# Patient Record
Sex: FEMALE | Race: WHITE | Employment: OTHER | URBAN - METROPOLITAN AREA
[De-identification: names, ages, dates, MRNs, and addresses within clinical notes are randomized per-mention and may not be internally consistent; named-entity substitution may affect disease eponyms.]

---

## 2017-01-06 DIAGNOSIS — E11.42 TYPE 2 DIABETES MELLITUS WITH DIABETIC POLYNEUROPATHY, UNSPECIFIED LONG TERM INSULIN USE STATUS: ICD-10-CM

## 2017-01-06 DIAGNOSIS — E03.9 HYPOTHYROIDISM, UNSPECIFIED TYPE: ICD-10-CM

## 2017-01-06 DIAGNOSIS — I10 ESSENTIAL HYPERTENSION: ICD-10-CM

## 2017-01-06 DIAGNOSIS — E78.2 MIXED HYPERLIPIDEMIA: ICD-10-CM

## 2017-01-06 LAB
ALBUMIN SERPL-MCNC: 4.2 G/DL (ref 3.9–4.9)
ALP BLD-CCNC: 87 U/L (ref 40–130)
ALT SERPL-CCNC: 16 U/L (ref 0–33)
ANION GAP SERPL CALCULATED.3IONS-SCNC: 11 MEQ/L (ref 7–13)
AST SERPL-CCNC: 19 U/L (ref 0–35)
BILIRUB SERPL-MCNC: 0.6 MG/DL (ref 0–1.2)
BUN BLDV-MCNC: 15 MG/DL (ref 8–23)
CALCIUM SERPL-MCNC: 9.7 MG/DL (ref 8.6–10.2)
CHLORIDE BLD-SCNC: 102 MEQ/L (ref 98–107)
CO2: 28 MEQ/L (ref 22–29)
CREAT SERPL-MCNC: 0.77 MG/DL (ref 0.5–0.9)
GFR AFRICAN AMERICAN: >60
GFR NON-AFRICAN AMERICAN: >60
GLOBULIN: 2.7 G/DL (ref 2.3–3.5)
GLUCOSE BLD-MCNC: 103 MG/DL (ref 74–109)
HBA1C MFR BLD: 5.8 % (ref 4.8–5.9)
POTASSIUM SERPL-SCNC: 4.8 MEQ/L (ref 3.5–5.1)
SODIUM BLD-SCNC: 141 MEQ/L (ref 132–144)
T4 FREE: 1.16 NG/DL (ref 0.93–1.7)
TOTAL PROTEIN: 6.9 G/DL (ref 6.4–8.1)
TSH SERPL DL<=0.05 MIU/L-ACNC: 5.83 UIU/ML (ref 0.27–4.2)

## 2017-01-13 ENCOUNTER — OFFICE VISIT (OUTPATIENT)
Dept: FAMILY MEDICINE CLINIC | Age: 67
End: 2017-01-13

## 2017-01-13 VITALS
HEIGHT: 63 IN | RESPIRATION RATE: 16 BRPM | BODY MASS INDEX: 44.3 KG/M2 | HEART RATE: 74 BPM | TEMPERATURE: 99.2 F | DIASTOLIC BLOOD PRESSURE: 80 MMHG | WEIGHT: 250 LBS | SYSTOLIC BLOOD PRESSURE: 136 MMHG

## 2017-01-13 DIAGNOSIS — E11.42 TYPE 2 DIABETES MELLITUS WITH DIABETIC POLYNEUROPATHY, WITHOUT LONG-TERM CURRENT USE OF INSULIN (HCC): Primary | ICD-10-CM

## 2017-01-13 DIAGNOSIS — E78.2 MIXED HYPERLIPIDEMIA: ICD-10-CM

## 2017-01-13 DIAGNOSIS — M17.9 OSTEOARTHRITIS OF KNEE, UNSPECIFIED LATERALITY, UNSPECIFIED OSTEOARTHRITIS TYPE: ICD-10-CM

## 2017-01-13 DIAGNOSIS — I10 ESSENTIAL HYPERTENSION: ICD-10-CM

## 2017-01-13 DIAGNOSIS — E03.9 HYPOTHYROIDISM, UNSPECIFIED TYPE: ICD-10-CM

## 2017-01-13 PROCEDURE — 99214 OFFICE O/P EST MOD 30 MIN: CPT | Performed by: FAMILY MEDICINE

## 2017-01-13 RX ORDER — LEVOTHYROXINE SODIUM 0.12 MG/1
125 TABLET ORAL DAILY
Qty: 90 TABLET | Refills: 3
Start: 2017-01-13 | End: 2017-06-05 | Stop reason: SDUPTHER

## 2017-01-13 ASSESSMENT — PATIENT HEALTH QUESTIONNAIRE - PHQ9
2. FEELING DOWN, DEPRESSED OR HOPELESS: 0
SUM OF ALL RESPONSES TO PHQ9 QUESTIONS 1 & 2: 1
1. LITTLE INTEREST OR PLEASURE IN DOING THINGS: 1
SUM OF ALL RESPONSES TO PHQ QUESTIONS 1-9: 1

## 2017-02-28 RX ORDER — LEVOTHYROXINE SODIUM 0.03 MG/1
TABLET ORAL
Qty: 30 TABLET | Refills: 3 | Status: SHIPPED | OUTPATIENT
Start: 2017-02-28 | End: 2017-06-30 | Stop reason: SDUPTHER

## 2017-03-08 RX ORDER — METOPROLOL SUCCINATE 100 MG/1
TABLET, EXTENDED RELEASE ORAL
Qty: 30 TABLET | Refills: 5 | Status: SHIPPED | OUTPATIENT
Start: 2017-03-08 | End: 2017-10-01 | Stop reason: SDUPTHER

## 2017-03-14 ENCOUNTER — TELEPHONE (OUTPATIENT)
Dept: FAMILY MEDICINE CLINIC | Age: 67
End: 2017-03-14

## 2017-04-05 ENCOUNTER — OFFICE VISIT (OUTPATIENT)
Dept: FAMILY MEDICINE CLINIC | Age: 67
End: 2017-04-05

## 2017-04-05 VITALS
DIASTOLIC BLOOD PRESSURE: 76 MMHG | RESPIRATION RATE: 18 BRPM | HEART RATE: 70 BPM | TEMPERATURE: 97.8 F | BODY MASS INDEX: 46.07 KG/M2 | SYSTOLIC BLOOD PRESSURE: 132 MMHG | HEIGHT: 63 IN | WEIGHT: 260 LBS

## 2017-04-05 DIAGNOSIS — I10 ESSENTIAL HYPERTENSION: ICD-10-CM

## 2017-04-05 DIAGNOSIS — E78.2 MIXED HYPERLIPIDEMIA: ICD-10-CM

## 2017-04-05 DIAGNOSIS — E03.9 HYPOTHYROIDISM, UNSPECIFIED TYPE: ICD-10-CM

## 2017-04-05 DIAGNOSIS — E11.42 TYPE 2 DIABETES MELLITUS WITH DIABETIC POLYNEUROPATHY, WITHOUT LONG-TERM CURRENT USE OF INSULIN (HCC): Primary | ICD-10-CM

## 2017-04-05 PROCEDURE — 99213 OFFICE O/P EST LOW 20 MIN: CPT | Performed by: FAMILY MEDICINE

## 2017-05-09 ENCOUNTER — OFFICE VISIT (OUTPATIENT)
Dept: FAMILY MEDICINE CLINIC | Age: 67
End: 2017-05-09

## 2017-05-09 VITALS
HEIGHT: 64 IN | WEIGHT: 257 LBS | DIASTOLIC BLOOD PRESSURE: 84 MMHG | BODY MASS INDEX: 43.87 KG/M2 | RESPIRATION RATE: 14 BRPM | SYSTOLIC BLOOD PRESSURE: 126 MMHG | HEART RATE: 72 BPM | TEMPERATURE: 97.7 F

## 2017-05-09 DIAGNOSIS — I10 ESSENTIAL HYPERTENSION: ICD-10-CM

## 2017-05-09 DIAGNOSIS — M17.9 OSTEOARTHRITIS OF KNEE, UNSPECIFIED LATERALITY, UNSPECIFIED OSTEOARTHRITIS TYPE: ICD-10-CM

## 2017-05-09 DIAGNOSIS — E03.9 HYPOTHYROIDISM, UNSPECIFIED TYPE: ICD-10-CM

## 2017-05-09 DIAGNOSIS — E78.2 MIXED HYPERLIPIDEMIA: ICD-10-CM

## 2017-05-09 DIAGNOSIS — E11.42 TYPE 2 DIABETES MELLITUS WITH DIABETIC POLYNEUROPATHY, WITHOUT LONG-TERM CURRENT USE OF INSULIN (HCC): Primary | ICD-10-CM

## 2017-05-09 PROCEDURE — 99213 OFFICE O/P EST LOW 20 MIN: CPT | Performed by: FAMILY MEDICINE

## 2017-05-26 ENCOUNTER — TELEPHONE (OUTPATIENT)
Dept: FAMILY MEDICINE CLINIC | Age: 67
End: 2017-05-26

## 2017-06-05 RX ORDER — LEVOTHYROXINE SODIUM 0.12 MG/1
125 TABLET ORAL DAILY
Qty: 90 TABLET | Refills: 1 | Status: SHIPPED | OUTPATIENT
Start: 2017-06-05 | End: 2017-07-15 | Stop reason: DRUGHIGH

## 2017-06-20 ENCOUNTER — OFFICE VISIT (OUTPATIENT)
Dept: FAMILY MEDICINE CLINIC | Age: 67
End: 2017-06-20

## 2017-06-20 VITALS
SYSTOLIC BLOOD PRESSURE: 118 MMHG | RESPIRATION RATE: 16 BRPM | TEMPERATURE: 97.6 F | HEART RATE: 72 BPM | HEIGHT: 63 IN | BODY MASS INDEX: 45 KG/M2 | DIASTOLIC BLOOD PRESSURE: 72 MMHG | WEIGHT: 254 LBS

## 2017-06-20 DIAGNOSIS — E03.9 HYPOTHYROIDISM, UNSPECIFIED TYPE: ICD-10-CM

## 2017-06-20 DIAGNOSIS — I10 ESSENTIAL HYPERTENSION: ICD-10-CM

## 2017-06-20 DIAGNOSIS — E78.2 MIXED HYPERLIPIDEMIA: ICD-10-CM

## 2017-06-20 DIAGNOSIS — E11.42 TYPE 2 DIABETES MELLITUS WITH DIABETIC POLYNEUROPATHY, WITHOUT LONG-TERM CURRENT USE OF INSULIN (HCC): Primary | ICD-10-CM

## 2017-06-20 DIAGNOSIS — M17.9 OSTEOARTHRITIS OF KNEE, UNSPECIFIED LATERALITY, UNSPECIFIED OSTEOARTHRITIS TYPE: ICD-10-CM

## 2017-06-20 PROCEDURE — 99213 OFFICE O/P EST LOW 20 MIN: CPT | Performed by: FAMILY MEDICINE

## 2017-06-23 RX ORDER — LOSARTAN POTASSIUM AND HYDROCHLOROTHIAZIDE 25; 100 MG/1; MG/1
TABLET ORAL
Qty: 90 TABLET | Refills: 1 | Status: SHIPPED | OUTPATIENT
Start: 2017-06-23 | End: 2017-12-19 | Stop reason: SDUPTHER

## 2017-06-30 RX ORDER — LEVOTHYROXINE SODIUM 0.03 MG/1
TABLET ORAL
Qty: 30 TABLET | Refills: 3 | Status: SHIPPED | OUTPATIENT
Start: 2017-06-30 | End: 2017-07-15 | Stop reason: DRUGHIGH

## 2017-07-12 DIAGNOSIS — E78.2 MIXED HYPERLIPIDEMIA: ICD-10-CM

## 2017-07-12 DIAGNOSIS — I10 ESSENTIAL HYPERTENSION: Primary | ICD-10-CM

## 2017-07-12 DIAGNOSIS — E11.42 TYPE 2 DIABETES MELLITUS WITH DIABETIC POLYNEUROPATHY, WITHOUT LONG-TERM CURRENT USE OF INSULIN (HCC): ICD-10-CM

## 2017-07-12 DIAGNOSIS — E03.9 HYPOTHYROIDISM, UNSPECIFIED TYPE: ICD-10-CM

## 2017-07-13 DIAGNOSIS — E03.9 HYPOTHYROIDISM, UNSPECIFIED TYPE: ICD-10-CM

## 2017-07-13 DIAGNOSIS — I10 ESSENTIAL HYPERTENSION: ICD-10-CM

## 2017-07-13 DIAGNOSIS — E11.42 TYPE 2 DIABETES MELLITUS WITH DIABETIC POLYNEUROPATHY, WITHOUT LONG-TERM CURRENT USE OF INSULIN (HCC): ICD-10-CM

## 2017-07-13 DIAGNOSIS — E78.2 MIXED HYPERLIPIDEMIA: ICD-10-CM

## 2017-07-13 LAB
ALBUMIN SERPL-MCNC: 3.9 G/DL (ref 3.9–4.9)
ALP BLD-CCNC: 69 U/L (ref 40–130)
ALT SERPL-CCNC: 12 U/L (ref 0–33)
ANION GAP SERPL CALCULATED.3IONS-SCNC: 13 MEQ/L (ref 7–13)
AST SERPL-CCNC: 18 U/L (ref 0–35)
BILIRUB SERPL-MCNC: 0.5 MG/DL (ref 0–1.2)
BUN BLDV-MCNC: 17 MG/DL (ref 8–23)
CALCIUM SERPL-MCNC: 9.2 MG/DL (ref 8.6–10.2)
CHLORIDE BLD-SCNC: 102 MEQ/L (ref 98–107)
CHOLESTEROL, TOTAL: 149 MG/DL (ref 0–199)
CO2: 25 MEQ/L (ref 22–29)
CREAT SERPL-MCNC: 0.95 MG/DL (ref 0.5–0.9)
GFR AFRICAN AMERICAN: >60
GFR NON-AFRICAN AMERICAN: 58.7
GLOBULIN: 2.8 G/DL (ref 2.3–3.5)
GLUCOSE BLD-MCNC: 98 MG/DL (ref 74–109)
HBA1C MFR BLD: 6.1 % (ref 4.8–5.9)
HDLC SERPL-MCNC: 46 MG/DL (ref 40–59)
LDL CHOLESTEROL CALCULATED: 72 MG/DL (ref 0–129)
POTASSIUM SERPL-SCNC: 4.5 MEQ/L (ref 3.5–5.1)
SODIUM BLD-SCNC: 140 MEQ/L (ref 132–144)
T4 FREE: 1.24 NG/DL (ref 0.93–1.7)
TOTAL PROTEIN: 6.7 G/DL (ref 6.4–8.1)
TRIGL SERPL-MCNC: 154 MG/DL (ref 0–200)
TSH SERPL DL<=0.05 MIU/L-ACNC: 9.31 UIU/ML (ref 0.27–4.2)

## 2017-07-14 ENCOUNTER — PATIENT MESSAGE (OUTPATIENT)
Dept: FAMILY MEDICINE CLINIC | Age: 67
End: 2017-07-14

## 2017-07-14 DIAGNOSIS — E03.9 HYPOTHYROIDISM, UNSPECIFIED TYPE: Primary | ICD-10-CM

## 2017-07-15 ENCOUNTER — PATIENT MESSAGE (OUTPATIENT)
Dept: FAMILY MEDICINE CLINIC | Age: 67
End: 2017-07-15

## 2017-07-15 DIAGNOSIS — E03.9 HYPOTHYROIDISM, UNSPECIFIED TYPE: Primary | ICD-10-CM

## 2017-07-15 RX ORDER — LEVOTHYROXINE SODIUM 0.15 MG/1
150 TABLET ORAL DAILY
Qty: 30 TABLET | Refills: 3 | Status: SHIPPED | OUTPATIENT
Start: 2017-07-15 | End: 2017-10-09 | Stop reason: SDUPTHER

## 2017-07-15 RX ORDER — LEVOTHYROXINE SODIUM 175 UG/1
175 TABLET ORAL DAILY
Qty: 30 TABLET | Refills: 3 | Status: SHIPPED | OUTPATIENT
Start: 2017-07-15 | End: 2017-07-15 | Stop reason: DRUGHIGH

## 2017-07-19 ENCOUNTER — OFFICE VISIT (OUTPATIENT)
Dept: FAMILY MEDICINE CLINIC | Age: 67
End: 2017-07-19

## 2017-07-19 VITALS
HEART RATE: 72 BPM | DIASTOLIC BLOOD PRESSURE: 78 MMHG | HEIGHT: 63 IN | SYSTOLIC BLOOD PRESSURE: 116 MMHG | WEIGHT: 248 LBS | TEMPERATURE: 97.7 F | BODY MASS INDEX: 43.94 KG/M2 | RESPIRATION RATE: 14 BRPM

## 2017-07-19 DIAGNOSIS — E78.2 MIXED HYPERLIPIDEMIA: ICD-10-CM

## 2017-07-19 DIAGNOSIS — I10 ESSENTIAL HYPERTENSION: ICD-10-CM

## 2017-07-19 DIAGNOSIS — E03.9 HYPOTHYROIDISM, UNSPECIFIED TYPE: ICD-10-CM

## 2017-07-19 DIAGNOSIS — E11.42 TYPE 2 DIABETES MELLITUS WITH DIABETIC POLYNEUROPATHY, WITHOUT LONG-TERM CURRENT USE OF INSULIN (HCC): Primary | ICD-10-CM

## 2017-07-19 PROCEDURE — 99213 OFFICE O/P EST LOW 20 MIN: CPT | Performed by: FAMILY MEDICINE

## 2017-10-02 RX ORDER — METOPROLOL SUCCINATE 100 MG/1
TABLET, EXTENDED RELEASE ORAL
Qty: 30 TABLET | Refills: 5 | Status: SHIPPED | OUTPATIENT
Start: 2017-10-02 | End: 2018-04-14 | Stop reason: SDUPTHER

## 2017-10-07 DIAGNOSIS — E11.42 TYPE 2 DIABETES MELLITUS WITH DIABETIC POLYNEUROPATHY, WITHOUT LONG-TERM CURRENT USE OF INSULIN (HCC): ICD-10-CM

## 2017-10-07 DIAGNOSIS — E03.9 HYPOTHYROIDISM, UNSPECIFIED TYPE: ICD-10-CM

## 2017-10-07 DIAGNOSIS — E11.42 TYPE 2 DIABETES MELLITUS WITH DIABETIC POLYNEUROPATHY, WITHOUT LONG-TERM CURRENT USE OF INSULIN (HCC): Primary | ICD-10-CM

## 2017-10-07 LAB
HBA1C MFR BLD: 5.8 % (ref 4.8–5.9)
T4 FREE: 1.89 NG/DL (ref 0.93–1.7)
TSH SERPL DL<=0.05 MIU/L-ACNC: 0.53 UIU/ML (ref 0.27–4.2)

## 2017-10-09 DIAGNOSIS — E03.9 HYPOTHYROIDISM, UNSPECIFIED TYPE: ICD-10-CM

## 2017-10-09 RX ORDER — LEVOTHYROXINE SODIUM 0.15 MG/1
150 TABLET ORAL DAILY
Qty: 90 TABLET | Refills: 0 | Status: SHIPPED | OUTPATIENT
Start: 2017-10-09 | End: 2018-06-13 | Stop reason: DRUGHIGH

## 2017-10-13 ENCOUNTER — OFFICE VISIT (OUTPATIENT)
Dept: FAMILY MEDICINE CLINIC | Age: 67
End: 2017-10-13

## 2017-10-13 VITALS
WEIGHT: 248 LBS | HEART RATE: 76 BPM | TEMPERATURE: 98.6 F | SYSTOLIC BLOOD PRESSURE: 124 MMHG | BODY MASS INDEX: 43.94 KG/M2 | RESPIRATION RATE: 18 BRPM | HEIGHT: 63 IN | DIASTOLIC BLOOD PRESSURE: 78 MMHG

## 2017-10-13 DIAGNOSIS — I10 ESSENTIAL HYPERTENSION: ICD-10-CM

## 2017-10-13 DIAGNOSIS — Z23 NEED FOR INFLUENZA VACCINATION: ICD-10-CM

## 2017-10-13 DIAGNOSIS — E78.2 MIXED HYPERLIPIDEMIA: ICD-10-CM

## 2017-10-13 DIAGNOSIS — E11.42 TYPE 2 DIABETES MELLITUS WITH DIABETIC POLYNEUROPATHY, WITHOUT LONG-TERM CURRENT USE OF INSULIN (HCC): Primary | ICD-10-CM

## 2017-10-13 DIAGNOSIS — Z12.31 ENCOUNTER FOR SCREENING MAMMOGRAM FOR BREAST CANCER: ICD-10-CM

## 2017-10-13 DIAGNOSIS — E03.9 HYPOTHYROIDISM, UNSPECIFIED TYPE: ICD-10-CM

## 2017-10-13 PROCEDURE — 90662 IIV NO PRSV INCREASED AG IM: CPT | Performed by: FAMILY MEDICINE

## 2017-10-13 PROCEDURE — 90471 IMMUNIZATION ADMIN: CPT | Performed by: FAMILY MEDICINE

## 2017-10-13 PROCEDURE — 99214 OFFICE O/P EST MOD 30 MIN: CPT | Performed by: FAMILY MEDICINE

## 2017-10-13 NOTE — PROGRESS NOTES
Chief Complaint   Patient presents with    Diabetes Mellitus    Hypertension    Hyperlipidemia     Rowdy Polo is here for a diabetic follow up    HBA1C= 5.8    LDL= 72    Sugars most recently have been running-   HIGH & LOW <150  Activity level-     advised to increase  The patient denies history of       seizures,             heart attack or KNOWN CAD       or stroke. No chest pain, shortness of breath, paroxysmal nocturnal dyspnea. No nausea, vomiting, diarrhea, hematochezia or melena. No paresthesias or headaches      No dysuria, frequency or hematuria.     Past Medical History:   Diagnosis Date    DM2 (diabetes mellitus, type 2) ([facility] [address]     Hyperlipidemia     Hypertension     Hypothyroidism      Past Surgical History:   Procedure Laterality Date     SECTION      COLONOSCOPY      needs      Social History     Social History    Marital status:      Spouse name: N/A    Number of children: N/A    Years of education: N/A     Social History Main Topics    Smoking status: Never Smoker    Smokeless tobacco: Never Used    Alcohol use Yes      Comment: rare     Drug use: No    Sexual activity: Yes     Partners: Male     Other Topics Concern    None     Social History Narrative    None     Orders Only on 10/07/2017   Component Date Value Ref Range Status    Hemoglobin A1C 10/07/2017 5.8  4.8 - 5.9 % Final    TSH 10/07/2017 0.527  0.270 - 4.200 uIU/mL Final    T4 Free 10/07/2017 1.89* 0.93 - 1.70 ng/dL Final     Health Maintenance   Topic Date Due    Hepatitis C screen  1950    Flu vaccine (1) 2017    Diabetic foot exam  10/12/2017    DEXA (modify frequency per FRAX score)  2018 (Originally 2015)    DTaP/Tdap/Td vaccine (1 - Tdap) 2018 (Originally 1969)    Diabetic microalbuminuria test  2017    Diabetic retinal exam  2018    Colon cancer screen colonoscopy  2018    Lipid screen  2018    Breast trying w diet and exercise  Has lost weight  May want to return to contrave  Will skip one d/wk thyroid med  Labs in 3-4mo    And again had a lengthy discussion w pt  about risks of poorly controlled diabetes including micro and macrovascular complications of DM2 including blindness,MI,CVA and death among other possibilities. Pt aware and agrees to better compliance and adherance to instructions such as regular eye exams q 1-2 y, foot exams,and f/u regularly for hba1c with a goal of 6.5.     NO evidence of neuropathy or nephropathy at this point    Follow up as planned for hba1c and BP checks    Sooner if condition deteriorates or problems arise    Adis Mccartney MD

## 2017-10-25 ENCOUNTER — HOSPITAL ENCOUNTER (OUTPATIENT)
Dept: WOMENS IMAGING | Age: 67
Discharge: HOME | End: 2017-10-25
Payer: COMMERCIAL

## 2017-10-25 DIAGNOSIS — Z12.31 ENCOUNTER FOR SCREENING MAMMOGRAM FOR BREAST CANCER: ICD-10-CM

## 2017-10-25 PROCEDURE — G0202 SCR MAMMO BI INCL CAD: HCPCS

## 2017-12-19 RX ORDER — LOSARTAN POTASSIUM AND HYDROCHLOROTHIAZIDE 25; 100 MG/1; MG/1
TABLET ORAL
Qty: 90 TABLET | Refills: 1 | Status: SHIPPED | OUTPATIENT
Start: 2017-12-19 | End: 2018-06-25 | Stop reason: SDUPTHER

## 2018-01-10 RX ORDER — ROSUVASTATIN CALCIUM 10 MG/1
TABLET, COATED ORAL
Qty: 90 TABLET | Refills: 1 | Status: SHIPPED | OUTPATIENT
Start: 2018-01-10 | End: 2018-07-12 | Stop reason: SDUPTHER

## 2018-03-05 DIAGNOSIS — E11.42 TYPE 2 DIABETES MELLITUS WITH DIABETIC POLYNEUROPATHY, WITHOUT LONG-TERM CURRENT USE OF INSULIN (HCC): ICD-10-CM

## 2018-03-05 DIAGNOSIS — I10 ESSENTIAL HYPERTENSION: ICD-10-CM

## 2018-03-05 DIAGNOSIS — E78.2 MIXED HYPERLIPIDEMIA: ICD-10-CM

## 2018-03-05 DIAGNOSIS — E03.9 HYPOTHYROIDISM, UNSPECIFIED TYPE: Primary | ICD-10-CM

## 2018-03-06 DIAGNOSIS — I10 ESSENTIAL HYPERTENSION: ICD-10-CM

## 2018-03-06 DIAGNOSIS — E78.2 MIXED HYPERLIPIDEMIA: ICD-10-CM

## 2018-03-06 DIAGNOSIS — E03.9 HYPOTHYROIDISM, UNSPECIFIED TYPE: ICD-10-CM

## 2018-03-06 DIAGNOSIS — E11.42 TYPE 2 DIABETES MELLITUS WITH DIABETIC POLYNEUROPATHY, WITHOUT LONG-TERM CURRENT USE OF INSULIN (HCC): ICD-10-CM

## 2018-03-06 LAB
ALBUMIN SERPL-MCNC: 4.1 G/DL (ref 3.9–4.9)
ALP BLD-CCNC: 81 U/L (ref 40–130)
ALT SERPL-CCNC: 12 U/L (ref 0–33)
ANION GAP SERPL CALCULATED.3IONS-SCNC: 14 MEQ/L (ref 7–13)
AST SERPL-CCNC: 16 U/L (ref 0–35)
BILIRUB SERPL-MCNC: 0.5 MG/DL (ref 0–1.2)
BUN BLDV-MCNC: 15 MG/DL (ref 8–23)
CALCIUM SERPL-MCNC: 9.4 MG/DL (ref 8.6–10.2)
CHLORIDE BLD-SCNC: 103 MEQ/L (ref 98–107)
CO2: 26 MEQ/L (ref 22–29)
CREAT SERPL-MCNC: 0.91 MG/DL (ref 0.5–0.9)
CREATININE URINE: 37.6 MG/DL
GFR AFRICAN AMERICAN: >60
GFR NON-AFRICAN AMERICAN: >60
GLOBULIN: 2.7 G/DL (ref 2.3–3.5)
GLUCOSE BLD-MCNC: 90 MG/DL (ref 74–109)
HBA1C MFR BLD: 6 % (ref 4.8–5.9)
MICROALBUMIN UR-MCNC: <1.2 MG/DL
MICROALBUMIN/CREAT UR-RTO: NORMAL MG/G (ref 0–30)
POTASSIUM SERPL-SCNC: 4.6 MEQ/L (ref 3.5–5.1)
SODIUM BLD-SCNC: 143 MEQ/L (ref 132–144)
T4 FREE: 1.23 NG/DL (ref 0.93–1.7)
TOTAL PROTEIN: 6.8 G/DL (ref 6.4–8.1)
TSH SERPL DL<=0.05 MIU/L-ACNC: 1.97 UIU/ML (ref 0.27–4.2)

## 2018-03-13 ENCOUNTER — OFFICE VISIT (OUTPATIENT)
Dept: FAMILY MEDICINE CLINIC | Age: 68
End: 2018-03-13
Payer: COMMERCIAL

## 2018-03-13 VITALS
RESPIRATION RATE: 16 BRPM | SYSTOLIC BLOOD PRESSURE: 118 MMHG | HEIGHT: 63 IN | BODY MASS INDEX: 42.88 KG/M2 | DIASTOLIC BLOOD PRESSURE: 62 MMHG | HEART RATE: 76 BPM | WEIGHT: 242 LBS | TEMPERATURE: 98.1 F

## 2018-03-13 DIAGNOSIS — E03.9 HYPOTHYROIDISM, UNSPECIFIED TYPE: ICD-10-CM

## 2018-03-13 DIAGNOSIS — E78.2 MIXED HYPERLIPIDEMIA: ICD-10-CM

## 2018-03-13 DIAGNOSIS — R19.4 CHANGE IN BOWEL HABITS: ICD-10-CM

## 2018-03-13 DIAGNOSIS — I10 ESSENTIAL HYPERTENSION: ICD-10-CM

## 2018-03-13 DIAGNOSIS — Z12.11 COLON CANCER SCREENING: ICD-10-CM

## 2018-03-13 DIAGNOSIS — E11.42 TYPE 2 DIABETES MELLITUS WITH DIABETIC POLYNEUROPATHY, WITHOUT LONG-TERM CURRENT USE OF INSULIN (HCC): Primary | ICD-10-CM

## 2018-03-13 PROCEDURE — 99214 OFFICE O/P EST MOD 30 MIN: CPT | Performed by: FAMILY MEDICINE

## 2018-03-13 RX ORDER — NALTREXONE HYDROCHLORIDE AND BUPROPION HYDROCHLORIDE 8; 90 MG/1; MG/1
TABLET, EXTENDED RELEASE ORAL
Refills: 3 | COMMUNITY
Start: 2018-02-13 | End: 2018-06-13 | Stop reason: SDUPTHER

## 2018-03-13 ASSESSMENT — PATIENT HEALTH QUESTIONNAIRE - PHQ9
SUM OF ALL RESPONSES TO PHQ QUESTIONS 1-9: 0
2. FEELING DOWN, DEPRESSED OR HOPELESS: 0
1. LITTLE INTEREST OR PLEASURE IN DOING THINGS: 0
SUM OF ALL RESPONSES TO PHQ9 QUESTIONS 1 & 2: 0

## 2018-03-13 NOTE — PROGRESS NOTES
controlled diabetes including micro and macrovascular complications of DM2 including blindness,MI,CVA and death among other possibilities. Pt aware and agrees to better compliance and adherance to instructions such as regular eye exams q 1-2 y, foot exams,and f/u regularly for hba1c with a goal of 6.5.     NO evidence of neuropathy or nephropathy at this point    Follow up as planned for hba1c and BP checks    Sooner if condition deteriorates or problems arise    iMcki Alanis MD

## 2018-03-15 ENCOUNTER — TELEPHONE (OUTPATIENT)
Dept: FAMILY MEDICINE CLINIC | Age: 68
End: 2018-03-15

## 2018-03-27 ENCOUNTER — OFFICE VISIT (OUTPATIENT)
Dept: GASTROENTEROLOGY | Age: 68
End: 2018-03-27
Payer: COMMERCIAL

## 2018-03-27 VITALS
DIASTOLIC BLOOD PRESSURE: 82 MMHG | HEIGHT: 64 IN | BODY MASS INDEX: 40.63 KG/M2 | RESPIRATION RATE: 16 BRPM | SYSTOLIC BLOOD PRESSURE: 152 MMHG | HEART RATE: 53 BPM | WEIGHT: 238 LBS

## 2018-03-27 DIAGNOSIS — Z12.11 SPECIAL SCREENING FOR MALIGNANT NEOPLASMS, COLON: Primary | ICD-10-CM

## 2018-03-27 DIAGNOSIS — Z79.82 ENCOUNTER FOR LONG-TERM (CURRENT) USE OF ASPIRIN: ICD-10-CM

## 2018-03-27 DIAGNOSIS — Z79.899 ENCOUNTER FOR LONG-TERM (CURRENT) USE OF MEDICATIONS: ICD-10-CM

## 2018-03-27 PROCEDURE — 99203 OFFICE O/P NEW LOW 30 MIN: CPT | Performed by: INTERNAL MEDICINE

## 2018-03-27 NOTE — PROGRESS NOTES
Jai Sees  79 y.o. female  Referred by: Lawrence Martínez MD    Medicines, social history, past medical history, surgical history, and allergies have been reviewed and updated as needed. Chief Complaint   Patient presents with    Other     change in bowel          HPI:   Patient is a 79year old WF referred for a screening colonoscopy. Family history is negative for colon carcinoma or colonic polyps. There is no bleeding. The rest of patient's GI history is negative. ROS:    CONSTITUTIONAL:  Negative  EYES:  Negative  ENMT:  Negative  MUSCULOSKELETAL:  Hip pain  NEUROLOGICAL:  Negative  INTEGUMENTARY:  Negative  GASTROINTESTINAL:  See HPI  GENITOURINARY:  Negative  CARDIOVASCULAR:  Hypertension  RESPIRATORY:  Shortness of breath with activity  HEM/LYMPH:  Negative  ENDOCRINE:  Diabetes  PSYCHIATRIC:  Negative  ALLERGIC/IMMUNO:  Negative  EXTREMITIES:  Negative  BREAST:  Negative        Physical Exam:  BP (!) 152/82 (Site: Right Arm, Position: Sitting)   Pulse 53   Resp 16   Ht 5' 4\" (1.626 m)   Wt 238 lb (108 kg)   LMP  (LMP Unknown)   BMI 40.85 kg/m²   Constitutional:  Patient appears overweight, well nourished, well developed, and hydrated. Alert and oriented x3 and appropriate. Non icteric and not pale. Eyes:  Pupils equal and reactive. Oropharynx:  Unremarkable. Neck/Thyroid: Neck is supple. No palpable nodules. No carotid bruits. Thyroid is symmetrical, without thyromegaly, masses, or palpable nodules. Respiratory:  Normal to inspection. Lungs clear to auscultation and percussion. No wheezes rhonchi or rales. Cardiovascular:  Regular rate and rhythm. No murmurs, gallops, or rubs. Abdomen:  Soft, obese, no tenderness and non-distended. No organomegaly. No masses. No rebound or guarding. Normal bowel sounds. Integumentary:  The skin is unremarkable. No rashes. No suspicious lesions. Warm and dry. Neuro: Grossly intact.  Cranial nerves, sensation and reflexes grossly

## 2018-04-13 ENCOUNTER — HOSPITAL ENCOUNTER (OUTPATIENT)
Age: 68
Setting detail: OUTPATIENT SURGERY
Discharge: HOME | End: 2018-04-13
Attending: INTERNAL MEDICINE | Admitting: INTERNAL MEDICINE
Payer: COMMERCIAL

## 2018-04-13 ENCOUNTER — ANESTHESIA (OUTPATIENT)
Dept: ENDOSCOPY | Age: 68
End: 2018-04-13
Payer: COMMERCIAL

## 2018-04-13 ENCOUNTER — ANESTHESIA EVENT (OUTPATIENT)
Dept: ENDOSCOPY | Age: 68
End: 2018-04-13
Payer: COMMERCIAL

## 2018-04-13 VITALS
SYSTOLIC BLOOD PRESSURE: 132 MMHG | WEIGHT: 238 LBS | TEMPERATURE: 98.5 F | RESPIRATION RATE: 16 BRPM | HEIGHT: 64 IN | BODY MASS INDEX: 40.63 KG/M2 | OXYGEN SATURATION: 96 % | HEART RATE: 51 BPM | DIASTOLIC BLOOD PRESSURE: 66 MMHG

## 2018-04-13 VITALS
OXYGEN SATURATION: 96 % | SYSTOLIC BLOOD PRESSURE: 219 MMHG | DIASTOLIC BLOOD PRESSURE: 94 MMHG | RESPIRATION RATE: 21 BRPM

## 2018-04-13 PROCEDURE — 7100000010 HC PHASE II RECOVERY - FIRST 15 MIN: Performed by: INTERNAL MEDICINE

## 2018-04-13 PROCEDURE — 3700000000 HC ANESTHESIA ATTENDED CARE: Performed by: INTERNAL MEDICINE

## 2018-04-13 PROCEDURE — 2580000003 HC RX 258: Performed by: NURSE ANESTHETIST, CERTIFIED REGISTERED

## 2018-04-13 PROCEDURE — 7100000011 HC PHASE II RECOVERY - ADDTL 15 MIN: Performed by: INTERNAL MEDICINE

## 2018-04-13 PROCEDURE — 3700000001 HC ADD 15 MINUTES (ANESTHESIA): Performed by: INTERNAL MEDICINE

## 2018-04-13 PROCEDURE — 6360000002 HC RX W HCPCS: Performed by: NURSE ANESTHETIST, CERTIFIED REGISTERED

## 2018-04-13 PROCEDURE — 3609027000 HC COLONOSCOPY: Performed by: INTERNAL MEDICINE

## 2018-04-13 PROCEDURE — 2500000003 HC RX 250 WO HCPCS: Performed by: NURSE ANESTHETIST, CERTIFIED REGISTERED

## 2018-04-13 PROCEDURE — 45385 COLONOSCOPY W/LESION REMOVAL: CPT | Performed by: INTERNAL MEDICINE

## 2018-04-13 RX ORDER — SODIUM CHLORIDE 9 MG/ML
INJECTION, SOLUTION INTRAVENOUS CONTINUOUS
Status: DISCONTINUED | OUTPATIENT
Start: 2018-04-13 | End: 2018-04-13 | Stop reason: HOSPADM

## 2018-04-13 RX ORDER — ONDANSETRON 2 MG/ML
4 INJECTION INTRAMUSCULAR; INTRAVENOUS
Status: DISCONTINUED | OUTPATIENT
Start: 2018-04-13 | End: 2018-04-13 | Stop reason: HOSPADM

## 2018-04-13 RX ORDER — SODIUM CHLORIDE 0.9 % (FLUSH) 0.9 %
SYRINGE (ML) INJECTION PRN
Status: DISCONTINUED | OUTPATIENT
Start: 2018-04-13 | End: 2018-04-13 | Stop reason: SDUPTHER

## 2018-04-13 RX ORDER — PROPOFOL 10 MG/ML
INJECTION, EMULSION INTRAVENOUS PRN
Status: DISCONTINUED | OUTPATIENT
Start: 2018-04-13 | End: 2018-04-13 | Stop reason: SDUPTHER

## 2018-04-13 RX ORDER — LABETALOL HYDROCHLORIDE 5 MG/ML
INJECTION, SOLUTION INTRAVENOUS PRN
Status: DISCONTINUED | OUTPATIENT
Start: 2018-04-13 | End: 2018-04-13 | Stop reason: SDUPTHER

## 2018-04-13 RX ORDER — LIDOCAINE HYDROCHLORIDE 20 MG/ML
INJECTION, SOLUTION INFILTRATION; PERINEURAL PRN
Status: DISCONTINUED | OUTPATIENT
Start: 2018-04-13 | End: 2018-04-13 | Stop reason: SDUPTHER

## 2018-04-13 RX ADMIN — PROPOFOL 40 MG: 10 INJECTION, EMULSION INTRAVENOUS at 09:34

## 2018-04-13 RX ADMIN — SODIUM CHLORIDE, PRESERVATIVE FREE 10 ML: 5 INJECTION INTRAVENOUS at 09:47

## 2018-04-13 RX ADMIN — PROPOFOL 40 MG: 10 INJECTION, EMULSION INTRAVENOUS at 09:36

## 2018-04-13 RX ADMIN — LABETALOL HYDROCHLORIDE 10 MG: 5 INJECTION, SOLUTION INTRAVENOUS at 09:48

## 2018-04-13 RX ADMIN — LIDOCAINE HYDROCHLORIDE 60 MG: 20 INJECTION, SOLUTION INFILTRATION; PERINEURAL at 09:33

## 2018-04-13 RX ADMIN — PROPOFOL 100 MG: 10 INJECTION, EMULSION INTRAVENOUS at 09:33

## 2018-04-13 ASSESSMENT — PAIN - FUNCTIONAL ASSESSMENT: PAIN_FUNCTIONAL_ASSESSMENT: 0-10

## 2018-04-16 RX ORDER — METOPROLOL SUCCINATE 100 MG/1
TABLET, EXTENDED RELEASE ORAL
Qty: 30 TABLET | Refills: 5 | Status: SHIPPED | OUTPATIENT
Start: 2018-04-16 | End: 2018-10-16 | Stop reason: SDUPTHER

## 2018-06-13 DIAGNOSIS — E03.9 HYPOTHYROIDISM, UNSPECIFIED TYPE: ICD-10-CM

## 2018-06-13 DIAGNOSIS — E11.42 TYPE 2 DIABETES MELLITUS WITH DIABETIC POLYNEUROPATHY, WITHOUT LONG-TERM CURRENT USE OF INSULIN (HCC): Primary | ICD-10-CM

## 2018-06-13 RX ORDER — LEVOTHYROXINE SODIUM 0.12 MG/1
125 TABLET ORAL DAILY
Qty: 90 TABLET | Refills: 0 | Status: SHIPPED | OUTPATIENT
Start: 2018-06-13 | End: 2018-09-14 | Stop reason: SDUPTHER

## 2018-06-13 RX ORDER — NALTREXONE HYDROCHLORIDE AND BUPROPION HYDROCHLORIDE 8; 90 MG/1; MG/1
2 TABLET, EXTENDED RELEASE ORAL 2 TIMES DAILY
Qty: 120 TABLET | Refills: 0 | Status: SHIPPED | OUTPATIENT
Start: 2018-06-13 | End: 2018-07-21 | Stop reason: SDUPTHER

## 2018-06-25 RX ORDER — LOSARTAN POTASSIUM AND HYDROCHLOROTHIAZIDE 25; 100 MG/1; MG/1
TABLET ORAL
Qty: 90 TABLET | Refills: 1 | Status: SHIPPED | OUTPATIENT
Start: 2018-06-25 | End: 2018-12-13 | Stop reason: SDUPTHER

## 2018-06-29 DIAGNOSIS — E03.9 HYPOTHYROIDISM, UNSPECIFIED TYPE: ICD-10-CM

## 2018-06-29 DIAGNOSIS — I10 ESSENTIAL HYPERTENSION: ICD-10-CM

## 2018-06-29 DIAGNOSIS — E78.2 MIXED HYPERLIPIDEMIA: ICD-10-CM

## 2018-06-29 DIAGNOSIS — E11.42 TYPE 2 DIABETES MELLITUS WITH DIABETIC POLYNEUROPATHY, WITHOUT LONG-TERM CURRENT USE OF INSULIN (HCC): Primary | ICD-10-CM

## 2018-07-02 DIAGNOSIS — I10 ESSENTIAL HYPERTENSION: ICD-10-CM

## 2018-07-02 DIAGNOSIS — E78.2 MIXED HYPERLIPIDEMIA: ICD-10-CM

## 2018-07-02 DIAGNOSIS — E11.42 TYPE 2 DIABETES MELLITUS WITH DIABETIC POLYNEUROPATHY, WITHOUT LONG-TERM CURRENT USE OF INSULIN (HCC): ICD-10-CM

## 2018-07-02 DIAGNOSIS — E03.9 HYPOTHYROIDISM, UNSPECIFIED TYPE: ICD-10-CM

## 2018-07-02 LAB
ALBUMIN SERPL-MCNC: 4 G/DL (ref 3.9–4.9)
ALP BLD-CCNC: 74 U/L (ref 40–130)
ALT SERPL-CCNC: 15 U/L (ref 0–33)
ANION GAP SERPL CALCULATED.3IONS-SCNC: 14 MEQ/L (ref 7–13)
AST SERPL-CCNC: 21 U/L (ref 0–35)
BILIRUB SERPL-MCNC: 0.4 MG/DL (ref 0–1.2)
BUN BLDV-MCNC: 18 MG/DL (ref 8–23)
CALCIUM SERPL-MCNC: 9.6 MG/DL (ref 8.6–10.2)
CHLORIDE BLD-SCNC: 102 MEQ/L (ref 98–107)
CHOLESTEROL, TOTAL: 154 MG/DL (ref 0–199)
CO2: 26 MEQ/L (ref 22–29)
CREAT SERPL-MCNC: 1.15 MG/DL (ref 0.5–0.9)
GFR AFRICAN AMERICAN: 56.8
GFR NON-AFRICAN AMERICAN: 46.9
GLOBULIN: 3 G/DL (ref 2.3–3.5)
GLUCOSE BLD-MCNC: 90 MG/DL (ref 74–109)
HBA1C MFR BLD: 6.2 % (ref 4.8–5.9)
HDLC SERPL-MCNC: 53 MG/DL (ref 40–59)
LDL CHOLESTEROL CALCULATED: 78 MG/DL (ref 0–129)
POTASSIUM SERPL-SCNC: 4.6 MEQ/L (ref 3.5–5.1)
SODIUM BLD-SCNC: 142 MEQ/L (ref 132–144)
T4 FREE: 1.55 NG/DL (ref 0.93–1.7)
TOTAL PROTEIN: 7 G/DL (ref 6.4–8.1)
TRIGL SERPL-MCNC: 117 MG/DL (ref 0–200)
TSH SERPL DL<=0.05 MIU/L-ACNC: 4.6 UIU/ML (ref 0.27–4.2)

## 2018-07-10 ENCOUNTER — OFFICE VISIT (OUTPATIENT)
Dept: FAMILY MEDICINE CLINIC | Age: 68
End: 2018-07-10
Payer: COMMERCIAL

## 2018-07-10 VITALS
HEART RATE: 80 BPM | HEIGHT: 63 IN | DIASTOLIC BLOOD PRESSURE: 60 MMHG | TEMPERATURE: 98.1 F | RESPIRATION RATE: 16 BRPM | SYSTOLIC BLOOD PRESSURE: 114 MMHG

## 2018-07-10 DIAGNOSIS — I10 ESSENTIAL HYPERTENSION: ICD-10-CM

## 2018-07-10 DIAGNOSIS — E11.42 TYPE 2 DIABETES MELLITUS WITH DIABETIC POLYNEUROPATHY, WITHOUT LONG-TERM CURRENT USE OF INSULIN (HCC): Primary | ICD-10-CM

## 2018-07-10 DIAGNOSIS — L23.7 ALLERGIC CONTACT DERMATITIS DUE TO PLANTS, EXCEPT FOOD: ICD-10-CM

## 2018-07-10 DIAGNOSIS — E03.9 HYPOTHYROIDISM, UNSPECIFIED TYPE: ICD-10-CM

## 2018-07-10 DIAGNOSIS — E78.2 MIXED HYPERLIPIDEMIA: ICD-10-CM

## 2018-07-10 PROCEDURE — 99214 OFFICE O/P EST MOD 30 MIN: CPT | Performed by: FAMILY MEDICINE

## 2018-07-10 RX ORDER — PREDNISONE 20 MG/1
20 TABLET ORAL DAILY
Qty: 5 TABLET | Refills: 0 | Status: SHIPPED | OUTPATIENT
Start: 2018-07-10 | End: 2018-07-15

## 2018-07-10 RX ORDER — PREDNISONE 20 MG/1
20 TABLET ORAL DAILY
Qty: 5 TABLET | Refills: 0 | Status: SHIPPED | OUTPATIENT
Start: 2018-07-10 | End: 2018-07-10 | Stop reason: SDUPTHER

## 2018-07-10 NOTE — PATIENT INSTRUCTIONS
Patient Education        Poison EMIGDIO[place]ILL[place], [place], and [place]: Care Instructions  Your Care Instructions    Poison ivy, poison oak, and poison sumac are plants that can cause a skin rash upon contact. The red, itchy rash often shows up in lines or streaks and may cause fluid-filled blisters or large, raised hives. The rash is caused by an allergic reaction to an oil in poison ivy, oak, and sumac. The rash may occur when you touch the plant or when you touch clothing, pet fur, sporting gear, gardening tools, or other objects that have come in contact with one of these plants. You cannot catch or spread the rash, even if you touch it or the blister fluid, because the plant oil will already have been absorbed or washed off the skin. The rash may seem to be spreading, but either it is still developing from earlier contact or you have touched something that still has the plant oil on it. Follow-up care is a key part of your treatment and safety. Be sure to make and go to all appointments, and call your doctor if you are having problems. It's also a good idea to know your test results and keep a list of the medicines you take. How can you care for yourself at home? · If your doctor prescribed a cream, use it as directed. If your doctor prescribed medicine, take it exactly as prescribed. Call your doctor if you think you are having a problem with your medicine. · Use cold, wet cloths to reduce itching. · Keep cool, and stay out of the sun. · Leave the rash open to the air. · Wash all clothing or other things that may have come in contact with the plant oil. · Avoid most lotions and ointments until the rash heals. Calamine lotion may help relieve symptoms of a plant rash. Use it 3 or 4 times a day. To prevent poison ivy exposure  If you know that you will be near poison ivy, oak, or sumac, you can try these options:  · Use a product designed to help prevent plant oil from getting on the skin.  These products, such as EMIGDIO-CHÂTILLON X Pre-Contact Skin Solution, come in lotions, sprays, or towelettes. You put the product on your skin right before you go outdoors. · If you did not use a preventive product and you have had contact with plant oil, clean it off your skin as soon as possible. Use a product such as [employer] Original Outdoor Skin Cleanser. These products can also be used to clean plant oil from clothing or tools. When should you call for help? Call your doctor now or seek immediate medical care if:    · Your rash gets worse, and you start to feel bad and have a fever, a stiff neck, nausea, and vomiting.     · You have signs of infection, such as:  ¨ Increased pain, swelling, warmth, or redness. ¨ Red streaks leading from the rash. ¨ Pus draining from the rash. ¨ A fever.    Watch closely for changes in your health, and be sure to contact your doctor if:    · You have new blisters or bruises, or the rash spreads and looks like a sunburn.     · The rash gets worse, or it comes back after nearly disappearing.     · You think a medicine you are using is making your rash worse.     · Your rash does not clear up after 1 to 2 weeks of home treatment.     · You have joint aches or body aches with your rash. Where can you learn more? Go to https://[employer] [facility]. org and sign in to your [employer] account. Enter I649 in the [facility] box to learn more about \"Poison EMIGDIO[place], [place], and [place]: Care Instructions. \"     If you do not have an account, please click on the \"Sign Up Now\" link. Current as of: October 5, 2017  Content Version: 11.6  © 1905-1451 [employer]. Care instructions adapted under license by [facility] ([facility]). If you have questions about a medical condition or this instruction, always ask your healthcare professional. [address] any warranty or liability for your use of this information.

## 2018-07-13 RX ORDER — ROSUVASTATIN CALCIUM 10 MG/1
TABLET, COATED ORAL
Qty: 90 TABLET | Refills: 1 | Status: SHIPPED | OUTPATIENT
Start: 2018-07-13 | End: 2019-01-04 | Stop reason: SDUPTHER

## 2018-07-23 RX ORDER — NALTREXONE HYDROCHLORIDE AND BUPROPION HYDROCHLORIDE 8; 90 MG/1; MG/1
2 TABLET, EXTENDED RELEASE ORAL 2 TIMES DAILY
Qty: 120 TABLET | Refills: 1 | Status: SHIPPED | OUTPATIENT
Start: 2018-07-23 | End: 2018-09-21 | Stop reason: SDUPTHER

## 2018-09-14 DIAGNOSIS — E11.42 TYPE 2 DIABETES MELLITUS WITH DIABETIC POLYNEUROPATHY, WITHOUT LONG-TERM CURRENT USE OF INSULIN (HCC): ICD-10-CM

## 2018-09-14 DIAGNOSIS — E03.9 HYPOTHYROIDISM, UNSPECIFIED TYPE: ICD-10-CM

## 2018-09-14 RX ORDER — LEVOTHYROXINE SODIUM 0.12 MG/1
125 TABLET ORAL DAILY
Qty: 90 TABLET | Refills: 1 | Status: SHIPPED | OUTPATIENT
Start: 2018-09-14 | End: 2018-12-13 | Stop reason: SDUPTHER

## 2018-09-24 RX ORDER — NALTREXONE HYDROCHLORIDE AND BUPROPION HYDROCHLORIDE 8; 90 MG/1; MG/1
2 TABLET, EXTENDED RELEASE ORAL 2 TIMES DAILY
Qty: 120 TABLET | Refills: 1 | Status: SHIPPED | OUTPATIENT
Start: 2018-09-24 | End: 2018-11-17 | Stop reason: SDUPTHER

## 2018-10-16 RX ORDER — METOPROLOL SUCCINATE 100 MG/1
TABLET, EXTENDED RELEASE ORAL
Qty: 90 TABLET | Refills: 0 | Status: ON HOLD | OUTPATIENT
Start: 2018-10-16 | End: 2021-07-21 | Stop reason: HOSPADM

## 2018-11-19 RX ORDER — NALTREXONE HYDROCHLORIDE AND BUPROPION HYDROCHLORIDE 8; 90 MG/1; MG/1
2 TABLET, EXTENDED RELEASE ORAL 2 TIMES DAILY
Qty: 120 TABLET | Refills: 1 | Status: SHIPPED | OUTPATIENT
Start: 2018-11-19 | End: 2021-05-21

## 2018-12-13 DIAGNOSIS — E03.9 HYPOTHYROIDISM, UNSPECIFIED TYPE: ICD-10-CM

## 2018-12-13 DIAGNOSIS — E11.42 TYPE 2 DIABETES MELLITUS WITH DIABETIC POLYNEUROPATHY, WITHOUT LONG-TERM CURRENT USE OF INSULIN (HCC): ICD-10-CM

## 2018-12-14 RX ORDER — LOSARTAN POTASSIUM AND HYDROCHLOROTHIAZIDE 25; 100 MG/1; MG/1
TABLET ORAL
Qty: 90 TABLET | Refills: 0 | Status: ON HOLD | OUTPATIENT
Start: 2018-12-14 | End: 2021-06-07 | Stop reason: HOSPADM

## 2018-12-14 RX ORDER — LEVOTHYROXINE SODIUM 0.12 MG/1
125 TABLET ORAL DAILY
Qty: 90 TABLET | Refills: 0 | Status: SHIPPED | OUTPATIENT
Start: 2018-12-14

## 2018-12-17 RX ORDER — LOSARTAN POTASSIUM AND HYDROCHLOROTHIAZIDE 25; 100 MG/1; MG/1
TABLET ORAL
Qty: 90 TABLET | Refills: 1 | OUTPATIENT
Start: 2018-12-17

## 2019-01-02 DIAGNOSIS — E78.2 MIXED HYPERLIPIDEMIA: ICD-10-CM

## 2019-01-02 DIAGNOSIS — E03.9 HYPOTHYROIDISM, UNSPECIFIED TYPE: ICD-10-CM

## 2019-01-02 DIAGNOSIS — I10 ESSENTIAL HYPERTENSION: ICD-10-CM

## 2019-01-02 DIAGNOSIS — E11.42 TYPE 2 DIABETES MELLITUS WITH DIABETIC POLYNEUROPATHY, WITHOUT LONG-TERM CURRENT USE OF INSULIN (HCC): ICD-10-CM

## 2019-01-02 DIAGNOSIS — E11.42 TYPE 2 DIABETES MELLITUS WITH DIABETIC POLYNEUROPATHY, WITHOUT LONG-TERM CURRENT USE OF INSULIN (HCC): Primary | ICD-10-CM

## 2019-01-02 LAB
ALBUMIN SERPL-MCNC: 3.8 G/DL (ref 3.9–4.9)
ALP BLD-CCNC: 74 U/L (ref 40–130)
ALT SERPL-CCNC: 11 U/L (ref 0–33)
ANION GAP SERPL CALCULATED.3IONS-SCNC: 13 MEQ/L (ref 7–13)
AST SERPL-CCNC: 20 U/L (ref 0–35)
BILIRUB SERPL-MCNC: 0.5 MG/DL (ref 0–1.2)
BUN BLDV-MCNC: 17 MG/DL (ref 8–23)
CALCIUM SERPL-MCNC: 9.4 MG/DL (ref 8.6–10.2)
CHLORIDE BLD-SCNC: 104 MEQ/L (ref 98–107)
CHOLESTEROL, TOTAL: 147 MG/DL (ref 0–199)
CO2: 24 MEQ/L (ref 22–29)
CREAT SERPL-MCNC: 1.03 MG/DL (ref 0.5–0.9)
GFR AFRICAN AMERICAN: >60
GFR NON-AFRICAN AMERICAN: 53.2
GLOBULIN: 3.1 G/DL (ref 2.3–3.5)
GLUCOSE BLD-MCNC: 95 MG/DL (ref 74–109)
HBA1C MFR BLD: 6 % (ref 4.8–5.9)
HDLC SERPL-MCNC: 51 MG/DL (ref 40–59)
LDL CHOLESTEROL CALCULATED: 62 MG/DL (ref 0–129)
POTASSIUM SERPL-SCNC: 4.5 MEQ/L (ref 3.5–5.1)
SODIUM BLD-SCNC: 141 MEQ/L (ref 132–144)
T4 FREE: 1.33 NG/DL (ref 0.93–1.7)
TOTAL PROTEIN: 6.9 G/DL (ref 6.4–8.1)
TRIGL SERPL-MCNC: 168 MG/DL (ref 0–200)
TSH SERPL DL<=0.05 MIU/L-ACNC: 5.4 UIU/ML (ref 0.27–4.2)

## 2019-01-04 RX ORDER — ROSUVASTATIN CALCIUM 10 MG/1
TABLET, COATED ORAL
Qty: 90 TABLET | Refills: 1 | Status: SHIPPED | OUTPATIENT
Start: 2019-01-04

## 2019-01-07 ENCOUNTER — OFFICE VISIT (OUTPATIENT)
Dept: FAMILY MEDICINE CLINIC | Age: 69
End: 2019-01-07
Payer: COMMERCIAL

## 2019-01-07 VITALS
TEMPERATURE: 98.4 F | RESPIRATION RATE: 16 BRPM | HEART RATE: 76 BPM | SYSTOLIC BLOOD PRESSURE: 118 MMHG | WEIGHT: 240 LBS | DIASTOLIC BLOOD PRESSURE: 70 MMHG | BODY MASS INDEX: 42.52 KG/M2 | HEIGHT: 63 IN

## 2019-01-07 DIAGNOSIS — E03.9 HYPOTHYROIDISM, UNSPECIFIED TYPE: ICD-10-CM

## 2019-01-07 DIAGNOSIS — Z12.39 BREAST CANCER SCREENING: ICD-10-CM

## 2019-01-07 DIAGNOSIS — E78.2 MIXED HYPERLIPIDEMIA: ICD-10-CM

## 2019-01-07 DIAGNOSIS — I10 ESSENTIAL HYPERTENSION: ICD-10-CM

## 2019-01-07 DIAGNOSIS — Z23 NEED FOR INFLUENZA VACCINATION: ICD-10-CM

## 2019-01-07 DIAGNOSIS — E11.42 TYPE 2 DIABETES MELLITUS WITH DIABETIC POLYNEUROPATHY, WITHOUT LONG-TERM CURRENT USE OF INSULIN (HCC): Primary | ICD-10-CM

## 2019-01-07 PROCEDURE — 99214 OFFICE O/P EST MOD 30 MIN: CPT | Performed by: FAMILY MEDICINE

## 2019-01-07 PROCEDURE — 90471 IMMUNIZATION ADMIN: CPT | Performed by: FAMILY MEDICINE

## 2019-01-07 PROCEDURE — 90662 IIV NO PRSV INCREASED AG IM: CPT | Performed by: FAMILY MEDICINE

## 2019-01-17 ENCOUNTER — HOSPITAL ENCOUNTER (OUTPATIENT)
Dept: WOMENS IMAGING | Age: 69
Discharge: HOME | End: 2019-01-19
Payer: COMMERCIAL

## 2019-01-17 DIAGNOSIS — Z12.39 BREAST CANCER SCREENING: ICD-10-CM

## 2019-01-17 PROCEDURE — 77067 SCR MAMMO BI INCL CAD: CPT

## 2019-03-07 LAB — DIABETIC RETINOPATHY: NEGATIVE

## 2019-05-08 ENCOUNTER — TELEPHONE (OUTPATIENT)
Dept: FAMILY MEDICINE CLINIC | Age: 69
End: 2019-05-08

## 2020-01-20 ENCOUNTER — HOSPITAL ENCOUNTER (OUTPATIENT)
Dept: WOMENS IMAGING | Age: 70
Discharge: HOME | End: 2020-01-22
Payer: COMMERCIAL

## 2020-01-20 PROCEDURE — 77067 SCR MAMMO BI INCL CAD: CPT

## 2020-12-22 ENCOUNTER — OFFICE VISIT (OUTPATIENT)
Dept: GASTROENTEROLOGY | Age: 70
End: 2020-12-22
Payer: COMMERCIAL

## 2020-12-22 VITALS
SYSTOLIC BLOOD PRESSURE: 128 MMHG | OXYGEN SATURATION: 97 % | RESPIRATION RATE: 16 BRPM | HEART RATE: 51 BPM | DIASTOLIC BLOOD PRESSURE: 86 MMHG | BODY MASS INDEX: 41.99 KG/M2 | WEIGHT: 237 LBS | HEIGHT: 63 IN

## 2020-12-22 PROCEDURE — 99203 OFFICE O/P NEW LOW 30 MIN: CPT | Performed by: SPECIALIST

## 2020-12-22 RX ORDER — SODIUM, POTASSIUM,MAG SULFATES 17.5-3.13G
SOLUTION, RECONSTITUTED, ORAL ORAL
Qty: 1 BOTTLE | Refills: 0 | Status: SHIPPED | OUTPATIENT
Start: 2020-12-22 | End: 2021-02-12 | Stop reason: ALTCHOICE

## 2020-12-22 ASSESSMENT — ENCOUNTER SYMPTOMS
EYES NEGATIVE: 1
GASTROINTESTINAL NEGATIVE: 1
RECTAL PAIN: 0
RESPIRATORY NEGATIVE: 1
VOMITING: 0
CONSTIPATION: 0
BLOOD IN STOOL: 0
ABDOMINAL DISTENTION: 0
NAUSEA: 0
ABDOMINAL PAIN: 0
ANAL BLEEDING: 0
DIARRHEA: 0

## 2020-12-22 NOTE — PROGRESS NOTES
Gastroenterology Clinic Visit    Tita Alejandra  [id number]  Chief Complaint   Patient presents with    Consultation     Anemia. No nausea, dizziness, lightheaded. Is fatigued. HPI: 79 y.o. female presents to the clinic with history of anemia.,  Patient reports no history of hematemesis melena or rectal bleeding, history of any hematuria. Occasional change in stool consistency depending on what she eats otherwise no significant change in bowel habits, no sharp abdominal pain nausea or vomiting, no history of any significant weight loss, takes Aleve occasionally for back pain, no history of any GERD symptoms, no history of any dysphagia, history of any oral ulcerations. Social history does not smoke does not drink alcohol, surgical history includes     Review of Systems   Constitutional: Negative. HENT: Negative. Eyes: Negative. Respiratory: Negative. No pulmonary issues   Cardiovascular: Negative. No cardiac issues   Gastrointestinal: Negative. Negative for abdominal distention, abdominal pain, anal bleeding, blood in stool, constipation, diarrhea, nausea, rectal pain and vomiting. Soft nontender no palpable mass   Endocrine: Negative. Genitourinary: Negative. Musculoskeletal: Negative. Skin: Negative. Allergic/Immunologic: Negative for food allergies. Neurological: Negative. Hematological: Negative. Psychiatric/Behavioral: Negative.          Past Medical History:   Diagnosis Date    DM2 (diabetes mellitus, type 2) ([facility] [address]     Hyperlipidemia     Hypertension     Hypothyroidism       Past Surgical History:   Procedure Laterality Date     SECTION      COLONOSCOPY      needs     CT COLON CA SCRN NOT  [address] IND N/A 2018    COLONOSCOPY adenoma     Current Outpatient Medications on File Prior to Visit   Medication Sig Dispense Refill    rosuvastatin (CRESTOR) 10 MG tablet TAKE 1 TABLET BY MOUTH ONCE DAILY 90 tablet 1    No    Sexual activity: Yes     Partners: Male   Lifestyle    Physical activity     Days per week: None     Minutes per session: None    Stress: None   Relationships    Social connections     Talks on phone: None     Gets together: None     Attends Zoroastrianism service: None     Active member of club or organization: None     Attends meetings of clubs or organizations: None     Relationship status: None    Intimate partner violence     Fear of current or ex partner: None     Emotionally abused: None     Physically abused: None     Forced sexual activity: None   Other Topics Concern    None   Social History Narrative    None       Blood pressure 128/86, pulse 51, resp. rate 16, height 5' 3\" (1.6 m), weight 237 lb (107.5 kg), SpO2 97 %, not currently breastfeeding. Physical Exam  Constitutional:       Appearance: She is well-developed. HENT:      Head: Normocephalic and atraumatic. Eyes:      Conjunctiva/sclera: Conjunctivae normal.      Pupils: Pupils are equal, round, and reactive to light. Neck:      Musculoskeletal: Normal range of motion. Cardiovascular:      Rate and Rhythm: Normal rate. Pulmonary:      Effort: Pulmonary effort is normal.   Abdominal:      General: Bowel sounds are normal.      Palpations: Abdomen is soft. Comments: Soft nontender no palpable mass, no nail changes of chronic anemia   Musculoskeletal: Normal range of motion. Skin:     General: Skin is warm. Neurological:      Mental Status: She is alert. Laboratory, Pathology, Radiology reviewed indetail with relevant important investigations summarized below:  Lab Results   Component Value Date    WBC 7.3 04/06/2016    HGB 12.8 04/06/2016    HCT 38.5 04/06/2016    MCV 80.5 (L) 04/06/2016     04/06/2016     Lab Results   Component Value Date    ALT 11 01/02/2019    AST 20 01/02/2019    ALKPHOS 74 01/02/2019    BILITOT 0.5 01/02/2019       No results found.     Endoscopic investigations:     Assessmentand Plan:  79 y.o. female with history of anemia etiology unclear. Rule out GI blood loss. Will schedule EGD and colonoscopy. Diagnosis Orders   1. Iron deficiency anemia, unspecified iron deficiency anemia type  EGD    Endoscopy, colon, diagnostic     Return in about 5 weeks (around 1/26/2021). Rafaela Dent MD   Staff Gastroenterologist  [facility]    Please note this report has been partially produced using speech recognition software and may cause contain errors related to thatsystem including grammar, punctuation and spelling as well as words and phrases that may seem inappropriate. If there are questions or concerns please feel free to contact me to clarify.

## 2021-01-05 ENCOUNTER — NURSE ONLY (OUTPATIENT)
Dept: PRIMARY CARE CLINIC | Age: 71
End: 2021-01-05

## 2021-01-05 DIAGNOSIS — Z01.818 ENCOUNTER FOR PREADMISSION TESTING: Primary | ICD-10-CM

## 2021-01-05 DIAGNOSIS — Z01.818 ENCOUNTER FOR PREADMISSION TESTING: ICD-10-CM

## 2021-01-07 LAB
SARS-COV-2: NOT DETECTED
SOURCE: NORMAL

## 2021-01-11 ENCOUNTER — ANCILLARY PROCEDURE (OUTPATIENT)
Dept: ENDOSCOPY | Age: 71
End: 2021-01-11
Attending: SPECIALIST
Payer: COMMERCIAL

## 2021-01-11 ENCOUNTER — ANESTHESIA EVENT (OUTPATIENT)
Dept: ENDOSCOPY | Age: 71
End: 2021-01-11
Payer: COMMERCIAL

## 2021-01-11 ENCOUNTER — ANESTHESIA (OUTPATIENT)
Dept: ENDOSCOPY | Age: 71
End: 2021-01-11
Payer: COMMERCIAL

## 2021-01-11 ENCOUNTER — HOSPITAL ENCOUNTER (OUTPATIENT)
Age: 71
Setting detail: OUTPATIENT SURGERY
Discharge: HOME | End: 2021-01-11
Attending: SPECIALIST | Admitting: SPECIALIST
Payer: COMMERCIAL

## 2021-01-11 VITALS
RESPIRATION RATE: 16 BRPM | SYSTOLIC BLOOD PRESSURE: 148 MMHG | HEART RATE: 43 BPM | WEIGHT: 231 LBS | DIASTOLIC BLOOD PRESSURE: 63 MMHG | BODY MASS INDEX: 39.44 KG/M2 | HEIGHT: 64 IN | OXYGEN SATURATION: 100 % | TEMPERATURE: 98.9 F

## 2021-01-11 VITALS
DIASTOLIC BLOOD PRESSURE: 63 MMHG | SYSTOLIC BLOOD PRESSURE: 131 MMHG | RESPIRATION RATE: 17 BRPM | OXYGEN SATURATION: 100 %

## 2021-01-11 LAB
GLUCOSE BLD-MCNC: 104 MG/DL (ref 60–115)
PERFORMED ON: NORMAL

## 2021-01-11 PROCEDURE — 6360000002 HC RX W HCPCS: Performed by: NURSE ANESTHETIST, CERTIFIED REGISTERED

## 2021-01-11 PROCEDURE — 7100000010 HC PHASE II RECOVERY - FIRST 15 MIN: Performed by: SPECIALIST

## 2021-01-11 PROCEDURE — 2580000003 HC RX 258: Performed by: SPECIALIST

## 2021-01-11 PROCEDURE — 43235 EGD DIAGNOSTIC BRUSH WASH: CPT | Performed by: SPECIALIST

## 2021-01-11 PROCEDURE — 3700000001 HC ADD 15 MINUTES (ANESTHESIA): Performed by: SPECIALIST

## 2021-01-11 PROCEDURE — 3700000000 HC ANESTHESIA ATTENDED CARE: Performed by: SPECIALIST

## 2021-01-11 PROCEDURE — 2500000003 HC RX 250 WO HCPCS: Performed by: NURSE ANESTHETIST, CERTIFIED REGISTERED

## 2021-01-11 PROCEDURE — 45380 COLONOSCOPY AND BIOPSY: CPT | Performed by: SPECIALIST

## 2021-01-11 PROCEDURE — 3609017100 HC EGD: Performed by: SPECIALIST

## 2021-01-11 PROCEDURE — 88305 TISSUE EXAM BY PATHOLOGIST: CPT

## 2021-01-11 PROCEDURE — 3609027000 HC COLONOSCOPY: Performed by: SPECIALIST

## 2021-01-11 PROCEDURE — 2709999900 HC NON-CHARGEABLE SUPPLY: Performed by: SPECIALIST

## 2021-01-11 PROCEDURE — 2580000003 HC RX 258: Performed by: NURSE ANESTHETIST, CERTIFIED REGISTERED

## 2021-01-11 RX ORDER — SODIUM CHLORIDE 9 MG/ML
INJECTION, SOLUTION INTRAVENOUS ONCE
Status: COMPLETED | OUTPATIENT
Start: 2021-01-11 | End: 2021-01-11

## 2021-01-11 RX ORDER — LIDOCAINE HYDROCHLORIDE 20 MG/ML
INJECTION, SOLUTION INFILTRATION; PERINEURAL PRN
Status: DISCONTINUED | OUTPATIENT
Start: 2021-01-11 | End: 2021-01-11 | Stop reason: SDUPTHER

## 2021-01-11 RX ORDER — PROPOFOL 10 MG/ML
INJECTION, EMULSION INTRAVENOUS PRN
Status: DISCONTINUED | OUTPATIENT
Start: 2021-01-11 | End: 2021-01-11 | Stop reason: SDUPTHER

## 2021-01-11 RX ORDER — PROPOFOL 10 MG/ML
INJECTION, EMULSION INTRAVENOUS CONTINUOUS PRN
Status: DISCONTINUED | OUTPATIENT
Start: 2021-01-11 | End: 2021-01-11 | Stop reason: SDUPTHER

## 2021-01-11 RX ORDER — SODIUM CHLORIDE 9 MG/ML
INJECTION, SOLUTION INTRAVENOUS CONTINUOUS PRN
Status: DISCONTINUED | OUTPATIENT
Start: 2021-01-11 | End: 2021-01-11 | Stop reason: SDUPTHER

## 2021-01-11 RX ORDER — PANTOPRAZOLE SODIUM 40 MG/1
40 TABLET, DELAYED RELEASE ORAL DAILY
Qty: 30 TABLET | Refills: 3 | Status: SHIPPED | OUTPATIENT
Start: 2021-01-11 | End: 2021-04-23 | Stop reason: SDUPTHER

## 2021-01-11 RX ADMIN — SODIUM CHLORIDE: 9 INJECTION, SOLUTION INTRAVENOUS at 09:27

## 2021-01-11 RX ADMIN — LIDOCAINE HYDROCHLORIDE 60 MG: 20 INJECTION, SOLUTION INFILTRATION; PERINEURAL at 10:51

## 2021-01-11 RX ADMIN — PROPOFOL 20 MG: 10 INJECTION, EMULSION INTRAVENOUS at 10:54

## 2021-01-11 RX ADMIN — PROPOFOL 30 MG: 10 INJECTION, EMULSION INTRAVENOUS at 10:53

## 2021-01-11 RX ADMIN — SODIUM CHLORIDE: 9 INJECTION, SOLUTION INTRAVENOUS at 09:45

## 2021-01-11 RX ADMIN — PROPOFOL 50 MG: 10 INJECTION, EMULSION INTRAVENOUS at 10:52

## 2021-01-11 RX ADMIN — PROPOFOL 100 MG: 10 INJECTION, EMULSION INTRAVENOUS at 10:51

## 2021-01-11 RX ADMIN — PROPOFOL 140 MCG/KG/MIN: 10 INJECTION, EMULSION INTRAVENOUS at 10:51

## 2021-01-11 ASSESSMENT — PAIN - FUNCTIONAL ASSESSMENT: PAIN_FUNCTIONAL_ASSESSMENT: 0-10

## 2021-01-11 NOTE — ANESTHESIA POSTPROCEDURE EVALUATION
Department of Anesthesiology  Postprocedure Note    Patient: Mike Up  MRN: [id number]  YOB: 1950  Date of evaluation: 1/11/2021  Time:  11:20 AM     Procedure Summary     Date: 01/11/21 Room / Location: [address] [place] [address] 01 / Bassem Harkins    Anesthesia Start: 6813 Anesthesia Stop:     Procedures:       EGD DIAGNOSTIC ONLY (N/A )      COLONOSCOPY DIAGNOSTIC (N/A ) Diagnosis: (86146/27475 - Iron deficiency anemia)    Surgeons: Mona Ireland MD Responsible Provider: DELIO Estrada CRNA    Anesthesia Type: MAC ASA Status: 3          Anesthesia Type: MAC    Heather Phase I: Heather Score: 10    Heather Phase II:      Last vitals: Reviewed and per EMR flowsheets.        Anesthesia Post Evaluation    Patient location during evaluation: PACU  Patient participation: complete - patient participated  Level of consciousness: awake and alert  Pain score: 0  Airway patency: patent  Nausea & Vomiting: no vomiting and no nausea  Complications: no  Cardiovascular status: hemodynamically stable  Respiratory status: acceptable, nonlabored ventilation and spontaneous ventilation  Hydration status: stable

## 2021-01-11 NOTE — H&P
Patient Name: Radha Patterson  : 1950  MRN: [id number]  DATE: 21      ENDOSCOPY  History and Physical    Procedure:    [x] Diagnostic Colonoscopy       [] Screening Colonoscopy  [x] EGD      [] ERCP      [] EUS       [] Other    [x] Previous office notes/History and Physical reviewed from the patients chart. Please see EMR for further details of HPI. I have examined the patient's status immediately prior to the procedure and:      Indications/HPI:    []Abdominal Pain  []Cancer- GI/Lung  []Fhx of colon CA/polyps  []History of Polyps  []Murphys   []Melena  []Abnormal Imaging  []Dysphagia    []Persistent Pneumonia  []Anemia  []Food Impaction  []History of Polyps  []GI Bleed  []Pulmonary nodule/Mass  []Change in bowel habits []Heartburn/Reflux  []Rectal Bleed (BRBPR)  []Chest Pain - Non Cardiac []Heme (+) Stoo  l[]Ulcers  []Constipation  []Hemoptysis   []Varices  []Diarrhea  []Hypoxemia  []Nausea/Vomiting  []Screening   []Crohns/Colitis  []Other: anemia  Anesthesia:   [x] MAC [] Moderate Sedation   [] General   [] None     ROS: 12 pt Review of Symptoms was negative unless mentioned above    Medications:   Prior to Admission medications    Medication Sig Start Date End Date Taking? Authorizing Provider   Na Sulfate-K Sulfate-Mg Sulf 17.5-3.13-1.6 GM/177ML SOLN As directed 20  Yes Sarah Cutler MD   VITAMIN E by Does not apply route. Yes Historical Provider, MD   vitamin B-12 (CYANOCOBALAMIN) 1000 MCG tablet Take 1,000 mcg by mouth daily.      Yes Historical Provider, MD   rosuvastatin (CRESTOR) 10 MG tablet TAKE 1 TABLET BY MOUTH ONCE DAILY 19   Mati Dooley MD   levothyroxine (SYNTHROID) 125 MCG tablet Take 1 tablet by mouth daily -except Sundays 12/14/18   Mati Dooley MD   metFORMIN (GLUCOPHAGE) 500 MG tablet TAKE 1 TABLET BY MOUTH TWICE A DAY WITH FOOD 18   Mati Dooley MD   losartan-hydrochlorothiazide (HYZAAR) 100-25 MG per tablet TAKE 1 TABLET BY MOUTH DAILY 18   Antoinette Salcedo MD   CONTRAVE 8-90 MG per extended release tablet TAKE 2 TABLETS BY MOUTH 2 TIMES DAILY  Patient not taking: Reported on 2020   Antoinette Salcedo MD   metoprolol succinate (TOPROL XL) 100 MG extended release tablet TAKE 1 TABLET BY MOUTH EVERY DAY 10/16/18   Antoinette Salcedo MD   omeprazole (PRILOSEC) 40 MG capsule Take 1 capsule by mouth daily  Patient not taking: Reported on 2020   Antoinette Salcedo MD   aspirin 81 MG tablet Take 81 mg by mouth daily. Historical Provider, MD   Omega-3 Fatty Acids (FISH OIL) 1200 MG CAPS Take  by mouth. Historical Provider, MD       Allergies: No Known Allergies     History of allergic reaction to anesthesia:  No    Past Medical History:  Past Medical History:   Diagnosis Date    DM2 (diabetes mellitus, type 2) ([facility] [address]     Hyperlipidemia     Hypertension     Hypothyroidism        Past Surgical History:  Past Surgical History:   Procedure Laterality Date     SECTION      COLONOSCOPY  2008    needs 2018    [place] COLON CA SCRN NOT  [address] [place] [address] N/A 2018    COLONOSCOPY adenoma       Social History:  Social History     Tobacco Use    Smoking status: Never Smoker    Smokeless tobacco: Never Used   Substance Use Topics    Alcohol use: Yes     Comment: rare     Drug use: No       Vital Signs:   Vitals:    21 0917   BP: (!) 176/77   Pulse: 67   Resp: 18   Temp: 98.9 °F (37.2 °C)   SpO2: 100%        Physical Exam:  Cardiac:  [x]WNL  []Comments:  Pulmonary:  [x]WNL   []Comments:   Neuro/Mental Status:  [x]WNL  []Comments:  Abdominal:  [x]WNL    []Comments:  Other:   []WNL  []Comments:    Informed Consent:  The risks and benefits of the procedure have been discussed with either the patient or if they cannot consent, their representative. Assessment:  Patient examined and appropriate for planned sedation and procedure. Plan:  Proceed with planned sedation and procedure as above.     Albin Bates Bridget Vee MD  10:39 AM

## 2021-01-11 NOTE — ANESTHESIA PRE PROCEDURE
Department of Anesthesiology  Preprocedure Note       Name:  Korina Vickers   Age:  79 y.o.  :  1950                                          MRN:  [id number]         Date:  2021      Surgeon: Sara Padilla):  Lakeisha Licona MD    Procedure: Procedure(s):  EGD DIAGNOSTIC ONLY  COLONOSCOPY DIAGNOSTIC    Medications prior to admission:   Prior to Admission medications    Medication Sig Start Date End Date Taking? Authorizing Provider   Na Sulfate-K Sulfate-Mg Sulf 17.5-3.13-1.6 GM/177ML SOLN As directed 20   Lakeisha Licona MD   rosuvastatin (CRESTOR) 10 MG tablet TAKE 1 TABLET BY MOUTH ONCE DAILY 19   Nay Lang MD   levothyroxine (SYNTHROID) 125 MCG tablet Take 1 tablet by mouth daily -except Sundays 12/14/18   Nay Lang MD   metFORMIN (GLUCOPHAGE) 500 MG tablet TAKE 1 TABLET BY MOUTH TWICE A DAY WITH FOOD 18   Nay Lang MD   losartan-hydrochlorothiazide [facility]) 100-25 MG per tablet TAKE 1 TABLET BY MOUTH DAILY 18   Nay Lang MD   CONTRAVE 8-90 MG per extended release tablet TAKE 2 TABLETS BY MOUTH 2 TIMES DAILY  Patient not taking: Reported on 2020   Nay Lang MD   metoprolol succinate (TOPROL XL) 100 MG extended release tablet TAKE 1 TABLET BY MOUTH EVERY DAY 10/16/18   Nay Lang MD   omeprazole (PRILOSEC) 40 MG capsule Take 1 capsule by mouth daily  Patient not taking: Reported on 2020   Nay Lang MD   aspirin 81 MG tablet Take 81 mg by mouth daily. Historical Provider, MD   VITAMIN E by Does not apply route. Historical Provider, MD   vitamin B-12 (CYANOCOBALAMIN) 1000 MCG tablet Take 1,000 mcg by mouth daily. Historical Provider, MD   Omega-3 Fatty Acids (FISH OIL) 1200 MG CAPS Take  by mouth. Historical Provider, MD       Current medications:    No current facility-administered medications for this encounter.         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    Diverticulosis of large intestine without diverticulitis K57.30       Past Medical History:        Diagnosis Date    DM[facility] mellitus, type 2) ([facility] [address]     Hyperlipidemia     Hypertension     Hypothyroidism        Past Surgical History:        Procedure Laterality Date     SECTION      COLONOSCOPY      needs     [place] COLON CA SCRN NOT  [address] IND N/A 2018    COLONOSCOPY adenoma       Social History:    Social History     Tobacco Use    Smoking status: Never Smoker    Smokeless tobacco: Never Used   Substance Use Topics    Alcohol use: Yes     Comment: rare                                 Counseling given: Not Answered      Vital Signs (Current): There were no vitals filed for this visit.                                            BP Readings from Last 3 Encounters:   20 128/86   19 118/70   07/10/18 114/60       NPO Status:                                                                                 BMI:   Wt Readings from Last 3 Encounters:   20 237 lb (107.5 kg)   19 240 lb (108.9 kg)   18 238 lb (108 kg)     There is no height or weight on file to calculate BMI.    CBC:   Lab Results   Component Value Date    WBC 7.3 2016    RBC 4.79 2016    HGB 12.8 2016    HCT 38.5 2016    MCV 80.5 2016    RDW 15.1 2016     2016       CMP:   Lab Results   Component Value Date     2019    K 4.5 2019     2019    CO2 24 2019    BUN 17 2019    CREATININE 1.03 2019    GFRAA >60.0 2019    LABGLOM 53.2 2019    GLUCOSE 95 2019    PROT 6.9 01/02/2019    CALCIUM 9.4 01/02/2019    BILITOT 0.5 01/02/2019    ALKPHOS 74 01/02/2019    AST 20 01/02/2019    ALT 11 01/02/2019       POC Tests: No results for input(s): POCGLU, POCNA, POCK, POCCL, POCBUN, POCHEMO, POCHCT in the last 72 hours. Coags: No results found for: PROTIME, INR, APTT    HCG (If Applicable): No results found for: PREGTESTUR, PREGSERUM, HCG, HCGQUANT     ABGs: No results found for: PHART, PO2ART, LKH6BON, JKD5NEX, BEART, R8GBGWIF     Type & Screen (If Applicable):  No results found for: LABABO, LABRH    Drug/Infectious Status (If Applicable):  No results found for: HIV, HEPCAB    COVID-19 Screening (If Applicable):   Lab Results   Component Value Date    COVID19 Not Detected 01/05/2021         Anesthesia Evaluation  Patient summary reviewed and Nursing notes reviewed no history of anesthetic complications:   Airway: Mallampati: III        Dental:          Pulmonary:Negative Pulmonary ROS                              Cardiovascular:  Exercise tolerance: no interval change,   (+) hypertension: no interval change, hyperlipidemia        Rhythm: regular  Rate: normal           Beta Blocker:  Not on Beta Blocker         Neuro/Psych:   (+) neuromuscular disease:,             GI/Hepatic/Renal:   (+) GERD:,           Endo/Other:    (+) DiabetesType II DM, , hypothyroidism: arthritis: OA., .                 Abdominal:           Vascular: negative vascular ROS. Anesthesia Plan      MAC     ASA 3       Induction: intravenous. Anesthetic plan and risks discussed with patient. Plan discussed with CRNA and attending.                   DELIO Puckett - MAISHA   1/11/2021

## 2021-01-21 ENCOUNTER — HOSPITAL ENCOUNTER (OUTPATIENT)
Dept: WOMENS IMAGING | Age: 71
Discharge: HOME | End: 2021-01-23
Payer: COMMERCIAL

## 2021-01-21 DIAGNOSIS — Z12.31 ENCOUNTER FOR SCREENING MAMMOGRAM FOR MALIGNANT NEOPLASM OF BREAST: ICD-10-CM

## 2021-01-21 PROCEDURE — 77067 SCR MAMMO BI INCL CAD: CPT

## 2021-02-12 ENCOUNTER — VIRTUAL VISIT (OUTPATIENT)
Dept: GASTROENTEROLOGY | Age: 71
End: 2021-02-12
Payer: COMMERCIAL

## 2021-02-12 DIAGNOSIS — K25.3 ACUTE CAMERON LESION: ICD-10-CM

## 2021-02-12 DIAGNOSIS — R19.7 DIARRHEA, UNSPECIFIED TYPE: ICD-10-CM

## 2021-02-12 DIAGNOSIS — D50.9 IRON DEFICIENCY ANEMIA, UNSPECIFIED IRON DEFICIENCY ANEMIA TYPE: Primary | ICD-10-CM

## 2021-02-12 PROCEDURE — 99442 PR PHYS/QHP TELEPHONE EVALUATION 11-20 MIN: CPT | Performed by: SPECIALIST

## 2021-02-12 ASSESSMENT — ENCOUNTER SYMPTOMS
DIARRHEA: 0
CONSTIPATION: 0
RECTAL PAIN: 0
EYES NEGATIVE: 1
ABDOMINAL DISTENTION: 0
ABDOMINAL PAIN: 0
NAUSEA: 0
RESPIRATORY NEGATIVE: 1
ANAL BLEEDING: 0
GASTROINTESTINAL NEGATIVE: 1
BLOOD IN STOOL: 0
VOMITING: 0

## 2021-02-12 NOTE — PROGRESS NOTES
Gastroenterology Clinic Follow up Visit    Paty Tolentino  [id number]  Chief Complaint   Patient presents with    Follow Up After Procedure       HPI and A/P at last visit summarized below: This was a telephone encounter and patient was at home and I was in our office. She was told it is a billable service and had agreed for that, this was a patient initiated telephone encounter. Patient had EGD and colonoscopy for evaluation of anemia. He showed a hiatal hernia with Georges's lesions and colonoscopy showed diverticulosis and hemorrhoid. Patient was placed on Protonix and she was also advised to hold aspirin. It seems patient was taking low-dose aspirin on her own, she also reports occasional greasy looking stool especially after she eats certain food, history of type 2 diabetes. No nausea no vomiting no dysphagia. Duration of phone call was 15 minutes  Review of Systems   Constitutional: Negative. HENT: Negative. Eyes: Negative. Respiratory: Negative. Cardiovascular: Negative. Gastrointestinal: Negative. Negative for abdominal distention, abdominal pain, anal bleeding, blood in stool, constipation, diarrhea, nausea, rectal pain and vomiting. No GERD symptoms occasional diarrhea   Endocrine: Negative. Genitourinary: Negative. Musculoskeletal: Negative. Skin: Negative. Allergic/Immunologic: Negative for food allergies. Neurological: Negative. Hematological: Negative. Psychiatric/Behavioral: Negative. Past medical history, past surgical history, medication list, social and familyhistory reviewed    not currently breastfeeding. Physical Exam    Laboratory, Pathology, Radiology reviewed in detail with relevantimportant investigations summarized below:    No results for input(s): WBC, HGB, HCT, MCV, PLT in the last 720 hours.   Lab Results   Component Value Date    ALT 11 01/02/2019    AST 20 01/02/2019    ALKPHOS 74 01/02/2019    BILITOT 0.5 01/02/2019 [facility] Digital Screen W Or Wo Cad Bilateral    Result Date: 1/21/2021  EXAMINATION: [facility] DIGITAL SCREEN W OR WO CAD BILATERAL CLINICAL HISTORY:Z12.31 Encounter for screening mammogram for malignant neoplasm of breast ICD10 COMPARISON: 1/20/2020 FINDINGS:3-D tomosynthesis imaging of the bilateral breasts was performed. There are scattered fibroglandular densities within each breast.   There are no suspicious masses, areas of architectural distortion or suspicious areas of microcalcifications. CAD analysis was performed and used in the interpretation. BI-RADS 1: NEGATIVE MAMMOGRAM. ROUTINE FOLLOW-UP MAMMOGRAPHY IS SUGGESTED IN ONE YEAR. Board Certified Radiologists. Accredited by the ACR and FDA. MAMMOGRAPHY IS VERY IMPORTANT TO YOUR HEALTH. THE [employer]ICAN [employer] SOCIET[employer] GUIDELINES RECOMMEND THAT WOMEN 36YEARS OF AGE AND OLDER SHOULD HAVE A MAMMOGRAM EVERY YEAR. A REMINDER LETTER WILL BE SENT AT THE APPROPRIATE TIME.  THIS FACILITY UTILIZES A REMINDER SYSTEM TO ENSURE ALL PATIENTS RECEIVE REMINDER NOTIFICATIONS AT THE APPROPRIATE TIME BASED ON THE RECOMMENDATIONS OF THIS EXAM. THIS INCLUDES REMINDERS FOR ROUTINE  SCREENING MAMMOGRAMS, DIAGNOSTIC MAMMOGRAMS IN WHICH THE PATIENT IS ASKED TO RETURN FOR ADDITIONAL VIEWS, OR OTHER BREAST IMAGING INTERVENTIONS WHEN APPROPRIATE. THE PATIENT WILL BE PLACED IN THE APPROPRIATE REMINDER SYSTEM INCLUDING A REMINDER AT THE APPROPRIATE TIME FOR ANY PENDING ADDITIONAL VIEWS. Endoscopic investigations:     Assessment and Plan:  Belinda Ramos 79 y.o. female for follow up. Anemia most likely  secondary to oral hernia and Georges's lesions. Patient advised to continue Protonix 40 mg once a day and avoid aspirin and NSAIDs. Will order stool for fat and pancreatic elastase. Return after 6 months   Diagnosis Orders   1. Iron deficiency anemia, unspecified iron deficiency anemia type     2. Acute Georges lesion     3.  Diarrhea, unspecified type  Pancreatic elastase, fecal Fecal Fat, Qualitative       Return in about 6 months (around 8/12/2021). Marla Doyle MD   StaffGastroenterologist  [facility]    Please note this report has been partially produced using speech recognitionsoftware  and may cause contain errors related to that system including grammar, punctuation and spelling as well as words andphrases that may seem inappropriate. If there are questions or concerns please feel free to contact me to clarify.

## 2021-03-01 DIAGNOSIS — R19.7 DIARRHEA, UNSPECIFIED TYPE: ICD-10-CM

## 2021-03-03 LAB
CALPROTECTIN, FECAL: 122 UG/G
FECAL NEUTRAL FAT: NORMAL
FECAL SPLIT FATS: NORMAL

## 2021-03-22 ENCOUNTER — TELEPHONE (OUTPATIENT)
Dept: GASTROENTEROLOGY | Age: 71
End: 2021-03-22

## 2021-04-23 RX ORDER — PANTOPRAZOLE SODIUM 40 MG/1
40 TABLET, DELAYED RELEASE ORAL DAILY
Qty: 30 TABLET | Refills: 3 | Status: SHIPPED | OUTPATIENT
Start: 2021-04-23 | End: 2021-08-14

## 2021-05-21 ENCOUNTER — OFFICE VISIT (OUTPATIENT)
Dept: CARDIOLOGY CLINIC | Age: 71
End: 2021-05-21
Payer: COMMERCIAL

## 2021-05-21 VITALS
HEIGHT: 64 IN | BODY MASS INDEX: 39.27 KG/M2 | WEIGHT: 230 LBS | SYSTOLIC BLOOD PRESSURE: 128 MMHG | OXYGEN SATURATION: 98 % | HEART RATE: 66 BPM | DIASTOLIC BLOOD PRESSURE: 80 MMHG

## 2021-05-21 DIAGNOSIS — I20.8 ANGINA AT REST (HCC): ICD-10-CM

## 2021-05-21 DIAGNOSIS — E78.2 MIXED HYPERLIPIDEMIA: ICD-10-CM

## 2021-05-21 DIAGNOSIS — I10 ESSENTIAL HYPERTENSION: Primary | ICD-10-CM

## 2021-05-21 PROBLEM — I20.89 ANGINA AT REST: Status: ACTIVE | Noted: 2021-05-21

## 2021-05-21 PROCEDURE — 99204 OFFICE O/P NEW MOD 45 MIN: CPT | Performed by: INTERNAL MEDICINE

## 2021-05-21 RX ORDER — PNV NO.95/FERROUS FUM/FOLIC AC 28MG-0.8MG
1 TABLET ORAL DAILY
COMMUNITY
Start: 2021-04-28

## 2021-05-21 NOTE — PROGRESS NOTES
Chief Complaint   Patient presents with    New Patient      referred    Chest Pain       Patient presents for initial medical evaluation. Patient is followed on a regular basis by Dr. Monik Salcido MD. S/p hospitalization for CP/heaviness. Had negative wokup in ER. Symptoms occurred at rest and was worse with carrying groceries. Had radiation to right shoulder. + associated SOB. No hx of MI, CHF or arrhythmia.  was worried about her during the episode. Never seen her like this. No hx of MI, CHF or arrhythmia. No hx of cath. Pt denies   nausea, vomiting, diarrhea, constipation, motor weakness, insomnia, weight loss, syncope, dizziness, lightheadedness, palpitations, PND, orthopnea, or claudication. Does have history of iron deficiency anemia ranging from 8-10. Does have hx of endoscopy.              Patient Active Problem List   Diagnosis    Essential hypertension    Mixed hyperlipidemia    Hypothyroidism    Osteoarthritis of knee    Type 2 diabetes mellitus with diabetic polyneuropathy, without long-term current use of insulin (HCC)    Benign neoplasm of ascending colon    Diverticulosis of large intestine without diverticulitis    Anemia    HH (hiatus hernia)    Chronic Georges lesion    Adenomatous polyp of transverse colon    Angina at rest [facility])       Past Surgical History:   Procedure Laterality Date     SECTION      COLONOSCOPY      needs     COLONOSCOPY N/A 2021    COLONOSCOPY DIAGNOSTIC performed by Oscar Mathur MD at [address] NOT  [address] [place] [address] N/A 2018    COLONOSCOPY adenoma    UPPER GASTROINTESTINAL ENDOSCOPY N/A 2021    EGD DIAGNOSTIC ONLY performed by Oscar Mathur MD at [address] [facility] Marital status:      Spouse name: Not on file    Number of children: Not on file    Years of education: Not on file    Highest education level: Not on file Occupational History    Not on file   Tobacco Use    Smoking status: Never Smoker    Smokeless tobacco: Never Used   Vaping Use    Vaping Use: Never used   Substance and Sexual Activity    Alcohol use: Yes     Comment: rare     Drug use: No    Sexual activity: Yes     Partners: Male   Other Topics Concern    Not on file   Social History Narrative    Not on file     Social Determinants of Health     Financial Resource Strain:     Difficulty of Paying Living Expenses:    Food Insecurity:     Worried About Running Out of Food in the Last Year:     [address] in the Last Year:    Transportation Needs:     Lack of Transportation (Medical):      Lack of Transportation (Non-Medical):    Physical Activity:     Days of Exercise per Week:     Minutes of Exercise per Session:    Stress:     Feeling of Stress :    Social Connections:     Frequency of Communication with Friends and Family:     Frequency of Social Gatherings with Friends and Family:     Attends [employer] Services:     Active Member of Clubs or Organizations:     Attends Club or Organization Meetings:     Marital Status:    Intimate Partner Violence:     Fear of Current or Ex-Partner:     Emotionally Abused:     Physically Abused:     Sexually Abused:        Family History   Problem Relation Age of Onset    Colon Cancer Maternal Grandmother        Current Outpatient Medications   Medication Sig Dispense Refill    Ferrous Sulfate (IRON) 325 (65 Fe) MG TABS       pantoprazole (PROTONIX) 40 MG tablet Take 1 tablet by mouth daily 30 tablet 3    rosuvastatin (CRESTOR) 10 MG tablet TAKE 1 TABLET BY MOUTH ONCE DAILY 90 tablet 1    levothyroxine (SYNTHROID) 125 MCG tablet Take 1 tablet by mouth daily -except Sundays 90 tablet 0    metFORMIN (GLUCOPHAGE) 500 MG tablet TAKE 1 TABLET BY MOUTH TWICE A DAY WITH FOOD 180 tablet 0    losartan-hydrochlorothiazide (HYZAAR) 100-25 MG per tablet TAKE 1 TABLET BY MOUTH DAILY 90 tablet 0    metoprolol succinate (TOPROL XL) 100 MG extended release tablet TAKE 1 TABLET BY MOUTH EVERY DAY 90 tablet 0    VITAMIN E by Does not apply route.  vitamin B-12 (CYANOCOBALAMIN) 1000 MCG tablet Take 1,000 mcg by mouth daily.  Omega-3 Fatty Acids (FISH OIL) 1200 MG CAPS Take  by mouth. Current Facility-Administered Medications   Medication Dose Route Frequency Provider Last Rate Last Admin    triamcinolone acetonide (KENALOG-40) injection 80 mg  80 mg Intramuscular Once DEILO Ann - CNP        methylPREDNISolone acetate (DEPO-MEDROL) injection 40 mg  40 mg Intramuscular Once Jyotsna Gold MD           Patient has no known allergies. Review of Systems:  General ROS: negative  Psychological ROS: negative  Hematological and Lymphatic ROS: No history of blood clots or bleeding disorder. Respiratory ROS: positive for - shortness of breath  Cardiovascular ROS: positive for - chest pain, dyspnea on exertion and shortness of breath  Gastrointestinal ROS: no abdominal pain, change in bowel habits, or black or bloody stools  Genito-Urinary ROS: no dysuria, trouble voiding, or hematuria  Musculoskeletal ROS: negative  Neurological ROS: no TIA or stroke symptoms  Dermatological ROS: negative    VITALS:  Blood pressure 128/80, pulse 66, height 5' 4\" (1.626 m), weight 230 lb (104.3 kg), SpO2 98 %, not currently breastfeeding. Body mass index is 39.48 kg/m². Physical Examination:  General appearance - alert, well appearing, and in no distress  Mental status - alert, oriented to person, place, and time  Neck - Neck is supple, no JVD or carotid bruits. No thyromegaly or adenopathy.    Chest - clear to auscultation, no wheezes, rales or rhonchi, symmetric air entry  Heart - normal rate, regular rhythm, normal S1, S2, no murmurs, rubs, clicks or gallops  Abdomen - soft, nontender, nondistended, no masses or organomegaly  Neurological - alert, oriented, normal speech, no focal findings or movement disorder noted  Extremities - peripheral pulses normal, no pedal edema, no clubbing or cyanosis  Skin - normal coloration and turgor, no rashes, no suspicious skin lesions noted      EKG: normal sinus rhythm, nonspecific ST and T waves changes    Orders Placed This Encounter   Procedures    NM MYOCARDIAL SPECT REST EXERCISE OR RX    ECHO Complete 2D W Doppler W Color       ASSESSMENT:     Diagnosis Orders   1. Essential hypertension     2. Mixed hyperlipidemia     3. Angina at rest [facility])  NM MYOCARDIAL SPECT REST EXERCISE OR RX    ECHO Complete 2D W Doppler W Color         PLAN:         As always, aggressive risk factor modification is strongly recommended. We should adhere to the JNC VIII guidelines for HTN management and the NCEP ATP III guidelines for LDL-C management. Cardiac diet is always recommended with low fat, cholesterol, calories and sodium. Continue medications at current doses. Non invasive cardiac testing will be ordered to further evaluate for any ischemic or structural heart disease as a cause of the patient symptoms. We will proceed with a Cardiolite stress test and echo. Patient was advised and encouraged to check blood pressure at home or at a pharmacy, maintain a logbook, and also call us back if blood pressure are above the target ranges or if it is low. Patient clearly understands and agrees to the instructions. We will need to continue to monitor muscle and liver enzymes, BUN, CR, and electrolytes. Details of medical condition explained and patient was warned about adverse consequences of uncontrolled medical conditions and possible side effects of prescribed medications.

## 2021-06-04 ENCOUNTER — DIRECT ADMIT ORDERS (OUTPATIENT)
Dept: CARDIOLOGY | Age: 71
End: 2021-06-04

## 2021-06-04 ENCOUNTER — HOSPITAL ENCOUNTER (INPATIENT)
Age: 71
LOS: 3 days | Discharge: HOME | DRG: 287 | End: 2021-06-07
Attending: INTERNAL MEDICINE | Admitting: INTERNAL MEDICINE
Payer: MEDICARE

## 2021-06-04 ENCOUNTER — HOSPITAL ENCOUNTER (OUTPATIENT)
Dept: NUCLEAR MEDICINE | Age: 71
Discharge: HOME | DRG: 287 | End: 2021-06-06
Payer: MEDICARE

## 2021-06-04 ENCOUNTER — HOSPITAL ENCOUNTER (OUTPATIENT)
Dept: NON INVASIVE DIAGNOSTICS | Age: 71
Discharge: HOME | End: 2021-06-04
Payer: COMMERCIAL

## 2021-06-04 DIAGNOSIS — I20.8 ANGINA AT REST (HCC): ICD-10-CM

## 2021-06-04 PROBLEM — I48.91 A-FIB (HCC): Status: ACTIVE | Noted: 2021-06-04

## 2021-06-04 LAB
GLUCOSE BLD-MCNC: 112 MG/DL (ref 60–115)
LV EF: 55 %
LVEF MODALITY: NORMAL
PERFORMED ON: NORMAL
PRO-BNP: 2929 PG/ML
TROPONIN: <0.01 NG/ML (ref 0–0.01)
TROPONIN: <0.01 NG/ML (ref 0–0.01)

## 2021-06-04 PROCEDURE — 78452 HT MUSCLE IMAGE SPECT MULT: CPT

## 2021-06-04 PROCEDURE — 2060000000 HC ICU INTERMEDIATE R&B

## 2021-06-04 PROCEDURE — 84484 ASSAY OF TROPONIN QUANT: CPT

## 2021-06-04 PROCEDURE — 3430000000 HC RX DIAGNOSTIC RADIOPHARMACEUTICAL: Performed by: INTERNAL MEDICINE

## 2021-06-04 PROCEDURE — 6360000002 HC RX W HCPCS: Performed by: INTERNAL MEDICINE

## 2021-06-04 PROCEDURE — 93306 TTE W/DOPPLER COMPLETE: CPT

## 2021-06-04 PROCEDURE — 93017 CV STRESS TEST TRACING ONLY: CPT

## 2021-06-04 PROCEDURE — 83880 ASSAY OF NATRIURETIC PEPTIDE: CPT

## 2021-06-04 PROCEDURE — A9502 TC99M TETROFOSMIN: HCPCS | Performed by: INTERNAL MEDICINE

## 2021-06-04 PROCEDURE — 2580000003 HC RX 258: Performed by: INTERNAL MEDICINE

## 2021-06-04 PROCEDURE — 99223 1ST HOSP IP/OBS HIGH 75: CPT | Performed by: INTERNAL MEDICINE

## 2021-06-04 PROCEDURE — 36415 COLL VENOUS BLD VENIPUNCTURE: CPT

## 2021-06-04 PROCEDURE — 6370000000 HC RX 637 (ALT 250 FOR IP): Performed by: INTERNAL MEDICINE

## 2021-06-04 RX ORDER — NITROGLYCERIN 0.4 MG/1
0.4 TABLET SUBLINGUAL EVERY 5 MIN PRN
Status: CANCELLED | OUTPATIENT
Start: 2021-06-04

## 2021-06-04 RX ORDER — ONDANSETRON 2 MG/ML
4 INJECTION INTRAMUSCULAR; INTRAVENOUS EVERY 6 HOURS PRN
Status: DISCONTINUED | OUTPATIENT
Start: 2021-06-04 | End: 2021-06-07 | Stop reason: HOSPADM

## 2021-06-04 RX ORDER — ASPIRIN 81 MG/1
81 TABLET, CHEWABLE ORAL DAILY
Status: CANCELLED | OUTPATIENT
Start: 2021-06-05

## 2021-06-04 RX ORDER — SODIUM CHLORIDE 0.9 % (FLUSH) 0.9 %
10 SYRINGE (ML) INJECTION PRN
Status: DISCONTINUED | OUTPATIENT
Start: 2021-06-04 | End: 2021-06-07 | Stop reason: HOSPADM

## 2021-06-04 RX ORDER — NITROGLYCERIN 0.4 MG/1
0.4 TABLET SUBLINGUAL EVERY 5 MIN PRN
Status: DISCONTINUED | OUTPATIENT
Start: 2021-06-04 | End: 2021-06-07 | Stop reason: HOSPADM

## 2021-06-04 RX ORDER — ONDANSETRON 4 MG/1
4 TABLET, ORALLY DISINTEGRATING ORAL EVERY 8 HOURS PRN
Status: DISCONTINUED | OUTPATIENT
Start: 2021-06-04 | End: 2021-06-07 | Stop reason: HOSPADM

## 2021-06-04 RX ORDER — METOPROLOL SUCCINATE 100 MG/1
100 TABLET, EXTENDED RELEASE ORAL DAILY
Status: DISCONTINUED | OUTPATIENT
Start: 2021-06-04 | End: 2021-06-06

## 2021-06-04 RX ORDER — DEXTROSE MONOHYDRATE 50 MG/ML
100 INJECTION, SOLUTION INTRAVENOUS PRN
Status: DISCONTINUED | OUTPATIENT
Start: 2021-06-04 | End: 2021-06-07 | Stop reason: HOSPADM

## 2021-06-04 RX ORDER — POLYETHYLENE GLYCOL 3350 17 G/17G
17 POWDER, FOR SOLUTION ORAL DAILY PRN
Status: DISCONTINUED | OUTPATIENT
Start: 2021-06-04 | End: 2021-06-07 | Stop reason: HOSPADM

## 2021-06-04 RX ORDER — ACETAMINOPHEN 650 MG/1
650 SUPPOSITORY RECTAL EVERY 6 HOURS PRN
Status: DISCONTINUED | OUTPATIENT
Start: 2021-06-04 | End: 2021-06-07 | Stop reason: HOSPADM

## 2021-06-04 RX ORDER — DEXTROSE MONOHYDRATE 25 G/50ML
12.5 INJECTION, SOLUTION INTRAVENOUS PRN
Status: DISCONTINUED | OUTPATIENT
Start: 2021-06-04 | End: 2021-06-07 | Stop reason: HOSPADM

## 2021-06-04 RX ORDER — LOSARTAN POTASSIUM AND HYDROCHLOROTHIAZIDE 12.5; 5 MG/1; MG/1
2 TABLET ORAL DAILY
Status: DISCONTINUED | OUTPATIENT
Start: 2021-06-04 | End: 2021-06-07 | Stop reason: HOSPADM

## 2021-06-04 RX ORDER — METOPROLOL TARTRATE 5 MG/5ML
5 INJECTION INTRAVENOUS EVERY 6 HOURS PRN
Status: DISCONTINUED | OUTPATIENT
Start: 2021-06-04 | End: 2021-06-07 | Stop reason: HOSPADM

## 2021-06-04 RX ORDER — FERROUS SULFATE 325(65) MG
325 TABLET ORAL 3 TIMES DAILY
Status: DISCONTINUED | OUTPATIENT
Start: 2021-06-04 | End: 2021-06-07 | Stop reason: HOSPADM

## 2021-06-04 RX ORDER — ACETAMINOPHEN 325 MG/1
650 TABLET ORAL EVERY 6 HOURS PRN
Status: DISCONTINUED | OUTPATIENT
Start: 2021-06-04 | End: 2021-06-07 | Stop reason: HOSPADM

## 2021-06-04 RX ORDER — PANTOPRAZOLE SODIUM 40 MG/1
40 TABLET, DELAYED RELEASE ORAL DAILY
Status: DISCONTINUED | OUTPATIENT
Start: 2021-06-04 | End: 2021-06-07 | Stop reason: HOSPADM

## 2021-06-04 RX ORDER — ROSUVASTATIN CALCIUM 10 MG/1
10 TABLET, COATED ORAL NIGHTLY
Status: DISCONTINUED | OUTPATIENT
Start: 2021-06-04 | End: 2021-06-07 | Stop reason: HOSPADM

## 2021-06-04 RX ORDER — ONDANSETRON 4 MG/1
4 TABLET, ORALLY DISINTEGRATING ORAL EVERY 8 HOURS PRN
Status: CANCELLED | OUTPATIENT
Start: 2021-06-04

## 2021-06-04 RX ORDER — NICOTINE POLACRILEX 4 MG
15 LOZENGE BUCCAL PRN
Status: DISCONTINUED | OUTPATIENT
Start: 2021-06-04 | End: 2021-06-07 | Stop reason: HOSPADM

## 2021-06-04 RX ORDER — POLYETHYLENE GLYCOL 3350 17 G/17G
17 POWDER, FOR SOLUTION ORAL DAILY PRN
Status: CANCELLED | OUTPATIENT
Start: 2021-06-04

## 2021-06-04 RX ORDER — ASPIRIN 81 MG/1
81 TABLET, CHEWABLE ORAL DAILY
Status: DISCONTINUED | OUTPATIENT
Start: 2021-06-05 | End: 2021-06-07 | Stop reason: HOSPADM

## 2021-06-04 RX ORDER — ONDANSETRON 2 MG/ML
4 INJECTION INTRAMUSCULAR; INTRAVENOUS EVERY 6 HOURS PRN
Status: CANCELLED | OUTPATIENT
Start: 2021-06-04

## 2021-06-04 RX ORDER — ACETAMINOPHEN 325 MG/1
650 TABLET ORAL EVERY 6 HOURS PRN
Status: CANCELLED | OUTPATIENT
Start: 2021-06-04

## 2021-06-04 RX ORDER — LEVOTHYROXINE SODIUM 0.12 MG/1
125 TABLET ORAL DAILY
Status: DISCONTINUED | OUTPATIENT
Start: 2021-06-04 | End: 2021-06-07 | Stop reason: HOSPADM

## 2021-06-04 RX ADMIN — METOPROLOL SUCCINATE 100 MG: 100 TABLET, EXTENDED RELEASE ORAL at 18:37

## 2021-06-04 RX ADMIN — TETROFOSMIN 12 MILLICURIE: 1.38 INJECTION, POWDER, LYOPHILIZED, FOR SOLUTION INTRAVENOUS at 10:26

## 2021-06-04 RX ADMIN — REGADENOSON 0.4 MG: 0.08 INJECTION, SOLUTION INTRAVENOUS at 12:45

## 2021-06-04 RX ADMIN — LOSARTAN POTASSIUM AND HYDROCHLOROTHIAZIDE 2 TABLET: 12.5; 5 TABLET ORAL at 18:37

## 2021-06-04 RX ADMIN — PANTOPRAZOLE SODIUM 40 MG: 40 TABLET, DELAYED RELEASE ORAL at 18:37

## 2021-06-04 RX ADMIN — ENOXAPARIN SODIUM 100 MG: 100 INJECTION SUBCUTANEOUS at 20:58

## 2021-06-04 RX ADMIN — FERROUS SULFATE TAB 325 MG (65 MG ELEMENTAL FE) 325 MG: 325 (65 FE) TAB at 20:58

## 2021-06-04 RX ADMIN — TETROFOSMIN 34.7 MILLICURIE: 1.38 INJECTION, POWDER, LYOPHILIZED, FOR SOLUTION INTRAVENOUS at 12:45

## 2021-06-04 RX ADMIN — ROSUVASTATIN CALCIUM 10 MG: 10 TABLET, FILM COATED ORAL at 20:58

## 2021-06-04 RX ADMIN — METFORMIN HYDROCHLORIDE 500 MG: 500 TABLET ORAL at 18:37

## 2021-06-04 RX ADMIN — SODIUM CHLORIDE, PRESERVATIVE FREE 10 ML: 5 INJECTION INTRAVENOUS at 12:45

## 2021-06-04 ASSESSMENT — ENCOUNTER SYMPTOMS
EYES NEGATIVE: 1
NAUSEA: 0
SHORTNESS OF BREATH: 1
VOMITING: 0
ABDOMINAL PAIN: 0
GASTROINTESTINAL NEGATIVE: 1
WHEEZING: 0
ALLERGIC/IMMUNOLOGIC NEGATIVE: 1

## 2021-06-04 NOTE — H&P
40 MG tablet Take 1 tablet by mouth daily 30 tablet 3    rosuvastatin (CRESTOR) 10 MG tablet TAKE 1 TABLET BY MOUTH ONCE DAILY 90 tablet 1    levothyroxine (SYNTHROID) 125 MCG tablet Take 1 tablet by mouth daily -except Sundays 90 tablet 0    metFORMIN (GLUCOPHAGE) 500 MG tablet TAKE 1 TABLET BY MOUTH TWICE A DAY WITH FOOD 180 tablet 0    losartan-hydrochlorothiazide (HYZAAR) 100-25 MG per tablet TAKE 1 TABLET BY MOUTH DAILY 90 tablet 0    metoprolol succinate (TOPROL XL) 100 MG extended release tablet TAKE 1 TABLET BY MOUTH EVERY DAY 90 tablet 0    VITAMIN E by Does not apply route.  vitamin B-12 (CYANOCOBALAMIN) 1000 MCG tablet Take 1,000 mcg by mouth daily.  Omega-3 Fatty Acids (FISH OIL) 1200 MG CAPS Take  by mouth. Current Facility-Administered Medications   Medication Dose Route Frequency Provider Last Rate Last Admin    triamcinolone acetonide (KENALOG-40) injection 80 mg  80 mg Intramuscular Once DELIO De Jesus - CNP        methylPREDNISolone acetate (DEPO-MEDROL) injection 40 mg  40 mg Intramuscular Once Jose Maria Ross MD         Facility-Administered Medications Ordered in Other Visits   Medication Dose Route Frequency Provider Last Rate Last Admin    sodium chloride flush 0.9 % injection 10 mL  10 mL Intravenous PRN Seamus J Holiday, DO   10 mL at 06/04/21 1245       ALLERGIES: Patient has no known allergies. Review of Systems   Constitutional: Negative. Negative for chills and fever. HENT: Negative. Eyes: Negative. Respiratory: Positive for shortness of breath. Negative for wheezing. Cardiovascular: Positive for chest pain. Negative for palpitations and leg swelling. Gastrointestinal: Negative. Negative for abdominal pain, nausea and vomiting. Endocrine: Negative. Genitourinary: Negative. Musculoskeletal: Negative. Skin: Negative. Negative for rash. Allergic/Immunologic: Negative.     Neurological: Negative for dizziness, weakness and headaches. Hematological: Negative. Psychiatric/Behavioral: Negative. VITALS:  not currently breastfeeding. There is no height or weight on file to calculate BMI. Physical Exam  Constitutional:       Appearance: She is well-developed. She is not diaphoretic. HENT:      Head: Normocephalic and atraumatic. Eyes:      Pupils: Pupils are equal, round, and reactive to light. Neck:      Thyroid: No thyromegaly. Vascular: No JVD. Trachea: No tracheal deviation. Cardiovascular:      Rate and Rhythm: Tachycardia present. Rhythm irregularly irregular. Chest Wall: PMI is not displaced. Pulses: Intact distal pulses. Heart sounds: Normal heart sounds. Heart sounds not distant. No murmur heard. No friction rub. No gallop. No S3 sounds. Pulmonary:      Effort: No respiratory distress. Breath sounds: No wheezing or rales. Chest:      Chest wall: No tenderness. Abdominal:      General: Bowel sounds are normal. There is no distension. Palpations: Abdomen is soft. There is no mass. Tenderness: There is no abdominal tenderness. There is no guarding or rebound. Musculoskeletal:      Cervical back: Normal range of motion and neck supple. Skin:     General: Skin is warm and dry. Coloration: Skin is not pale. Findings: No erythema or rash. Neurological:      Mental Status: She is alert and oriented to person, place, and time. Cranial Nerves: No cranial nerve deficit. Psychiatric:         Behavior: Behavior normal.         Thought Content: Thought content normal.         Judgment: Judgment normal.         LABS:  No results found for this or any previous visit (from the past 24 hour(s)).   Troponin: No results found for: TROPONINI    EKG: atrial fibrillation      ASSESSMENT:    New onset atrial fibrillation with rapid ventricle response  Angina class III-IV  Abnormal stress test with anterior wall ischemia versus breast shadowing  Diabetes mellitus  Essential hypertension  Hyperlipidemia  History of anemia  Morbid obesity class II      PLAN:   1. As always, aggressive risk factor modification is strongly recommended. We should adhere to the JNC VIII guidelines for HTN management and the NCEPATP III guidelines for LDL-C management. 2. Direct admission  3. Heart rate control with beta-blocker and titrate as tolerated  4. Lovenox full dose transition to oral anticoagulation prior to discharge  5. We will plan for cardiac catheterization on Monday given anginal symptoms, abnormal stress test and multiple risk factors for CAD. Patient is agreeable and understands the risk benefits and alternatives of the procedure  6. We will review 2D echocardiogram  7. Monitor on telemetry  8. Potassium greater than 4, magnesium greater than 2  9. GI prophylaxis  10. Monitor H&H closely  11.  Further recommendations to follow      Electronically signed by Kirby Thompson DO on 6/4/2021 at 3:25 PM

## 2021-06-05 LAB
GLUCOSE BLD-MCNC: 108 MG/DL (ref 60–115)
GLUCOSE BLD-MCNC: 113 MG/DL (ref 60–115)
GLUCOSE BLD-MCNC: 121 MG/DL (ref 60–115)
GLUCOSE BLD-MCNC: 91 MG/DL (ref 60–115)
HCT VFR BLD CALC: 34.3 % (ref 37–47)
HEMOGLOBIN: 10.9 G/DL (ref 12–16)
MCH RBC QN AUTO: 22.7 PG (ref 27–31.3)
MCHC RBC AUTO-ENTMCNC: 31.7 % (ref 33–37)
MCV RBC AUTO: 71.4 FL (ref 82–100)
PDW BLD-RTO: 22.4 % (ref 11.5–14.5)
PERFORMED ON: ABNORMAL
PERFORMED ON: NORMAL
PLATELET # BLD: 201 K/UL (ref 130–400)
RBC # BLD: 4.81 M/UL (ref 4.2–5.4)
WBC # BLD: 7.8 K/UL (ref 4.8–10.8)

## 2021-06-05 PROCEDURE — 6370000000 HC RX 637 (ALT 250 FOR IP): Performed by: INTERNAL MEDICINE

## 2021-06-05 PROCEDURE — 99233 SBSQ HOSP IP/OBS HIGH 50: CPT | Performed by: INTERNAL MEDICINE

## 2021-06-05 PROCEDURE — 85027 COMPLETE CBC AUTOMATED: CPT

## 2021-06-05 PROCEDURE — 93005 ELECTROCARDIOGRAM TRACING: CPT | Performed by: INTERNAL MEDICINE

## 2021-06-05 PROCEDURE — 36415 COLL VENOUS BLD VENIPUNCTURE: CPT

## 2021-06-05 PROCEDURE — 2060000000 HC ICU INTERMEDIATE R&B

## 2021-06-05 PROCEDURE — 6360000002 HC RX W HCPCS: Performed by: INTERNAL MEDICINE

## 2021-06-05 RX ORDER — DIGOXIN 125 MCG
125 TABLET ORAL DAILY
Status: DISCONTINUED | OUTPATIENT
Start: 2021-06-05 | End: 2021-06-07 | Stop reason: HOSPADM

## 2021-06-05 RX ADMIN — ENOXAPARIN SODIUM 100 MG: 100 INJECTION SUBCUTANEOUS at 09:29

## 2021-06-05 RX ADMIN — ENOXAPARIN SODIUM 100 MG: 100 INJECTION SUBCUTANEOUS at 22:03

## 2021-06-05 RX ADMIN — FERROUS SULFATE TAB 325 MG (65 MG ELEMENTAL FE) 325 MG: 325 (65 FE) TAB at 22:03

## 2021-06-05 RX ADMIN — METOPROLOL SUCCINATE 100 MG: 100 TABLET, EXTENDED RELEASE ORAL at 09:26

## 2021-06-05 RX ADMIN — LOSARTAN POTASSIUM AND HYDROCHLOROTHIAZIDE 2 TABLET: 12.5; 5 TABLET ORAL at 09:25

## 2021-06-05 RX ADMIN — LEVOTHYROXINE SODIUM 125 MCG: 0.12 TABLET ORAL at 09:25

## 2021-06-05 RX ADMIN — FERROUS SULFATE TAB 325 MG (65 MG ELEMENTAL FE) 325 MG: 325 (65 FE) TAB at 09:25

## 2021-06-05 RX ADMIN — FERROUS SULFATE TAB 325 MG (65 MG ELEMENTAL FE) 325 MG: 325 (65 FE) TAB at 14:34

## 2021-06-05 RX ADMIN — PANTOPRAZOLE SODIUM 40 MG: 40 TABLET, DELAYED RELEASE ORAL at 09:25

## 2021-06-05 RX ADMIN — ASPIRIN 81 MG: 81 TABLET, CHEWABLE ORAL at 09:25

## 2021-06-05 RX ADMIN — DIGOXIN 125 MCG: 125 TABLET ORAL at 09:26

## 2021-06-05 RX ADMIN — ROSUVASTATIN CALCIUM 10 MG: 10 TABLET, FILM COATED ORAL at 22:03

## 2021-06-05 ASSESSMENT — ENCOUNTER SYMPTOMS
EYES NEGATIVE: 1
RESPIRATORY NEGATIVE: 1
COUGH: 0
STRIDOR: 0
WHEEZING: 0
CHEST TIGHTNESS: 0
BLOOD IN STOOL: 0
SHORTNESS OF BREATH: 0
NAUSEA: 0
GASTROINTESTINAL NEGATIVE: 1

## 2021-06-05 NOTE — CARE COORDINATION
[facility]othi Rubalcava Case Management Initial Discharge Assessment    Met with Patient AND SPOUSE to discuss discharge plan. PCP: Tiago Lopez MD                                Date of Last Visit: MAY 2021    Discharge Planning    Living Arrangements: independently at home    Who do you live with? [address] [place] [address]    Who helps you with your care:  self    If lives at home:     Do you have any barriers navigating in your home? no    Patient can perform ADL? Yes    Current Services (outpatient and in home) :  None    Dialysis: No    Is transportation available to get to your appointments? Yes    DME Equipment:  no    Respiratory equipment: None    Respiratory provider:  no     Pharmacy:  yes - [facility] [address] with Medication Assistance Program?  No      [facility]? Declined    Patient agreeable to SNF/Rehab? Declined    Other discharge needs identified? N/A    Does Patient Have a High-Risk for Readmission Diagnosis (CHF, PN, MI, COPD)? No      Initial Discharge Plan? (Note: please see concurrent daily documentation for any updates after initial note). PT DENIES ANY HOME NEEDS. PT FOR CATH ON Monday. NEW AFIB, LSW/CM TO FOLLOW FOR [address] [place] [address].       Readmission Risk              Risk of Unplanned Readmission:  11         Electronically signed by Lata Gonzales RN on 6/5/2021 at 2:41 PM

## 2021-06-05 NOTE — PROGRESS NOTES
Progress Note  Patient: Jose Dhaliwal  Unit/Bed: J578/Q853-14  YOB: 1950  MRN: [id number]  Acct: [de-identified]   Admitting Diagnosis: A-fib [facility]) [I48.91]  Admit Date:  2021  Hospital Day: 1    Chief Complaint: AF Angina    Histories:  Past Medical History:   Diagnosis Date    Angina at rest [facility]) 2021    DM2 [facility] mellitus[facility] type 2) [facility]     Hyperlipidemia     Hypertension     Hypothyroidism      Past Surgical History:   Procedure Laterality Date     SECTION      COLONOSCOPY      needs     COLONOSCOPY N/A 2021    COLONOSCOPY DIAGNOSTIC performed by Esperanza Ponce MD at [address] [place] [facility] [place]  [address] [place] [address] N/A 2018    COLONOSCOPY adenoma    UPPER GASTROINTESTINAL ENDOSCOPY N/A 2021    EGD DIAGNOSTIC ONLY performed by Esperanza Ponce MD at [facility]     Family History   Problem Relation Age of Onset    Colon Cancer Maternal Grandmother      Social History     Socioeconomic History    Marital status:      Spouse name: Not on file    Number of children: Not on file    Years of education: Not on file    Highest education level: Not on file   Occupational History    Not on file   Tobacco Use    Smoking status: Never Smoker    Smokeless tobacco: Never Used   Vaping Use    Vaping Use: Never used   Substance and Sexual Activity    Alcohol use: Yes     Comment: rare     Drug use: No    Sexual activity: Yes     Partners: Male   Other Topics Concern    Not on file   Social History Narrative    Not on file     Social Determinants of Health     Financial Resource Strain:     Difficulty of Paying Living Expenses:    Food Insecurity:     Worried About Running Out of Food in the Last Year:     Ran Out of Food in the Last Year:    Transportation Needs:     Lack of Transportation (Medical):      Lack of Transportation (Non-Medical):    Physical Activity:     Days of Exercise per Week:     Minutes of Exercise per Session:    Stress:     Feeling of Stress :    Social Connections:     Frequency of Communication with Friends and Family:     Frequency of Social Gatherings with Friends and Family:     Attends Islam Services:     Active Member of Clubs or Organizations:     Attends Club or Organization Meetings:     Marital Status:    Intimate Partner Violence:     Fear of Current or Ex-Partner:     Emotionally Abused:     Physically Abused:     Sexually Abused:        Subjective/HPI no acute events overnight. No further CP. Can not sense AF. EKG: AF       Review of Systems:   Review of Systems   Constitutional: Negative. Negative for diaphoresis and fatigue. HENT: Negative. Eyes: Negative. Respiratory: Negative. Negative for cough, chest tightness, shortness of breath, wheezing and stridor. Cardiovascular: Negative. Negative for chest pain, palpitations and leg swelling. Gastrointestinal: Negative. Negative for blood in stool and nausea. Genitourinary: Negative. Musculoskeletal: Negative. Skin: Negative. Neurological: Negative. Negative for dizziness, syncope, weakness and light-headedness. Hematological: Negative. Psychiatric/Behavioral: Negative. Physical Examination:    BP (!) 121/97   Pulse 62   Temp 97.7 °F (36.5 °C) (Oral)   Resp 18   Wt 230 lb (104.3 kg)   LMP  (LMP Unknown)   SpO2 98%   BMI 39.48 kg/m²    Physical Exam   Constitutional: She appears healthy. No distress. HENT:   Normal cephalic and Atraumatic   Eyes: Pupils are equal, round, and reactive to light. Neck: Thyroid normal. No JVD present. No neck adenopathy. No thyromegaly present. Cardiovascular: Normal rate, regular rhythm, normal heart sounds, intact distal pulses and normal pulses. Pulmonary/Chest: Effort normal and breath sounds normal. She has no wheezes. She has no rales. She exhibits no tenderness. Abdominal: Soft.  Bowel sounds are normal. There is no abdominal tenderness. Musculoskeletal:         General: No tenderness or edema. Normal range of motion. Cervical back: Normal range of motion and neck supple. Neurological: She is alert and oriented to person, place, and time. Skin: Skin is warm. No cyanosis. Nails show no clubbing.        LABS:  CBC:   Lab Results   Component Value Date    WBC 7.8 06/05/2021    RBC 4.81 06/05/2021    HGB 10.9 06/05/2021    HCT 34.3 06/05/2021    MCV 71.4 06/05/2021    MCH 22.7 06/05/2021    MCHC 31.7 06/05/2021    RDW 22.4 06/05/2021     06/05/2021    MPV 10.0 07/07/2015     CBC with Differential:    Lab Results   Component Value Date    WBC 7.8 06/05/2021    RBC 4.81 06/05/2021    HGB 10.9 06/05/2021    HCT 34.3 06/05/2021     06/05/2021    MCV 71.4 06/05/2021    MCH 22.7 06/05/2021    MCHC 31.7 06/05/2021    RDW 22.4 06/05/2021    LYMPHOPCT 34.6 04/06/2016    MONOPCT 9.6 04/06/2016    BASOPCT 0.2 04/06/2016    MONOSABS 0.7 04/06/2016    LYMPHSABS 2.5 04/06/2016    EOSABS 0.2 04/06/2016    BASOSABS 0.0 04/06/2016     CMP:    Lab Results   Component Value Date     01/02/2019    K 4.5 01/02/2019     01/02/2019    CO2 24 01/02/2019    BUN 17 01/02/2019    CREATININE 1.03 01/02/2019    GFRAA >60.0 01/02/2019    LABGLOM 53.2 01/02/2019    GLUCOSE 95 01/02/2019    PROT 6.9 01/02/2019    LABALBU 3.8 01/02/2019    CALCIUM 9.4 01/02/2019    BILITOT 0.5 01/02/2019    ALKPHOS 74 01/02/2019    AST 20 01/02/2019    ALT 11 01/02/2019     BMP:    Lab Results   Component Value Date     01/02/2019    K 4.5 01/02/2019     01/02/2019    CO2 24 01/02/2019    BUN 17 01/02/2019    LABALBU 3.8 01/02/2019    CREATININE 1.03 01/02/2019    CALCIUM 9.4 01/02/2019    GFRAA >60.0 01/02/2019    LABGLOM 53.2 01/02/2019    GLUCOSE 95 01/02/2019     Magnesium:  No results found for: MG  Troponin:    Lab Results   Component Value Date    TROPONINI <0.010 06/04/2021        Active Hospital Problems    Diagnosis Date Noted    A-fib [facility]) [I48.91] 06/04/2021     Priority: High        Assessment/Plan:  1. AF - Rate uncontrolled- add Dig today. Continue Full does Lovenox. Continue BB  2. Angina with Abnormal Spect suspicious for anterior ischemia with potential superimposed Breast artifact. - plan for Cath Monday per Dr. Tyron Pereira. ASA BB Statin   3. DM  4. HTN Stable  5. HPL  6. Statin   7. LVEF 55%  8.  RVSP 53       Electronically signed by Alana Turcios MD on 6/5/2021 at 7:45 AM

## 2021-06-05 NOTE — PROCEDURES
visual TID. IMPRESSION:  1. Abnormal stress myocardial perfusion examination. There is  reversible ischemia in the [id number] anterior wall as well as the LV  apex of moderate size and moderate intensity. Cannot exclude underlying  ischemia versus breast attenuation artifact. Clinical correlation is  advised. 2.  Normal hemodynamic response to Lexiscan infusion. 3.  Normal LV function with calculated ejection fraction of 54%. 4.  Normal TID ratio equals to 0.90.  5.  Atrial fibrillation with rapid ventricular response. 6.  No reported chest pain, chest pressure or syncope.         Sandeep Garcia DO    D: 06/04/2021 #19:20:40       T: 06/04/2021 20:58:25     [id number]  Job#: [id number]     Doc#: [id number]    CC:

## 2021-06-06 LAB
ALBUMIN SERPL-MCNC: 3.5 G/DL (ref 3.5–4.6)
ALP BLD-CCNC: 73 U/L (ref 40–130)
ALT SERPL-CCNC: 8 U/L (ref 0–33)
ANION GAP SERPL CALCULATED.3IONS-SCNC: 11 MEQ/L (ref 9–15)
AST SERPL-CCNC: 13 U/L (ref 0–35)
BILIRUB SERPL-MCNC: 0.4 MG/DL (ref 0.2–0.7)
BUN BLDV-MCNC: 18 MG/DL (ref 8–23)
CALCIUM SERPL-MCNC: 8.8 MG/DL (ref 8.5–9.9)
CHLORIDE BLD-SCNC: 106 MEQ/L (ref 95–107)
CO2: 24 MEQ/L (ref 20–31)
CREAT SERPL-MCNC: 0.95 MG/DL (ref 0.5–0.9)
GFR AFRICAN AMERICAN: >60
GFR NON-AFRICAN AMERICAN: 58
GLOBULIN: 2.9 G/DL (ref 2.3–3.5)
GLUCOSE BLD-MCNC: 103 MG/DL (ref 70–99)
GLUCOSE BLD-MCNC: 113 MG/DL (ref 60–115)
GLUCOSE BLD-MCNC: 121 MG/DL (ref 60–115)
GLUCOSE BLD-MCNC: 129 MG/DL (ref 60–115)
GLUCOSE BLD-MCNC: 96 MG/DL (ref 60–115)
PERFORMED ON: ABNORMAL
PERFORMED ON: ABNORMAL
PERFORMED ON: NORMAL
PERFORMED ON: NORMAL
POTASSIUM REFLEX MAGNESIUM: 3.9 MEQ/L (ref 3.4–4.9)
SODIUM BLD-SCNC: 141 MEQ/L (ref 135–144)
TOTAL PROTEIN: 6.4 G/DL (ref 6.3–8)
TSH SERPL DL<=0.05 MIU/L-ACNC: 0.42 UIU/ML (ref 0.44–3.86)

## 2021-06-06 PROCEDURE — 6370000000 HC RX 637 (ALT 250 FOR IP): Performed by: INTERNAL MEDICINE

## 2021-06-06 PROCEDURE — 6360000002 HC RX W HCPCS: Performed by: INTERNAL MEDICINE

## 2021-06-06 PROCEDURE — 84443 ASSAY THYROID STIM HORMONE: CPT

## 2021-06-06 PROCEDURE — 99233 SBSQ HOSP IP/OBS HIGH 50: CPT | Performed by: INTERNAL MEDICINE

## 2021-06-06 PROCEDURE — 2500000003 HC RX 250 WO HCPCS: Performed by: INTERNAL MEDICINE

## 2021-06-06 PROCEDURE — 2060000000 HC ICU INTERMEDIATE R&B

## 2021-06-06 PROCEDURE — 36415 COLL VENOUS BLD VENIPUNCTURE: CPT

## 2021-06-06 PROCEDURE — 80053 COMPREHEN METABOLIC PANEL: CPT

## 2021-06-06 RX ORDER — METOPROLOL SUCCINATE 100 MG/1
100 TABLET, EXTENDED RELEASE ORAL 2 TIMES DAILY
Status: DISCONTINUED | OUTPATIENT
Start: 2021-06-06 | End: 2021-06-07 | Stop reason: HOSPADM

## 2021-06-06 RX ORDER — HYDROXYZINE HYDROCHLORIDE 50 MG/ML
50 INJECTION, SOLUTION INTRAMUSCULAR EVERY 6 HOURS PRN
Status: DISCONTINUED | OUTPATIENT
Start: 2021-06-06 | End: 2021-06-06

## 2021-06-06 RX ADMIN — PANTOPRAZOLE SODIUM 40 MG: 40 TABLET, DELAYED RELEASE ORAL at 09:13

## 2021-06-06 RX ADMIN — ENOXAPARIN SODIUM 100 MG: 100 INJECTION SUBCUTANEOUS at 20:37

## 2021-06-06 RX ADMIN — METOPROLOL SUCCINATE 100 MG: 100 TABLET, EXTENDED RELEASE ORAL at 20:37

## 2021-06-06 RX ADMIN — FERROUS SULFATE TAB 325 MG (65 MG ELEMENTAL FE) 325 MG: 325 (65 FE) TAB at 20:37

## 2021-06-06 RX ADMIN — LOSARTAN POTASSIUM AND HYDROCHLOROTHIAZIDE 2 TABLET: 12.5; 5 TABLET ORAL at 09:14

## 2021-06-06 RX ADMIN — ENOXAPARIN SODIUM 100 MG: 100 INJECTION SUBCUTANEOUS at 09:14

## 2021-06-06 RX ADMIN — METOPROLOL TARTRATE 5 MG: 5 INJECTION INTRAVENOUS at 00:11

## 2021-06-06 RX ADMIN — DIGOXIN 125 MCG: 125 TABLET ORAL at 09:14

## 2021-06-06 RX ADMIN — FERROUS SULFATE TAB 325 MG (65 MG ELEMENTAL FE) 325 MG: 325 (65 FE) TAB at 09:14

## 2021-06-06 RX ADMIN — ROSUVASTATIN CALCIUM 10 MG: 10 TABLET, FILM COATED ORAL at 20:36

## 2021-06-06 RX ADMIN — METOPROLOL SUCCINATE 100 MG: 100 TABLET, EXTENDED RELEASE ORAL at 09:14

## 2021-06-06 RX ADMIN — FERROUS SULFATE TAB 325 MG (65 MG ELEMENTAL FE) 325 MG: 325 (65 FE) TAB at 13:14

## 2021-06-06 RX ADMIN — LEVOTHYROXINE SODIUM 125 MCG: 0.12 TABLET ORAL at 09:14

## 2021-06-06 RX ADMIN — ASPIRIN 81 MG: 81 TABLET, CHEWABLE ORAL at 09:14

## 2021-06-06 RX ADMIN — METOPROLOL TARTRATE 5 MG: 5 INJECTION INTRAVENOUS at 06:49

## 2021-06-06 ASSESSMENT — ENCOUNTER SYMPTOMS
CHEST TIGHTNESS: 0
COUGH: 0
SHORTNESS OF BREATH: 0
STRIDOR: 0
RESPIRATORY NEGATIVE: 1
EYES NEGATIVE: 1
NAUSEA: 0
WHEEZING: 0
GASTROINTESTINAL NEGATIVE: 1
BLOOD IN STOOL: 0

## 2021-06-06 ASSESSMENT — PAIN SCALES - GENERAL: PAINLEVEL_OUTOF10: 0

## 2021-06-06 NOTE — PROGRESS NOTES
Progress Note  Patient: Adilia Lynch  Unit/Bed: E679/F336-10  YOB: 1950  MRN: [id number]  Acct: [de-identified]   Admitting Diagnosis: A-fib [facility]) [I48.91]  Admit Date:  2021  Hospital Day: 2    Chief Complaint: AF Angina    Histories:  Past Medical History:   Diagnosis Date    Angina at rest [facility]) 2021    [facility]     Hyperlipidemia     Hypertension     Hypothyroidism      Past Surgical History:   Procedure Laterality Date     SECTION      COLONOSCOPY      needs     COLONOSCOPY N/A 2021    COLONOSCOPY DIAGNOSTIC performed by Juan Pablo Siddiqui MD at [address] [place] [facility] [place]  [address] [facility] N/A 2018    COLONOSCOPY adenoma    UPPER GASTROINTESTINAL ENDOSCOPY N/A 2021    EGD DIAGNOSTIC ONLY performed by Juan [facility]iqu[facility] MD at [facility]     Family History   Problem Relation Age of Onset    Colon Cancer Maternal Grandmother      Social History     Socioeconomic History    Marital status:      Spouse name: Not on file    Number of children: Not on file    Years of education: Not on file    Highest education level: Not on file   Occupational History    Not on file   Tobacco Use    Smoking status: Never Smoker    Smokeless tobacco: Never Used   Vaping Use    Vaping Use: Never used   Substance and Sexual Activity    Alcohol use: Yes     Comment: rare     Drug use: No    Sexual activity: Yes     Partners: Male   Other Topics Concern    Not on file   Social History Narrative    Not on file     Social Determinants of Health     Financial Resource Strain:     Difficulty of Paying Living Expenses:    Food Insecurity:     Worried About Running Out of Food in the Last Year:     Ran Out of Food in the Last Year:    Transportation Needs:     Lack of Transportation (Medical):      Lack of Transportation (Non-Medical):    Physical Activity:     Days of Exercise per Week:     Minutes of Exercise per Session:    Stress:     Feeling of Stress :    Social Connections:     Frequency of Communication with Friends and Family:     Frequency of Social Gatherings with Friends and Family:     Attends Jain Services:     Active Member of Clubs or Organizations:     Attends Club or Organization Meetings:     Marital Status:    Intimate Partner Violence:     Fear of Current or Ex-Partner:     Emotionally Abused:     Physically Abused:     Sexually Abused:        Subjective/HPI no acute events overnight. No further CP. Can not sense AF. Lying flat. EKG:       Review of Systems:   Review of Systems   Constitutional: Negative. Negative for diaphoresis and fatigue. HENT: Negative. Eyes: Negative. Respiratory: Negative. Negative for cough, chest tightness, shortness of breath, wheezing and stridor. Cardiovascular: Negative. Negative for chest pain, palpitations and leg swelling. Gastrointestinal: Negative. Negative for blood in stool and nausea. Genitourinary: Negative. Musculoskeletal: Negative. Skin: Negative. Neurological: Negative. Negative for dizziness, syncope, weakness and light-headedness. Hematological: Negative. Psychiatric/Behavioral: Negative. Physical Examination:    /78   Pulse 67   Temp 98.2 °F (36.8 °C) (Oral)   Resp 17   Wt 230 lb 6.4 oz (104.5 kg)   LMP  (LMP Unknown)   SpO2 98%   BMI 39.55 kg/m²    Physical Exam   Constitutional: She appears healthy. No distress. HENT:   Normal cephalic and Atraumatic   Eyes: Pupils are equal, round, and reactive to light. Neck: Thyroid normal. No JVD present. No neck adenopathy. No thyromegaly present. Cardiovascular: Normal rate, regular rhythm, normal heart sounds, intact distal pulses and normal pulses. Pulmonary/Chest: Effort normal and breath sounds normal. She has no wheezes. She has no rales. She exhibits no tenderness. Abdominal: Soft.  Bowel sounds are normal. There is no abdominal tenderness. Musculoskeletal:         General: No tenderness or edema. Normal range of motion. Cervical back: Normal range of motion and neck supple. Neurological: She is alert and oriented to person, place, and time. Skin: Skin is warm. No cyanosis. Nails show no clubbing.        LABS:  CBC:   Lab Results   Component Value Date    WBC 7.8 06/05/2021    RBC 4.81 06/05/2021    HGB 10.9 06/05/2021    HCT 34.3 06/05/2021    MCV 71.4 06/05/2021    MCH 22.7 06/05/2021    MCHC 31.7 06/05/2021    RDW 22.4 06/05/2021     06/05/2021    MPV 10.0 07/07/2015     CBC with Differential:    Lab Results   Component Value Date    WBC 7.8 06/05/2021    RBC 4.81 06/05/2021    HGB 10.9 06/05/2021    HCT 34.3 06/05/2021     06/05/2021    MCV 71.4 06/05/2021    MCH 22.7 06/05/2021    MCHC 31.7 06/05/2021    RDW 22.4 06/05/2021    LYMPHOPCT 34.6 04/06/2016    MONOPCT 9.6 04/06/2016    BASOPCT 0.2 04/06/2016    MONOSABS 0.7 04/06/2016    LYMPHSABS 2.5 04/06/2016    EOSABS 0.2 04/06/2016    BASOSABS 0.0 04/06/2016     CMP:    Lab Results   Component Value Date     06/06/2021    K 3.9 06/06/2021     06/06/2021    CO2 24 06/06/2021    BUN 18 06/06/2021    CREATININE 0.95 06/06/2021    GFRAA >60.0 06/06/2021    LABGLOM 58.0 06/06/2021    GLUCOSE 103 06/06/2021    PROT 6.4 06/06/2021    LABALBU 3.5 06/06/2021    CALCIUM 8.8 06/06/2021    BILITOT 0.4 06/06/2021    ALKPHOS 73 06/06/2021    AST 13 06/06/2021    ALT 8 06/06/2021     BMP:    Lab Results   Component Value Date     06/06/2021    K 3.9 06/06/2021     06/06/2021    CO2 24 06/06/2021    BUN 18 06/06/2021    LABALBU 3.5 06/06/2021    CREATININE 0.95 06/06/2021    CALCIUM 8.8 06/06/2021    GFRAA >60.0 06/06/2021    LABGLOM 58.0 06/06/2021    GLUCOSE 103 06/06/2021     Magnesium:  No results found for: MG  Troponin:    Lab Results   Component Value Date    TROPONINI <0.010 06/04/2021        Active Hospital Problems    Diagnosis Date Noted    A-fib [facility]) [I48.91] 06/04/2021     Priority: High        Assessment/Plan:  1. AF - Rate uncontrolled- added Dig yesterday. . Advance BB to bid  2. Angina with Abnormal Spect suspicious for anterior ischemia with potential superimposed Breast artifact. - plan for Cath Monday per Dr. Gris Milian. ASA BB Statin. NPO . Hold Metformin. 3. DM  4. HTN Stable but hold Hyzaar to advance BB. 5. HPL  6. Statin   7. LVEF 55%  8.  RVSP 53       Electronically signed by Kennth Boeck, MD on 6/6/2021 at 10:03 AM

## 2021-06-06 NOTE — PROGRESS NOTES
Patient up in chair in room. Denies chest pain or SOB. Does report occasionally feeling a sense of fluttering in her chest.  Rhythm remains a-fib, rate frequently uncontrolled. Anticipating a cardiac catheterization tomorrow. Further education provided regarding atrial fibrillation and what to expect during the procedure. 1500 update:  Patient alternates sitting up in chair and resting in bed. Denies pain or SOB. Reports no needs. 1800 update: Patient remains in atrial fibrillation, rate variable. Patient comfortable, denies any needs at this time.

## 2021-06-06 NOTE — FLOWSHEET NOTE
HR into the 120-130s more frequently. PRN Lopressor order given at this time. Electronically signed by João Acosta RN on 6/6/2021 at 12:12 AM     HR into the 120-130s, occasionally reaching into the 140s. PRN lopressor given.   Electronically signed by João Acosta RN on 6/6/2021 at 6:49 AM'

## 2021-06-07 ENCOUNTER — APPOINTMENT (OUTPATIENT)
Dept: CARDIAC CATH/INVASIVE PROCEDURES | Age: 71
DRG: 287 | End: 2021-06-07
Attending: INTERNAL MEDICINE
Payer: MEDICARE

## 2021-06-07 VITALS
SYSTOLIC BLOOD PRESSURE: 111 MMHG | HEART RATE: 109 BPM | HEIGHT: 62 IN | BODY MASS INDEX: 42.4 KG/M2 | OXYGEN SATURATION: 98 % | WEIGHT: 230.4 LBS | DIASTOLIC BLOOD PRESSURE: 85 MMHG | RESPIRATION RATE: 18 BRPM | TEMPERATURE: 98.2 F

## 2021-06-07 LAB
ANION GAP SERPL CALCULATED.3IONS-SCNC: 16 MEQ/L (ref 9–15)
ANISOCYTOSIS: ABNORMAL
BASOPHILS ABSOLUTE: 0.1 K/UL (ref 0–0.2)
BASOPHILS RELATIVE PERCENT: 0.5 %
BUN BLDV-MCNC: 15 MG/DL (ref 8–23)
CALCIUM SERPL-MCNC: 9.8 MG/DL (ref 8.5–9.9)
CHLORIDE BLD-SCNC: 103 MEQ/L (ref 95–107)
CO2: 22 MEQ/L (ref 20–31)
CREAT SERPL-MCNC: 1.03 MG/DL (ref 0.5–0.9)
EKG ATRIAL RATE: 357 BPM
EKG Q-T INTERVAL: 366 MS
EKG QRS DURATION: 92 MS
EKG QTC CALCULATION (BAZETT): 534 MS
EKG R AXIS: -25 DEGREES
EKG T AXIS: 3 DEGREES
EKG VENTRICULAR RATE: 128 BPM
EOSINOPHILS ABSOLUTE: 0.2 K/UL (ref 0–0.7)
EOSINOPHILS RELATIVE PERCENT: 1.7 %
GFR AFRICAN AMERICAN: >60
GFR NON-AFRICAN AMERICAN: 52.8
GLUCOSE BLD-MCNC: 118 MG/DL (ref 70–99)
GLUCOSE BLD-MCNC: 119 MG/DL (ref 60–115)
HCT VFR BLD CALC: 38.3 % (ref 37–47)
HEMOGLOBIN: 12.4 G/DL (ref 12–16)
INR BLD: 1
LYMPHOCYTES ABSOLUTE: 3.7 K/UL (ref 1–4.8)
LYMPHOCYTES RELATIVE PERCENT: 37 %
MCH RBC QN AUTO: 23.3 PG (ref 27–31.3)
MCHC RBC AUTO-ENTMCNC: 32.5 % (ref 33–37)
MCV RBC AUTO: 71.8 FL (ref 82–100)
MICROCYTES: ABNORMAL
MONOCYTES ABSOLUTE: 0.8 K/UL (ref 0.2–0.8)
MONOCYTES RELATIVE PERCENT: 8.1 %
NEUTROPHILS ABSOLUTE: 5.3 K/UL (ref 1.4–6.5)
NEUTROPHILS RELATIVE PERCENT: 52.7 %
OVALOCYTES: ABNORMAL
PDW BLD-RTO: 23.3 % (ref 11.5–14.5)
PERFORMED ON: ABNORMAL
PLATELET # BLD: 238 K/UL (ref 130–400)
PLATELET SLIDE REVIEW: NORMAL
POIKILOCYTES: ABNORMAL
POTASSIUM REFLEX MAGNESIUM: 3.9 MEQ/L (ref 3.4–4.9)
PROTHROMBIN TIME: 13.7 SEC (ref 12.3–14.9)
RBC # BLD: 5.33 M/UL (ref 4.2–5.4)
SODIUM BLD-SCNC: 141 MEQ/L (ref 135–144)
WBC # BLD: 10 K/UL (ref 4.8–10.8)

## 2021-06-07 PROCEDURE — 6370000000 HC RX 637 (ALT 250 FOR IP): Performed by: INTERNAL MEDICINE

## 2021-06-07 PROCEDURE — 2580000003 HC RX 258: Performed by: INTERNAL MEDICINE

## 2021-06-07 PROCEDURE — 93458 L HRT ARTERY/VENTRICLE ANGIO: CPT | Performed by: INTERNAL MEDICINE

## 2021-06-07 PROCEDURE — 93010 ELECTROCARDIOGRAM REPORT: CPT | Performed by: INTERNAL MEDICINE

## 2021-06-07 PROCEDURE — B2111ZZ FLUOROSCOPY OF MULTIPLE CORONARY ARTERIES USING LOW OSMOLAR CONTRAST: ICD-10-PCS | Performed by: INTERNAL MEDICINE

## 2021-06-07 PROCEDURE — 85025 COMPLETE CBC W/AUTO DIFF WBC: CPT

## 2021-06-07 PROCEDURE — 80048 BASIC METABOLIC PNL TOTAL CA: CPT

## 2021-06-07 PROCEDURE — C1894 INTRO/SHEATH, NON-LASER: HCPCS

## 2021-06-07 PROCEDURE — 6360000002 HC RX W HCPCS

## 2021-06-07 PROCEDURE — 36415 COLL VENOUS BLD VENIPUNCTURE: CPT

## 2021-06-07 PROCEDURE — 85610 PROTHROMBIN TIME: CPT

## 2021-06-07 PROCEDURE — 4A023N7 MEASUREMENT OF CARDIAC SAMPLING AND PRESSURE, LEFT HEART, PERCUTANEOUS APPROACH: ICD-10-PCS | Performed by: INTERNAL MEDICINE

## 2021-06-07 PROCEDURE — 2500000003 HC RX 250 WO HCPCS

## 2021-06-07 PROCEDURE — 99024 POSTOP FOLLOW-UP VISIT: CPT | Performed by: INTERNAL MEDICINE

## 2021-06-07 PROCEDURE — B215YZZ FLUOROSCOPY OF LEFT HEART USING OTHER CONTRAST: ICD-10-PCS | Performed by: INTERNAL MEDICINE

## 2021-06-07 PROCEDURE — 6360000004 HC RX CONTRAST MEDICATION: Performed by: INTERNAL MEDICINE

## 2021-06-07 PROCEDURE — 2709999900 HC NON-CHARGEABLE SUPPLY

## 2021-06-07 PROCEDURE — C1769 GUIDE WIRE: HCPCS

## 2021-06-07 RX ORDER — NITROGLYCERIN 0.4 MG/1
0.4 TABLET SUBLINGUAL EVERY 5 MIN PRN
Status: DISCONTINUED | OUTPATIENT
Start: 2021-06-07 | End: 2021-06-07 | Stop reason: HOSPADM

## 2021-06-07 RX ORDER — SODIUM CHLORIDE 450 MG/100ML
75 INJECTION, SOLUTION INTRAVENOUS CONTINUOUS
Status: DISCONTINUED | OUTPATIENT
Start: 2021-06-07 | End: 2021-06-07

## 2021-06-07 RX ORDER — ACETAMINOPHEN 325 MG/1
650 TABLET ORAL EVERY 4 HOURS PRN
Status: DISCONTINUED | OUTPATIENT
Start: 2021-06-07 | End: 2021-06-07 | Stop reason: HOSPADM

## 2021-06-07 RX ORDER — SODIUM CHLORIDE 0.9 % (FLUSH) 0.9 %
5-40 SYRINGE (ML) INJECTION EVERY 12 HOURS SCHEDULED
Status: DISCONTINUED | OUTPATIENT
Start: 2021-06-07 | End: 2021-06-07 | Stop reason: HOSPADM

## 2021-06-07 RX ORDER — SODIUM CHLORIDE 9 MG/ML
25 INJECTION, SOLUTION INTRAVENOUS PRN
Status: DISCONTINUED | OUTPATIENT
Start: 2021-06-07 | End: 2021-06-07 | Stop reason: HOSPADM

## 2021-06-07 RX ORDER — SODIUM CHLORIDE 9 MG/ML
INJECTION, SOLUTION INTRAVENOUS CONTINUOUS
Status: DISCONTINUED | OUTPATIENT
Start: 2021-06-07 | End: 2021-06-07

## 2021-06-07 RX ORDER — SODIUM CHLORIDE 0.9 % (FLUSH) 0.9 %
5-40 SYRINGE (ML) INJECTION PRN
Status: DISCONTINUED | OUTPATIENT
Start: 2021-06-07 | End: 2021-06-07 | Stop reason: HOSPADM

## 2021-06-07 RX ORDER — HYDRALAZINE HYDROCHLORIDE 20 MG/ML
10 INJECTION INTRAMUSCULAR; INTRAVENOUS EVERY 10 MIN PRN
Status: DISCONTINUED | OUTPATIENT
Start: 2021-06-07 | End: 2021-06-07 | Stop reason: HOSPADM

## 2021-06-07 RX ORDER — DILTIAZEM HYDROCHLORIDE 180 MG/1
120 CAPSULE, COATED, EXTENDED RELEASE ORAL DAILY
Qty: 30 CAPSULE | Refills: 6 | Status: SHIPPED | OUTPATIENT
Start: 2021-06-07 | End: 2021-07-06 | Stop reason: SDUPTHER

## 2021-06-07 RX ORDER — ONDANSETRON 2 MG/ML
4 INJECTION INTRAMUSCULAR; INTRAVENOUS EVERY 6 HOURS PRN
Status: DISCONTINUED | OUTPATIENT
Start: 2021-06-07 | End: 2021-06-07

## 2021-06-07 RX ORDER — DIPHENHYDRAMINE HYDROCHLORIDE 50 MG/ML
50 INJECTION INTRAMUSCULAR; INTRAVENOUS ONCE
Status: DISCONTINUED | OUTPATIENT
Start: 2021-06-07 | End: 2021-06-07 | Stop reason: HOSPADM

## 2021-06-07 RX ADMIN — PANTOPRAZOLE SODIUM 40 MG: 40 TABLET, DELAYED RELEASE ORAL at 08:29

## 2021-06-07 RX ADMIN — ASPIRIN 81 MG: 81 TABLET, CHEWABLE ORAL at 08:29

## 2021-06-07 RX ADMIN — SODIUM CHLORIDE, PRESERVATIVE FREE 10 ML: 5 INJECTION INTRAVENOUS at 08:30

## 2021-06-07 RX ADMIN — DIGOXIN 125 MCG: 125 TABLET ORAL at 08:29

## 2021-06-07 RX ADMIN — FERROUS SULFATE TAB 325 MG (65 MG ELEMENTAL FE) 325 MG: 325 (65 FE) TAB at 08:29

## 2021-06-07 RX ADMIN — LEVOTHYROXINE SODIUM 125 MCG: 0.12 TABLET ORAL at 08:30

## 2021-06-07 RX ADMIN — SODIUM CHLORIDE 75 ML/HR: 4.5 INJECTION, SOLUTION INTRAVENOUS at 08:51

## 2021-06-07 RX ADMIN — IOPAMIDOL 41 ML: 612 INJECTION, SOLUTION INTRAVENOUS at 10:29

## 2021-06-07 RX ADMIN — METOPROLOL SUCCINATE 100 MG: 100 TABLET, EXTENDED RELEASE ORAL at 08:29

## 2021-06-07 ASSESSMENT — PAIN SCALES - GENERAL
PAINLEVEL_OUTOF10: 0

## 2021-06-07 NOTE — PROGRESS NOTES
Right radial no bleeding or hematoma.   Report called to Kristal Apodaca on one [address]  Monitor attached and transport notified

## 2021-06-07 NOTE — FLOWSHEET NOTE
Patient states she isn't too familiar with cath procedure planned for tomorrow, would like physician to meet with her before signing consents.    Electronically signed by Franck Felton RN on 6/6/2021 at 8:45 PM

## 2021-06-07 NOTE — DISCHARGE INSTR - DIET

## 2021-06-07 NOTE — FLOWSHEET NOTE
Pt.given d/c instructions- verbalized understanding. Pt had no further questions. Waiting for d/c ride.

## 2021-06-07 NOTE — PROGRESS NOTES
Arrived to pre/post cath from [address] [place] [address], alert and oriented. Vital signs obtained. Verified consent for procedure was signed. Prepping pt for procedure.

## 2021-06-07 NOTE — DISCHARGE INSTR - ACTIVITY
WATCH RIGHT WRIST FOR INCREASED SWELLING- HARD BRUISING AT SITE- NUMBNESS COLDNESS- IN RIGHT ARM -  GO TO EMERGENCY ROOM . NO HEAVY LIFTING OR PUSHING OFF WITH RIGHT ARM FOR 48 HOURS. CAN TAKE OFF DRESSING X 24 HOURS AND LEAVE OPEN TO AIR.

## 2021-06-07 NOTE — PROGRESS NOTES
Pressure bandage removed from R wrist, R wrist remains stable, no bleeding or hematoma. Will continue to monitor.

## 2021-06-07 NOTE — DISCHARGE SUMMARY
Discharge Summary    Date: 6/7/2021  Patient Name: Erica Kearns YOB: 1950 Age: 79 y.o. Admit Date: 6/4/2021  Discharge Date: 6/7/2021  Discharge Condition: Good    Admission Diagnosis  A-fib ([address] (I48.91)     Discharge Diagnosis  Active Problems: A-fib (HCC)Resolved Problems: * No resolved hospital problems. [facility] Stay  Narrative of Hospital Course:  Patient presented for elective nuclear stress test and was noted to be in new onset atrial fibrillation with rapid ventricle response. Patient also noted to have abnormal stress test in the anterior wall and was admitted for further evaluation and treatment. She was placed on full dose Lovenox as well as heart rate controlling medications. She underwent cardiac catheterization showing normal coronaries with normal LV function and normal LVEDP. She was noted to have LARS II flow likely secondary to microvascular disease. Echo with normal LV function, moderate pulmonary hypertension, moderate concentric left medical hypertrophy. Patient was transitioned to Xarelto 20 mg daily, continued on Toprol- mg daily and Cardizem  mg was initiated. She will be followed up as outpatient and if she remains in atrial fibrillation then will plan antiarrhythmic/cardioversion. Consultants:  None    Surgeries/procedures Performed:       Treatments:    Cardiac Medications    Beta-Blocker, Calcium Channel Blocker and Other    Discharge Plan/Disposition:  Home    Hospital/Incidental Findings Requiring Follow Up:    Patient Instructions:    Diet: Cardiac Diet    Activity:Activity as Tolerated  For number of days (if applicable): Other Instructions:    Provider Follow-Up:   No follow-ups on file.      Significant Diagnostic Studies:    Recent Labs:  Admission on 06/04/2021Pro-BNP                                       Date: 06/04/2021Value: 2,929       Ref range: pg/mL              Status: Final              Comment: NT-pro BNP ACUTE Interpretive Guidelines:      Age        Cutoff for Heart Failure   Less than 50 yrs   450 pg/mL   50-75 yrs           900 pg/mL   Greater than 75 yrs  1800 pg/mLNT-pro BNP NON-ACUTE Interpretive Guidelines:    Age                 Reference Range  Less than 74 yrs   0-125 pg/mL  Greater than 74 yrs   0-450 pg/mLOther possible causes of an elevated NT-proBNP include:cardiac ischemia, acute coronary syndrome, COPD, pneumonia,atrial fibrillation, pulmonary emboli, pulmonary hypertension,pericarditisReference:TRACEY Wiseman et al. NT-proBNP testing for diagnosis andshort-term prognosis in acute destabilized HF: an internationalpooled analysis of 1256 patients.  Heart Journal.2006;27:330-337Troponin                                      Date: 06/04/2021Value: <0.010      Ref range: 0.000 - 0.010 ng*  Status: Final              Comment: Methodology by Troponin T. Troponin                                      Date: 06/04/2021Value: <0.010      Ref range: 0.000 - 0.010 ng*  Status: Final              Comment: Methodology by Troponin T.WBC                                           Date: 06/05/2021Value: 7.8         Ref range: 4.8 - 10.8 K/uL    Status: FinalRBC                                           Date: 06/05/2021Value: 4.81        Ref range: 4.20 - 5.40 M/uL   Status: FinalHemoglobin                                    Date: 06/05/2021Value: 10.9*       Ref range: 12.0 - 16.0 g/dL   Status: FinalHematocrit                                    Date: 06/05/2021Value: 34.3*       Ref range: 37.0 - 47.0 %      Status: FinalMCV                                           Date: 06/05/2021Value: 71.4*       Ref range: 82.0 - 100.0 fL    Status: [address]                                           Date: 06/05/2021Value: 22.7*       Ref range: 27.0 - 31.3 pg     Status: [address]                                          Date: 06/05/2021Value: 31.7*       Ref range: 33.0 - 37.0 %      Status: FinalRDW Date: 06/05/2021Value: 22.4*       Ref range: 11.5 - 14.5 %      Status: FinalPlatelets                                     Date: 06/05/2021Value: 201         Ref range: 130 - 400 K/uL     Status: FinalPOC Glucose                                   Date: 06/04/2021Value: 112         Ref range: 60 - 115 mg/dl     Status: FinalPerformed on                                  Date: 06/04/2021Value: ACCU-CHEK     Status: FinalVentricular Rate                              Date: 06/05/2021Value: 128         Ref range: BPM                Status: FinalAtrial Rate                                   Date: 06/05/2021Value: 357         Ref range: BPM                Status: FinalQRS Duration                                  Date: 06/05/2021Value: 92          Ref range: ms                 Status: FinalQ-T Interval                                  Date: 06/05/2021Value: 366         Ref range: ms                 Status: FinalQTc Calculation (Bazett)                      Date: 06/05/2021Value: 534         Ref range: ms                 Status: FinalR Axis                                        Date: 06/05/2021Value: -25         Ref range: degrees            Status: FinalT Axis                                        Date: 06/05/2021Value: 3           Ref range: degrees            Status: FinalPOC Glucose                                   Date: 06/05/2021Value: 113         Ref range: 60 - 115 mg/dl     Status: FinalPerformed on                                  Date: 06/05/2021Value: ACCU-CHEK     Status: FinalPOC Glucose                                   Date: 06/05/2021Value: 91          Ref range: 60 - 115 mg/dl     Status: FinalPerformed on                                  Date: 06/05/2021Value: ACCU-CHEK     Status: FinalPOC Glucose                                   Date: 06/05/2021Value: 108         Ref range: 60 - 115 mg/dl     Status: FinalPerformed on                                  Date: 06/05/2021Value: Jeaneth Banana FinalTotal Protein                                 Date: 06/06/2021Value: 6.4         Ref range: 6.3 - 8.0 g/dL     Status: FinalAlbumin                                       Date: 06/06/2021Value: 3.5         Ref range: 3.5 - 4.6 g/dL     Status: FinalTotal Bilirubin                               Date: 06/06/2021Value: 0.4         Ref range: 0.2 - 0.7 mg/dL    Status: FinalAlkaline Phosphatase                          Date: 06/06/2021Value: 73          Ref range: 40 - 130 U/L       Status: FinalALT                                           Date: 06/06/2021Value: 8           Ref range: 0 - 33 U/L         Status: FinalAST                                           Date: 06/06/2021Value: 13          Ref range: 0 - 35 U/L         Status: FinalGlobulin                                      Date: 06/06/2021Value: 2.9         Ref range: 2.3 - 3.5 g/dL     Status: FinalPOC Glucose                                   Date: 06/06/2021Value: 121*        Ref range: 60 - 115 mg/dl     Status: FinalPerformed on                                  Date: 06/06/2021Value: ACCU-CHEK     Status: [address]                                           Date: 06/06/2021Value: 0.421*      Ref range: 0.440 - 3.860 uI*  Status: FinalPOC Glucose                                   Date: 06/06/2021Value: 96          Ref range: 60 - 115 mg/dl     Status: FinalPerformed on                                  Date: 06/06/2021Value: ACCU-CHEK     Status: [address] Glucose                                   Date: 06/06/2021Value: 113         Ref range: 60 - 115 mg/dl     Status: FinalPerformed on                                  Date: 06/06/2021Value: ACCU-CHEK     Status: [address] Glucose                                   Date: 06/06/2021Value: 129*        Ref range: 60 - 115 mg/dl     Status: FinalPerformed on                                  Date: 06/06/2021Value: ACCU-CHEK     Status: FinalSodium                                        Date: 06/07/2021Value: 141 Ref range: 135 - 144 mEq/L    Status: FinalPotassium reflex Magnesium                    Date: 06/07/2021Value: 3.9         Ref range: 3.4 - 4.9 mEq/L    Status: FinalChloride                                      Date: 06/07/2021Value: 103         Ref range: 95 - 107 mEq/L     Status: FinalCO2                                           Date: 06/07/2021Value: 22          Ref range: 20 - 31 mEq/L      Status: FinalAnion Gap                                     Date: 06/07/2021Value: 16*         Ref range: 9 - 15 mEq/L       Status: FinalGlucose                                       Date: 06/07/2021Value: 118*        Ref range: 70 - 99 mg/dL      Status: FinalBUN                                           Date: 06/07/2021Value: 15          Ref range: 8 - 23 mg/dL       Status: FinalCREATININE                                    Date: 06/07/2021Value: 1.03*       Ref range: 0.50 - 0.90 mg/dL  Status: FinalGFR Non-                      Date: 06/07/2021Value: 52.8*       Ref range: >60                Status: Final              Comment: >60 mL/min/1.73m2 EGFR, calc. for ages 25 and older using theMDRD formula (not corrected for weight), is valid for stablerenal function. GFR                           Date: 06/07/2021Value: >60.0       Ref range: >60                Status: Final              Comment: >60 mL/min/1.73m2 EGFR, calc. for ages 25 and older using theMDRD formula (not corrected for weight), is valid for stablerenal function. Calcium                                       Date: 06/07/2021Value: 9.8         Ref range: 8.5 - 9.9 mg/dL    Status: [address] [facility]                                           Date: 06/07/2021Value: 10.0        Ref range: 4.8 - 10.8 K/uL    Status: FinalRBC                                           Date: 06/07/2021Value: 5.33        Ref range: 4.20 - 5.40 M/uL   Status: FinalHemoglobin                                    Date: 06/07/2021Value: 12.4        Ref range: 12.0 - 16.0 g/dL   Status: FinalHematocrit                                    Date: 06/07/2021Value: 38.3        Ref range: 37.0 - 47.0 %      Status: FinalMCV                                           Date: 06/07/2021Value: 71.8*       Ref range: 82.0 - 100.0 fL    Status: [address]                                           Date: 06/07/2021Value: 23.3*       Ref range: 27.0 - 31.3 pg     Status: [address]                                          Date: 06/07/2021Value: 32.5*       Ref range: 33.0 - 37.0 %      Status: FinalRDW                                           Date: 06/07/2021Value: 23.3*       Ref range: 11.5 - 14.5 %      Status: FinalPlatelets                                     Date: 06/07/2021Value: 238         Ref range: 130 - 400 K/uL     Status: FinalPLATELET SLIDE REVIEW                         Date: 06/07/2021Value: Normal        Status: FinalNeutrophils %                                 Date: 06/07/2021Value: 52.7        Ref range: %                  Status: FinalLymphocytes %                                 Date: 06/07/2021Value: 37.0        Ref range: %                  Status: FinalMonocytes %                                   Date: 06/07/2021Value: 8.1         Ref range: %                  Status: FinalEosinophils %                                 Date: 06/07/2021Value: 1.7         Ref range: %                  Status: FinalBasophils %                                   Date: 06/07/2021Value: 0.5         Ref range: %                  Status: FinalNeutrophils Absolute                          Date: 06/07/2021Value: 5.3         Ref range: 1.4 - 6.5 K/uL     Status: FinalLymphocytes Absolute                          Date: 06/07/2021Value: 3.7         Ref range: 1.0 - 4.8 K/uL     Status: FinalMonocytes Absolute                            Date: 06/07/2021Value: 0.8         Ref range: 0.2 - 0.8 K/uL     Status: FinalEosinophils Absolute                          Date: 06/07/2021Value: 0.2         Ref

## 2021-07-02 ENCOUNTER — OFFICE VISIT (OUTPATIENT)
Dept: CARDIOLOGY CLINIC | Age: 71
End: 2021-07-02
Payer: COMMERCIAL

## 2021-07-02 VITALS
DIASTOLIC BLOOD PRESSURE: 80 MMHG | WEIGHT: 240 LBS | BODY MASS INDEX: 43.9 KG/M2 | HEART RATE: 114 BPM | SYSTOLIC BLOOD PRESSURE: 120 MMHG

## 2021-07-02 DIAGNOSIS — R06.02 SHORTNESS OF BREATH: ICD-10-CM

## 2021-07-02 DIAGNOSIS — R60.0 BILATERAL LEG EDEMA: ICD-10-CM

## 2021-07-02 DIAGNOSIS — G47.33 OSA (OBSTRUCTIVE SLEEP APNEA): ICD-10-CM

## 2021-07-02 DIAGNOSIS — I48.91 ATRIAL FIBRILLATION, UNSPECIFIED TYPE (HCC): Primary | ICD-10-CM

## 2021-07-02 DIAGNOSIS — E66.01 OBESITY, CLASS III, BMI 40-49.9 (MORBID OBESITY) (HCC): ICD-10-CM

## 2021-07-02 DIAGNOSIS — E78.2 MIXED HYPERLIPIDEMIA: ICD-10-CM

## 2021-07-02 DIAGNOSIS — K44.9 HH (HIATUS HERNIA): ICD-10-CM

## 2021-07-02 DIAGNOSIS — I50.31 ACUTE DIASTOLIC CONGESTIVE HEART FAILURE (HCC): ICD-10-CM

## 2021-07-02 DIAGNOSIS — I10 ESSENTIAL HYPERTENSION: ICD-10-CM

## 2021-07-02 PROBLEM — I20.89 ANGINA AT REST: Status: RESOLVED | Noted: 2021-05-21 | Resolved: 2021-07-02

## 2021-07-02 PROBLEM — I20.8 ANGINA AT REST (HCC): Status: RESOLVED | Noted: 2021-05-21 | Resolved: 2021-07-02

## 2021-07-02 PROBLEM — I50.30 DIASTOLIC CONGESTIVE HEART FAILURE (HCC): Status: ACTIVE | Noted: 2021-07-02

## 2021-07-02 PROCEDURE — 93000 ELECTROCARDIOGRAM COMPLETE: CPT | Performed by: INTERNAL MEDICINE

## 2021-07-02 PROCEDURE — 99215 OFFICE O/P EST HI 40 MIN: CPT | Performed by: INTERNAL MEDICINE

## 2021-07-02 RX ORDER — FUROSEMIDE 40 MG/1
40 TABLET ORAL DAILY
Qty: 30 TABLET | Refills: 6 | Status: SHIPPED | OUTPATIENT
Start: 2021-07-02 | End: 2022-01-20

## 2021-07-02 NOTE — PROGRESS NOTES
Chief Complaint   Patient presents with    Atrial Fibrillation    6 Month Follow-Up       5-21-21: Patient presents for initial medical evaluation. Patient is followed on a regular basis by Dr. Liliana Acevedo MD. S/p hospitalization for CP/heaviness. Had negative wokup in ER. Symptoms occurred at rest and was worse with carrying groceries. Had radiation to right shoulder. + associated SOB. No hx of MI, CHF or arrhythmia.  was worried about her during the episode. Never seen her like this. No hx of MI, CHF or arrhythmia. No hx of cath. Pt denies   nausea, vomiting, diarrhea, constipation, motor weakness, insomnia, weight loss, syncope, dizziness, lightheadedness, palpitations, PND, orthopnea, or claudication. Does have history of iron deficiency anemia ranging from 8-10. Does have hx of endoscopy. 7-2-21:  Patient is a 79 y.o. female who presented for elective stress test and echo. Patient is followed on a regular basis by Dr. Liliana Acevedo MD.  Patient with past medical 3 of hypertension, hyperlipidemia and diabetes. Patient status post recent hospitalization for chest pain/heaviness and had negative work-up in the emergency department. Patient developed chest pain that occurred at rest and was worsened with exertion with radiation to the right shoulder associated with shortness of breath. Patient was noted to be in new onset atrial fibrillation with rapid ventricular response on presentation to the stress test.  Patient does not sense that she is in atrial fibrillation. Preliminary stress test images reviewed showing mild anterior/anterior apical ischemia versus breast attenuation artifact. Patient currently is chest pain-free. Hematology oncology. Shreyas  No previous history of myocardial infarction, congestive heart failure. No history of cardiac catheterization. Patient with history of iron deficiency anemia ranging from 8-10.   She was referred to        7-2-21: s/p abnormal nuclear stress test s/p LHC on 21 with very mild CAD, EF of 55%. S/p ECHO with EF of55%, mod LVH, no valve abn, IAS consistently bowed to the right indicative of elevated severely dilated LA. , mod PHTN, RVSP of 54mmHg,  LA pressure She continues to have SOB worse with exertion. Struggled at [employer] on vacation and going up steps. Pt denies   nausea, vomiting, diarrhea, constipation, motor weakness, insomnia, weight loss, syncope, dizziness, lightheadedness, palpitations, PND, orthopnea, or claudication. Followed with Heme/PCP for low MCV, Hgb is 12.4.  + LE edema. She is on cardizem and DOAC. Losartan was stopped due to mild renal insuff. S/p CT of chest in 2021 with moderate sized hiatal hernia, ? Basilar atelectasis or fibrosis  + hx of DM, HTN, HLD.    EKG today in office afib at 114bpm.         Patient Active Problem List   Diagnosis    Essential hypertension    Mixed hyperlipidemia    Hypothyroidism    Osteoarthritis of knee    Type 2 diabetes mellitus with diabetic polyneuropathy, without long-term current use of insulin ([address]    Benign neoplasm of ascending colon    Diverticulosis of large intestine without diverticulitis    Anemia    HH (hiatus hernia)    Chronic Georges lesion    Adenomatous polyp of transverse colon    A-fib (HCC)    Bilateral leg edema    Shortness of breath    Diastolic congestive heart failure ([address]       Past Surgical History:   Procedure Laterality Date     SECTION      COLONOSCOPY      needs     COLONOSCOPY N/A 2021    COLONOSCOPY DIAGNOSTIC performed by Sharmin Granado MD at [address] [place] [facility]  [address] [place] [address] N/A 2018    COLONOSCOPY adenoma    UPPER GASTROINTESTINAL ENDOSCOPY N/A 2021    EGD DIAGNOSTIC ONLY performed by Sharmin Granado MD at [address] [facility] Marital status:      Spouse name: Not on file    Number of children: Not on file    Years of education: Not on file    Highest education level: Not on file   Occupational History    Not on file   Tobacco Use    Smoking status: Never Smoker    Smokeless tobacco: Never Used   Vaping Use    Vaping Use: Never used   Substance and Sexual Activity    Alcohol use: Yes     Comment: rare     Drug use: No    Sexual activity: Yes     Partners: Male   Other Topics Concern    Not on file   Social History Narrative    Not on file     Social Determinants of Health     Financial Resource Strain:     Difficulty of Paying Living Expenses:    Food Insecurity:     Worried About Running Out of Food in the Last Year:     [address] in the Last Year:    Transportation Needs:     Lack of Transportation (Medical):      Lack of Transportation (Non-Medical):    Physical Activity:     Days of Exercise per Week:     Minutes of Exercise per Session:    Stress:     Feeling of Stress :    Social Connections:     Frequency of Communication with Friends and Family:     Frequency of Social Gatherings with Friends and Family:     Attends Hoahaoism Services:     Active Member of Clubs or Organizations:     Attends Club or Organization Meetings:     Marital Status:    Intimate Partner Violence:     Fear of Current or Ex-Partner:     Emotionally Abused:     Physically Abused:     Sexually Abused:        Family History   Problem Relation Age of Onset    Colon Cancer Maternal Grandmother        Current Outpatient Medications   Medication Sig Dispense Refill    furosemide (LASIX) 40 MG tablet Take 1 tablet by mouth daily 30 tablet 6    rivaroxaban (XARELTO) 20 MG TABS tablet Take 1 tablet by mouth daily (with breakfast) 30 tablet 1    dilTIAZem (CARDIZEM CD) 180 MG extended release capsule Take 1 capsule by mouth daily 30 capsule 6    Ferrous Sulfate (IRON) 325 (65 Fe) MG TABS       pantoprazole (PROTONIX) 40 MG tablet Take 1 tablet by mouth daily 30 tablet 3    rosuvastatin (CRESTOR) 10 MG tablet TAKE 1 TABLET BY MOUTH ONCE DAILY 90 tablet 1    levothyroxine (SYNTHROID) 125 MCG tablet Take 1 tablet by mouth daily -except Sundays 90 tablet 0    metFORMIN (GLUCOPHAGE) 500 MG tablet TAKE 1 TABLET BY MOUTH TWICE A DAY WITH FOOD 180 tablet 0    metoprolol succinate (TOPROL XL) 100 MG extended release tablet TAKE 1 TABLET BY MOUTH EVERY DAY 90 tablet 0    VITAMIN E Take 400 Units by mouth daily       vitamin B-12 (CYANOCOBALAMIN) 1000 MCG tablet Take 1,000 mcg by mouth daily.  Omega-3 Fatty Acids (FISH OIL) 1200 MG CAPS Take  by mouth. Current Facility-Administered Medications   Medication Dose Route Frequency Provider Last Rate Last Admin    triamcinolone acetonide (KENALOG-40) injection 80 mg  80 mg Intramuscular Once DELIO Ching CNP        methylPREDNISolone acetate (DEPO-MEDROL) injection 40 mg  40 mg Intramuscular Once Stanton Mata MD           Patient has no known allergies. Review of Systems:  General ROS: negative  Psychological ROS: negative  Hematological and Lymphatic ROS: No history of blood clots or bleeding disorder. Respiratory ROS: positive for - shortness of breath  Cardiovascular ROS: positive for - chest pain, dyspnea on exertion and shortness of breath  Gastrointestinal ROS: no abdominal pain, change in bowel habits, or black or bloody stools  Genito-Urinary ROS: no dysuria, trouble voiding, or hematuria  Musculoskeletal ROS: negative  Neurological ROS: no TIA or stroke symptoms  Dermatological ROS: negative    VITALS:  Blood pressure 120/80, pulse 114, weight 240 lb (108.9 kg), not currently breastfeeding. Body mass index is 43.9 kg/m². Physical Examination:  General appearance - alert, well appearing, and in no distress  Mental status - alert, oriented to person, place, and time  Neck - Neck is supple, no JVD or carotid bruits. No thyromegaly or adenopathy.    Chest - clear to auscultation, no wheezes, rales or rhonchi, symmetric air entry  Heart - normal rate, regular rhythm, normal S1, S2, no murmurs, rubs, clicks or gallops  Abdomen - soft, nontender, nondistended, no masses or organomegaly  Neurological - alert, oriented, normal speech, no focal findings or movement disorder noted  Extremities - peripheral pulses normal, 4+  pedal edema, no clubbing or cyanosis  Skin - normal coloration and turgor, no rashes, no suspicious skin lesions noted      EKG: normal sinus rhythm, nonspecific ST and T waves changes    Orders Placed This Encounter   Procedures   Michelle Paniagua MD, Pulmonology, [place]    AMB REFERRAL TO HEART FAILURE CLINIC    Cardioversion external    EKG 12 Lead       ASSESSMENT:     Diagnosis Orders   1. Atrial fibrillation, unspecified type (HCC)  EKG 12 Lead    Cardioversion external   2. Essential hypertension     3. Mixed hyperlipidemia     4. HH (hiatus hernia)     5. Bilateral leg edema  AMB REFERRAL TO HEART FAILURE CLINIC   6. Shortness of breath  Jennyfer De Jesus MD, Pulmonology, [place]    AMB REFERRAL TO HEART FAILURE CLINIC   7. Obesity, Class III, BMI 40-49.9 (morbid obesity) ([facility]     8. Acute diastolic congestive heart failure (HCC)  AMB REFERRAL TO HEART FAILURE CLINIC    Basic Metabolic Panel         PLAN:         As always, aggressive risk factor modification is strongly recommended. We should adhere to the JNC VIII guidelines for HTN management and the NCEP ATP III guidelines for LDL-C management. Cardiac diet is always recommended with low fat, cholesterol, calories and sodium. Continue medications at current doses. Multiple signs and symptoms suggestive of sleep apnea, will order sleep study    Refer to Pulmonary for SOB, PHTN, GALINA    Plan for CVN and drug loading. Check EKG today    Cont with DOAC. Refer to CHF clinic. Check BMP in one week.      Patient was advised and encouraged to check blood pressure at home or at a pharmacy, maintain a logbook, and also call us back if blood pressure are above the target ranges or if it is low. Patient clearly understands and agrees to the instructions. We will need to continue to monitor muscle and liver enzymes, BUN, CR, and electrolytes. Details of medical condition explained and patient was warned about adverse consequences of uncontrolled medical conditions and possible side effects of prescribed medications.

## 2021-07-06 RX ORDER — DILTIAZEM HYDROCHLORIDE 180 MG/1
180 CAPSULE, COATED, EXTENDED RELEASE ORAL DAILY
Qty: 90 CAPSULE | Refills: 2 | Status: SHIPPED | OUTPATIENT
Start: 2021-07-06 | End: 2021-07-07

## 2021-07-07 ENCOUNTER — OFFICE VISIT (OUTPATIENT)
Dept: CARDIOLOGY CLINIC | Age: 71
End: 2021-07-07
Payer: COMMERCIAL

## 2021-07-07 VITALS
BODY MASS INDEX: 40.85 KG/M2 | RESPIRATION RATE: 18 BRPM | HEIGHT: 62 IN | SYSTOLIC BLOOD PRESSURE: 141 MMHG | OXYGEN SATURATION: 98 % | HEART RATE: 92 BPM | DIASTOLIC BLOOD PRESSURE: 99 MMHG | WEIGHT: 222 LBS

## 2021-07-07 DIAGNOSIS — E11.69 DIABETES MELLITUS TYPE 2 IN OBESE (HCC): ICD-10-CM

## 2021-07-07 DIAGNOSIS — E78.5 DYSLIPIDEMIA: ICD-10-CM

## 2021-07-07 DIAGNOSIS — R40.0 DAYTIME SOMNOLENCE: ICD-10-CM

## 2021-07-07 DIAGNOSIS — I50.31 ACUTE DIASTOLIC CHF (CONGESTIVE HEART FAILURE), NYHA CLASS 3 (HCC): ICD-10-CM

## 2021-07-07 DIAGNOSIS — I48.19 PERSISTENT ATRIAL FIBRILLATION (HCC): ICD-10-CM

## 2021-07-07 DIAGNOSIS — E66.9 DIABETES MELLITUS TYPE 2 IN OBESE (HCC): ICD-10-CM

## 2021-07-07 DIAGNOSIS — I27.20 PULMONARY HTN (HCC): ICD-10-CM

## 2021-07-07 DIAGNOSIS — N18.30 STAGE 3 CHRONIC KIDNEY DISEASE, UNSPECIFIED WHETHER STAGE 3A OR 3B CKD (HCC): ICD-10-CM

## 2021-07-07 DIAGNOSIS — I50.31 ACUTE DIASTOLIC CONGESTIVE HEART FAILURE (HCC): ICD-10-CM

## 2021-07-07 DIAGNOSIS — I10 ESSENTIAL HYPERTENSION: ICD-10-CM

## 2021-07-07 LAB
ANION GAP SERPL CALCULATED.3IONS-SCNC: 12 MEQ/L (ref 9–15)
BUN BLDV-MCNC: 15 MG/DL (ref 8–23)
CALCIUM SERPL-MCNC: 10.4 MG/DL (ref 8.5–9.9)
CHLORIDE BLD-SCNC: 100 MEQ/L (ref 95–107)
CO2: 31 MEQ/L (ref 20–31)
CREAT SERPL-MCNC: 1.15 MG/DL (ref 0.5–0.9)
GFR AFRICAN AMERICAN: 56.3
GFR NON-AFRICAN AMERICAN: 46.5
GLUCOSE BLD-MCNC: 104 MG/DL (ref 70–99)
POTASSIUM SERPL-SCNC: 4.5 MEQ/L (ref 3.4–4.9)
PRO-BNP: 682 PG/ML
SODIUM BLD-SCNC: 143 MEQ/L (ref 135–144)

## 2021-07-07 PROCEDURE — 99215 OFFICE O/P EST HI 40 MIN: CPT | Performed by: PHYSICIAN ASSISTANT

## 2021-07-07 PROCEDURE — 93000 ELECTROCARDIOGRAM COMPLETE: CPT | Performed by: PHYSICIAN ASSISTANT

## 2021-07-07 RX ORDER — DILTIAZEM HYDROCHLORIDE 360 MG/1
360 CAPSULE, EXTENDED RELEASE ORAL DAILY
Qty: 90 CAPSULE | Refills: 3 | Status: SHIPPED | OUTPATIENT
Start: 2021-07-07 | End: 2021-08-11 | Stop reason: DRUGHIGH

## 2021-07-07 ASSESSMENT — ENCOUNTER SYMPTOMS
SHORTNESS OF BREATH: 0
COLOR CHANGE: 0
ABDOMINAL PAIN: 0
ABDOMINAL DISTENTION: 0
VOMITING: 0
CHEST TIGHTNESS: 0
NAUSEA: 0
COUGH: 1
BLOOD IN STOOL: 0

## 2021-07-07 NOTE — PROGRESS NOTES
Patient: Jackeline Ponce  YOB: 1950  MRN: [id number]    Chief Complaint:  Chief Complaint   Patient presents with    Congestive Heart Failure    Shortness of Breath    Cough     occasional    Fatigue         Subjective/HPI       7/7/21 CHF clinic visit #1: This is a 42-year-old  female with past medical history significant for hypertension, dyslipidemia and diabetes who was recently diagnosed with new onset atrial fibrillation and diastolic congestive heart failure. Patient had undergone outpatient stress test on 6/4/2021 and during stress testing was noticed to develop rapid atrial fibrillation. She was subsequently hospitalized and due to mildly abnormal stress test with new onset A. fib and multiple risk factors for CAD, she underwent left heart catheterization by Dr. Bharath Ponce on 6/7/2021. This heart catheterization revealed normal coronary arteries and normal LVEDP. Echocardiogram completed on 6/4/2021 revealed normal LV systolic function with EF of 55%, moderate concentric left ventricular hypertrophy, mild mitral valve regurgitation, RVSP elevated 53 mmHg. During her hospital admission her rate control medications were adjusted for improved heart rate control including Toprol-XL and Cardizem CD. She was started on oral anticoagulation with Xarelto 20 mg p.o. daily. She recently had follow-up with Dr. Andrei Dumas on 7/2/2021 at which time she was initiated on Lasix 40 mg p.o. daily as she was complaining of significant bilateral lower extremity edema and symptoms concerning for diastolic heart failure. She was also referred for sleep study to evaluate for underlying obstructive sleep apnea and was referred to pulmonology for evaluation of shortness of breath and pulmonary hypertension. Given persistent A. fib with rapid rates, Dr. Hahn Look for eventual cardioversion plus or minus drug loading.     Since being started on Lasix at this recent office visit with  as:Normal.  Ejection Fraction    - Method: LV gram. EF%: 55. Stress test 21:  IMPRESSION:  1. Abnormal stress myocardial perfusion examination. There is  reversible ischemia in the [id number] anterior wall as well as the LV  apex of moderate size and moderate intensity. Cannot exclude underlying  ischemia versus breast attenuation artifact. Clinical correlation is  advised. 2.  Normal hemodynamic response to Lexiscan infusion. 3.  Normal LV function with calculated ejection fraction of 54%. 4.  Normal TID ratio equals to 0.90.  5.  Atrial fibrillation with rapid ventricular response. 6.  No reported chest pain, chest pressure or syncope.     MARY OLMSTEAD DO      Echocardiogram 21:  Conclusions      Summary   Atrial fibrillation. Normal left ventricular systolic function, no regional wall motion   abnormalities, estimated ejection fraction of 55%. Normal left ventricular size and function. Moderate concentric left ventricular hypertrophy. Mild (1+) mitral regurgitation is present. No evidence of mitral valve stenosis. No evidence of aortic valve regurgitation . No evidence of aortic valve stenosis. The left atrium is Severely dilated. IAS consistently bowed to the right indicative of elevated LA pressure. There is mild to moderate tricuspid regurgitation with estimated RVSP of   53 mm Hg. Right ventricular systolic pressure of 53 mm Hg consistent with moderate   pulmonary hypertension. Signature   ----------------------------------------------------------------   Electronically signed by Joshua Mejia DO(Interpreting   physician) on 2021 06:51 PM        Vital signs stable in office today. Blood pressure 141/99, heart rate 92, pulse ox 98% on room air. Weight is 222 pounds.       PMHx:  Persistent A-fib  Diastolic CHF  PHTN with RVSP 53mmHg per echo 21  HTN  Dyslipidemia  DM  Hypothyroidism  CKD       PSHx:      Social Hx:  Non smoker  No alcohol    Family Hx:  No CAD          No Known Allergies    Current Outpatient Medications   Medication Sig Dispense Refill    dilTIAZem (CARDIZEM CD) 360 MG extended release capsule Take 1 capsule by mouth daily 90 capsule 3    rivaroxaban (XARELTO) 20 MG TABS tablet Take 1 tablet by mouth daily (with breakfast) 90 tablet 2    furosemide (LASIX) 40 MG tablet Take 1 tablet by mouth daily 30 tablet 6    Ferrous Sulfate (IRON) 325 (65 Fe) MG TABS Take by mouth daily       pantoprazole (PROTONIX) 40 MG tablet Take 1 tablet by mouth daily 30 tablet 3    rosuvastatin (CRESTOR) 10 MG tablet TAKE 1 TABLET BY MOUTH ONCE DAILY 90 tablet 1    levothyroxine (SYNTHROID) 125 MCG tablet Take 1 tablet by mouth daily -except Sundays 90 tablet 0    metFORMIN (GLUCOPHAGE) 500 MG tablet TAKE 1 TABLET BY MOUTH TWICE A DAY WITH FOOD 180 tablet 0    metoprolol succinate (TOPROL XL) 100 MG extended release tablet TAKE 1 TABLET BY MOUTH EVERY DAY 90 tablet 0    VITAMIN E Take 400 Units by mouth daily       vitamin B-12 (CYANOCOBALAMIN) 1000 MCG tablet Take 1,000 mcg by mouth daily.         Omega-3 Fatty Acids (FISH OIL) 1200 MG CAPS Take by mouth daily        Current Facility-Administered Medications   Medication Dose Route Frequency Provider Last Rate Last Admin    triamcinolone acetonide (KENALOG-40) injection 80 mg  80 mg Intramuscular Once DELIO Aguiar CNP        methylPREDNISolone acetate (DEPO-MEDROL) injection 40 mg  40 mg Intramuscular Once Diana Puckett MD           Past Medical History:   Diagnosis Date    Angina at rest [facility]) 2021    Bilateral leg edema 3568    Diastolic congestive heart failure ([facility] [address] 2021    DM2 (diabetes mellitus, type 2) ([facility] [address]     Hyperlipidemia     Hypertension     Hypothyroidism     Shortness of breath 2021       Past Surgical History:   Procedure Laterality Date     SECTION      COLONOSCOPY      needs     COLONOSCOPY N/A 2021 COLONOSCOPY DIAGNOSTIC performed by Reina Marks MD at [address] IND N/A 4/13/2018    COLONOSCOPY adenoma    UPPER GASTROINTESTINAL ENDOSCOPY N/A 1/11/2021    EGD DIAGNOSTIC ONLY performed by Reina Marks MD at [address] History     Socioeconomic History    Marital status:      Spouse name: None    Number of children: None    Years of education: None    Highest education level: None   Occupational History    None   Tobacco Use    Smoking status: Never Smoker    Smokeless tobacco: Never Used   Vaping Use    Vaping Use: Never used   Substance and Sexual Activity    Alcohol use: Yes     Comment: rare     Drug use: No    Sexual activity: Yes     Partners: Male   Other Topics Concern    None   Social History Narrative    None     Social Determinants of Health     Financial Resource Strain:     Difficulty of Paying Living Expenses:    Food Insecurity:     Worried About Running Out of Food in the Last Year:     Ran Out of Food in the Last Year:    Transportation Needs:     Lack of Transportation (Medical):  Lack of Transportation (Non-Medical):    Physical Activity:     Days of Exercise per Week:     Minutes of Exercise per Session:    Stress:     Feeling of Stress :    Social Connections:     Frequency of Communication with Friends and Family:     Frequency of Social Gatherings with Friends and Family:     Attends Church Services:     Active Member of Clubs or Organizations:     Attends Club or Organization Meetings:     Marital Status:    Intimate Partner Violence:     Fear of Current or Ex-Partner:     Emotionally Abused:     Physically Abused:     Sexually Abused:        Family History   Problem Relation Age of Onset    Colon Cancer Maternal Grandmother          Review of Systems:   Review of Systems   Constitutional: Positive for fatigue. Negative for activity change and fever.         +snoring; +daytime somnolence   HENT: Negative for congestion. Respiratory: Positive for cough (rarely). Negative for chest tightness and shortness of breath. Cardiovascular: Positive for palpitations (rarely) and leg swelling (improved since starting lasix). Negative for chest pain. Gastrointestinal: Negative for abdominal distention, abdominal pain, blood in stool, nausea and vomiting. Stools dark but on iron supplement   Genitourinary: Negative for difficulty urinating, dysuria and hematuria. Musculoskeletal: Negative for arthralgias and myalgias. Skin: Negative for color change, pallor and rash. Neurological: Negative for dizziness, syncope and light-headedness. Psychiatric/Behavioral: Negative for agitation and behavioral problems. Physical Examination:    BP (!) 141/99 (Site: Right Upper Arm, Position: Sitting, Cuff Size: Large Adult)   Pulse 92   Resp 18   Ht 5' 2\" (1.575 m)   Wt 222 lb (100.7 kg)   LMP  (LMP Unknown)   SpO2 98%   BMI 40.60 kg/m²    Physical Exam  Constitutional:       General: She is not in acute distress. Appearance: Normal appearance. She is obese. HENT:      Head: Normocephalic and atraumatic. Cardiovascular:      Rate and Rhythm: Tachycardia present. Rhythm irregularly irregular. Heart sounds: No murmur heard. Pulmonary:      Effort: Pulmonary effort is normal. No respiratory distress. Breath sounds: No wheezing, rhonchi or rales. Abdominal:      Palpations: Abdomen is soft. Tenderness: There is no abdominal tenderness. Musculoskeletal:         General: Normal range of motion. Right lower leg: Edema (trace) present. Left lower leg: Edema (trace) present. Skin:     General: Skin is warm and dry. Neurological:      General: No focal deficit present. Mental Status: She is alert and oriented to person, place, and time. Cranial Nerves: No cranial nerve deficit.    Psychiatric:         Mood and Affect: Mood normal.         Behavior: Behavior normal.         LABS:  CBC:   Lab Results   Component Value Date    WBC 10.0 06/07/2021    RBC 5.33 06/07/2021    HGB 12.4 06/07/2021    HCT 38.3 06/07/2021    MCV 71.8 06/07/2021    MCH 23.3 06/07/2021    MCHC 32.5 06/07/2021    RDW 23.3 06/07/2021     06/07/2021    MPV 10.0 07/07/2015     Lipids:  Lab Results   Component Value Date    CHOL 147 01/02/2019    CHOL 154 07/02/2018    CHOL 149 07/13/2017     Lab Results   Component Value Date    TRIG 168 01/02/2019    TRIG 117 07/02/2018    TRIG 154 07/13/2017     Lab Results   Component Value Date    HDL 51 01/02/2019    HDL 53 07/02/2018    HDL 46 07/13/2017     Lab Results   Component Value Date    LDLCALC 62 01/02/2019    LDLCALC 78 07/02/2018    LDLCALC 72 07/13/2017     No results found for: LABVLDL, VLDL  Lab Results   Component Value Date    CHOLHDLRATIO 4.0 01/04/2013     CMP:    Lab Results   Component Value Date     07/07/2021    K 4.5 07/07/2021    K 3.9 06/07/2021     07/07/2021    CO2 31 07/07/2021    BUN 15 07/07/2021    CREATININE 1.15 07/07/2021    GFRAA 56.3 07/07/2021    LABGLOM 46.5 07/07/2021    GLUCOSE 104 07/07/2021    PROT 6.4 06/06/2021    LABALBU 3.5 06/06/2021    CALCIUM 10.4 07/07/2021    BILITOT 0.4 06/06/2021    ALKPHOS 73 06/06/2021    AST 13 06/06/2021    ALT 8 06/06/2021     BMP:    Lab Results   Component Value Date     07/07/2021    K 4.5 07/07/2021    K 3.9 06/07/2021     07/07/2021    CO2 31 07/07/2021    BUN 15 07/07/2021    LABALBU 3.5 06/06/2021    CREATININE 1.15 07/07/2021    CALCIUM 10.4 07/07/2021    GFRAA 56.3 07/07/2021    LABGLOM 46.5 07/07/2021    GLUCOSE 104 07/07/2021     Magnesium:  No results found for: MG  TSH:  Lab Results   Component Value Date    TSH 0.421 (L) 06/06/2021     . result  No results for input(s): PROBNP in the last 72 hours. No results for input(s): INR in the last 72 hours.             Patient Active Problem List   Diagnosis    Essential hypertension    Mixed hyperlipidemia    Hypothyroidism    Osteoarthritis of knee    Type 2 diabetes mellitus with diabetic polyneuropathy, without long-term current use of insulin (HCC)    Benign neoplasm of ascending colon    Diverticulosis of large intestine without diverticulitis    Anemia    HH (hiatus hernia)    Chronic Georges lesion    Adenomatous polyp of transverse colon    A-fib (HCC)    Bilateral leg edema    Shortness of breath    Diastolic congestive heart failure (HCC)       Assessment/Plan:     Diagnosis Orders   1. Acute diastolic CHF (congestive heart failure), NYHA class 3 (HCC)  Brain Natriuretic Peptide    compensated currently following recent addition of lasix   2. Persistent atrial fibrillation (HCC)  EKG 12 lead    A-fib RVR in office today. Cardizem CD increased to 360mg PO daily. Continue [address] with xarelto   3. Daytime somnolence      Sleep study ordered previously by Dr. Chery Mendoza and patient awaiting call to schedule   4. Pulmonary HTN ([facility] [address]      RVSP 53mmHg per echo 6/4/21. Has appt with pulmonology on 9/2/21 and sleep study pending   5. Essential hypertension      Continue same meds   6. Dyslipidemia      Continue statin   7. Diabetes mellitus type 2 in obese (HCC)      Continue metformin. Management per PCP   8. Stage 3 chronic kidney disease, unspecified whether stage 3a or 3b CKD ([facility] [address]      renal function stable per BMP today       -Maximize medical therapy--titrate Cardizem CD to 360 mg p.o. daily for improved heart rate management, Toprol- mg p.o. daily, Crestor 10 mg p.o. daily, continue Lasix 40 mg p.o. daily for now as lower extremity edema much improved after initiation, oral anticoagulation with Xarelto 20 mg p.o. daily  -Consider initiation of ACE inhibitor/angiotensin receptor blocker in future if renal function remains stable and BP tolerates  -Heart failure appears compensated currently.   Patient reports significant improvement in bilateral lower extremity edema since initiation of Lasix at office visit with Dr. Esau Hayes on 7/2/2021  -BMP this morning 7/7/2021 reviewed. Renal function and electrolytes stable. Review of patient's labs over the past few years show patient have baseline CKD   -Will call lab to add on BNP today for repeat evaluation  -Cardiac/<2 gram sodium diet recommended  -2000mL daily fluid restriction   -Weight loss recommended   -Advise routine blood pressure monitoring at home. Recommend patient check blood pressure twice daily in a.m. and p.m. and keep log of blood pressure trends to review at next office visit. --BP machine provided  Notify office if blood pressure running high with  mmHg or above or if diastolic blood pressure 85 mmHg or above  Notify office if BP running low with  mmHg or below  -Instructed patient to weigh self daily every morning upon waking and keep log book of daily weights. Notify office if gaining more than 3 pounds in 48 hours. --scale provided  -Advised patient to notify office immediately if experiencing any progressive SOB, orthopnea, PND, LE edema or weight gain  -Educated patient on importance of fluid and salt restriction as well as lifestyle modification  -Sleep study previously ordered by Dr. Benjie Foster will be given number to call and schedule sleep study if she does not hear from them within the next few days  -Maintain routine outpatient follow-up with general cardiologist   Status post left heart catheterization on 6/7/2021 which revealed normal coronary arteries  Echocardiogram 6/4/2021 with normal LV systolic function with EF of 55%, concentric LVH, mild MR, RVSP elevated 53 mmHg  -Has evaluation with pulmonology scheduled for 9/2/21 for evaluation of pulmonary hypertension and suspected obstructive sleep apnea  -Maintain routine outpatient follow-up with PCP  -F/U with CHF clinic in 4 weeks or sooner if needed    >45 min face-to-face time with patient and  Counseling: The importance of daily weights, dietary sodium restriction, and contact with cardiology if weight is increased more than 3 lbs in any 48 hour period was stressed. The patient has been advised to contact us if theyexperience progressive SOB, orthopnea, PND or progressive edema.

## 2021-07-07 NOTE — PATIENT INSTRUCTIONS
-Check daily weight every morning and notify CHF clinic if gaining more than 3 pounds in 2 days    -Check blood pressure twice daily in AM and PM and keep log of blood pressure trends to review at next office visit  Notify office if BP running high with SBP (top number) 150mmHg or DBP (bottom number)  Above 85mmHg  Notify office if BP running low with SBP (top number) below 100mmHg   Record heart rate with each BP reading    -Limit total daily sodium intake to less than 2000mg daily  -Limit total daily fluid intake to less than 2 liter (64 ounces) daily    -Increase Cardizem CD (diltiazem) to 360mg PO daily    -Schedule sleep study

## 2021-07-21 ENCOUNTER — ANESTHESIA EVENT (OUTPATIENT)
Dept: CARDIAC CATH/INVASIVE PROCEDURES | Age: 71
End: 2021-07-21
Payer: COMMERCIAL

## 2021-07-21 ENCOUNTER — ANESTHESIA (OUTPATIENT)
Dept: CARDIAC CATH/INVASIVE PROCEDURES | Age: 71
End: 2021-07-21
Payer: COMMERCIAL

## 2021-07-21 ENCOUNTER — HOSPITAL ENCOUNTER (OUTPATIENT)
Dept: CARDIAC CATH/INVASIVE PROCEDURES | Age: 71
Discharge: HOME | End: 2021-07-21
Attending: INTERNAL MEDICINE | Admitting: INTERNAL MEDICINE
Payer: COMMERCIAL

## 2021-07-21 VITALS
RESPIRATION RATE: 20 BRPM | HEART RATE: 87 BPM | DIASTOLIC BLOOD PRESSURE: 80 MMHG | TEMPERATURE: 99.5 F | OXYGEN SATURATION: 97 % | SYSTOLIC BLOOD PRESSURE: 104 MMHG

## 2021-07-21 VITALS
RESPIRATION RATE: 16 BRPM | SYSTOLIC BLOOD PRESSURE: 132 MMHG | DIASTOLIC BLOOD PRESSURE: 72 MMHG | OXYGEN SATURATION: 95 %

## 2021-07-21 LAB
ANION GAP SERPL CALCULATED.3IONS-SCNC: 10 MEQ/L (ref 9–15)
BUN BLDV-MCNC: 20 MG/DL (ref 8–23)
CALCIUM SERPL-MCNC: 9.3 MG/DL (ref 8.5–9.9)
CHLORIDE BLD-SCNC: 103 MEQ/L (ref 95–107)
CO2: 27 MEQ/L (ref 20–31)
CREAT SERPL-MCNC: 1.06 MG/DL (ref 0.5–0.9)
GFR AFRICAN AMERICAN: >60
GFR NON-AFRICAN AMERICAN: 51.1
GLUCOSE BLD-MCNC: 114 MG/DL (ref 70–99)
POTASSIUM SERPL-SCNC: 3.7 MEQ/L (ref 3.4–4.9)
SODIUM BLD-SCNC: 140 MEQ/L (ref 135–144)

## 2021-07-21 PROCEDURE — 92960 CARDIOVERSION ELECTRIC EXT: CPT | Performed by: INTERNAL MEDICINE

## 2021-07-21 PROCEDURE — 93005 ELECTROCARDIOGRAM TRACING: CPT | Performed by: INTERNAL MEDICINE

## 2021-07-21 PROCEDURE — 3700000000 HC ANESTHESIA ATTENDED CARE

## 2021-07-21 PROCEDURE — 2580000003 HC RX 258: Performed by: INTERNAL MEDICINE

## 2021-07-21 PROCEDURE — 80048 BASIC METABOLIC PNL TOTAL CA: CPT

## 2021-07-21 PROCEDURE — 3700000001 HC ADD 15 MINUTES (ANESTHESIA)

## 2021-07-21 PROCEDURE — 6360000002 HC RX W HCPCS: Performed by: NURSE ANESTHETIST, CERTIFIED REGISTERED

## 2021-07-21 RX ORDER — PROPOFOL 10 MG/ML
INJECTION, EMULSION INTRAVENOUS PRN
Status: DISCONTINUED | OUTPATIENT
Start: 2021-07-21 | End: 2021-07-21 | Stop reason: SDUPTHER

## 2021-07-21 RX ORDER — SOTALOL HYDROCHLORIDE 80 MG/1
80 TABLET ORAL 2 TIMES DAILY
Qty: 60 TABLET | Refills: 3 | Status: SHIPPED | OUTPATIENT
Start: 2021-07-21 | End: 2022-01-17

## 2021-07-21 RX ORDER — SODIUM CHLORIDE 9 MG/ML
INJECTION, SOLUTION INTRAVENOUS CONTINUOUS
Status: DISCONTINUED | OUTPATIENT
Start: 2021-07-21 | End: 2021-07-21 | Stop reason: HOSPADM

## 2021-07-21 RX ADMIN — PROPOFOL 110 MG: 10 INJECTION, EMULSION INTRAVENOUS at 13:06

## 2021-07-21 RX ADMIN — SODIUM CHLORIDE: 9 INJECTION, SOLUTION INTRAVENOUS at 12:52

## 2021-07-21 NOTE — ANESTHESIA PRE PROCEDURE
Department of Anesthesiology  Preprocedure Note       Name:  Adeola Sagastume   Age:  70 y.o.  :  1950                                          MRN:  [id number]         Date:  2021      Surgeon: * Surgery not found *    Procedure:     Medications prior to admission:   Prior to Admission medications    Medication Sig Start Date End Date Taking? Authorizing Provider   dilTIAZem (CARDIZEM CD) 360 MG extended release capsule Take 1 capsule by mouth daily 21  [place]   rivaroxaban (XARELTO) 20 MG TABS tablet Take 1 tablet by mouth daily (with breakfast) 21  Yes  [address]   furosemide (LASIX) 40 MG tablet Take 1 tablet by mouth daily 21  Yes Seamus J Holiday, DO   Ferrous Sulfate (IRON) 325 (65 Fe) MG TABS Take 1 tablet by mouth daily  21  Yes Historical Provider, MD   pantoprazole (PROTONIX) 40 MG tablet Take 1 tablet by mouth daily 21  Yes Lamin Montes MD   rosuvastatin (CRESTOR) 10 MG tablet TAKE 1 TABLET BY MOUTH ONCE DAILY  Patient taking differently: Take 10 mg by mouth daily TAKE 1 TABLET BY MOUTH ONCE DAILY 19  Yes Edith Loaiza MD   levothyroxine (SYNTHROID) 125 MCG tablet Take 1 tablet by mouth daily -except Sundays 12/14/18  Yes Edith Loaiza MD   metFORMIN (GLUCOPHAGE) 500 MG tablet TAKE 1 TABLET BY MOUTH TWICE A DAY WITH FOOD  Patient taking differently: Take 500 mg by mouth 2 times daily (with meals) TAKE 1 TABLET BY MOUTH TWICE A DAY WITH FOOD 18  Yes Edith Loaiza MD   metoprolol succinate (TOPROL XL) 100 MG extended release tablet TAKE 1 TABLET BY MOUTH EVERY DAY  Patient taking differently: Take 100 mg by mouth daily TAKE 1 TABLET BY MOUTH EVERY DAY 10/16/18  Yes Edith Loaiza MD   VITAMIN E Take 400 Units by mouth daily    Yes Historical Provider, MD   vitamin B-12 (CYANOCOBALAMIN) 1000 MCG tablet Take 1,000 mcg by mouth daily.      Yes Historical Provider, MD   Omega-3 Fatty Acids (FISH OIL) 1200 MG CAPS Take 1 capsule by mouth daily    Yes Historical Provider, MD       Current medications:    Current Facility-Administered Medications   Medication Dose Route Frequency Provider Last Rate Last Admin    0.9 % sodium chloride infusion   Intravenous Continuous Seamus J Holiday, DO           Allergies:  No Known Allergies    Problem List:    Patient Active Problem List   Diagnosis Code    Essential hypertension I10    Mixed hyperlipidemia E78.2    Hypothyroidism E03.9    Osteoarthritis of knee M17.10    Type 2 diabetes mellitus with diabetic polyneuropathy, without long-term current use of insulin (HCC) E11.42    Benign neoplasm of ascending colon D12.2    Diverticulosis of large intestine without diverticulitis K57.30    Anemia D64.9    HH (hiatus hernia) K44.9    Chronic Georges lesion K25.7    Adenomatous polyp of transverse colon D12.3    A-fib (HCC) I48.91    Bilateral leg edema R60.0    Shortness of breath I71.61    Diastolic congestive heart failure ([facility]) I50.30       Past Medical History:        Diagnosis Date    Angina at rest [facility]) 2021    Bilateral leg edema 2579    Diastolic congestive heart failure ([facility] [address] 2021    DM2 (diabetes mellitus, type 2) ([facility] [address]     Hyperlipidemia     Hypertension     Hypothyroidism     Shortness of breath 2021       Past Surgical History:        Procedure Laterality Date     SECTION      COLONOSCOPY  2008    needs 2018    COLONOSCOPY N/A 2021    COLONOSCOPY DIAGNOSTIC performed by Mar Cunningham MD at [address] [place] [address] N/A 2018    COLONOSCOPY adenoma    UPPER GASTROINTESTINAL ENDOSCOPY N/A 2021    EGD DIAGNOSTIC ONLY performed by [facility] at [facility]       Social History:    Social History     Tobacco Use    Smoking status: Never Smoker    Smokeless tobacco: Never Used   Substance Use Topics    Alcohol use: Yes     Comment: rare Counseling given: Not Answered      Vital Signs (Current):   Vitals:    07/21/21 1207   BP: 104/80   Pulse: 87   Resp: 20   Temp: 99.5 °F (37.5 °C)   TempSrc: Temporal   SpO2: 97%                                              BP Readings from Last 3 Encounters:   07/21/21 104/80   07/07/21 (!) 141/99   07/02/21 120/80       NPO Status:                                                   Date of last liquid consumption: 07/20/21                        Date of last solid food consumption: 07/20/21    BMI:   Wt Readings from Last 3 Encounters:   07/07/21 222 lb (100.7 kg)   07/02/21 240 lb (108.9 kg)   06/05/21 230 lb 6.4 oz (104.5 kg)     There is no height or weight on file to calculate BMI.    CBC:   Lab Results   Component Value Date    WBC 10.0 06/07/2021    RBC 5.33 06/07/2021    HGB 12.4 06/07/2021    HCT 38.3 06/07/2021    MCV 71.8 06/07/2021    RDW 23.3 06/07/2021     06/07/2021       CMP:   Lab Results   Component Value Date     07/21/2021    K 3.7 07/21/2021    K 3.9 06/07/2021     07/21/2021    CO2 27 07/21/2021    BUN 20 07/21/2021    CREATININE 1.06 07/21/2021    GFRAA >60.0 07/21/2021    LABGLOM 51.1 07/21/2021    GLUCOSE 114 07/21/2021    PROT 6.4 06/06/2021    CALCIUM 9.3 07/21/2021    BILITOT 0.4 06/06/2021    ALKPHOS 73 06/06/2021    AST 13 06/06/2021    ALT 8 06/06/2021       POC Tests: No results for input(s): POCGLU, POCNA, POCK, POCCL, POCBUN, POCHEMO, POCHCT in the last 72 hours.     Coags:   Lab Results   Component Value Date    PROTIME 13.7 06/07/2021    INR 1.0 06/07/2021       HCG (If Applicable): No results found for: PREGTESTUR, PREGSERUM, HCG, HCGQUANT     ABGs: No results found for: PHART, PO2ART, OMD8WCR, ZEP2QRU, BEART, Q0PGSPKQ     Type & Screen (If Applicable):  No results found for: LABABO, LABRH    Drug/Infectious Status (If Applicable):  No results found for: HIV, HEPCAB    COVID-19 Screening (If Applicable):   Lab Results   Component Value Date    COVID19 Not Detected 01/05/2021           Anesthesia Evaluation  Patient summary reviewed and Nursing notes reviewed no history of anesthetic complications:   Airway: Mallampati: II  TM distance: >3 FB   Neck ROM: full  Mouth opening: > = 3 FB Dental: normal exam         Pulmonary:Negative Pulmonary ROS and normal exam                               Cardiovascular:Negative CV ROS  Exercise tolerance: good (>4 METS),   (+) hypertension:, dysrhythmias: atrial fibrillation,       ECG reviewed      Echocardiogram reviewed  Stress test reviewed       Beta Blocker:  Not on Beta Blocker         Neuro/Psych:   Negative Neuro/Psych ROS              GI/Hepatic/Renal: Neg GI/Hepatic/Renal ROS  (+) hiatal hernia, morbid obesity          Endo/Other: Negative Endo/Other ROS   (+) DiabetesType II DM, , hypothyroidism::., .          Pt had PAT visit. Abdominal:   (+) obese,           Vascular: negative vascular ROS. Other Findings:             Anesthesia Plan      MAC     ASA 3       Induction: intravenous. MIPS: Prophylactic antiemetics administered. Anesthetic plan and risks discussed with patient. Plan discussed with CRNA.     Attending anesthesiologist reviewed and agrees with Pre Eval content              Berto Jay MD   7/21/2021

## 2021-07-21 NOTE — PROGRESS NOTES
Discharge instructions given and patient verbalizes understanding.   Discharged to care of family by wheelchair

## 2021-07-21 NOTE — BRIEF OP NOTE
Section of Cardiology  Adult Brief Procedure Note        Procedure(s):  CVN    Pre-operative Diagnosis:  Afib    H&P Status: Completed and reviewed. Post-operative Diagnosis:      200J one shock, successful to SB AT 55BPM.     Findings:  See full report    Complications:  none    Primary Proceduralist:   Dr.Wes Knott DO    PLAN    Dc TOPROL xl  START SOTALOL 80MG BID  EKG 2 DAYS IN A ROW IN OFFICE.        Full procedure note to follow

## 2021-07-21 NOTE — ANESTHESIA POSTPROCEDURE EVALUATION
Department of Anesthesiology  Postprocedure Note    Patient: Jules Evans  MRN: [id number]  YOB: 1950  Date of evaluation: 7/21/2021  Time:  1:17 PM     Procedure Summary     Date: 07/21/21 Room / Location: Cardiac Cath Services    Anesthesia Start: 1302 Anesthesia Stop:     Procedure: CATH LAB WITH ANESTHESIA Diagnosis:     Scheduled Providers:  Responsible Provider: Cynthia Robbins MD    Anesthesia Type: MAC ASA Status: 3          Anesthesia Type: No value filed. Heather Phase I: Heather Score: 10    Heather Phase II:      Last vitals: Reviewed and per EMR flowsheets.        Anesthesia Post Evaluation    Patient location during evaluation: PACU  Patient participation: complete - patient participated  Level of consciousness: awake and alert  Pain score: 0  Airway patency: patent  Nausea & Vomiting: no nausea and no vomiting  Complications: no  Cardiovascular status: blood pressure returned to baseline and hemodynamically stable  Respiratory status: acceptable  Hydration status: euvolemic

## 2021-07-22 ENCOUNTER — NURSE ONLY (OUTPATIENT)
Dept: CARDIOLOGY CLINIC | Age: 71
End: 2021-07-22
Payer: COMMERCIAL

## 2021-07-22 DIAGNOSIS — I48.19 PERSISTENT ATRIAL FIBRILLATION (HCC): Primary | ICD-10-CM

## 2021-07-22 LAB
EKG ATRIAL RATE: 141 BPM
EKG ATRIAL RATE: 48 BPM
EKG P AXIS: 64 DEGREES
EKG P-R INTERVAL: 200 MS
EKG Q-T INTERVAL: 416 MS
EKG Q-T INTERVAL: 476 MS
EKG QRS DURATION: 100 MS
EKG QRS DURATION: 94 MS
EKG QTC CALCULATION (BAZETT): 425 MS
EKG QTC CALCULATION (BAZETT): 468 MS
EKG R AXIS: -27 DEGREES
EKG R AXIS: -34 DEGREES
EKG T AXIS: -3 DEGREES
EKG T AXIS: 17 DEGREES
EKG VENTRICULAR RATE: 48 BPM
EKG VENTRICULAR RATE: 76 BPM

## 2021-07-22 PROCEDURE — 93010 ELECTROCARDIOGRAM REPORT: CPT | Performed by: INTERNAL MEDICINE

## 2021-07-22 PROCEDURE — 93000 ELECTROCARDIOGRAM COMPLETE: CPT | Performed by: INTERNAL MEDICINE

## 2021-07-23 ENCOUNTER — NURSE ONLY (OUTPATIENT)
Dept: CARDIOLOGY CLINIC | Age: 71
End: 2021-07-23
Payer: COMMERCIAL

## 2021-07-23 DIAGNOSIS — Z79.899 ENCOUNTER FOR MONITORING SOTALOL THERAPY: ICD-10-CM

## 2021-07-23 DIAGNOSIS — I48.19 PERSISTENT ATRIAL FIBRILLATION (HCC): Primary | ICD-10-CM

## 2021-07-23 DIAGNOSIS — Z51.81 ENCOUNTER FOR MONITORING SOTALOL THERAPY: ICD-10-CM

## 2021-07-23 PROCEDURE — 93000 ELECTROCARDIOGRAM COMPLETE: CPT | Performed by: INTERNAL MEDICINE

## 2021-07-26 NOTE — PROCEDURES
[address]                      [facility], [address] [facility]                                 PROCEDURE NOTE    PATIENT NAME: Alma Kennedy                    :        1950  MED REC NO:   [id number]                            ROOM:  ACCOUNT NO:   [de-identified]                           ADMIT DATE: 2021  PROVIDER:     Poncho Knott,     DATE OF PROCEDURE:  2021    ELECTIVE CARDIOVERSION    PROCEDURE PERFORMED:  External cardioversion. INDICATIONS:  Atrial fibrillation    PROCEDURE IN DETAIL:  The procedure was explained to the patient with  risks and benefits including risk of stroke. The patient understands  and is agreeable to proceed. Anesthesia was present throughout the  entire procedure, please see anesthesia notes for details of medications  administered. The pads were applied in the anterior and posterior  approach. With synchronized biphasic wave forms at 200 joules, 1 shock  was successfully applied in restoring sinus rhythm. The patient had no  immediate post-procedure complications. The rhythm was maintained and a  12-lead EKG was requested. ASSESSMENT:  Status post successful external cardioversion to normal  sinus/sinus bradycardia at 65 beats per minute. PLAN:  1.  DC Toprol-XL. 2.  Start sotalol 80 mg b.i.d. and follow EKGs and QTc interval.  3.  Continue with oral anticoagulation.         Americo Ma DO    D: 2021 12:34:37       T: 2021 13:45:45     [email or web address]  Job#: [id number]     Doc#: [id number]    CC:

## 2021-08-11 ENCOUNTER — OFFICE VISIT (OUTPATIENT)
Dept: CARDIOLOGY CLINIC | Age: 71
End: 2021-08-11
Payer: COMMERCIAL

## 2021-08-11 VITALS
SYSTOLIC BLOOD PRESSURE: 130 MMHG | BODY MASS INDEX: 41.22 KG/M2 | HEART RATE: 54 BPM | OXYGEN SATURATION: 98 % | DIASTOLIC BLOOD PRESSURE: 67 MMHG | RESPIRATION RATE: 18 BRPM | HEIGHT: 62 IN | WEIGHT: 224 LBS

## 2021-08-11 DIAGNOSIS — R40.0 DAYTIME SOMNOLENCE: ICD-10-CM

## 2021-08-11 DIAGNOSIS — I50.32 CHRONIC DIASTOLIC CHF (CONGESTIVE HEART FAILURE), NYHA CLASS 1 (HCC): ICD-10-CM

## 2021-08-11 DIAGNOSIS — E78.5 DYSLIPIDEMIA: ICD-10-CM

## 2021-08-11 DIAGNOSIS — I10 ESSENTIAL HYPERTENSION: ICD-10-CM

## 2021-08-11 DIAGNOSIS — E66.9 DIABETES MELLITUS TYPE 2 IN OBESE (HCC): ICD-10-CM

## 2021-08-11 DIAGNOSIS — I48.19 PERSISTENT ATRIAL FIBRILLATION (HCC): ICD-10-CM

## 2021-08-11 DIAGNOSIS — I27.20 PULMONARY HTN (HCC): ICD-10-CM

## 2021-08-11 DIAGNOSIS — N18.30 STAGE 3 CHRONIC KIDNEY DISEASE, UNSPECIFIED WHETHER STAGE 3A OR 3B CKD (HCC): ICD-10-CM

## 2021-08-11 DIAGNOSIS — E11.69 DIABETES MELLITUS TYPE 2 IN OBESE (HCC): ICD-10-CM

## 2021-08-11 PROCEDURE — 99214 OFFICE O/P EST MOD 30 MIN: CPT | Performed by: PHYSICIAN ASSISTANT

## 2021-08-11 RX ORDER — DILTIAZEM HYDROCHLORIDE 240 MG/1
240 CAPSULE, COATED, EXTENDED RELEASE ORAL DAILY
Qty: 90 CAPSULE | Refills: 3 | Status: SHIPPED | OUTPATIENT
Start: 2021-08-11 | End: 2021-11-19 | Stop reason: SINTOL

## 2021-08-11 ASSESSMENT — ENCOUNTER SYMPTOMS
ABDOMINAL PAIN: 0
SHORTNESS OF BREATH: 0
NAUSEA: 0
BLOOD IN STOOL: 0
COUGH: 0
VOMITING: 0
CHEST TIGHTNESS: 0
ABDOMINAL DISTENTION: 0
COLOR CHANGE: 0

## 2021-08-11 NOTE — PROGRESS NOTES
Patient: Antonio Willard  YOB: 1950  MRN: [id number]    Chief Complaint:  Chief Complaint   Patient presents with    Congestive Heart Failure     diastolic         Subjective/HPI       8/11/21: Here for follow-up of chronic diastolic congestive heart failure. Was seen for initial CHF clinic evaluation on 7/7/2021. EKG in office at that time showed A. fib with rapid rate of 123 bpm.  Following office visit, Cardizem CD was increased to 360 mg daily from 180 mg daily. On 7/21/2021 she underwent cardioversion with Dr. Janet Jenkins converting from A. fib to sinus rhythm/sinus bradycardia. Her Toprol-XL was discontinued and she was initiated on sotalol 80 mg p.o. twice daily. She has continued to check routine blood pressure and heart rate since last office visit. The initial 2 weeks post first office visit, heart rates had stabilized in the 70s to 80s range but over the past 2 weeks patient has been bradycardic on a.m. vitals with heart rates typically 40s to 50s before morning meds. She is completely asymptomatic with the bradycardia. She denies any new or worsening heart failure symptoms. She is compliant with low-sodium diet and fluid restriction. She continues to weigh herself daily and weight has remained stable typically between 220 to 223 pounds. She remains on oral anticoagulation with Xarelto and denies any bleeding issues. Dr. Janet Jenkins had previously ordered a sleep study and patient is pending approval through her insurance. Most recent labs reviewed and documented below.   BMP 7/21/2021: Sodium 140, potassium 3.7, chloride 103, total CO2 27, BUN 20, creatinine 1.06, GFR low at 51.1, glucose 114  BMP on 7/7/2021: Sodium 143, potassium 4.5, chloride 100, total CO2 31, BUN 15, creatinine 1.15, GFR low at 46.5, glucose 104  proBNP on 7/7/2021: 682    Given bradycardia with documented heart rates in the 40s to 50s on routine vitals at home, discussed with patient possibly ordering a 2-week event monitor for further evaluation of A. fib and possibly underlying sick sinus syndrome. She is wishing to defer event monitor for now. So given bradycardia will decrease Cardizem CD to 240 mg daily from 360 daily and continue to monitor heart rates closely. Vital signs stable in office today. Blood pressure 130/67, heart rate 54, pulse ox 98% on room air. Weight is 224 pounds compared to 222 pounds at last office visit on 7/7/2021.        7/7/21 CHF clinic visit #1: This is a 66-year-old  female with past medical history significant for hypertension, dyslipidemia and diabetes who was recently diagnosed with new onset atrial fibrillation and diastolic congestive heart failure. Patient had undergone outpatient stress test on 6/4/2021 and during stress testing was noticed to develop rapid atrial fibrillation. She was subsequently hospitalized and due to mildly abnormal stress test with new onset A. fib and multiple risk factors for CAD, she underwent left heart catheterization by Dr. Abhishek Crenshaw on 6/7/2021. This heart catheterization revealed normal coronary arteries and normal LVEDP. Echocardiogram completed on 6/4/2021 revealed normal LV systolic function with EF of 55%, moderate concentric left ventricular hypertrophy, mild mitral valve regurgitation, RVSP elevated 53 mmHg. During her hospital admission her rate control medications were adjusted for improved heart rate control including Toprol-XL and Cardizem CD. She was started on oral anticoagulation with Xarelto 20 mg p.o. daily. She recently had follow-up with Dr. Viral Pedro on 7/2/2021 at which time she was initiated on Lasix 40 mg p.o. daily as she was complaining of significant bilateral lower extremity edema and symptoms concerning for diastolic heart failure.   She was also referred for sleep study to evaluate for underlying obstructive sleep apnea and was referred to pulmonology for evaluation of shortness of breath and pulmonary hypertension. Given persistent A. fib with rapid rates, Dr. James Sanchez for eventual cardioversion plus or minus drug loading. Since being started on Lasix at this recent office visit with Dr. Terrence Merlin, patient states her lower extremity edema has significantly improved. She has no complaint of shortness of breath or dyspnea on exertion. She has very rare palpitations/fluttering heartbeat but for the most part is asymptomatic with her A. fib. She denies chest pain, nausea, vomiting, dizziness, lightheadedness, diaphoresis, paroxysmal nocturnal dyspnea, orthopnea, hematochezia, melena, hematuria, syncope, fever or chills. She is compliant with all of her medications and with low-sodium diet and fluid restriction. I educated patient and  at length regarding recommendation of 2000 mg sodium diet and 2 L daily fluid restriction. She will be provided with both scale and blood pressure seen for routine monitoring of both daily weights and blood pressures. EKG in office today: A. fib with rapid ventricular response with ventricular rate of 123 bpm, low voltage QRS, incomplete right bundle branch block, nonspecific ST and T wave changes,  ms    Recent labs reviewed and documented below. BMP today 7/7/2021: Sodium 143, potassium 4.5, chloride 100, total CO2 31, BUN 15, creatinine elevated 1.15, GFR low at 46.5, glucose 104. CBC 6/7/2021: WBC 10.0, hemoglobin 12.4, hematocrit 38.3, platelets 439  BMP on 6/7/2021: Sodium 141, potassium 3.9, chloride 103, total CO2 22, BUN 15, creatinine elevated 1.03, GFR low at 52.8, glucose 118  TSH 6/6/2021: 0.421  proBNP 6/4/2021: 2929    Recent diagnostic testing including echocardiogram, stress test and cardiac catheterization reviewed and documented below. [facility] 6/4/21:  Procedure Summary  no sig. CAD  LARS II flow indicative of micro vacsular disease  Normal LVF  Recommendations  Aggressive risk factor management. Maximize medical therapy. is 222 pounds. PMHx:  Persistent A-fib  Diastolic CHF  PHTN with RVSP 53mmHg per echo 21  HTN  Dyslipidemia  DM  Hypothyroidism  CKD       PSHx:      Social Hx:  Non smoker  No alcohol    Family Hx:  No CAD          No Known Allergies    Current Outpatient Medications   Medication Sig Dispense Refill    dilTIAZem (CARDIZEM CD) 240 MG extended release capsule Take 1 capsule by mouth daily HOLD for SBP<100 or HR<60 90 capsule 3    sotalol AF 80 MG TABS Take 80 mg by mouth 2 times daily 60 tablet 3    rivaroxaban (XARELTO) 20 MG TABS tablet Take 1 tablet by mouth daily (with breakfast) 90 tablet 2    furosemide (LASIX) 40 MG tablet Take 1 tablet by mouth daily 30 tablet 6    Ferrous Sulfate (IRON) 325 (65 Fe) MG TABS Take 1 tablet by mouth daily       pantoprazole (PROTONIX) 40 MG tablet Take 1 tablet by mouth daily 30 tablet 3    rosuvastatin (CRESTOR) 10 MG tablet TAKE 1 TABLET BY MOUTH ONCE DAILY (Patient taking differently: Take 10 mg by mouth daily TAKE 1 TABLET BY MOUTH ONCE DAILY) 90 tablet 1    levothyroxine (SYNTHROID) 125 MCG tablet Take 1 tablet by mouth daily -except Sundays 90 tablet 0    metFORMIN (GLUCOPHAGE) 500 MG tablet TAKE 1 TABLET BY MOUTH TWICE A DAY WITH FOOD (Patient taking differently: Take 500 mg by mouth 2 times daily (with meals) TAKE 1 TABLET BY MOUTH TWICE A DAY WITH FOOD) 180 tablet 0    VITAMIN E Take 400 Units by mouth daily       vitamin B-12 (CYANOCOBALAMIN) 1000 MCG tablet Take 1,000 mcg by mouth daily.         Omega-3 Fatty Acids (FISH OIL) 1200 MG CAPS Take 1 capsule by mouth daily        Current Facility-Administered Medications   Medication Dose Route Frequency Provider Last Rate Last Admin    triamcinolone acetonide (KENALOG-40) injection 80 mg  80 mg Intramuscular Once Offermatica, APRN - CNP        methylPREDNISolone acetate (DEPO-MEDROL) injection 40 mg  40 mg Intramuscular Once Brigitte James MD           Past Medical History: Diagnosis Date    Angina at rest [facility]) 2021    Bilateral leg edema     Diastolic congestive heart failure ([facility] 2021    DM2 (diabetes mellitus, type 2) ([facility] [facility]     Hyperlipidemia     Hypertension     Hypothyroidism     Shortness of breath 2021       Past Surgical History:   Procedure Laterality Date     SECTION      COLONOSCOPY  2008    needs 2018    COLONOSCOPY N/A 2021    COLONOSCOPY DIAGNOSTIC performed by Clarissa Moreira MD at [address] [place] [facility] [address]  [address] IND N/A 2018    COLONOSCOPY adenoma    UPPER GASTROINTESTINAL ENDOSCOPY N/A 2021    EGD DIAGNOSTIC ONLY performed by Clarissa Moreira MD at [address] [facility] Marital status:      Spouse name: None    Number of children: None    Years of education: None    Highest education level: None   Occupational History    None   Tobacco Use    Smoking status: Never Smoker    Smokeless tobacco: Never Used   Vaping Use    Vaping Use: Never used   Substance and Sexual Activity    Alcohol use: Yes     Comment: rare     Drug use: No    Sexual activity: Yes     Partners: Male   Other Topics Concern    None   Social History Narrative    None     Social Determinants of Health     Financial Resource Strain:     Difficulty of Paying Living Expenses:    Food Insecurity:     Worried About Running Out of Food in the Last Year:     Ran Out of Food in the Last Year:    Transportation Needs:     Lack of Transportation (Medical):      Lack of Transportation (Non-Medical):    Physical Activity:     Days of Exercise per Week:     Minutes of Exercise per Session:    Stress:     Feeling of Stress :    Social Connections:     Frequency of Communication with Friends and Family:     Frequency of Social Gatherings with Friends and Family:     Attends [employer] Services:     Active Member of Clubs or Organizations:     Attends Club or Organization Meetings:     Marital Status:    Intimate Partner Violence:     Fear of Current or Ex-Partner:     Emotionally Abused:     Physically Abused:     Sexually Abused:        Family History   Problem Relation Age of Onset    Colon Cancer Maternal Grandmother          Review of Systems:   Review of Systems   Constitutional: Negative for activity change, appetite change, fatigue and fever. +snoring   HENT: Negative for congestion. Respiratory: Negative for cough, chest tightness and shortness of breath. Cardiovascular: Positive for leg swelling (mild--stable). Negative for chest pain and palpitations. No orthopnea or PND   Gastrointestinal: Negative for abdominal distention, abdominal pain, blood in stool, nausea and vomiting. Stools dark but on iron supplement   Genitourinary: Negative for difficulty urinating, dysuria and hematuria. Musculoskeletal: Negative for arthralgias and myalgias. Skin: Negative for color change, pallor and rash. Neurological: Negative for dizziness, syncope and light-headedness. Psychiatric/Behavioral: Negative for agitation and behavioral problems. Physical Examination:    /67 (Site: Left Upper Arm, Position: Sitting, Cuff Size: Large Adult)   Pulse 54   Resp 18   Ht 5' 2\" (1.575 m)   Wt 224 lb (101.6 kg)   LMP  (LMP Unknown)   SpO2 98%   BMI 40.97 kg/m²    Physical Exam  Constitutional:       General: She is not in acute distress. Appearance: Normal appearance. She is obese. HENT:      Head: Normocephalic and atraumatic. Cardiovascular:      Rate and Rhythm: Normal rate. Rhythm irregularly irregular. Heart sounds: Murmur heard. Pulmonary:      Effort: Pulmonary effort is normal. No respiratory distress. Breath sounds: No wheezing, rhonchi or rales. Abdominal:      Palpations: Abdomen is soft. Tenderness: There is no abdominal tenderness.    Musculoskeletal:         General: Normal range of motion. Right lower leg: Edema (trace) present. Left lower leg: Edema (trace) present. Skin:     General: Skin is warm and dry. Neurological:      General: No focal deficit present. Mental Status: She is alert and oriented to person, place, and time. Cranial Nerves: No cranial nerve deficit.    Psychiatric:         Mood and Affect: Mood normal.         Behavior: Behavior normal.         LABS:  CBC:   Lab Results   Component Value Date    WBC 10.0 06/07/2021    RBC 5.33 06/07/2021    HGB 12.4 06/07/2021    HCT 38.3 06/07/2021    MCV 71.8 06/07/2021    MCH 23.3 06/07/2021    MCHC 32.5 06/07/2021    RDW 23.3 06/07/2021     06/07/2021    MPV 10.0 07/07/2015     Lipids:  Lab Results   Component Value Date    CHOL 147 01/02/2019    CHOL 154 07/02/2018    CHOL 149 07/13/2017     Lab Results   Component Value Date    TRIG 168 01/02/2019    TRIG 117 07/02/2018    TRIG 154 07/13/2017     Lab Results   Component Value Date    HDL 51 01/02/2019    HDL 53 07/02/2018    HDL 46 07/13/2017     Lab Results   Component Value Date    LDLCALC 62 01/02/2019    LDLCALC 78 07/02/2018    LDLCALC 72 07/13/2017     No results found for: LABVLDL, VLDL  Lab Results   Component Value Date    CHOLHDLRATIO 4.0 01/04/2013     CMP:    Lab Results   Component Value Date     07/21/2021    K 3.7 07/21/2021    K 3.9 06/07/2021     07/21/2021    CO2 27 07/21/2021    BUN 20 07/21/2021    CREATININE 1.06 07/21/2021    GFRAA >60.0 07/21/2021    LABGLOM 51.1 07/21/2021    GLUCOSE 114 07/21/2021    PROT 6.4 06/06/2021    LABALBU 3.5 06/06/2021    CALCIUM 9.3 07/21/2021    BILITOT 0.4 06/06/2021    ALKPHOS 73 06/06/2021    AST 13 06/06/2021    ALT 8 06/06/2021     BMP:    Lab Results   Component Value Date     07/21/2021    K 3.7 07/21/2021    K 3.9 06/07/2021     07/21/2021    CO2 27 07/21/2021    BUN 20 07/21/2021    LABALBU 3.5 06/06/2021    CREATININE 1.06 07/21/2021    CALCIUM 9.3 07/21/2021    GFRAA >60.0 07/21/2021    LABGLOM 51.1 07/21/2021    GLUCOSE 114 07/21/2021     Magnesium:  No results found for: MG  TSH:  Lab Results   Component Value Date    TSH 0.421 (L) 06/06/2021     . result  No results for input(s): PROBNP in the last 72 hours. No results for input(s): INR in the last 72 hours. Patient Active Problem List   Diagnosis    Essential hypertension    Mixed hyperlipidemia    Hypothyroidism    Osteoarthritis of knee    Type 2 diabetes mellitus with diabetic polyneuropathy, without long-term current use of insulin ([facility] [address]    Benign neoplasm of ascending colon    Diverticulosis of large intestine without diverticulitis    Anemia    HH (hiatus hernia)    Chronic Georges lesion    Adenomatous polyp of transverse colon    A-fib (HCC)    Bilateral leg edema    Shortness of breath    Diastolic congestive heart failure (HCC)       Assessment/Plan:     Diagnosis Orders   1. Chronic diastolic CHF (congestive heart failure), NYHA class 1 ([facility] [address]      compensated currently   2. Persistent atrial fibrillation ([facility] [address]      Rate better controlled since last visit but on home vitals kvng with HR 40s-50s for past 2 weeks. Dcrease cardizem CD to 240mg PO daily. 4 [facility] with xarelto   3. Daytime somnolence      Sleep study pending approval by insurance   4. Pulmonary HTN ([facility])      RVSP 53mmHg per echo 6/4/21   5. Essential hypertension      stable   6. Dyslipidemia      Continue Crestor   7. Diabetes mellitus type 2 in obese ([facility])      Continue metformin.  Management per PCP   8. Stage 3 chronic kidney disease, unspecified whether stage 3a or 3b CKD ([facility] [address]      Renal function stable per BMP 7/21/21       -Maximize medical therapy--decrease Cardizem CD to 240mg p.o. daily even bradycardia with heart rate in the 40s to 50s on home vitals over the past 2 weeks, Sotalol 80mg PO BID, Crestor 10 mg p.o. daily, Lasix 40 mg p.o. daily, oral anticoagulation with Xarelto 20 mg p.o. daily  -Heart rhythm appears regular on examination today, possibly back in atrial fibrillation vs frequent PACs. If patient continues to have persistent or paroxysmal A. fib in future, may need to discontinue sotalol. Will defer to Dr. Chago Walter  -[facility] from 7/21/2021 reviewed and function stable compared to prior  -Cardiac/<2 gram sodium diet recommended  -2000mL daily fluid restriction   -Weight loss recommended   -Advise routine blood pressure monitoring at home. Recommend patient check blood pressure twice daily in a.m. and p.m. and keep log of blood pressure trends to review at next office visit.  -Instructed patient to weigh self daily every morning upon waking and keep log book of daily weights. Notify office if gaining more than 3 pounds in 48 hours. -Advised patient to notify office immediately if experiencing any progressive SOB, orthopnea, PND, LE edema or weight gain  -Educated patient on importance of fluid and salt restriction as well as lifestyle modification  -Sleep study previously ordered by Dr. Bessie Alexis approval by patient's insurance currently  -Maintain routine outpatient follow-up with general cardiologist--next appointment 9/9/2021  Status post left heart catheterization on 6/7/2021 which revealed normal coronary arteries  Echocardiogram 6/4/2021 with normal LV systolic function with EF of 55%, concentric LVH, mild MR, RVSP elevated 53 mmHg  **Consider 2-week event monitor in future for further evaluation of atrial fibrillation and possible underlying sick sinus syndrome. --Wishing to defer for now. Given bradycardia with heart rate in the 40s to 50s per vitals over the past 2 weeks, will decrease Cardizem at this time  -Has evaluation with pulmonology scheduled for 9/2/21 for evaluation of pulmonary   hypertension and suspected obstructive sleep apnea  -Maintain routine outpatient follow-up with PCP  -F/U with CHF clinic in 3 months or sooner if needed        Counseling:   The importance of daily weights, dietary sodium restriction, and contact with cardiology if weight is increased more than 3 lbs in any 48 hour period was stressed. The patient has been advised to contact us if theyexperience progressive SOB, orthopnea, PND or progressive edema.

## 2021-08-14 RX ORDER — PANTOPRAZOLE SODIUM 40 MG/1
TABLET, DELAYED RELEASE ORAL
Qty: 90 TABLET | Refills: 1 | Status: SHIPPED | OUTPATIENT
Start: 2021-08-14 | End: 2021-11-22

## 2021-08-24 ENCOUNTER — HOSPITAL ENCOUNTER (OUTPATIENT)
Dept: SLEEP CENTER | Age: 71
Discharge: HOME | End: 2021-08-26
Payer: COMMERCIAL

## 2021-08-24 PROCEDURE — 95810 POLYSOM 6/> YRS 4/> PARAM: CPT

## 2021-08-27 ENCOUNTER — VIRTUAL VISIT (OUTPATIENT)
Dept: GASTROENTEROLOGY | Age: 71
End: 2021-08-27
Payer: COMMERCIAL

## 2021-08-27 DIAGNOSIS — D50.9 IRON DEFICIENCY ANEMIA, UNSPECIFIED IRON DEFICIENCY ANEMIA TYPE: Primary | ICD-10-CM

## 2021-08-27 DIAGNOSIS — K25.7: ICD-10-CM

## 2021-08-27 PROCEDURE — 99442 PR PHYS/QHP TELEPHONE EVALUATION 11-20 MIN: CPT | Performed by: SPECIALIST

## 2021-08-27 ASSESSMENT — ENCOUNTER SYMPTOMS
ABDOMINAL DISTENTION: 0
ABDOMINAL PAIN: 0
RESPIRATORY NEGATIVE: 1
BLOOD IN STOOL: 0
EYES NEGATIVE: 1
VOMITING: 0
RECTAL PAIN: 0
NAUSEA: 0
ANAL BLEEDING: 0
DIARRHEA: 0
CONSTIPATION: 0
GASTROINTESTINAL NEGATIVE: 1

## 2021-08-27 NOTE — PROGRESS NOTES
Gastroenterology Clinic Follow up Visit    Reva Lake  [id number]  Chief Complaint   Patient presents with    Follow-up       HPI and A/P at last visit summarized below: This was a telephone encounter and patient was at home and I was in our office. She was told it is a billable service and had agreed for that. Was a patient initiated telephone encounter. She has a history of anemia secondary to Georges's lesions and has been on PPI. CBC done on June 7 showed a hemoglobin of 12.4 and hematocrit of 38.3, patient feels fine, no history of any melena or hematemesis. Experience diarrhea after certain food otherwise has normal bowel movement,Duration of phone call was 15 minutes. Review of Systems   Constitutional: Negative. HENT: Negative. Eyes: Negative. Respiratory: Negative. Cardiovascular: Negative. Gastrointestinal: Negative. Negative for abdominal distention, abdominal pain, anal bleeding, blood in stool, constipation, diarrhea, nausea, rectal pain and vomiting. No active GI issues   Endocrine: Negative. Genitourinary: Negative. Musculoskeletal: Negative. Skin: Negative. Allergic/Immunologic: Negative for food allergies. Neurological: Negative. Hematological: Negative. Psychiatric/Behavioral: Negative. Past medical history, past surgical history, medication list, social and familyhistory reviewed    not currently breastfeeding. Physical Exam    Laboratory, Pathology, Radiology reviewed in detail with relevantimportant investigations summarized below:    No results for input(s): WBC, HGB, HCT, MCV, PLT in the last 720 hours. Lab Results   Component Value Date    ALT 8 06/06/2021    AST 13 06/06/2021    ALKPHOS 73 06/06/2021    BILITOT 0.4 06/06/2021     No results found. Endoscopic investigations:     Assessment and Plan:  Reva Lake 70 y.o. female for follow up. Anemia secondary to hiatal hernia with Georges's lesions. ,  To continue Protonix and avoid NSAIDs. Return after 6 months   Diagnosis Orders   1. Iron deficiency anemia, unspecified iron deficiency anemia type     2. Veleta Barrack lesion, chronic         Return in about 6 months (around 2/27/2022). Maciej Smith MD   StaffGastroenterologist  [facility]    Please note this report has been partially produced using speech recognitionsoftware  and may cause contain errors related to that system including grammar, punctuation and spelling as well as words andphrases that may seem inappropriate. If there are questions or concerns please feel free to contact me to clarify.

## 2021-08-30 PROCEDURE — 95810 POLYSOM 6/> YRS 4/> PARAM: CPT | Performed by: INTERNAL MEDICINE

## 2021-09-02 DIAGNOSIS — G47.33 OSA (OBSTRUCTIVE SLEEP APNEA): ICD-10-CM

## 2021-09-03 ENCOUNTER — TELEPHONE (OUTPATIENT)
Dept: CARDIOLOGY CLINIC | Age: 71
End: 2021-09-03

## 2021-09-03 NOTE — TELEPHONE ENCOUNTER
Patient calling for sleep study result as she had gotten a call to schedule a titration study. Discussed with Dr. Abhishek Crenshaw and patients initial study showed moderate sleep apnea with titration study indicated for CPAP settings. She was also at her last appointment here referred to pulmonology. She canceled her appt with Dr. Julieth Belle and is not going to pursue the additional sleep study testing at this time. She has an appointment with Dr. Abhishek Crenshaw 9/9/21 and will discuss further.

## 2021-09-09 ENCOUNTER — OFFICE VISIT (OUTPATIENT)
Dept: CARDIOLOGY CLINIC | Age: 71
End: 2021-09-09
Payer: COMMERCIAL

## 2021-09-09 VITALS
HEART RATE: 47 BPM | BODY MASS INDEX: 41.34 KG/M2 | WEIGHT: 226 LBS | DIASTOLIC BLOOD PRESSURE: 84 MMHG | SYSTOLIC BLOOD PRESSURE: 126 MMHG

## 2021-09-09 DIAGNOSIS — I50.31 ACUTE DIASTOLIC CONGESTIVE HEART FAILURE (HCC): ICD-10-CM

## 2021-09-09 DIAGNOSIS — E78.2 MIXED HYPERLIPIDEMIA: ICD-10-CM

## 2021-09-09 DIAGNOSIS — I48.19 PERSISTENT ATRIAL FIBRILLATION (HCC): Primary | ICD-10-CM

## 2021-09-09 DIAGNOSIS — I10 ESSENTIAL HYPERTENSION: ICD-10-CM

## 2021-09-09 DIAGNOSIS — R06.02 SHORTNESS OF BREATH: ICD-10-CM

## 2021-09-09 DIAGNOSIS — G47.33 OSA (OBSTRUCTIVE SLEEP APNEA): ICD-10-CM

## 2021-09-09 PROCEDURE — 99214 OFFICE O/P EST MOD 30 MIN: CPT | Performed by: INTERNAL MEDICINE

## 2021-09-09 PROCEDURE — 93000 ELECTROCARDIOGRAM COMPLETE: CPT | Performed by: INTERNAL MEDICINE

## 2021-09-09 NOTE — PROGRESS NOTES
Chief Complaint   Patient presents with    Follow Up After Procedure     CARDIOVERSION    Results     SLEEP STUDY       5-21-21: Patient presents for initial medical evaluation. Patient is followed on a regular basis by Dr. Buster Scheuermann, MD. S/p hospitalization for CP/heaviness. Had negative wokup in ER. Symptoms occurred at rest and was worse with carrying groceries. Had radiation to right shoulder. + associated SOB. No hx of MI, CHF or arrhythmia.  was worried about her during the episode. Never seen her like this. No hx of MI, CHF or arrhythmia. No hx of cath. Pt denies   nausea, vomiting, diarrhea, constipation, motor weakness, insomnia, weight loss, syncope, dizziness, lightheadedness, palpitations, PND, orthopnea, or claudication. Does have history of iron deficiency anemia ranging from 8-10. Does have hx of endoscopy. 7-2-21:  Patient is a 79 y.o. female who presented for elective stress test and echo. Patient is followed on a regular basis by Dr. Buster Scheuermann, MD.  Patient with past medical 3 of hypertension, hyperlipidemia and diabetes. Patient status post recent hospitalization for chest pain/heaviness and had negative work-up in the emergency department. Patient developed chest pain that occurred at rest and was worsened with exertion with radiation to the right shoulder associated with shortness of breath. Patient was noted to be in new onset atrial fibrillation with rapid ventricular response on presentation to the stress test.  Patient does not sense that she is in atrial fibrillation. Preliminary stress test images reviewed showing mild anterior/anterior apical ischemia versus breast attenuation artifact. Patient currently is chest pain-free. Hematology oncology. Marcel Martin No previous history of myocardial infarction, congestive heart failure. No history of cardiac catheterization. Patient with history of iron deficiency anemia ranging from 8-10.   She was referred to        7-2-21: s/p abnormal nuclear stress test s/p LHC on 6-4-21 with very mild CAD, EF of 55%. S/p ECHO with EF of55%, mod LVH, no valve abn, IAS consistently bowed to the right indicative of elevated severely dilated LA. , mod PHTN, RVSP of 54mmHg,  LA pressure She continues to have SOB worse with exertion. Struggled at [place] on vacation and going up steps. Pt denies   nausea, vomiting, diarrhea, constipation, motor weakness, insomnia, weight loss, syncope, dizziness, lightheadedness, palpitations, PND, orthopnea, or claudication. Followed with Heme/PCP for low MCV, Hgb is 12.4.  + LE edema. She is on cardizem and DOAC. Losartan was stopped due to mild renal insuff. S/p CT of chest in 5/2021 with moderate sized hiatal hernia, ? Basilar atelectasis or fibrosis  + hx of DM, HTN, HLD. EKG today in office afib at 114bpm.        9-9-21: Status post external cardioversion with successful conversion to sinus bradycardia. Patient was started on sotalol 80 mg twice daily. She is follow-up with the CHF clinic. S/p sleep study with mod GALINA and severe O2 desaturation as low as 74%. Patient is on Xarelto 20 mg daily. + hx of DM, HTN, HLD. LHC on 6-4-21 with very mild CAD, EF of 55%.  S/p ECHO with EF of55%, mod LVH, no valve abn, IAS consistently bowed to the right indicative of elevated severely dilated LA. , mod PHTN, RVSP of 54mmHg,  EKG today with sinus bradycardia normal QTc interval.      Patient Active Problem List   Diagnosis    Essential hypertension    Mixed hyperlipidemia    Hypothyroidism    Osteoarthritis of knee    Type 2 diabetes mellitus with diabetic polyneuropathy, without long-term current use of insulin (HCC)    Benign neoplasm of ascending colon    Diverticulosis of large intestine without diverticulitis    Anemia    HH (hiatus hernia)    Chronic Georges lesion    Adenomatous polyp of transverse colon    A-fib (HCC)    Bilateral leg edema    Shortness of breath    Diastolic congestive heart failure (HCC)    GALINA (obstructive sleep apnea)       Past Surgical History:   Procedure Laterality Date     SECTION      COLONOSCOPY      needs     COLONOSCOPY N/A 2021    COLONOSCOPY DIAGNOSTIC performed by Teodora Morel MD at [address] IND N/A 2018    COLONOSCOPY adenoma    UPPER GASTROINTESTINAL ENDOSCOPY N/A 2021    EGD DIAGNOSTIC ONLY performed by Teodora Morel MD at [address] [facility] Marital status:      Spouse name: Not on file    Number of children: Not on file    Years of education: Not on file    Highest education level: Not on file   Occupational History    Not on file   Tobacco Use    Smoking status: Never Smoker    Smokeless tobacco: Never Used   Vaping Use    Vaping Use: Never used   Substance and Sexual Activity    Alcohol use: Yes     Comment: rare     Drug use: No    Sexual activity: Yes     Partners: Male   Other Topics Concern    Not on file   Social History Narrative    Not on file     Social Determinants of Health     Financial Resource Strain:     Difficulty of Paying Living Expenses:    Food Insecurity:     Worried About Running Out of Food in the Last Year:     [address] in the Last Year:    Transportation Needs:     Lack of Transportation (Medical):      Lack of Transportation (Non-Medical):    Physical Activity:     Days of Exercise per Week:     Minutes of Exercise per Session:    Stress:     Feeling of Stress :    Social Connections:     Frequency of Communication with Friends and Family:     Frequency of Social Gatherings with Friends and Family:     Attends Christian Services:     Active Member of Clubs or Organizations:     Attends Club or Organization Meetings:     Marital Status:    Intimate Partner Violence:     Fear of Current or Ex-Partner:     Emotionally Abused:     Physically Abused:     clots or bleeding disorder. Respiratory ROS: positive for - shortness of breath  Cardiovascular ROS: positive for - chest pain, dyspnea on exertion and shortness of breath  Gastrointestinal ROS: no abdominal pain, change in bowel habits, or black or bloody stools  Genito-Urinary ROS: no dysuria, trouble voiding, or hematuria  Musculoskeletal ROS: negative  Neurological ROS: no TIA or stroke symptoms  Dermatological ROS: negative    VITALS:  Blood pressure 126/84, pulse (!) 47, weight 226 lb (102.5 kg), not currently breastfeeding. Body mass index is 41.34 kg/m². Physical Examination:  General appearance - alert, well appearing, and in no distress  Mental status - alert, oriented to person, place, and time  Neck - Neck is supple, no JVD or carotid bruits. No thyromegaly or adenopathy. Chest - clear to auscultation, no wheezes, rales or rhonchi, symmetric air entry  Heart - normal rate, regular rhythm, normal S1, S2, no murmurs, rubs, clicks or gallops  Abdomen - soft, nontender, nondistended, no masses or organomegaly  Neurological - alert, oriented, normal speech, no focal findings or movement disorder noted  Extremities - peripheral pulses normal, 2+  pedal edema, no clubbing or cyanosis  Skin - normal coloration and turgor, no rashes, no suspicious skin lesions noted      EKG: normal sinus rhythm, nonspecific ST and T waves changes    Orders Placed This Encounter   Procedures    EKG 12 Lead       ASSESSMENT:     Diagnosis Orders   1. Persistent atrial fibrillation (HCC)  EKG 12 Lead   2. Shortness of breath     3. GALINA (obstructive sleep apnea)     4. Mixed hyperlipidemia     5. Essential hypertension     6. Acute diastolic congestive heart failure (HCC)           PLAN:         As always, aggressive risk factor modification is strongly recommended. We should adhere to the JNC VIII guidelines for HTN management and the NCEP ATP III guidelines for LDL-C management.     Cardiac diet is always recommended with low fat, cholesterol, calories and sodium. Continue medications at current doses. Pulmonary for SOB, PHTN, GALINA     Check EKG today    Cont with DOAC. Xarelto    Continue with sotalol 80mg BID. Monitor QTC     CHF clinic follow up    The importance of daily weights, dietary sodium restriction, and contact with cardiology if weight is increased more than 3 lbs in any 48 hour period was stressed. The patient has been advised to contact us if they experience progressive SOB, orthopnea, PND or progressive edema. Patient was advised and encouraged to check blood pressure at home or at a pharmacy, maintain a logbook, and also call us back if blood pressure are above the target ranges or if it is low. Patient clearly understands and agrees to the instructions. We will need to continue to monitor muscle and liver enzymes, BUN, CR, and electrolytes. Details of medical condition explained and patient was warned about adverse consequences of uncontrolled medical conditions and possible side effects of prescribed medications.

## 2021-09-14 ENCOUNTER — HOSPITAL ENCOUNTER (OUTPATIENT)
Dept: SLEEP CENTER | Age: 71
Discharge: HOME | End: 2021-09-16
Payer: COMMERCIAL

## 2021-09-14 PROCEDURE — 95811 POLYSOM 6/>YRS CPAP 4/> PARM: CPT

## 2021-09-17 PROCEDURE — 95811 POLYSOM 6/>YRS CPAP 4/> PARM: CPT | Performed by: INTERNAL MEDICINE

## 2021-09-23 ENCOUNTER — TELEPHONE (OUTPATIENT)
Dept: CARDIOLOGY CLINIC | Age: 71
End: 2021-09-23

## 2021-10-19 LAB
CHOLESTEROL, TOTAL: 143 MG/DL
CHOLESTEROL/HDL RATIO: 2.9
HDLC SERPL-MCNC: 50 MG/DL (ref 35–70)
LDL CHOLESTEROL CALCULATED: 59 MG/DL (ref 0–160)
NONHDLC SERPL-MCNC: NORMAL MG/DL
TRIGL SERPL-MCNC: 168 MG/DL
VLDLC SERPL CALC-MCNC: 34 MG/DL

## 2021-11-10 ENCOUNTER — OFFICE VISIT (OUTPATIENT)
Dept: PULMONOLOGY | Age: 71
End: 2021-11-10
Payer: COMMERCIAL

## 2021-11-10 ENCOUNTER — OFFICE VISIT (OUTPATIENT)
Dept: CARDIOLOGY CLINIC | Age: 71
End: 2021-11-10
Payer: COMMERCIAL

## 2021-11-10 VITALS
DIASTOLIC BLOOD PRESSURE: 56 MMHG | HEIGHT: 62 IN | BODY MASS INDEX: 41.04 KG/M2 | WEIGHT: 223 LBS | HEART RATE: 52 BPM | TEMPERATURE: 98.2 F | OXYGEN SATURATION: 98 % | SYSTOLIC BLOOD PRESSURE: 134 MMHG

## 2021-11-10 VITALS
WEIGHT: 224 LBS | BODY MASS INDEX: 41.22 KG/M2 | RESPIRATION RATE: 18 BRPM | HEART RATE: 50 BPM | SYSTOLIC BLOOD PRESSURE: 122 MMHG | DIASTOLIC BLOOD PRESSURE: 72 MMHG | OXYGEN SATURATION: 97 % | HEIGHT: 62 IN

## 2021-11-10 DIAGNOSIS — E66.9 DIABETES MELLITUS TYPE 2 IN OBESE (HCC): ICD-10-CM

## 2021-11-10 DIAGNOSIS — I50.32 CHRONIC DIASTOLIC CHF (CONGESTIVE HEART FAILURE), NYHA CLASS 1 (HCC): ICD-10-CM

## 2021-11-10 DIAGNOSIS — E78.5 DYSLIPIDEMIA: ICD-10-CM

## 2021-11-10 DIAGNOSIS — I48.19 PERSISTENT ATRIAL FIBRILLATION (HCC): ICD-10-CM

## 2021-11-10 DIAGNOSIS — E11.69 DIABETES MELLITUS TYPE 2 IN OBESE (HCC): ICD-10-CM

## 2021-11-10 DIAGNOSIS — G47.33 OSA (OBSTRUCTIVE SLEEP APNEA): Primary | ICD-10-CM

## 2021-11-10 DIAGNOSIS — I25.10 MILD CAD: ICD-10-CM

## 2021-11-10 DIAGNOSIS — G47.33 OSA (OBSTRUCTIVE SLEEP APNEA): ICD-10-CM

## 2021-11-10 DIAGNOSIS — R00.1 BRADYCARDIA: ICD-10-CM

## 2021-11-10 DIAGNOSIS — I10 ESSENTIAL HYPERTENSION: ICD-10-CM

## 2021-11-10 DIAGNOSIS — N18.30 STAGE 3 CHRONIC KIDNEY DISEASE, UNSPECIFIED WHETHER STAGE 3A OR 3B CKD (HCC): ICD-10-CM

## 2021-11-10 DIAGNOSIS — I27.20 PULMONARY HTN (HCC): ICD-10-CM

## 2021-11-10 DIAGNOSIS — E66.01 MORBID OBESITY (HCC): ICD-10-CM

## 2021-11-10 PROCEDURE — 99214 OFFICE O/P EST MOD 30 MIN: CPT | Performed by: PHYSICIAN ASSISTANT

## 2021-11-10 PROCEDURE — 99204 OFFICE O/P NEW MOD 45 MIN: CPT | Performed by: INTERNAL MEDICINE

## 2021-11-10 ASSESSMENT — ENCOUNTER SYMPTOMS
ABDOMINAL PAIN: 0
ABDOMINAL PAIN: 0
VOMITING: 0
RHINORRHEA: 0
SORE THROAT: 0
NAUSEA: 0
EYE ITCHING: 0
WHEEZING: 0
SHORTNESS OF BREATH: 0
CHEST TIGHTNESS: 0
COLOR CHANGE: 0
TROUBLE SWALLOWING: 0
ABDOMINAL DISTENTION: 0
BLOOD IN STOOL: 0
CHEST TIGHTNESS: 0
COUGH: 0
SHORTNESS OF BREATH: 0
COUGH: 0
DIARRHEA: 0
EYE DISCHARGE: 0
SINUS PRESSURE: 0
NAUSEA: 0
VOMITING: 0
VOICE CHANGE: 0

## 2021-11-10 NOTE — PROGRESS NOTES
Subjective:     Marylene Null is a 70 y.o. female who complains today of:     Chief Complaint   Patient presents with    New Patient    Results     Sleep Study       HPI  She has PSG done on  21  an shows GALINA. AHI 15.8  RDI 19.6  CPAP titration study on 21 and show therapeutic CPAP 11cm  She is complaining of snoring and daytime sleepiness and tiredness. Not sure about  witness apnea. She does not wakes up with gasping for air. No C/o wakes up frequently during sleep . No complaint of morning headache. She does have restful sleep. She does take naps. She does not fall asleep while watching TV. She does not have a complaint of sleepiness while driving. She does not have history of motor vehicle accident due to falling a sleep while driving. She does not have difficulty falling sleep or staying asleep  No significant Weight gain in last 6-12 month . No sleep walking,  talking or eating. No sleep onset hallucination. No sleep paralysis . No sleep attacks. Allergies:  Patient has no known allergies.   Past Medical History:   Diagnosis Date    Angina at rest [facility]) 2021    Bilateral leg edema     Diastolic congestive heart failure ([facility] 2021    DM2 (diabetes mellitus, type 2) ([facility]     Hyperlipidemia     Hypertension     Hypothyroidism     Shortness of breath 2021     Past Surgical History:   Procedure Laterality Date     SECTION      COLONOSCOPY  2008    needs 2018    COLONOSCOPY N/A 2021    COLONOSCOPY DIAGNOSTIC performed by Laura Patel MD at [address] [place] [address] N/A 2018    COLONOSCOPY adenoma    UPPER GASTROINTESTINAL ENDOSCOPY N/A 2021    EGD DIAGNOSTIC ONLY performed by [facility]el [facility] at [facility]     Family History   Problem Relation Age of Onset    Colon Cancer Maternal Grandmother      Social History     Socioeconomic History    Marital status:      Spouse name: Not on file    Number of children: Not on file    Years of education: Not on file    Highest education level: Not on file   Occupational History    Not on file   Tobacco Use    Smoking status: Never Smoker    Smokeless tobacco: Never Used   Vaping Use    Vaping Use: Never used   Substance and Sexual Activity    Alcohol use: Yes     Comment: rare     Drug use: No    Sexual activity: Yes     Partners: Male   Other Topics Concern    Not on file   Social History Narrative    Not on file     Social Determinants of Health     Financial Resource Strain:     Difficulty of Paying Living Expenses: Not on file   Food Insecurity:     Worried About Running Out of Food in the Last Year: Not on file    Berry of Food in the Last Year: Not on file   Transportation Needs:     Lack of Transportation (Medical): Not on file    Lack of Transportation (Non-Medical): Not on file   Physical Activity:     Days of Exercise per Week: Not on file    Minutes of Exercise per Session: Not on file   Stress:     Feeling of Stress : Not on file   Social Connections:     Frequency of Communication with Friends and Family: Not on file    Frequency of Social Gatherings with Friends and Family: Not on file    Attends Mu-ism Services: Not on file    Active Member of [address] [place] [address] or Organizations: Not on file    Attends Club or Organization Meetings: Not on file    Marital Status: Not on file   Intimate Partner Violence:     Fear of Current or Ex-Partner: Not on file    Emotionally Abused: Not on file    Physically Abused: Not on file    Sexually Abused: Not on file   Housing Stability:     Unable to Pay for Housing in the Last Year: Not on file    Number of [place] in the Last Year: Not on file    Unstable Housing in the Last Year: Not on file         Review of Systems   Constitutional: Negative for chills, diaphoresis, fatigue and fever.    HENT: Negative for congestion, mouth sores, nosebleeds, postnasal drip, rhinorrhea, sinus pressure, sneezing, sore throat, trouble swallowing and voice change. Snoring    Eyes: Negative for discharge, itching and visual disturbance. Respiratory: Negative for cough, chest tightness, shortness of breath and wheezing. Cardiovascular: Negative for chest pain, palpitations and leg swelling. Gastrointestinal: Negative for abdominal pain, diarrhea, nausea and vomiting. Genitourinary: Negative for difficulty urinating and hematuria. Musculoskeletal: Negative for arthralgias, joint swelling and myalgias. Skin: Negative for rash. Allergic/Immunologic: Negative for environmental allergies and food allergies. Neurological: Negative for dizziness, tremors, weakness and headaches. Psychiatric/Behavioral: Positive for sleep disturbance. Negative for behavioral problems. :     Vitals:    11/10/21 0946   BP: (!) 134/56   Site: Left Upper Arm   Position: Sitting   Cuff Size: Large Adult   Pulse: 52   Temp: 98.2 °F (36.8 °C)   TempSrc: Temporal   SpO2: 98%   Weight: 223 lb (101.2 kg)   Height: 5' 2\" (1.575 m)     Wt Readings from Last 3 Encounters:   11/10/21 223 lb (101.2 kg)   11/10/21 224 lb (101.6 kg)   09/09/21 226 lb (102.5 kg)         Physical Exam  Constitutional:       General: She is not in acute distress. Appearance: She is well-developed. She is obese. She is not diaphoretic. HENT:      Head: Normocephalic and atraumatic. Nose: Nose normal.   Eyes:      Pupils: Pupils are equal, round, and reactive to light. Neck:      Thyroid: No thyromegaly. Vascular: No JVD. Trachea: No tracheal deviation. Cardiovascular:      Rate and Rhythm: Normal rate and regular rhythm. Heart sounds: No murmur heard. No friction rub. No gallop. Pulmonary:      Effort: No respiratory distress. Breath sounds: No wheezing or rales. Chest:      Chest wall: No tenderness. Abdominal:      General: There is no distension. Tenderness:  There is no abdominal tenderness. There is no rebound. Musculoskeletal:         General: Normal range of motion. Lymphadenopathy:      Cervical: No cervical adenopathy. Skin:     General: Skin is warm and dry. Neurological:      Mental Status: She is alert and oriented to person, place, and time. Coordination: Coordination normal.         Current Outpatient Medications   Medication Sig Dispense Refill    CPAP Machine MISC by Does not apply route New CPAP at 11 cm  And supply. 1 each 0    pantoprazole (PROTONIX) 40 MG tablet TAKE 1 TABLET BY MOUTH EVERY DAY 90 tablet 1    dilTIAZem (CARDIZEM CD) 240 MG extended release capsule Take 1 capsule by mouth daily HOLD for SBP<100 or HR<60 90 capsule 3    sotalol AF 80 MG TABS Take 80 mg by mouth 2 times daily 60 tablet 3    rivaroxaban (XARELTO) 20 MG TABS tablet Take 1 tablet by mouth daily (with breakfast) 90 tablet 2    furosemide (LASIX) 40 MG tablet Take 1 tablet by mouth daily 30 tablet 6    Ferrous Sulfate (IRON) 325 (65 Fe) MG TABS Take 1 tablet by mouth daily       rosuvastatin (CRESTOR) 10 MG tablet TAKE 1 TABLET BY MOUTH ONCE DAILY (Patient taking differently: Take 10 mg by mouth daily TAKE 1 TABLET BY MOUTH ONCE DAILY) 90 tablet 1    levothyroxine (SYNTHROID) 125 MCG tablet Take 1 tablet by mouth daily -except Sundays 90 tablet 0    metFORMIN (GLUCOPHAGE) 500 MG tablet TAKE 1 TABLET BY MOUTH TWICE A DAY WITH FOOD (Patient taking differently: Take 500 mg by mouth 2 times daily (with meals) TAKE 1 TABLET BY MOUTH TWICE A DAY WITH FOOD) 180 tablet 0    VITAMIN E Take 400 Units by mouth daily       vitamin B-12 (CYANOCOBALAMIN) 1000 MCG tablet Take 1,000 mcg by mouth daily.         Omega-3 Fatty Acids (FISH OIL) 1200 MG CAPS Take 1 capsule by mouth daily        Current Facility-Administered Medications   Medication Dose Route Frequency Provider Last Rate Last Admin    triamcinolone acetonide (KENALOG-40) injection 80 mg  80 mg IntraMUSCular Once

## 2021-11-10 NOTE — PROGRESS NOTES
Patient: Opal Maradiaga  YOB: 1950  MRN: [id number]    Chief Complaint:  Chief Complaint   Patient presents with    Congestive Heart Failure     diastolic         Subjective/HPI       11/10/21: Here for follow-up of diastolic congestive heart failure. Was last seen in the office on 8/11/2021 and following that visit, her Cardizem was decreased to 240 mg daily from 360 mg daily due to persistent bradycardia post cardioversion. Since her last office visit with me, she also underwent outpatient sleep study on 8/24/2021 which revealed moderate obstructive sleep apnea for which CPAP therapy indicated and she subsequently underwent CPAP titration study and is scheduled to follow-up with pulmonology today. She states that she is doing very well since her last visit with no new or worsening heart failure symptoms. She denies shortness of breath or dyspnea on exertion, chest pain, palpitations, diaphoresis, paroxysmal nocturnal dyspnea, orthopnea, worsening lower extremity edema, dizziness, lightheadedness, syncope, fever or chills. She is compliant with all of her medications including oral anticoagulation with Xarelto. She denies any bleeding issues including hematochezia, melena or hematuria. She continues to check routine blood pressures at home and BP stable typically with systolic blood pressure between 110s to 130s range. Heart rate on home monitoring is typically between 40s to 50s range but occasionally in the 60s. She reports that her heart rate readings are prior to taking her morning medications. Most recent labs from PCP office reviewed and documented below  CBC 10/19/2021: WBC 6.6, hemoglobin 12.9, hematocrit 41.0, platelets 736  TSH 60/70/0894: 1.60  Free T4 10/19/2021: 1.30  CMP 10/19/2021: Sodium 145, potassium 4.2, chloride 105, total CO2 30, BUN 18, creatinine 1.0, GFR 55, glucose 101. AST 19, ALT 13. Lipid panel 10/19/2021:  Total cholesterol 143, HDL 50, LDL 59, triglycerides 168. Vital signs stable in office today. Blood pressure 122/72, heart rate bradycardic at 50, pulse ox 97% on room air. Weight is 224 pounds which is the exact same weight she was at last office visit on 8/11/2021 8/11/21: Here for follow-up of chronic diastolic congestive heart failure. Was seen for initial CHF clinic evaluation on 7/7/2021. EKG in office at that time showed A. fib with rapid rate of 123 bpm.  Following office visit, Cardizem CD was increased to 360 mg daily from 180 mg daily. On 7/21/2021 she underwent cardioversion with Dr. Forrest Gutiererz converting from A. fib to sinus rhythm/sinus bradycardia. Her Toprol-XL was discontinued and she was initiated on sotalol 80 mg p.o. twice daily. She has continued to check routine blood pressure and heart rate since last office visit. The initial 2 weeks post first office visit, heart rates had stabilized in the 70s to 80s range but over the past 2 weeks patient has been bradycardic on a.m. vitals with heart rates typically 40s to 50s before morning meds. She is completely asymptomatic with the bradycardia. She denies any new or worsening heart failure symptoms. She is compliant with low-sodium diet and fluid restriction. She continues to weigh herself daily and weight has remained stable typically between 220 to 223 pounds. She remains on oral anticoagulation with Xarelto and denies any bleeding issues. Dr. Forrest Gutierrez had previously ordered a sleep study and patient is pending approval through her insurance. Most recent labs reviewed and documented below.   BMP 7/21/2021: Sodium 140, potassium 3.7, chloride 103, total CO2 27, BUN 20, creatinine 1.06, GFR low at 51.1, glucose 114  BMP on 7/7/2021: Sodium 143, potassium 4.5, chloride 100, total CO2 31, BUN 15, creatinine 1.15, GFR low at 46.5, glucose 104  proBNP on 7/7/2021: 682    Given bradycardia with documented heart rates in the 40s to 50s on routine vitals at home, discussed with patient possibly ordering a 2-week event monitor for further evaluation of A. fib and possibly underlying sick sinus syndrome. She is wishing to defer event monitor for now. So given bradycardia will decrease Cardizem CD to 240 mg daily from 360 daily and continue to monitor heart rates closely. Vital signs stable in office today. Blood pressure 130/67, heart rate 54, pulse ox 98% on room air. Weight is 224 pounds compared to 222 pounds at last office visit on 7/7/2021.        7/7/21 CHF clinic visit #1: This is a 79-year-old  female with past medical history significant for hypertension, dyslipidemia and diabetes who was recently diagnosed with new onset atrial fibrillation and diastolic congestive heart failure. Patient had undergone outpatient stress test on 6/4/2021 and during stress testing was noticed to develop rapid atrial fibrillation. She was subsequently hospitalized and due to mildly abnormal stress test with new onset A. fib and multiple risk factors for CAD, she underwent left heart catheterization by Dr. Bruce Ruffin on 6/7/2021. This heart catheterization revealed normal coronary arteries and normal LVEDP. Echocardiogram completed on 6/4/2021 revealed normal LV systolic function with EF of 55%, moderate concentric left ventricular hypertrophy, mild mitral valve regurgitation, RVSP elevated 53 mmHg. During her hospital admission her rate control medications were adjusted for improved heart rate control including Toprol-XL and Cardizem CD. She was started on oral anticoagulation with Xarelto 20 mg p.o. daily. She recently had follow-up with Dr. Cesilia Fong on 7/2/2021 at which time she was initiated on Lasix 40 mg p.o. daily as she was complaining of significant bilateral lower extremity edema and symptoms concerning for diastolic heart failure.   She was also referred for sleep study to evaluate for underlying obstructive sleep apnea and was referred to pulmonology for evaluation of shortness of breath and pulmonary hypertension. Given persistent A. fib with rapid rates, Dr. Froy Felipe for eventual cardioversion plus or minus drug loading. Since being started on Lasix at this recent office visit with Dr. John Cervantes, patient states her lower extremity edema has significantly improved. She has no complaint of shortness of breath or dyspnea on exertion. She has very rare palpitations/fluttering heartbeat but for the most part is asymptomatic with her A. fib. She denies chest pain, nausea, vomiting, dizziness, lightheadedness, diaphoresis, paroxysmal nocturnal dyspnea, orthopnea, hematochezia, melena, hematuria, syncope, fever or chills. She is compliant with all of her medications and with low-sodium diet and fluid restriction. I educated patient and  at length regarding recommendation of 2000 mg sodium diet and 2 L daily fluid restriction. She will be provided with both scale and blood pressure seen for routine monitoring of both daily weights and blood pressures. EKG in office today: A. fib with rapid ventricular response with ventricular rate of 123 bpm, low voltage QRS, incomplete right bundle branch block, nonspecific ST and T wave changes,  ms    Recent labs reviewed and documented below. BMP today 7/7/2021: Sodium 143, potassium 4.5, chloride 100, total CO2 31, BUN 15, creatinine elevated 1.15, GFR low at 46.5, glucose 104. CBC 6/7/2021: WBC 10.0, hemoglobin 12.4, hematocrit 38.3, platelets 437  BMP on 6/7/2021: Sodium 141, potassium 3.9, chloride 103, total CO2 22, BUN 15, creatinine elevated 1.03, GFR low at 52.8, glucose 118  TSH 6/6/2021: 0.421  proBNP 6/4/2021: 2929    Recent diagnostic testing including echocardiogram, stress test and cardiac catheterization reviewed and documented below. [facility] 6/4/21:  Procedure Summary  no sig.  CAD  LARS II flow indicative of micro vacsular disease  Normal LVF  Recommendations  Aggressive risk factor management. Maximize medical therapy. Angiographic Findings   Cardiac Arteries and Lesion Findings  LMCA: Normal.  LAD: Diffuse irregularity. LCx: Diffuse irregularity. RCA: Diffuse irregularity. Dominance: Left  LV function assessed as:Normal.  Ejection Fraction    - Method: LV gram. EF%: 55. Stress test 6/4/21:  IMPRESSION:  1. Abnormal stress myocardial perfusion examination. There is  reversible ischemia in the [id number] anterior wall as well as the LV  apex of moderate size and moderate intensity. Cannot exclude underlying  ischemia versus breast attenuation artifact. Clinical correlation is  advised. 2.  Normal hemodynamic response to Lexiscan infusion. 3.  Normal LV function with calculated ejection fraction of 54%. 4.  Normal TID ratio equals to 0.90.  5.  Atrial fibrillation with rapid ventricular response. 6.  No reported chest pain, chest pressure or syncope.     MARY OLMSTEAD DO      Echocardiogram 6/4/21:  Conclusions      Summary   Atrial fibrillation. Normal left ventricular systolic function, no regional wall motion   abnormalities, estimated ejection fraction of 55%. Normal left ventricular size and function. Moderate concentric left ventricular hypertrophy. Mild (1+) mitral regurgitation is present. No evidence of mitral valve stenosis. No evidence of aortic valve regurgitation . No evidence of aortic valve stenosis. The left atrium is Severely dilated. IAS consistently bowed to the right indicative of elevated LA pressure. There is mild to moderate tricuspid regurgitation with estimated RVSP of   53 mm Hg. Right ventricular systolic pressure of 53 mm Hg consistent with moderate   pulmonary hypertension. Signature   ----------------------------------------------------------------   Electronically signed by Zoraida Núñez DO(Interpreting   physician) on 06/04/2021 06:51 PM        Vital signs stable in office today.   Blood pressure 141/99, heart rate 92, pulse ox 98% on room air. Weight is 222 pounds. PMHx:  Persistent A-fib  Diastolic CHF  PHTN with RVSP 53mmHg per echo 21  HTN  Dyslipidemia  DM  Hypothyroidism  CKD       PSHx:      Social Hx:  Non smoker  No alcohol    Family Hx:  No CAD          No Known Allergies    Current Outpatient Medications   Medication Sig Dispense Refill    pantoprazole (PROTONIX) 40 MG tablet TAKE 1 TABLET BY MOUTH EVERY DAY 90 tablet 1    dilTIAZem (CARDIZEM CD) 240 MG extended release capsule Take 1 capsule by mouth daily HOLD for SBP<100 or HR<60 90 capsule 3    sotalol AF 80 MG TABS Take 80 mg by mouth 2 times daily 60 tablet 3    rivaroxaban (XARELTO) 20 MG TABS tablet Take 1 tablet by mouth daily (with breakfast) 90 tablet 2    furosemide (LASIX) 40 MG tablet Take 1 tablet by mouth daily 30 tablet 6    Ferrous Sulfate (IRON) 325 (65 Fe) MG TABS Take 1 tablet by mouth daily       rosuvastatin (CRESTOR) 10 MG tablet TAKE 1 TABLET BY MOUTH ONCE DAILY (Patient taking differently: Take 10 mg by mouth daily TAKE 1 TABLET BY MOUTH ONCE DAILY) 90 tablet 1    levothyroxine (SYNTHROID) 125 MCG tablet Take 1 tablet by mouth daily -except Sundays 90 tablet 0    metFORMIN (GLUCOPHAGE) 500 MG tablet TAKE 1 TABLET BY MOUTH TWICE A DAY WITH FOOD (Patient taking differently: Take 500 mg by mouth 2 times daily (with meals) TAKE 1 TABLET BY MOUTH TWICE A DAY WITH FOOD) 180 tablet 0    VITAMIN E Take 400 Units by mouth daily       vitamin B-12 (CYANOCOBALAMIN) 1000 MCG tablet Take 1,000 mcg by mouth daily.         Omega-3 Fatty Acids (FISH OIL) 1200 MG CAPS Take 1 capsule by mouth daily        Current Facility-Administered Medications   Medication Dose Route Frequency Provider Last Rate Last Admin    triamcinolone acetonide (KENALOG-40) injection 80 mg  80 mg IntraMUSCular Once [employer], APRN - CNP        methylPREDNISolone acetate (DEPO-MEDROL) injection 40 mg  40 mg [facility]USCular Once [facility]           Past Medical History:   Diagnosis Date    Angina at rest [facility]) 2021    Bilateral leg edema 9008    Diastolic congestive heart failure ([facility] [address] 2021    DM2 (diabetes mellitus, type 2) ([facility] [address]     Hyperlipidemia     Hypertension     Hypothyroidism     Shortness of breath 2021       Past Surgical History:   Procedure Laterality Date     SECTION      COLONOSCOPY      needs     COLONOSCOPY N/A 2021    COLONOSCOPY DIAGNOSTIC performed by Ladonna Rubio MD at [address] IND N/A 2018    COLONOSCOPY adenoma    UPPER GASTROINTESTINAL ENDOSCOPY N/A 2021    EGD DIAGNOSTIC ONLY performed by Ladonna Rubio MD at [place] [id number] History    Marital status:      Spouse name: None    Number of children: None    Years of education: None    Highest education level: None   Occupational History    None   Tobacco Use    Smoking status: Never Smoker    Smokeless tobacco: Never Used   Vaping Use    Vaping Use: Never used   Substance and Sexual Activity    Alcohol use: Yes     Comment: rare     Drug use: No    Sexual activity: Yes     Partners: Male   Other Topics Concern    None   Social History Narrative    None     Social Determinants of Health     Financial Resource Strain:     Difficulty of Paying Living Expenses: Not on file   Food Insecurity:     Worried About Running Out of Food in the Last Year: Not on file    Berry of Food in the Last Year: Not on file   Transportation Needs:     Lack of Transportation (Medical): Not on file    Lack of Transportation (Non-Medical):  Not on file   Physical Activity:     Days of Exercise per Week: Not on file    Minutes of Exercise per Session: Not on file   Stress:     Feeling of Stress : Not on file   Social Connections:     Frequency of Communication with Friends and Family: Not on file    Frequency of Social Gatherings with Friends and Family: Not on file    Attends Denominational Services: Not on file    Active Member of Clubs or Organizations: Not on file    Attends Club or Organization Meetings: Not on file    Marital Status: Not on file   Intimate Partner Violence:     Fear of Current or Ex-Partner: Not on file    Emotionally Abused: Not on file    Physically Abused: Not on file    Sexually Abused: Not on file   Housing Stability:     Unable to Pay for Housing in the Last Year: Not on file    Number of [place] in the Last Year: Not on file    Unstable Housing in the Last Year: Not on file       Family History   Problem Relation Age of Onset    Colon Cancer Maternal Grandmother          Review of Systems:   Review of Systems   Constitutional: Negative for activity change, appetite change, fatigue, fever and unexpected weight change. +snoring   HENT: Negative for congestion. Respiratory: Negative for cough, chest tightness and shortness of breath. Cardiovascular: Negative for chest pain, palpitations and leg swelling. No orthopnea or PND   Gastrointestinal: Negative for abdominal distention, abdominal pain, blood in stool, nausea and vomiting. Stools dark but on iron supplement   Genitourinary: Negative for difficulty urinating, dysuria and hematuria. Musculoskeletal: Negative for arthralgias and myalgias. Skin: Negative for color change, pallor and rash. Neurological: Negative for dizziness, syncope and light-headedness. Psychiatric/Behavioral: Negative for agitation and behavioral problems. Physical Examination:    /72 (Site: Left Upper Arm, Position: Sitting, Cuff Size: Large Adult)   Pulse 50   Resp 18   Ht 5' 2\" (1.575 m)   Wt 224 lb (101.6 kg)   LMP  (LMP Unknown)   SpO2 97%   BMI 40.97 kg/m²    Physical Exam  Constitutional:       General: She is not in acute distress. Appearance: Normal appearance. She is obese.    HENT:      Head: Normocephalic and atraumatic. Cardiovascular:      Rate and Rhythm: Regular rhythm. Bradycardia present. Heart sounds: Murmur heard. Pulmonary:      Effort: Pulmonary effort is normal. No respiratory distress. Breath sounds: No wheezing, rhonchi or rales. Abdominal:      Palpations: Abdomen is soft. Tenderness: There is no abdominal tenderness. Musculoskeletal:         General: Normal range of motion. Right lower leg: Edema (trace-1+ at ankle) present. Left lower leg: Edema (trace-1+ at ankle) present. Skin:     General: Skin is warm and dry. Neurological:      General: No focal deficit present. Mental Status: She is alert and oriented to person, place, and time. Cranial Nerves: No cranial nerve deficit.    Psychiatric:         Mood and Affect: Mood normal.         Behavior: Behavior normal.         LABS:  CBC:   Lab Results   Component Value Date    WBC 10.0 06/07/2021    RBC 5.33 06/07/2021    HGB 12.4 06/07/2021    HCT 38.3 06/07/2021    MCV 71.8 06/07/2021    MCH 23.3 06/07/2021    MCHC 32.5 06/07/2021    RDW 23.3 06/07/2021     06/07/2021    MPV 10.0 07/07/2015     Lipids:  Lab Results   Component Value Date    CHOL 147 01/02/2019    CHOL 154 07/02/2018    CHOL 149 07/13/2017     Lab Results   Component Value Date    TRIG 168 01/02/2019    TRIG 117 07/02/2018    TRIG 154 07/13/2017     Lab Results   Component Value Date    HDL 51 01/02/2019    HDL 53 07/02/2018    HDL 46 07/13/2017     Lab Results   Component Value Date    LDLCALC 62 01/02/2019    LDLCALC 78 07/02/2018    LDLCALC 72 07/13/2017     No results found for: LABVLDL, VLDL  Lab Results   Component Value Date    CHOLHDLRATIO 4.0 01/04/2013     CMP:    Lab Results   Component Value Date     07/21/2021    K 3.7 07/21/2021    K 3.9 06/07/2021     07/21/2021    CO2 27 07/21/2021    BUN 20 07/21/2021    CREATININE 1.06 07/21/2021    GFRAA >60.0 07/21/2021    LABGLOM 51.1 07/21/2021 GLUCOSE 114 07/21/2021    PROT 6.4 06/06/2021    LABALBU 3.5 06/06/2021    CALCIUM 9.3 07/21/2021    BILITOT 0.4 06/06/2021    ALKPHOS 73 06/06/2021    AST 13 06/06/2021    ALT 8 06/06/2021     BMP:    Lab Results   Component Value Date     07/21/2021    K 3.7 07/21/2021    K 3.9 06/07/2021     07/21/2021    CO2 27 07/21/2021    BUN 20 07/21/2021    LABALBU 3.5 06/06/2021    CREATININE 1.06 07/21/2021    CALCIUM 9.3 07/21/2021    GFRAA >60.0 07/21/2021    LABGLOM 51.1 07/21/2021    GLUCOSE 114 07/21/2021     Magnesium:  No results found for: MG  TSH:  Lab Results   Component Value Date    TSH 0.421 (L) 06/06/2021     . result  No results for input(s): PROBNP in the last 72 hours. No results for input(s): INR in the last 72 hours. Patient Active Problem List   Diagnosis    Essential hypertension    Mixed hyperlipidemia    Hypothyroidism    Osteoarthritis of knee    Type 2 diabetes mellitus with diabetic polyneuropathy, without long-term current use of insulin ([facility] [address]    Benign neoplasm of ascending colon    Diverticulosis of large intestine without diverticulitis    Anemia    HH (hiatus hernia)    Chronic Georges lesion    Adenomatous polyp of transverse colon    A-fib (HCC)    Bilateral leg edema    Shortness of breath    Diastolic congestive heart failure (HCC)    GALINA (obstructive sleep apnea)       Assessment/Plan:     Diagnosis Orders   1. Chronic diastolic CHF (congestive heart failure), NYHA class 1 (HCC)      compensated   2. Persistent atrial fibrillation (HCC)      s/p cardioversion into SB/SR on 7/21/21. On sotalol and cardizem CD. [address] with Xarelto   3. Bradycardia      HR frequently 40s-50s on home monitoring. Advised to check HR before meds and 3 hrs after x 1 week then call office   4. GALINA (obstructive sleep apnea)      per sleep study 8/24/21. Initiated on CPAP   5. Pulmonary HTN (HCC)      RVSP 53mmHg per echo 6/4/21   6. Mild CAD      per [facility] 6/4/21   7.  Essential prior to taking a.m. medications and 3 hours after taking medications and record log to report to the office in 1 week. Based on this result will decide upon further decreasing Cardizem and possible 2-week event monitor  -Maintain follow-up with pulmonology regarding GALINA. --Has appointment with Dr. Koby Tomas today  Had outpatient sleep study on 8/24/2021 which showed moderate obstructive sleep apnea and CPAP titration study was completed. Patient in process of obtaining CPAP  -Maintain routine outpatient follow-up with PCP  -F/U with CHF clinic in 6 months or sooner if needed        Counseling: The importance of daily weights, dietary sodium restriction, and contact with cardiology if weight is increased more than 3 lbs in any 48 hour period was stressed. The patient has been advised to contact us if theyexperience progressive SOB, orthopnea, PND or progressive edema.

## 2021-11-10 NOTE — PATIENT INSTRUCTIONS
-Check pulse (heart rate) every morning before taking medications and record. Then repeat pulse (heart rate) 3 hours after morning meds and record result. ---call office with results after 1 week. If persistently low heart rate, will likely decrease cardizem CD (diltiazem) dose    -Will call Dr. Theresa Brice office to obtain recent labs.  Will consider reducing lasix (furosemide) dose in future

## 2021-11-19 ENCOUNTER — TELEPHONE (OUTPATIENT)
Dept: CARDIOLOGY CLINIC | Age: 71
End: 2021-11-19

## 2021-11-19 RX ORDER — DILTIAZEM HYDROCHLORIDE 120 MG/1
120 CAPSULE, COATED, EXTENDED RELEASE ORAL DAILY
Qty: 30 CAPSULE | Refills: 5 | Status: SHIPPED | OUTPATIENT
Start: 2021-11-19 | End: 2022-02-11

## 2021-11-19 NOTE — TELEPHONE ENCOUNTER
Patient called with heart rates: forgot to check on 11/15/21    11/12--50 then 63  11/13--59 then 50  11/14--44 then 45  11/16--40 then 46  11/18--44 then 43

## 2021-11-19 NOTE — TELEPHONE ENCOUNTER
Per Doreen Justice PA-C have patient decrease cardizem to 120mg daily. Patient aware and new script sent to pharmacy.

## 2021-11-22 RX ORDER — PANTOPRAZOLE SODIUM 40 MG/1
TABLET, DELAYED RELEASE ORAL
Qty: 90 TABLET | Refills: 1 | Status: SHIPPED | OUTPATIENT
Start: 2021-11-22 | End: 2022-03-14 | Stop reason: SDUPTHER

## 2022-01-17 RX ORDER — SOTALOL HYDROCHLORIDE 80 MG/1
TABLET ORAL
Qty: 180 TABLET | Refills: 1 | Status: SHIPPED | OUTPATIENT
Start: 2022-01-17 | End: 2022-01-20 | Stop reason: SDUPTHER

## 2022-01-17 NOTE — TELEPHONE ENCOUNTER
Please approve or deny this refill request. The order is pended. Thank you.     LOV 9/9/2021    Next Visit Date:  Future Appointments   Date Time Provider James Hodgson   3/10/2022 12:30 PM Seamus Baig [facility]   5/11/2022  9:00 AM JASWINDER Felder [facility] EMERGENCY [facility] AT [place]

## 2022-01-19 ENCOUNTER — TELEPHONE (OUTPATIENT)
Dept: CARDIOLOGY CLINIC | Age: 72
End: 2022-01-19

## 2022-01-19 DIAGNOSIS — I48.19 PERSISTENT ATRIAL FIBRILLATION (HCC): Primary | ICD-10-CM

## 2022-01-19 NOTE — TELEPHONE ENCOUNTER
Sotalol (80MG) states to take 1X per day/her old script was 2X per day. Please call patient and advise. Patient states pharmacy has called and has not heard back regarding change.

## 2022-01-19 NOTE — TELEPHONE ENCOUNTER
I do not see anything in chart changing her sotalol dosing to QD. A refill was requested by the pharmacy on 1/14/22 and was sent in as QD. Please advise or change to BID is appropriate.

## 2022-01-20 RX ORDER — SOTALOL HYDROCHLORIDE 80 MG/1
TABLET ORAL
Qty: 180 TABLET | Refills: 3 | Status: SHIPPED | OUTPATIENT
Start: 2022-01-20

## 2022-01-20 RX ORDER — FUROSEMIDE 40 MG/1
TABLET ORAL
Qty: 90 TABLET | Refills: 3 | Status: SHIPPED | OUTPATIENT
Start: 2022-01-20

## 2022-01-20 NOTE — TELEPHONE ENCOUNTER
Please approve or deny this refill request. The order is pended. Thank you.     LOV 9/9/2021    Next Visit Date:  Future Appointments   Date Time Provider James Hodgson   3/10/2022 12:30 PM Seamus Ingram DO [address] [place] [address]   5/11/2022  9:00 AM JASWINDER Felipe FACUNDO ALEE [facility] EMERGENCY [facility] AT [place]

## 2022-01-28 ENCOUNTER — HOSPITAL ENCOUNTER (OUTPATIENT)
Dept: WOMENS IMAGING | Age: 72
Discharge: HOME | End: 2022-01-30
Payer: COMMERCIAL

## 2022-01-28 VITALS — BODY MASS INDEX: 40.79 KG/M2 | HEIGHT: 62 IN

## 2022-01-28 DIAGNOSIS — Z12.31 ENCOUNTER FOR SCREENING MAMMOGRAM FOR BREAST CANCER: ICD-10-CM

## 2022-01-28 PROCEDURE — 77067 SCR MAMMO BI INCL CAD: CPT

## 2022-02-01 ENCOUNTER — HOSPITAL ENCOUNTER (OUTPATIENT)
Dept: ULTRASOUND IMAGING | Age: 72
End: 2022-02-01
Payer: COMMERCIAL

## 2022-02-01 ENCOUNTER — HOSPITAL ENCOUNTER (OUTPATIENT)
Dept: WOMENS IMAGING | Age: 72
Discharge: HOME | End: 2022-02-03
Payer: COMMERCIAL

## 2022-02-01 DIAGNOSIS — R92.8 ABNORMAL MAMMOGRAM: ICD-10-CM

## 2022-02-01 PROCEDURE — 77065 DX MAMMO INCL CAD UNI: CPT

## 2022-02-10 DIAGNOSIS — I48.19 PERSISTENT ATRIAL FIBRILLATION (HCC): Primary | ICD-10-CM

## 2022-02-10 NOTE — TELEPHONE ENCOUNTER
Please approve or deny this refill request. The order is pended. Thank you.     LOV 11/10/2021    Next Visit Date:  Future Appointments   Date Time Provider James Hodgson   3/10/2022 12:30 PM Seamus Acuna [facility]   5/11/2022  9:00 AM JASWINDER Marte [facility] EMERGENCY [facility] AT [place]

## 2022-02-11 RX ORDER — DILTIAZEM HYDROCHLORIDE 120 MG/1
CAPSULE, COATED, EXTENDED RELEASE ORAL
Qty: 90 CAPSULE | Refills: 1 | Status: SHIPPED | OUTPATIENT
Start: 2022-02-11 | End: 2022-03-10 | Stop reason: ALTCHOICE

## 2022-03-10 ENCOUNTER — OFFICE VISIT (OUTPATIENT)
Dept: CARDIOLOGY CLINIC | Age: 72
End: 2022-03-10
Payer: COMMERCIAL

## 2022-03-10 VITALS
DIASTOLIC BLOOD PRESSURE: 80 MMHG | HEART RATE: 45 BPM | BODY MASS INDEX: 40.6 KG/M2 | SYSTOLIC BLOOD PRESSURE: 138 MMHG | WEIGHT: 222 LBS

## 2022-03-10 DIAGNOSIS — I25.10 CORONARY ARTERY DISEASE INVOLVING NATIVE CORONARY ARTERY OF NATIVE HEART WITHOUT ANGINA PECTORIS: ICD-10-CM

## 2022-03-10 DIAGNOSIS — I48.19 PERSISTENT ATRIAL FIBRILLATION (HCC): Primary | ICD-10-CM

## 2022-03-10 DIAGNOSIS — I50.31 ACUTE DIASTOLIC CONGESTIVE HEART FAILURE (HCC): ICD-10-CM

## 2022-03-10 DIAGNOSIS — G47.33 OSA (OBSTRUCTIVE SLEEP APNEA): ICD-10-CM

## 2022-03-10 DIAGNOSIS — E78.2 MIXED HYPERLIPIDEMIA: ICD-10-CM

## 2022-03-10 DIAGNOSIS — I10 ESSENTIAL HYPERTENSION: ICD-10-CM

## 2022-03-10 DIAGNOSIS — R60.0 BILATERAL LEG EDEMA: ICD-10-CM

## 2022-03-10 PROCEDURE — 93000 ELECTROCARDIOGRAM COMPLETE: CPT | Performed by: INTERNAL MEDICINE

## 2022-03-10 PROCEDURE — 99214 OFFICE O/P EST MOD 30 MIN: CPT | Performed by: INTERNAL MEDICINE

## 2022-03-10 NOTE — PROGRESS NOTES
Chief Complaint   Patient presents with    Atrial Fibrillation    Hypertension    6 Month Follow-Up       5-21-21: Patient presents for initial medical evaluation. Patient is followed on a regular basis by Dr. Monik Salcido MD. S/p hospitalization for CP/heaviness. Had negative wokup in ER. Symptoms occurred at rest and was worse with carrying groceries. Had radiation to right shoulder. + associated SOB. No hx of MI, CHF or arrhythmia.  was worried about her during the episode. Never seen her like this. No hx of MI, CHF or arrhythmia. No hx of cath. Pt denies   nausea, vomiting, diarrhea, constipation, motor weakness, insomnia, weight loss, syncope, dizziness, lightheadedness, palpitations, PND, orthopnea, or claudication. Does have history of iron deficiency anemia ranging from 8-10. Does have hx of endoscopy. 7-2-21:  Patient is a 79 y.o. female who presented for elective stress test and echo. Patient is followed on a regular basis by Dr. Monik Salcido MD.  Patient with past medical 3 of hypertension, hyperlipidemia and diabetes. Patient status post recent hospitalization for chest pain/heaviness and had negative work-up in the emergency department. Patient developed chest pain that occurred at rest and was worsened with exertion with radiation to the right shoulder associated with shortness of breath. Patient was noted to be in new onset atrial fibrillation with rapid ventricular response on presentation to the stress test.  Patient does not sense that she is in atrial fibrillation. Preliminary stress test images reviewed showing mild anterior/anterior apical ischemia versus breast attenuation artifact. Patient currently is chest pain-free. Hematology oncology. Kristin Records No previous history of myocardial infarction, congestive heart failure. No history of cardiac catheterization. Patient with history of iron deficiency anemia ranging from 8-10.   She was referred to        7-2-21: s/p abnormal nuclear stress test s/p LHC on 6-4-21 with very mild CAD, EF of 55%. S/p ECHO with EF of55%, mod LVH, no valve abn, IAS consistently bowed to the right indicative of elevated severely dilated LA. , mod PHTN, RVSP of 54mmHg,  LA pressure She continues to have SOB worse with exertion. Struggled at [employer] on vacation and going up steps. Pt denies   nausea, vomiting, diarrhea, constipation, motor weakness, insomnia, weight loss, syncope, dizziness, lightheadedness, palpitations, PND, orthopnea, or claudication. Followed with Heme/PCP for low MCV, Hgb is 12.4.  + LE edema. She is on cardizem and DOAC. Losartan was stopped due to mild renal insuff. S/p CT of chest in 5/2021 with moderate sized hiatal hernia, ? Basilar atelectasis or fibrosis  + hx of DM, HTN, HLD. EKG today in office afib at 114bpm.        9-9-21: Status post external cardioversion with successful conversion to sinus bradycardia. Patient was started on sotalol 80 mg twice daily. She is follow-up with the CHF clinic. S/p sleep study with mod GALINA and severe O2 desaturation as low as 74%. Patient is on Xarelto 20 mg daily. + hx of DM, HTN, HLD. LHC on 6-4-21 with very mild CAD, EF of 55%. S/p ECHO with EF of55%, mod LVH, no valve abn, IAS consistently bowed to the right indicative of elevated severely dilated LA. , mod PHTN, RVSP of 54mmHg,  EKG today with sinus bradycardia normal QTc interval.    3-10-22: on CPAP now for GALINA. Following with CHF clinic. States pulse is low at times. Hx of Pafib s/p CVN and on sotalol and DOAC.   + hx of DM, HTN, HLD  hx of LHC on 6-4-21 with very mild CAD, EF of 55%. S/p ECHO with EF of55%, mod LVH, no valve abn, IAS consistently bowed to the right indicative of elevated severely dilated LA. , mod PHTN, RVSP of 54mmHg,  EKG with SB at 45bpm, normal QTC.      Patient Active Problem List   Diagnosis    Essential hypertension    Mixed hyperlipidemia    Hypothyroidism    Osteoarthritis of knee    Type 2 diabetes mellitus with diabetic polyneuropathy, without long-term current use of insulin (HCC)    Benign neoplasm of ascending colon    Diverticulosis of large intestine without diverticulitis    Anemia    HH (hiatus hernia)    Chronic Georges lesion    Adenomatous polyp of transverse colon    A-fib (HCC)    Bilateral leg edema    Shortness of breath    Diastolic congestive heart failure (HCC)    GALINA (obstructive sleep apnea)    Coronary artery disease involving native coronary artery of native heart without angina pectoris       Past Surgical History:   Procedure Laterality Date     SECTION      COLONOSCOPY      needs     COLONOSCOPY N/A 2021    COLONOSCOPY DIAGNOSTIC performed by Kaley Cheng MD at [address] [place] [facility] [address]  [address] IND N/A 2018    COLONOSCOPY adenoma    UPPER GASTROINTESTINAL ENDOSCOPY N/A 2021    EGD DIAGNOSTIC ONLY performed by Kaley Cheng MD at [facility] Marital status:      Spouse name: Not on file    Number of children: Not on file    Years of education: Not on file    Highest education level: Not on file   Occupational History    Not on file   Tobacco Use    Smoking status: Never Smoker    Smokeless tobacco: Never Used   Vaping Use    Vaping Use: Never used   Substance and Sexual Activity    Alcohol use: Yes     Comment: rare     Drug use: No    Sexual activity: Yes     Partners: Male   Other Topics Concern    Not on file   Social History Narrative    Not on file     Social Determinants of Health     Financial Resource Strain:     Difficulty of Paying Living Expenses: Not on file   Food Insecurity:     Worried About Running Out of Food in the Last Year: Not on file    Berry of Food in the Last Year: Not on file   Transportation Needs:     Lack of Transportation (Medical):  Not on file    Lack of Transportation (Non-Medical): Not on file   Physical Activity:     Days of Exercise per Week: Not on file    Minutes of Exercise per Session: Not on file   Stress:     Feeling of Stress : Not on file   Social Connections:     Frequency of Communication with Friends and Family: Not on file    Frequency of Social Gatherings with Friends and Family: Not on file    Attends Scientology Services: Not on file    Active Member of [address] [place] [address] or Organizations: Not on file    Attends Club or Organization Meetings: Not on file    Marital Status: Not on file   Intimate Partner Violence:     Fear of Current or Ex-Partner: Not on file    Emotionally Abused: Not on file    Physically Abused: Not on file    Sexually Abused: Not on file   Housing Stability:     Unable to Pay for Housing in the Last Year: Not on file    Number of [place] in the Last Year: Not on file    Unstable Housing in the Last Year: Not on file       Family History   Problem Relation Age of Onset    Colon Cancer Maternal Grandmother        Current Outpatient Medications   Medication Sig Dispense Refill    furosemide (LASIX) 40 MG tablet TAKE 1 TABLET BY MOUTH EVERY DAY 90 tablet 3    sotalol AF 80 MG TABS Take one tablet twice daily. 180 tablet 3    pantoprazole (PROTONIX) 40 MG tablet TAKE 1 TABLET BY MOUTH EVERY DAY 90 tablet 1    CPAP Machine MISC by Does not apply route New CPAP at 11 cm  And supply.  1 each 0    rivaroxaban (XARELTO) 20 MG TABS tablet Take 1 tablet by mouth daily (with breakfast) 90 tablet 2    Ferrous Sulfate (IRON) 325 (65 Fe) MG TABS Take 1 tablet by mouth daily       rosuvastatin (CRESTOR) 10 MG tablet TAKE 1 TABLET BY MOUTH ONCE DAILY (Patient taking differently: Take 10 mg by mouth daily TAKE 1 TABLET BY MOUTH ONCE DAILY) 90 tablet 1    levothyroxine (SYNTHROID) 125 MCG tablet Take 1 tablet by mouth daily -except Sundays 90 tablet 0    metFORMIN (GLUCOPHAGE) 500 MG tablet TAKE 1 TABLET BY MOUTH TWICE A DAY WITH FOOD (Patient taking differently: Take 500 mg by mouth 2 times daily (with meals) TAKE 1 TABLET BY MOUTH TWICE A DAY WITH FOOD) 180 tablet 0    VITAMIN E Take 400 Units by mouth daily       vitamin B-12 (CYANOCOBALAMIN) 1000 MCG tablet Take 1,000 mcg by mouth daily.  Omega-3 Fatty Acids (FISH OIL) 1200 MG CAPS Take 1 capsule by mouth daily        Current Facility-Administered Medications   Medication Dose Route Frequency Provider Last Rate Last Admin    triamcinolone acetonide (KENALOG-40) injection 80 mg  80 mg IntraMUSCular Once DELIO Jones - CNP        methylPREDNISolone acetate (DEPO-MEDROL) injection 40 mg  40 mg IntraMUSCular Once Chacorta Neves MD           Patient has no known allergies. Review of Systems:  General ROS: negative  Psychological ROS: negative  Hematological and Lymphatic ROS: No history of blood clots or bleeding disorder. Respiratory ROS: positive for - shortness of breath  Cardiovascular ROS: positive for - chest pain, dyspnea on exertion and shortness of breath  Gastrointestinal ROS: no abdominal pain, change in bowel habits, or black or bloody stools  Genito-Urinary ROS: no dysuria, trouble voiding, or hematuria  Musculoskeletal ROS: negative  Neurological ROS: no TIA or stroke symptoms  Dermatological ROS: negative    VITALS:  Blood pressure 138/80, pulse (!) 45, weight 222 lb (100.7 kg), not currently breastfeeding. Body mass index is 40.6 kg/m². Physical Examination:  General appearance - alert, well appearing, and in no distress  Mental status - alert, oriented to person, place, and time  Neck - Neck is supple, no JVD or carotid bruits. No thyromegaly or adenopathy.    Chest - clear to auscultation, no wheezes, rales or rhonchi, symmetric air entry  Heart - normal rate, regular rhythm, normal S1, S2, no murmurs, rubs, clicks or gallops  Abdomen - soft, nontender, nondistended, no masses or organomegaly  Neurological - alert, oriented, normal speech, no focal findings or movement disorder noted  Extremities - peripheral pulses normal, 2+  pedal edema, no clubbing or cyanosis  Skin - normal coloration and turgor, no rashes, no suspicious skin lesions noted      EKG: normal sinus rhythm, nonspecific ST and T waves changes    Orders Placed This Encounter   Procedures    EKG 12 Lead       ASSESSMENT:     Diagnosis Orders   1. Persistent atrial fibrillation (HCC)  EKG 12 Lead   2. Essential hypertension     3. Mixed hyperlipidemia     4. Acute diastolic congestive heart failure ([address]     5. GALINA (obstructive sleep apnea)     6. Bilateral leg edema     7. Coronary artery disease involving native coronary artery of native heart without angina pectoris           PLAN:         As always, aggressive risk factor modification is strongly recommended. We should adhere to the JNC VIII guidelines for HTN management and the NCEP ATP III guidelines for LDL-C management. Cardiac diet is always recommended with low fat, cholesterol, calories and sodium. Continue medications at current doses. Pulmonary for SOB, PHTN, GALINA     Check EKG today    Cont with DOAC. Xarelto    Continue with sotalol 80mg BID. Monitor QTC     CHF clinic follow up    STOP CARDIZEM due to bradycardia. The importance of daily weights, dietary sodium restriction, and contact with cardiology if weight is increased more than 3 lbs in any 48 hour period was stressed. The patient has been advised to contact us if they experience progressive SOB, orthopnea, PND or progressive edema. Patient was advised and encouraged to check blood pressure at home or at a pharmacy, maintain a logbook, and also call us back if blood pressure are above the target ranges or if it is low. Patient clearly understands and agrees to the instructions. We will need to continue to monitor muscle and liver enzymes, BUN, CR, and electrolytes.     Details of medical condition explained and patient was warned about adverse consequences of uncontrolled medical conditions and possible side effects of prescribed medications.

## 2022-03-14 RX ORDER — PANTOPRAZOLE SODIUM 40 MG/1
TABLET, DELAYED RELEASE ORAL
Qty: 90 TABLET | Refills: 2 | Status: SHIPPED | OUTPATIENT
Start: 2022-03-14

## 2022-04-13 ENCOUNTER — OFFICE VISIT (OUTPATIENT)
Dept: PULMONOLOGY | Age: 72
End: 2022-04-13
Payer: COMMERCIAL

## 2022-04-13 VITALS
BODY MASS INDEX: 41.22 KG/M2 | TEMPERATURE: 98.2 F | HEART RATE: 95 BPM | OXYGEN SATURATION: 97 % | SYSTOLIC BLOOD PRESSURE: 133 MMHG | WEIGHT: 224 LBS | HEIGHT: 62 IN | DIASTOLIC BLOOD PRESSURE: 74 MMHG

## 2022-04-13 DIAGNOSIS — E66.01 MORBID OBESITY (HCC): ICD-10-CM

## 2022-04-13 DIAGNOSIS — G47.33 OSA (OBSTRUCTIVE SLEEP APNEA): Primary | ICD-10-CM

## 2022-04-13 PROCEDURE — 1036F TOBACCO NON-USER: CPT | Performed by: INTERNAL MEDICINE

## 2022-04-13 PROCEDURE — 1123F ACP DISCUSS/DSCN MKR DOCD: CPT | Performed by: INTERNAL MEDICINE

## 2022-04-13 PROCEDURE — 1090F PRES/ABSN URINE INCON ASSESS: CPT | Performed by: INTERNAL MEDICINE

## 2022-04-13 PROCEDURE — G8417 CALC BMI ABV UP PARAM F/U: HCPCS | Performed by: INTERNAL MEDICINE

## 2022-04-13 PROCEDURE — 4040F PNEUMOC VAC/ADMIN/RCVD: CPT | Performed by: INTERNAL MEDICINE

## 2022-04-13 PROCEDURE — 3017F COLORECTAL CA SCREEN DOC REV: CPT | Performed by: INTERNAL MEDICINE

## 2022-04-13 PROCEDURE — 99214 OFFICE O/P EST MOD 30 MIN: CPT | Performed by: INTERNAL MEDICINE

## 2022-04-13 PROCEDURE — G8427 DOCREV CUR MEDS BY ELIG CLIN: HCPCS | Performed by: INTERNAL MEDICINE

## 2022-04-13 PROCEDURE — G8400 PT W/DXA NO RESULTS DOC: HCPCS | Performed by: INTERNAL MEDICINE

## 2022-04-13 ASSESSMENT — ENCOUNTER SYMPTOMS
SHORTNESS OF BREATH: 0
EYE ITCHING: 0
DIARRHEA: 0
EYE DISCHARGE: 0
WHEEZING: 0
COUGH: 0
TROUBLE SWALLOWING: 0
RHINORRHEA: 0
NAUSEA: 0
SORE THROAT: 0
VOMITING: 0
SINUS PRESSURE: 0
CHEST TIGHTNESS: 0
ABDOMINAL PAIN: 0
VOICE CHANGE: 0

## 2022-04-13 NOTE — PROGRESS NOTES
Subjective:     Anil Whiteside is a 70 y.o. female who complains today of:     Chief Complaint   Patient presents with    Sleep Apnea     f/u       HPI  She is using CPAP with 11   centimeters of H2O with heated humidity. She is using CPAP for about   6-7  hours every night. She is using CPAP with nasal   Mask. She said  sleep is restful with the CPAP use. She is compliant with CPAP therapy and benefiting with CPAP use. No snoring with CPAP use. No complaint of daytime sleepiness or tiredness with CPAP use. She denies taking naps. No sleepiness with driving. She denies difficulty falling asleep or staying asleep. I reviewed compliance report with patient regarding CPAP therapy. She is using  CPAP for 30 days out of 30 days. Average usage of days used is 6  hours and 21 min , average AHI 1 with CPAP use. Allergies:  Patient has no known allergies.   Past Medical History:   Diagnosis Date    Angina at rest [facility]) 2021    Bilateral leg edema 1/3/4989    Diastolic congestive heart failure ([facility] 2021    DM2 (diabetes mellitus, type 2) ([facility]     Hyperlipidemia     Hypertension     Hypothyroidism     Shortness of breath 2021     Past Surgical History:   Procedure Laterality Date     SECTION      COLONOSCOPY  2008    needs 2018    COLONOSCOPY N/A 2021    COLONOSCOPY DIAGNOSTIC performed by Tahir Saldaña MD at [address] [place] [facility]  [address] [place] [address] N/A 2018    COLONOSCOPY adenoma    UPPER GASTROINTESTINAL ENDOSCOPY N/A 2021    EGD DIAGNOSTIC ONLY performed by Tahir Saldaña MD at [facility]     Family History   Problem Relation Age of Onset    Colon Cancer Maternal Grandmother      Social History     Socioeconomic History    Marital status:      Spouse name: Not on file    Number of children: Not on file    Years of education: Not on file    Highest education level: Not on file   Occupational History    Not on file Tobacco Use    Smoking status: Never Smoker    Smokeless tobacco: Never Used   Vaping Use    Vaping Use: Never used   Substance and Sexual Activity    Alcohol use: Yes     Comment: rare     Drug use: No    Sexual activity: Yes     Partners: Male   Other Topics Concern    Not on file   Social History Narrative    Not on file     Social Determinants of Health     Financial Resource Strain:     Difficulty of Paying Living Expenses: Not on file   Food Insecurity:     Worried About Running Out of Food in the Last Year: Not on file    Berry of Food in the Last Year: Not on file   Transportation Needs:     Lack of Transportation (Medical): Not on file    Lack of Transportation (Non-Medical): Not on file   Physical Activity:     Days of Exercise per Week: Not on file    Minutes of Exercise per Session: Not on file   Stress:     Feeling of Stress : Not on file   Social Connections:     Frequency of Communication with Friends and Family: Not on file    Frequency of Social Gatherings with Friends and Family: Not on file    Attends Quaker Services: Not on file    Active Member of [address] [place] [address] or Organizations: Not on file    Attends Club or Organization Meetings: Not on file    Marital Status: Not on file   Intimate Partner Violence:     Fear of Current or Ex-Partner: Not on file    Emotionally Abused: Not on file    Physically Abused: Not on file    Sexually Abused: Not on file   Housing Stability:     Unable to Pay for Housing in the Last Year: Not on file    Number of [place] in the Last Year: Not on file    Unstable Housing in the Last Year: Not on file         Review of Systems   Constitutional: Negative for chills, diaphoresis, fatigue and fever. HENT: Negative for congestion, mouth sores, nosebleeds, postnasal drip, rhinorrhea, sinus pressure, sneezing, sore throat, trouble swallowing and voice change. Eyes: Negative for discharge, itching and visual disturbance.    Respiratory: Negative for cough, chest tightness, shortness of breath and wheezing. Cardiovascular: Negative for chest pain, palpitations and leg swelling. Gastrointestinal: Negative for abdominal pain, diarrhea, nausea and vomiting. Genitourinary: Negative for difficulty urinating and hematuria. Musculoskeletal: Negative for arthralgias, joint swelling and myalgias. Skin: Negative for rash. Allergic/Immunologic: Negative for environmental allergies and food allergies. Neurological: Negative for dizziness, tremors, weakness and headaches. Psychiatric/Behavioral: Positive for sleep disturbance. Negative for behavioral problems. :     Vitals:    04/13/22 0923   BP: 133/74   Pulse: 95   Temp: 98.2 °F (36.8 °C)   SpO2: 97%   Weight: 224 lb (101.6 kg)   Height: 5' 2\" (1.575 m)     Wt Readings from Last 3 Encounters:   04/13/22 224 lb (101.6 kg)   03/10/22 222 lb (100.7 kg)   11/10/21 223 lb (101.2 kg)         Physical Exam  Constitutional:       General: She is not in acute distress. Appearance: She is well-developed. She is obese. She is not diaphoretic. HENT:      Head: Normocephalic and atraumatic. Nose: Nose normal.   Eyes:      Pupils: Pupils are equal, round, and reactive to light. Neck:      Thyroid: No thyromegaly. Vascular: No JVD. Trachea: No tracheal deviation. Cardiovascular:      Rate and Rhythm: Normal rate and regular rhythm. Heart sounds: No murmur heard. No friction rub. No gallop. Pulmonary:      Effort: No respiratory distress. Breath sounds: No wheezing or rales. Chest:      Chest wall: No tenderness. Abdominal:      General: There is no distension. Tenderness: There is no abdominal tenderness. There is no rebound. Musculoskeletal:         General: Normal range of motion. Lymphadenopathy:      Cervical: No cervical adenopathy. Skin:     General: Skin is warm and dry.    Neurological:      Mental Status: She is alert and oriented to person, place, and time. Coordination: Coordination normal.         Current Outpatient Medications   Medication Sig Dispense Refill    pantoprazole (PROTONIX) 40 MG tablet TAKE 1 TABLET BY MOUTH EVERY DAY 90 tablet 2    furosemide (LASIX) 40 MG tablet TAKE 1 TABLET BY MOUTH EVERY DAY 90 tablet 3    sotalol AF 80 MG TABS Take one tablet twice daily. 180 tablet 3    CPAP Machine MISC by Does not apply route New CPAP at 11 cm  And supply. 1 each 0    rivaroxaban (XARELTO) 20 MG TABS tablet Take 1 tablet by mouth daily (with breakfast) 90 tablet 2    Ferrous Sulfate (IRON) 325 (65 Fe) MG TABS Take 1 tablet by mouth daily       rosuvastatin (CRESTOR) 10 MG tablet TAKE 1 TABLET BY MOUTH ONCE DAILY (Patient taking differently: Take 10 mg by mouth daily TAKE 1 TABLET BY MOUTH ONCE DAILY) 90 tablet 1    levothyroxine (SYNTHROID) 125 MCG tablet Take 1 tablet by mouth daily -except Sundays 90 tablet 0    metFORMIN (GLUCOPHAGE) 500 MG tablet TAKE 1 TABLET BY MOUTH TWICE A DAY WITH FOOD (Patient taking differently: Take 500 mg by mouth 2 times daily (with meals) TAKE 1 TABLET BY MOUTH TWICE A DAY WITH FOOD) 180 tablet 0    VITAMIN E Take 400 Units by mouth daily       vitamin B-12 (CYANOCOBALAMIN) 1000 MCG tablet Take 1,000 mcg by mouth daily.         Omega-3 Fatty Acids (FISH OIL) 1200 MG CAPS Take 1 capsule by mouth daily        Current Facility-Administered Medications   Medication Dose Route Frequency Provider Last Rate Last Admin    triamcinolone acetonide (KENALOG-40) injection 80 mg  80 mg IntraMUSCular Once [employer] [facility], APRN - CNP        methylPREDNISolone acetate (DEPO-MEDROL) injection 40 mg  40 mg IntraMUSCular Once Diana Puckett MD           Results for orders placed in visit on 05/13/21    CT CHEST [address]  ]  No results found for this or any previous visit.  ]    Assessment/Plan:     1. GALINA (obstructive sleep apnea)  She is using CPAP with 11   centimeters of H2O with heated

## 2022-04-14 DIAGNOSIS — I48.19 PERSISTENT ATRIAL FIBRILLATION (HCC): Primary | ICD-10-CM

## 2022-04-14 RX ORDER — RIVAROXABAN 20 MG/1
TABLET, FILM COATED ORAL
Qty: 90 TABLET | Refills: 2 | Status: SHIPPED | OUTPATIENT
Start: 2022-04-14

## 2022-04-14 NOTE — TELEPHONE ENCOUNTER
Please approve or deny this refill request. The order is pended. Thank you.     LOV 11/10/2021    Next Visit Date:  Future Appointments   Date Time Provider James Hodgson   5/11/2022  9:00 AM Crispin Garsia [facility] AT [place]   8/15/2022  9:15 AM Deya White MD [facility]   9/9/2022  8:15 AM Seamus Russ DO [facility]

## 2022-05-11 ENCOUNTER — OFFICE VISIT (OUTPATIENT)
Dept: CARDIOLOGY CLINIC | Age: 72
End: 2022-05-11
Payer: MEDICARE

## 2022-05-11 VITALS
HEIGHT: 62 IN | BODY MASS INDEX: 41.96 KG/M2 | DIASTOLIC BLOOD PRESSURE: 82 MMHG | SYSTOLIC BLOOD PRESSURE: 126 MMHG | HEART RATE: 51 BPM | OXYGEN SATURATION: 98 % | RESPIRATION RATE: 18 BRPM | WEIGHT: 228 LBS

## 2022-05-11 DIAGNOSIS — I27.20 PULMONARY HTN (HCC): ICD-10-CM

## 2022-05-11 DIAGNOSIS — G47.33 OSA (OBSTRUCTIVE SLEEP APNEA): ICD-10-CM

## 2022-05-11 DIAGNOSIS — R00.1 BRADYCARDIA: ICD-10-CM

## 2022-05-11 DIAGNOSIS — E11.69 DIABETES MELLITUS TYPE 2 IN OBESE (HCC): ICD-10-CM

## 2022-05-11 DIAGNOSIS — I10 ESSENTIAL HYPERTENSION: ICD-10-CM

## 2022-05-11 DIAGNOSIS — Z86.79 HISTORY OF ATRIAL FIBRILLATION: ICD-10-CM

## 2022-05-11 DIAGNOSIS — E66.9 DIABETES MELLITUS TYPE 2 IN OBESE (HCC): ICD-10-CM

## 2022-05-11 DIAGNOSIS — E78.5 DYSLIPIDEMIA: ICD-10-CM

## 2022-05-11 DIAGNOSIS — I50.32 CHRONIC DIASTOLIC CHF (CONGESTIVE HEART FAILURE), NYHA CLASS 1 (HCC): ICD-10-CM

## 2022-05-11 DIAGNOSIS — I25.10 MILD CAD: ICD-10-CM

## 2022-05-11 DIAGNOSIS — N18.30 STAGE 3 CHRONIC KIDNEY DISEASE, UNSPECIFIED WHETHER STAGE 3A OR 3B CKD (HCC): ICD-10-CM

## 2022-05-11 PROCEDURE — 2022F DILAT RTA XM EVC RTNOPTHY: CPT | Performed by: PHYSICIAN ASSISTANT

## 2022-05-11 PROCEDURE — 99214 OFFICE O/P EST MOD 30 MIN: CPT | Performed by: PHYSICIAN ASSISTANT

## 2022-05-11 PROCEDURE — 1090F PRES/ABSN URINE INCON ASSESS: CPT | Performed by: PHYSICIAN ASSISTANT

## 2022-05-11 PROCEDURE — G8400 PT W/DXA NO RESULTS DOC: HCPCS | Performed by: PHYSICIAN ASSISTANT

## 2022-05-11 PROCEDURE — G8417 CALC BMI ABV UP PARAM F/U: HCPCS | Performed by: PHYSICIAN ASSISTANT

## 2022-05-11 PROCEDURE — 4040F PNEUMOC VAC/ADMIN/RCVD: CPT | Performed by: PHYSICIAN ASSISTANT

## 2022-05-11 PROCEDURE — 3046F HEMOGLOBIN A1C LEVEL >9.0%: CPT | Performed by: PHYSICIAN ASSISTANT

## 2022-05-11 PROCEDURE — 3017F COLORECTAL CA SCREEN DOC REV: CPT | Performed by: PHYSICIAN ASSISTANT

## 2022-05-11 PROCEDURE — 1123F ACP DISCUSS/DSCN MKR DOCD: CPT | Performed by: PHYSICIAN ASSISTANT

## 2022-05-11 PROCEDURE — G8427 DOCREV CUR MEDS BY ELIG CLIN: HCPCS | Performed by: PHYSICIAN ASSISTANT

## 2022-05-11 PROCEDURE — 1036F TOBACCO NON-USER: CPT | Performed by: PHYSICIAN ASSISTANT

## 2022-05-11 ASSESSMENT — ENCOUNTER SYMPTOMS
ABDOMINAL PAIN: 0
COUGH: 0
BLOOD IN STOOL: 0
VOMITING: 0
COLOR CHANGE: 0
CHEST TIGHTNESS: 0
NAUSEA: 0
ABDOMINAL DISTENTION: 0
SHORTNESS OF BREATH: 0

## 2022-05-11 NOTE — PROGRESS NOTES
Patient: Adeola Sagastume  YOB: 1950  MRN: [id number]    Chief Complaint:  Chief Complaint   Patient presents with    Congestive Heart Failure     DIASTOLIC    Swelling     BLE         Subjective/HPI       5/11/22: Here for follow-up of chronic diastolic congestive heart failure. Was last seen in CHF clinic on 11/10/2021. Since that time, she has been doing well overall with no new or worsening heart failure symptoms. She most recently followed up with Dr. Mona Vasquez on 3/10/2022 at which time her Cardizem was discontinued completely due to persistent sinus bradycardia with heart rate of 45 bpm on EKG. She was continued on sotalol 80 mg p.o. twice daily. She reports her heart rates are consistently in the mid to high 50s range now. Also since her last visit, she was started on CPAP in January 2022 and states that overall she is feeling much better and more well rested. She denies shortness of breath or dyspnea on exertion, chest pain, palpitations, diaphoresis, paroxysmal nocturnal dyspnea, orthopnea, dizziness, lightheadedness, syncope, fever or chills. She will occasionally experience lower extremity edema but denies any significant or rapid weight gain. She periodically checks her blood pressure at home and reports that it has been stable. She is compliant with all medications including oral anticoagulation with Xarelto and denies any bleeding issues. She most recently followed up with her PCP, Dr. Lauro Forte on 4/19/2022 and states that she had blood work completed at that time. Will contact PCP office to obtain recent blood work for my review. She is following with pulmonology for management of obstructive sleep apnea. Vital signs stable in office today. Blood pressure 126/82, heart rate bradycardic at 51, pulse ox 98% on room air.   Weight is 228 pounds compared to 224 pounds at last office visit on 11/10/2021.        11/10/21: Here for follow-up of diastolic congestive heart failure. Was last seen in the office on 8/11/2021 and following that visit, her Cardizem was decreased to 240 mg daily from 360 mg daily due to persistent bradycardia post cardioversion. Since her last office visit with me, she also underwent outpatient sleep study on 8/24/2021 which revealed moderate obstructive sleep apnea for which CPAP therapy indicated and she subsequently underwent CPAP titration study and is scheduled to follow-up with pulmonology today. She states that she is doing very well since her last visit with no new or worsening heart failure symptoms. She denies shortness of breath or dyspnea on exertion, chest pain, palpitations, diaphoresis, paroxysmal nocturnal dyspnea, orthopnea, worsening lower extremity edema, dizziness, lightheadedness, syncope, fever or chills. She is compliant with all of her medications including oral anticoagulation with Xarelto. She denies any bleeding issues including hematochezia, melena or hematuria. She continues to check routine blood pressures at home and BP stable typically with systolic blood pressure between 110s to 130s range. Heart rate on home monitoring is typically between 40s to 50s range but occasionally in the 60s. She reports that her heart rate readings are prior to taking her morning medications. Most recent labs from PCP office reviewed and documented below  CBC 10/19/2021: WBC 6.6, hemoglobin 12.9, hematocrit 41.0, platelets 183  TSH 50/56/1420: 1.60  Free T4 10/19/2021: 1.30  CMP 10/19/2021: Sodium 145, potassium 4.2, chloride 105, total CO2 30, BUN 18, creatinine 1.0, GFR 55, glucose 101. AST 19, ALT 13. Lipid panel 10/19/2021: Total cholesterol 143, HDL 50, LDL 59, triglycerides 168. Vital signs stable in office today. Blood pressure 122/72, heart rate bradycardic at 50, pulse ox 97% on room air.   Weight is 224 pounds which is the exact same weight she was at last office visit on 8/11/2021 8/11/21: Here for follow-up of chronic diastolic congestive heart failure. Was seen for initial CHF clinic evaluation on 7/7/2021. EKG in office at that time showed A. fib with rapid rate of 123 bpm.  Following office visit, Cardizem CD was increased to 360 mg daily from 180 mg daily. On 7/21/2021 she underwent cardioversion with Dr. Lashaun Ramos converting from A. fib to sinus rhythm/sinus bradycardia. Her Toprol-XL was discontinued and she was initiated on sotalol 80 mg p.o. twice daily. She has continued to check routine blood pressure and heart rate since last office visit. The initial 2 weeks post first office visit, heart rates had stabilized in the 70s to 80s range but over the past 2 weeks patient has been bradycardic on a.m. vitals with heart rates typically 40s to 50s before morning meds. She is completely asymptomatic with the bradycardia. She denies any new or worsening heart failure symptoms. She is compliant with low-sodium diet and fluid restriction. She continues to weigh herself daily and weight has remained stable typically between 220 to 223 pounds. She remains on oral anticoagulation with Xarelto and denies any bleeding issues. Dr. Lashaun Ramos had previously ordered a sleep study and patient is pending approval through her insurance. Most recent labs reviewed and documented below. BMP 7/21/2021: Sodium 140, potassium 3.7, chloride 103, total CO2 27, BUN 20, creatinine 1.06, GFR low at 51.1, glucose 114  BMP on 7/7/2021: Sodium 143, potassium 4.5, chloride 100, total CO2 31, BUN 15, creatinine 1.15, GFR low at 46.5, glucose 104  proBNP on 7/7/2021: 682    Given bradycardia with documented heart rates in the 40s to 50s on routine vitals at home, discussed with patient possibly ordering a 2-week event monitor for further evaluation of A. fib and possibly underlying sick sinus syndrome. She is wishing to defer event monitor for now.   So given bradycardia will decrease Cardizem CD to 240 mg daily from 360 daily and continue to monitor heart rates closely. Vital signs stable in office today. Blood pressure 130/67, heart rate 54, pulse ox 98% on room air. Weight is 224 pounds compared to 222 pounds at last office visit on 7/7/2021.        7/7/21 CHF clinic visit #1: This is a 72-year-old  female with past medical history significant for hypertension, dyslipidemia and diabetes who was recently diagnosed with new onset atrial fibrillation and diastolic congestive heart failure. Patient had undergone outpatient stress test on 6/4/2021 and during stress testing was noticed to develop rapid atrial fibrillation. She was subsequently hospitalized and due to mildly abnormal stress test with new onset A. fib and multiple risk factors for CAD, she underwent left heart catheterization by Dr. Chery Mendoza on 6/7/2021. This heart catheterization revealed normal coronary arteries and normal LVEDP. Echocardiogram completed on 6/4/2021 revealed normal LV systolic function with EF of 55%, moderate concentric left ventricular hypertrophy, mild mitral valve regurgitation, RVSP elevated 53 mmHg. During her hospital admission her rate control medications were adjusted for improved heart rate control including Toprol-XL and Cardizem CD. She was started on oral anticoagulation with Xarelto 20 mg p.o. daily. She recently had follow-up with Dr. Janee Harrison on 7/2/2021 at which time she was initiated on Lasix 40 mg p.o. daily as she was complaining of significant bilateral lower extremity edema and symptoms concerning for diastolic heart failure. She was also referred for sleep study to evaluate for underlying obstructive sleep apnea and was referred to pulmonology for evaluation of shortness of breath and pulmonary hypertension. Given persistent A. fib with rapid rates, Dr. Reyna Lank for eventual cardioversion plus or minus drug loading.     Since being started on Lasix at this recent office visit with Dr. Janee Harrison, patient states her lower extremity edema has significantly improved. She has no complaint of shortness of breath or dyspnea on exertion. She has very rare palpitations/fluttering heartbeat but for the most part is asymptomatic with her A. fib. She denies chest pain, nausea, vomiting, dizziness, lightheadedness, diaphoresis, paroxysmal nocturnal dyspnea, orthopnea, hematochezia, melena, hematuria, syncope, fever or chills. She is compliant with all of her medications and with low-sodium diet and fluid restriction. I educated patient and  at length regarding recommendation of 2000 mg sodium diet and 2 L daily fluid restriction. She will be provided with both scale and blood pressure seen for routine monitoring of both daily weights and blood pressures. EKG in office today: A. fib with rapid ventricular response with ventricular rate of 123 bpm, low voltage QRS, incomplete right bundle branch block, nonspecific ST and T wave changes,  ms    Recent labs reviewed and documented below. BMP today 7/7/2021: Sodium 143, potassium 4.5, chloride 100, total CO2 31, BUN 15, creatinine elevated 1.15, GFR low at 46.5, glucose 104. CBC 6/7/2021: WBC 10.0, hemoglobin 12.4, hematocrit 38.3, platelets 667  BMP on 6/7/2021: Sodium 141, potassium 3.9, chloride 103, total CO2 22, BUN 15, creatinine elevated 1.03, GFR low at 52.8, glucose 118  TSH 6/6/2021: 0.421  proBNP 6/4/2021: 2929    Recent diagnostic testing including echocardiogram, stress test and cardiac catheterization reviewed and documented below. [facility] 6/4/21:  Procedure Summary  no sig. CAD  LARS II flow indicative of micro vacsular disease  Normal LVF  Recommendations  Aggressive risk factor management. Maximize medical therapy. Angiographic Findings   Cardiac Arteries and Lesion Findings  LMCA: Normal.  LAD: Diffuse irregularity. LCx: Diffuse irregularity. RCA: Diffuse irregularity.   Dominance: Left  LV function assessed as:Normal.  Ejection Fraction    - Method: LV gram. EF%: 55. Stress test 21:  IMPRESSION:  1. Abnormal stress myocardial perfusion examination. There is  reversible ischemia in the [id number] anterior wall as well as the LV  apex of moderate size and moderate intensity. Cannot exclude underlying  ischemia versus breast attenuation artifact. Clinical correlation is  advised. 2.  Normal hemodynamic response to Lexiscan infusion. 3.  Normal LV function with calculated ejection fraction of 54%. 4.  Normal TID ratio equals to 0.90.  5.  Atrial fibrillation with rapid ventricular response. 6.  No reported chest pain, chest pressure or syncope.     MARY OLMSTEAD DO      Echocardiogram 21:  Conclusions   Summary   Atrial fibrillation. Normal left ventricular systolic function, no regional wall motion   abnormalities, estimated ejection fraction of 55%. Normal left ventricular size and function. Moderate concentric left ventricular hypertrophy. Mild (1+) mitral regurgitation is present. No evidence of mitral valve stenosis. No evidence of aortic valve regurgitation . No evidence of aortic valve stenosis. The left atrium is Severely dilated. IAS consistently bowed to the right indicative of elevated LA pressure. There is mild to moderate tricuspid regurgitation with estimated RVSP of   53 mm Hg. Right ventricular systolic pressure of 53 mm Hg consistent with moderate   pulmonary hypertension. Signature   ----------------------------------------------------------------   Electronically signed by Domingo Rivas DO(Interpreting   physician) on 2021 06:51 PM        Vital signs stable in office today. Blood pressure 141/99, heart rate 92, pulse ox 98% on room air. Weight is 222 pounds.       PMHx:  Persistent A-fib  Diastolic CHF  PHTN with RVSP 53mmHg per echo 21  HTN  Dyslipidemia  DM  Hypothyroidism  CKD       PSHx:      Social Hx:  Non smoker  No alcohol    Family Hx:  No CAD          No Known Allergies    Current Outpatient Medications   Medication Sig Dispense Refill    XARELTO 20 MG TABS tablet TAKE 1 TABLET DAILY WITH   BREAKFAST 90 tablet 2    pantoprazole (PROTONIX) 40 MG tablet TAKE 1 TABLET BY MOUTH EVERY DAY 90 tablet 2    furosemide (LASIX) 40 MG tablet TAKE 1 TABLET BY MOUTH EVERY DAY 90 tablet 3    sotalol AF 80 MG TABS Take one tablet twice daily. 180 tablet 3    CPAP Machine MISC by Does not apply route New CPAP at 11 cm  And supply. 1 each 0    Ferrous Sulfate (IRON) 325 (65 Fe) MG TABS Take 1 tablet by mouth daily       rosuvastatin (CRESTOR) 10 MG tablet TAKE 1 TABLET BY MOUTH ONCE DAILY (Patient taking differently: Take 10 mg by mouth daily TAKE 1 TABLET BY MOUTH ONCE DAILY) 90 tablet 1    levothyroxine (SYNTHROID) 125 MCG tablet Take 1 tablet by mouth daily -except Sundays 90 tablet 0    metFORMIN (GLUCOPHAGE) 500 MG tablet TAKE 1 TABLET BY MOUTH TWICE A DAY WITH FOOD (Patient taking differently: Take 500 mg by mouth 2 times daily (with meals) TAKE 1 TABLET BY MOUTH TWICE A DAY WITH FOOD) 180 tablet 0    VITAMIN E Take 400 Units by mouth daily       vitamin B-12 (CYANOCOBALAMIN) 1000 MCG tablet Take 1,000 mcg by mouth daily.         Omega-3 Fatty Acids (FISH OIL) 1200 MG CAPS Take 1 capsule by mouth daily        Current Facility-Administered Medications   Medication Dose Route Frequency Provider Last Rate Last Admin    triamcinolone acetonide (KENALOG-40) injection 80 mg  80 mg IntraMUSCular Once [facility]Data [facility], APRN - CNP        methylPREDNISolone acetate (DEPO-MEDROL) injection 40 mg  40 mg IntraMUSCular Once Zofia Campos MD           Past Medical History:   Diagnosis Date    Angina at rest [facility]) 5/21/2021    Bilateral leg edema 0/6/4222    Diastolic congestive heart failure ([facility] [address] 7/2/2021    DM2 (diabetes mellitus, type 2) ([facility] [address]     Hyperlipidemia     Hypertension     Hypothyroidism     Shortness of breath 7/2/2021       Past Surgical History:   Procedure Laterality Date     SECTION      COLONOSCOPY      needs     COLONOSCOPY N/A 2021    COLONOSCOPY DIAGNOSTIC performed by Sully Matthews MD at [address]  [place] N/A 2018    COLONOSCOPY adenoma    UPPER GASTROINTESTINAL ENDOSCOPY N/A 2021    EGD DIAGNOSTIC ONLY performed by Sully Matthews MD at [address] [facility] Marital status:      Spouse name: Not on file    Number of children: Not on file    Years of education: Not on file    Highest education level: Not on file   Occupational History    Not on file   Tobacco Use    Smoking status: Never Smoker    Smokeless tobacco: Never Used   Vaping Use    Vaping Use: Never used   Substance and Sexual Activity    Alcohol use: Yes     Comment: rare     Drug use: No    Sexual activity: Yes     Partners: Male   Other Topics Concern    Not on file   Social History Narrative    Not on file     Social Determinants of Health     Financial Resource Strain:     Difficulty of Paying Living Expenses: Not on file   Food Insecurity:     Worried About Running Out of Food in the Last Year: Not on file    Berry of Food in the Last Year: Not on file   Transportation Needs:     Lack of Transportation (Medical): Not on file    Lack of Transportation (Non-Medical):  Not on file   Physical Activity:     Days of Exercise per Week: Not on file    Minutes of Exercise per Session: Not on file   Stress:     Feeling of Stress : Not on file   Social Connections:     Frequency of Communication with Friends and Family: Not on file    Frequency of Social Gatherings with Friends and Family: Not on file    Attends Buddhist Services: Not on file    Active Member of Clubs or Organizations: Not on file    Attends Club or Organization Meetings: Not on file    Marital Status: Not on file   Intimate Partner Violence:     Fear of Current or Ex-Partner: Not on file    Emotionally Abused: Not on file    Physically Abused: Not on file    Sexually Abused: Not on file   Housing Stability:     Unable to Pay for Housing in the Last Year: Not on file    Number of Places Lived in the Last Year: Not on file    Unstable Housing in the Last Year: Not on file       Family History   Problem Relation Age of Onset    Colon Cancer Maternal Grandmother          Review of Systems:   Review of Systems   Constitutional: Negative for activity change, appetite change, fatigue, fever and unexpected weight change. +snoring   HENT: Negative for congestion. Respiratory: Negative for cough, chest tightness and shortness of breath. Cardiovascular: Positive for leg swelling (occasional). Negative for chest pain and palpitations. No orthopnea or PND; on CPAP at HS   Gastrointestinal: Negative for abdominal distention, abdominal pain, blood in stool, nausea and vomiting. Stools dark but on iron supplement   Genitourinary: Negative for difficulty urinating, dysuria and hematuria. Musculoskeletal: Negative for arthralgias and myalgias. Skin: Negative for color change, pallor and rash. Neurological: Negative for dizziness, syncope and light-headedness. Psychiatric/Behavioral: Negative for agitation and behavioral problems. Physical Examination:    /82 (Site: Left Upper Arm, Position: Sitting, Cuff Size: Large Adult)   Pulse 51   Resp 18   Ht 5' 2\" (1.575 m)   Wt 228 lb (103.4 kg)   LMP  (LMP Unknown)   SpO2 98%   BMI 41.70 kg/m²    Physical Exam  Constitutional:       General: She is not in acute distress. Appearance: Normal appearance. She is obese. HENT:      Head: Normocephalic and atraumatic. Cardiovascular:      Rate and Rhythm: Regular rhythm. Bradycardia present. Heart sounds: Murmur heard. Pulmonary:      Effort: Pulmonary effort is normal. No respiratory distress. Breath sounds:  No wheezing, rhonchi or rales. Abdominal:      Palpations: Abdomen is soft. Tenderness: There is no abdominal tenderness. Musculoskeletal:         General: Normal range of motion. Right lower leg: Edema (trace-1+ at ankle) present. Left lower leg: Edema (trace at ankle) present. Skin:     General: Skin is warm and dry. Neurological:      General: No focal deficit present. Mental Status: She is alert and oriented to person, place, and time. Cranial Nerves: No cranial nerve deficit.    Psychiatric:         Mood and Affect: Mood normal.         Behavior: Behavior normal.         LABS:  CBC:   Lab Results   Component Value Date    WBC 10.0 06/07/2021    RBC 5.33 06/07/2021    HGB 12.4 06/07/2021    HCT 38.3 06/07/2021    MCV 71.8 06/07/2021    MCH 23.3 06/07/2021    MCHC 32.5 06/07/2021    RDW 23.3 06/07/2021     06/07/2021    MPV 10.0 07/07/2015     Lipids:  Lab Results   Component Value Date    CHOL 143 10/19/2021    CHOL 147 01/02/2019    CHOL 154 07/02/2018     Lab Results   Component Value Date    TRIG 168 10/19/2021    TRIG 168 01/02/2019    TRIG 117 07/02/2018     Lab Results   Component Value Date    HDL 50 10/19/2021    HDL 51 01/02/2019    HDL 53 07/02/2018     Lab Results   Component Value Date    LDLCALC 59 10/19/2021    LDLCALC 62 01/02/2019    LDLCALC 78 07/02/2018     Lab Results   Component Value Date    VLDL 34 10/19/2021     Lab Results   Component Value Date    CHOLHDLRATIO 2.9 10/19/2021    CHOLHDLRATIO 4.0 01/04/2013     CMP:    Lab Results   Component Value Date     07/21/2021    K 3.7 07/21/2021    K 3.9 06/07/2021     07/21/2021    CO2 27 07/21/2021    BUN 20 07/21/2021    CREATININE 1.06 07/21/2021    GFRAA >60.0 07/21/2021    LABGLOM 51.1 07/21/2021    GLUCOSE 114 07/21/2021    PROT 6.4 06/06/2021    LABALBU 3.5 06/06/2021    CALCIUM 9.3 07/21/2021    BILITOT 0.4 06/06/2021    ALKPHOS 73 06/06/2021    AST 13 06/06/2021    ALT 8 06/06/2021     BMP: Lab Results   Component Value Date     07/21/2021    K 3.7 07/21/2021    K 3.9 06/07/2021     07/21/2021    CO2 27 07/21/2021    BUN 20 07/21/2021    LABALBU 3.5 06/06/2021    CREATININE 1.06 07/21/2021    CALCIUM 9.3 07/21/2021    GFRAA >60.0 07/21/2021    LABGLOM 51.1 07/21/2021    GLUCOSE 114 07/21/2021     Magnesium:  No results found for: MG  TSH:  Lab Results   Component Value Date    TSH 0.421 (L) 06/06/2021     . result  No results for input(s): PROBNP in the last 72 hours. No results for input(s): INR in the last 72 hours. Patient Active Problem List   Diagnosis    Essential hypertension    Mixed hyperlipidemia    Hypothyroidism    Osteoarthritis of knee    Type 2 diabetes mellitus with diabetic polyneuropathy, without long-term current use of insulin ([facility] [address]    Benign neoplasm of ascending colon    Diverticulosis of large intestine without diverticulitis    Anemia    HH (hiatus hernia)    Chronic Georges lesion    Adenomatous polyp of transverse colon    A-fib (HCC)    Bilateral leg edema    Shortness of breath    Diastolic congestive heart failure (HCC)    GALINA (obstructive sleep apnea)    Coronary artery disease involving native coronary artery of native heart without angina pectoris       Assessment/Plan:     Diagnosis Orders   1. Chronic diastolic CHF (congestive heart failure), NYHA class 1 (HCC)      Compensated. Continue current meds   2. History of atrial fibrillation      s/p cardioversion into SB on 7/21/21. On antiarrhythmic therapy with sotalol 80mg PO BID. Cont OAC with xarelto   3. Bradycardia      Cardizem CD discontinued by Dr. Deven Albarado on 3/10/22 due to persistent bradycardia   4. GALINA (obstructive sleep apnea)      Continue CPAP at HS   5. Pulmonary HTN (HCC)      RVSP 53mmHg per echo 6/4/22   6. Mild CAD      per [facility]/4/21. No angina   7. Essential hypertension      Stable. Continue current meds   8.  Dyslipidemia      Continue Crestor 10mg PO daily 9. Diabetes mellitus type 2 in obese ([facility]      On metformin. Management per PCP   10. Stage 3 chronic kidney disease, unspecified whether stage 3a or 3b CKD ([facility]      stable per labs from PCP office on 10/19/21       -Maximize medical therapy-- Sotalol 80mg PO BID, Crestor 10 mg p.o. daily, Lasix 40 mg p.o. daily, oral anticoagulation with Xarelto 20 mg p.o. daily  Cardizem CD discontinued by Dr. Shanti Fletcher on 3/10/2022 due to persistent bradycardia  -Heart failure appears compensated. No need for further medication adjustments at this time  -Cardiac/<2 gram sodium diet recommended  -2000mL daily fluid restriction   -Weight loss recommended   -Will obtain recent lab work completed on 4/19/2022 from PCP office, Dr. Félix Hirsch for my review  -Advise routine blood pressure monitoring at home. Recommend patient check blood pressure twice daily in a.m. and p.m. and keep log of blood pressure trends to review at next office visit.  -Instructed patient to weigh self daily every morning upon waking and keep log book of daily weights. Notify office if gaining more than 3 pounds in 48 hours.   -Advised patient to notify office immediately if experiencing any progressive SOB, orthopnea, PND, LE edema or weight gain  -Educated patient on importance of fluid and salt restriction as well as lifestyle modification  -Maintain routine outpatient follow-up with general cardiologist--next appointment 9/9/22  Status post left heart catheterization on 6/7/2021 which revealed mild luminal irregularities  Echocardiogram 6/4/2021 with normal LV systolic function with EF of 55%, concentric LVH, mild MR, RVSP elevated 53 mmHg  -Consider repeat echocardiogram in the next 6 months to reevaluate LV function, valvular structures and pulmonary pressures--will defer to Dr. Shanti Fletcher at follow-up appointment in September 2022  -Consider event monitor in future for further evaluation of bradycardia arrhythmias and possible recurrent atrial fibrillation. Patient is not experiencing any symptoms at this time including any palpitations so will defer for now. Patient advised to notify cardiology if she notices extreme fluctuation heart rates when using pulse oximeter or if developing any palpitations or rapid heartbeat  -Maintain follow-up with pulmonology regarding GALINA. Continue CPAP at at bedtime  -Maintain routine outpatient follow-up with PCP  -F/U with CHF clinic in 9 months or sooner if needed        Counseling: The importance of daily weights, dietary sodium restriction, and contact with cardiology if weight is increased more than 3 lbs in any 48 hour period was stressed. The patient has been advised to contact us if theyexperience progressive SOB, orthopnea, PND or progressive edema.

## 2022-05-11 NOTE — PATIENT INSTRUCTIONS
-Check daily weight every morning and notify CHF clinic if gaining more than 3 pounds in 2 days    -Check blood pressure twice daily in a.m. and p.m. prior to taking medications and keep log of blood pressure trends to review at next office visit  Notify office if BP running low with  mmHg or below prior to taking medications  Notify office if BP running high with  mmHg or above or if DBP 90 mmHg or above    -Recommend 2000 mg daily sodium restriction    -Recommend 2 liter (48 to 64 ounces) daily fluid restriction

## 2022-08-15 ENCOUNTER — OFFICE VISIT (OUTPATIENT)
Dept: PULMONOLOGY | Age: 72
End: 2022-08-15
Payer: MEDICARE

## 2022-08-15 VITALS
TEMPERATURE: 97.6 F | HEART RATE: 50 BPM | DIASTOLIC BLOOD PRESSURE: 83 MMHG | SYSTOLIC BLOOD PRESSURE: 148 MMHG | OXYGEN SATURATION: 97 % | BODY MASS INDEX: 42.14 KG/M2 | HEIGHT: 62 IN | WEIGHT: 229 LBS

## 2022-08-15 DIAGNOSIS — G47.33 OSA (OBSTRUCTIVE SLEEP APNEA): Primary | ICD-10-CM

## 2022-08-15 DIAGNOSIS — E66.01 MORBID OBESITY (HCC): ICD-10-CM

## 2022-08-15 PROCEDURE — 1123F ACP DISCUSS/DSCN MKR DOCD: CPT | Performed by: INTERNAL MEDICINE

## 2022-08-15 PROCEDURE — 99214 OFFICE O/P EST MOD 30 MIN: CPT | Performed by: INTERNAL MEDICINE

## 2022-08-15 PROCEDURE — 1036F TOBACCO NON-USER: CPT | Performed by: INTERNAL MEDICINE

## 2022-08-15 PROCEDURE — G8427 DOCREV CUR MEDS BY ELIG CLIN: HCPCS | Performed by: INTERNAL MEDICINE

## 2022-08-15 PROCEDURE — G8400 PT W/DXA NO RESULTS DOC: HCPCS | Performed by: INTERNAL MEDICINE

## 2022-08-15 PROCEDURE — G8417 CALC BMI ABV UP PARAM F/U: HCPCS | Performed by: INTERNAL MEDICINE

## 2022-08-15 PROCEDURE — 3017F COLORECTAL CA SCREEN DOC REV: CPT | Performed by: INTERNAL MEDICINE

## 2022-08-15 PROCEDURE — 1090F PRES/ABSN URINE INCON ASSESS: CPT | Performed by: INTERNAL MEDICINE

## 2022-08-15 ASSESSMENT — ENCOUNTER SYMPTOMS
ABDOMINAL PAIN: 0
SORE THROAT: 0
NAUSEA: 0
EYE DISCHARGE: 0
VOMITING: 0
RHINORRHEA: 0
EYE ITCHING: 0
CHEST TIGHTNESS: 0
WHEEZING: 0
TROUBLE SWALLOWING: 0
DIARRHEA: 0
COUGH: 0
VOICE CHANGE: 0
SHORTNESS OF BREATH: 0
SINUS PRESSURE: 0

## 2022-08-15 NOTE — PROGRESS NOTES
Subjective:     Gopi Paniagua is a 67 y.o. female who complains today of:     Chief Complaint   Patient presents with    Follow-up     4m F/U - GALINA       HPI  She is using CPAP with   11 centimeters of H2O with heated humidity. She is using CPAP for about  6-7   hours every night. She is using CPAP with  nasal pillow mask Mask. She said  sleep is restful with the CPAP use. She is compliant with CPAP therapy and benefiting with CPAP use. No snoring with CPAP use. No complaint of daytime sleepiness or tiredness with CPAP use. She denies taking naps. No sleepiness with driving. She denies difficulty falling asleep or staying asleep. She got CPAP supply recently, no need for supply     I reviewed compliance report with patient regarding CPAP therapy. She is using  CPAP for 27 days out of 30 days. Average usage of days used is 6 hours and 39 min , average AHI 0.7 with CPAP use. Allergies:  Patient has no known allergies.   Past Medical History:   Diagnosis Date    Angina at rest [facility]) 2021    Bilateral leg edema 3/1/1042    Diastolic congestive heart failure ([facility] [address] 2021    DM2 (diabetes mellitus, type 2) ([facility] [address]     Hyperlipidemia     Hypertension     Hypothyroidism     Shortness of breath 2021     Past Surgical History:   Procedure Laterality Date     SECTION      COLONOSCOPY  2008    needs 2018    COLONOSCOPY N/A 2021    COLONOSCOPY DIAGNOSTIC performed by Angie Jorge MD at [address] [place] [address] [address] [place] [address] N/A 2018    COLONOSCOPY adenoma    UPPER GASTROINTESTINAL ENDOSCOPY N/A 2021    EGD DIAGNOSTIC ONLY performed by Angie Jorge MD at [facility]     Family History   Problem Relation Age of Onset    Colon Cancer Maternal Grandmother      Social History     Socioeconomic History    Marital status:      Spouse name: Not on file    Number of children: Not on file    Years of education: Not on file    Highest education level: 97%   Weight: 229 lb (103.9 kg)    Height: 5' 2\" (1.575 m)      Wt Readings from Last 3 Encounters:   08/15/22 229 lb (103.9 kg)   05/11/22 228 lb (103.4 kg)   04/13/22 224 lb (101.6 kg)         Physical Exam  Constitutional:       General: She is not in acute distress. Appearance: She is well-developed. She is obese. She is not diaphoretic. HENT:      Head: Normocephalic and atraumatic. Nose: Nose normal.   Eyes:      Pupils: Pupils are equal, round, and reactive to light. Neck:      Thyroid: No thyromegaly. Vascular: No JVD. Trachea: No tracheal deviation. Cardiovascular:      Rate and Rhythm: Normal rate and regular rhythm. Heart sounds: No murmur heard. No friction rub. No gallop. Pulmonary:      Effort: No respiratory distress. Breath sounds: No wheezing or rales. Chest:      Chest wall: No tenderness. Abdominal:      General: There is no distension. Tenderness: There is no abdominal tenderness. There is no rebound. Musculoskeletal:         General: Normal range of motion. Lymphadenopathy:      Cervical: No cervical adenopathy. Skin:     General: Skin is warm and dry. Neurological:      Mental Status: She is alert and oriented to person, place, and time. Coordination: Coordination normal.   Psychiatric:         Mood and Affect: Mood normal.         Behavior: Behavior normal.       Current Outpatient Medications   Medication Sig Dispense Refill    XARELTO 20 MG TABS tablet TAKE 1 TABLET DAILY WITH   BREAKFAST 90 tablet 2    pantoprazole (PROTONIX) 40 MG tablet TAKE 1 TABLET BY MOUTH EVERY DAY 90 tablet 2    furosemide (LASIX) 40 MG tablet TAKE 1 TABLET BY MOUTH EVERY DAY 90 tablet 3    sotalol AF 80 MG TABS Take one tablet twice daily. 180 tablet 3    CPAP Machine MISC by Does not apply route New CPAP at 11 cm  And supply.  1 each 0    Ferrous Sulfate (IRON) 325 (65 Fe) MG TABS Take 1 tablet by mouth daily       rosuvastatin (CRESTOR) 10 MG tablet TAKE 1 TABLET BY MOUTH ONCE DAILY (Patient taking differently: Take 10 mg by mouth in the morning. TAKE 1 TABLET BY MOUTH ONCE DAILY. ) 90 tablet 1    levothyroxine (SYNTHROID) 125 MCG tablet Take 1 tablet by mouth daily -except Sundays 90 tablet 0    metFORMIN (GLUCOPHAGE) 500 MG tablet TAKE 1 TABLET BY MOUTH TWICE A DAY WITH FOOD (Patient taking differently: Take 500 mg by mouth in the morning and 500 mg in the evening. Take with meals. TAKE 1 TABLET BY MOUTH TWICE A DAY WITH FOOD.) 180 tablet 0    VITAMIN E Take 400 Units by mouth daily       vitamin B-12 (CYANOCOBALAMIN) 1000 MCG tablet Take 1,000 mcg by mouth daily. Omega-3 Fatty Acids (FISH OIL) 1200 MG CAPS Take 1 capsule by mouth daily  (Patient not taking: Reported on 8/15/2022)       Current Facility-Administered Medications   Medication Dose Route Frequency Provider Last Rate Last Admin    triamcinolone acetonide (KENALOG-40) injection 80 mg  80 mg IntraMUSCular Once Joni Douse, APRN - CNP        methylPREDNISolone acetate (DEPO-MEDROL) injection 40 mg  40 mg IntraMUSCular Once Juan Rose MD               Assessment/Plan:     1. GALINA (obstructive sleep apnea)  She is using CPAP with   11 centimeters of H2O with heated humidity. She is using CPAP for about  6-7   hours every night. She is using CPAP with  nasal pillow mask Mask. She said  sleep is restful with the CPAP use. She is compliant with CPAP therapy and benefiting with CPAP use. No snoring with CPAP use. No complaint of daytime sleepiness or tiredness with CPAP use. She denies taking naps. No sleepiness with driving. She got CPAP supply recently, no need for supply     I reviewed compliance report with patient regarding CPAP therapy. She is using  CPAP for 27 days out of 30 days. Average usage of days used is 6 hours and 39 min , average AHI 0.7 with CPAP use. Counseling: CPAP/BiPAP uses, She advised to use CPAP at least 5-6 hours every night.     Driving: She is advised for extreme caution when driving or operating machinery if there is a feeling of drowsiness, especially while driving it is preferable to stop driving and take a brief nap. Sleep hygiene:Avoid supine sleep, sleep on  sides. Avoid  sleep deprivation. Explained sleep hygiene. Advice to avoid Alcohol and sedative    Time spend over 30 min. Face to face. with greater than 50 % time with CPAP therapy including review compliance, counseling and advised regarding CPAP therapy. 2. Morbid obesity ([address]  She is advised try to lose weight. obesity related risk explained to the patient ,  Current weight:  229 lb (103.9 kg) Lbs. BMI:  Body mass index is 41.88 kg/m². Suggested weight control approaches, including dietary changes , exercise, behavioral modification. Return in about 4 months (around 12/15/2022) for eulalio.       Catrachita Cheatham MD

## 2022-09-09 ENCOUNTER — OFFICE VISIT (OUTPATIENT)
Dept: CARDIOLOGY CLINIC | Age: 72
End: 2022-09-09
Payer: MEDICARE

## 2022-09-09 VITALS
WEIGHT: 227 LBS | HEART RATE: 51 BPM | SYSTOLIC BLOOD PRESSURE: 122 MMHG | DIASTOLIC BLOOD PRESSURE: 84 MMHG | BODY MASS INDEX: 41.52 KG/M2

## 2022-09-09 DIAGNOSIS — Z86.79 HISTORY OF ATRIAL FIBRILLATION: Primary | ICD-10-CM

## 2022-09-09 DIAGNOSIS — E78.2 MIXED HYPERLIPIDEMIA: ICD-10-CM

## 2022-09-09 DIAGNOSIS — G47.33 OSA (OBSTRUCTIVE SLEEP APNEA): ICD-10-CM

## 2022-09-09 DIAGNOSIS — I48.0 PAROXYSMAL ATRIAL FIBRILLATION (HCC): ICD-10-CM

## 2022-09-09 DIAGNOSIS — I25.10 CORONARY ARTERY DISEASE INVOLVING NATIVE CORONARY ARTERY OF NATIVE HEART WITHOUT ANGINA PECTORIS: ICD-10-CM

## 2022-09-09 DIAGNOSIS — I10 ESSENTIAL HYPERTENSION: ICD-10-CM

## 2022-09-09 DIAGNOSIS — I50.31 ACUTE DIASTOLIC CONGESTIVE HEART FAILURE (HCC): ICD-10-CM

## 2022-09-09 PROBLEM — R06.02 SHORTNESS OF BREATH: Status: RESOLVED | Noted: 2021-07-02 | Resolved: 2022-09-09

## 2022-09-09 PROCEDURE — 3017F COLORECTAL CA SCREEN DOC REV: CPT | Performed by: INTERNAL MEDICINE

## 2022-09-09 PROCEDURE — 99214 OFFICE O/P EST MOD 30 MIN: CPT | Performed by: INTERNAL MEDICINE

## 2022-09-09 PROCEDURE — 1123F ACP DISCUSS/DSCN MKR DOCD: CPT | Performed by: INTERNAL MEDICINE

## 2022-09-09 PROCEDURE — 93000 ELECTROCARDIOGRAM COMPLETE: CPT | Performed by: INTERNAL MEDICINE

## 2022-09-09 PROCEDURE — 1036F TOBACCO NON-USER: CPT | Performed by: INTERNAL MEDICINE

## 2022-09-09 PROCEDURE — G8427 DOCREV CUR MEDS BY ELIG CLIN: HCPCS | Performed by: INTERNAL MEDICINE

## 2022-09-09 PROCEDURE — G8400 PT W/DXA NO RESULTS DOC: HCPCS | Performed by: INTERNAL MEDICINE

## 2022-09-09 PROCEDURE — 1090F PRES/ABSN URINE INCON ASSESS: CPT | Performed by: INTERNAL MEDICINE

## 2022-09-09 PROCEDURE — G8417 CALC BMI ABV UP PARAM F/U: HCPCS | Performed by: INTERNAL MEDICINE

## 2022-09-09 NOTE — PROGRESS NOTES
Chief Complaint   Patient presents with    Atrial Fibrillation    Coronary Artery Disease    Hypertension    Hyperlipidemia    6 Month Follow-Up       5-21-21: Patient presents for initial medical evaluation. Patient is followed on a regular basis by Dr. Juan Rose MD. S/p hospitalization for CP/heaviness. Had negative wokup in ER. Symptoms occurred at rest and was worse with carrying groceries. Had radiation to right shoulder. + associated SOB. No hx of MI, CHF or arrhythmia.  was worried about her during the episode. Never seen her like this. No hx of MI, CHF or arrhythmia. No hx of cath. Pt denies   nausea, vomiting, diarrhea, constipation, motor weakness, insomnia, weight loss, syncope, dizziness, lightheadedness, palpitations, PND, orthopnea, or claudication. Does have history of iron deficiency anemia ranging from 8-10. Does have hx of endoscopy. 7-2-21:  Patient is a 79 y.o. female who presented for elective stress test and echo. Patient is followed on a regular basis by Dr. Juan Rose MD.  Patient with past medical 3 of hypertension, hyperlipidemia and diabetes. Patient status post recent hospitalization for chest pain/heaviness and had negative work-up in the emergency department. Patient developed chest pain that occurred at rest and was worsened with exertion with radiation to the right shoulder associated with shortness of breath. Patient was noted to be in new onset atrial fibrillation with rapid ventricular response on presentation to the stress test.  Patient does not sense that she is in atrial fibrillation. Preliminary stress test images reviewed showing mild anterior/anterior apical ischemia versus breast attenuation artifact. Patient currently is chest pain-free. Hematology oncology. Aleja Bustos No previous history of myocardial infarction, congestive heart failure. No history of cardiac catheterization.   Patient with history of iron deficiency anemia ranging from 8-10. She was referred to        7-2-21: s/p abnormal nuclear stress test s/p LHC on 6-4-21 with very mild CAD, EF of 55%. S/p ECHO with EF of55%, mod LVH, no valve abn, IAS consistently bowed to the right indicative of elevated severely dilated LA. , mod PHTN, RVSP of 54mmHg,  LA pressure She continues to have SOB worse with exertion. Struggled at [employer] on vacation and going up steps. Pt denies   nausea, vomiting, diarrhea, constipation, motor weakness, insomnia, weight loss, syncope, dizziness, lightheadedness, palpitations, PND, orthopnea, or claudication. Followed with Heme/PCP for low MCV, Hgb is 12.4.  + LE edema. She is on cardizem and DOAC. Losartan was stopped due to mild renal insuff. S/p CT of chest in 5/2021 with moderate sized hiatal hernia, ? Basilar atelectasis or fibrosis  + hx of DM, HTN, HLD. EKG today in office afib at 114bpm.        9-9-21: Status post external cardioversion with successful conversion to sinus bradycardia. Patient was started on sotalol 80 mg twice daily. She is follow-up with the CHF clinic. S/p sleep study with mod GALINA and severe O2 desaturation as low as 74%. Patient is on Xarelto 20 mg daily. + hx of DM, HTN, HLD. LHC on 6-4-21 with very mild CAD, EF of 55%. S/p ECHO with EF of55%, mod LVH, no valve abn, IAS consistently bowed to the right indicative of elevated severely dilated LA. , mod PHTN, RVSP of 54mmHg,  EKG today with sinus bradycardia normal QTc interval.    3-10-22: on CPAP now for GALINA. Following with CHF clinic. States pulse is low at times. Hx of Pafib s/p CVN and on sotalol and DOAC.   + hx of DM, HTN, HLD  hx of LHC on 6-4-21 with very mild CAD, EF of 55%. S/p ECHO with EF of55%, mod LVH, no valve abn, IAS consistently bowed to the right indicative of elevated severely dilated LA. , mod PHTN, RVSP of 54mmHg,  EKG with SB at 45bpm, normal QTC.       9-9-22: Patient is doing well overall. No angina or heart failure type symptoms.   She did have an episode of tachycardia with heart rate into the 180s briefly and none since. She does have history of paroxysmal atrial fibrillation status post cardioversion and on sotalol as well as full dose oral anticoagulation. No bleeding issues. hx of LHC on 21 with very mild CAD, EF of 55%. S/p ECHO with EF of55%, mod LVH, no valve abn, IAS consistently bowed to the right indicative of elevated severely dilated LA. , mod PHTN, RVSP of 54mmHg  EKG today was sinus bradycardia, normal QTc interval right bundle branch block. Patient does have history of diabetes hypertension hyperlipidemia. DM is under good control.  HGBA1C is 5.7    Patient Active Problem List   Diagnosis    Essential hypertension    Mixed hyperlipidemia    Hypothyroidism    Osteoarthritis of knee    Type 2 diabetes mellitus with diabetic polyneuropathy, without long-term current use of insulin ([facility] [address]    Benign neoplasm of ascending colon    Diverticulosis of large intestine without diverticulitis    Anemia    HH (hiatus hernia)    Chronic Georges lesion    Adenomatous polyp of transverse colon    A-fib (HCC)    Bilateral leg edema    Diastolic congestive heart failure (HCC)    GALINA (obstructive sleep apnea)    Coronary artery disease involving native coronary artery of native heart without angina pectoris       Past Surgical History:   Procedure Laterality Date     SECTION      COLONOSCOPY      needs     COLONOSCOPY N/A 2021    COLONOSCOPY DIAGNOSTIC performed by Caitlin Duvall MD at [address]  [address] [place] [address] N/A 2018    COLONOSCOPY adenoma    UPPER GASTROINTESTINAL ENDOSCOPY N/A 2021    EGD DIAGNOSTIC ONLY performed by Caitlin Duvall MD at [facility] History     Socioeconomic History    Marital status:    Tobacco Use    Smoking status: Never     Passive exposure: Never    Smokeless tobacco: Never   Vaping Use    Vaping Use: Never used   Substance and Sexual Activity    Alcohol use: Yes     Comment: rare     Drug use: No    Sexual activity: Yes     Partners: Male       Family History   Problem Relation Age of Onset    Colon Cancer Maternal Grandmother        Current Outpatient Medications   Medication Sig Dispense Refill    XARELTO 20 MG TABS tablet TAKE 1 TABLET DAILY WITH   BREAKFAST 90 tablet 2    pantoprazole (PROTONIX) 40 MG tablet TAKE 1 TABLET BY MOUTH EVERY DAY 90 tablet 2    furosemide (LASIX) 40 MG tablet TAKE 1 TABLET BY MOUTH EVERY DAY 90 tablet 3    sotalol AF 80 MG TABS Take one tablet twice daily. 180 tablet 3    CPAP Machine MISC by Does not apply route New CPAP at 11 cm  And supply. 1 each 0    Ferrous Sulfate (IRON) 325 (65 Fe) MG TABS Take 1 tablet by mouth daily       rosuvastatin (CRESTOR) 10 MG tablet TAKE 1 TABLET BY MOUTH ONCE DAILY (Patient taking differently: Take 10 mg by mouth daily TAKE 1 TABLET BY MOUTH ONCE DAILY) 90 tablet 1    levothyroxine (SYNTHROID) 125 MCG tablet Take 1 tablet by mouth daily -except Sundays 90 tablet 0    metFORMIN (GLUCOPHAGE) 500 MG tablet TAKE 1 TABLET BY MOUTH TWICE A DAY WITH FOOD (Patient taking differently: Take 500 mg by mouth 2 times daily (with meals) TAKE 1 TABLET BY MOUTH TWICE A DAY WITH FOOD) 180 tablet 0    VITAMIN E Take 400 Units by mouth daily       vitamin B-12 (CYANOCOBALAMIN) 1000 MCG tablet Take 1,000 mcg by mouth daily. Omega-3 Fatty Acids (FISH OIL) 1200 MG CAPS Take 1 capsule by mouth daily       Current Facility-Administered Medications   Medication Dose Route Frequency Provider Last Rate Last Admin    triamcinolone acetonide (KENALOG-40) injection 80 mg  80 mg IntraMUSCular Once [facility], APRN - CNP        methylPREDNISolone acetate (DEPO-MEDROL) injection 40 mg  40 mg IntraMUSCular Once Fly Galan MD           Patient has no known allergies.     Review of Systems:  General ROS: negative  Psychological ROS: negative  Hematological and Lymphatic ROS: No history of blood clots or bleeding disorder. Respiratory ROS: positive for - shortness of breath  Cardiovascular ROS: positive for - chest pain, dyspnea on exertion and shortness of breath  Gastrointestinal ROS: no abdominal pain, change in bowel habits, or black or bloody stools  Genito-Urinary ROS: no dysuria, trouble voiding, or hematuria  Musculoskeletal ROS: negative  Neurological ROS: no TIA or stroke symptoms  Dermatological ROS: negative    VITALS:  Blood pressure 122/84, pulse 51, weight 227 lb (103 kg), not currently breastfeeding. Body mass index is 41.52 kg/m². Physical Examination:  General appearance - alert, well appearing, and in no distress  Mental status - alert, oriented to person, place, and time  Neck - Neck is supple, no JVD or carotid bruits. No thyromegaly or adenopathy. Chest - clear to auscultation, no wheezes, rales or rhonchi, symmetric air entry  Heart - normal rate, regular rhythm, normal S1, S2, no murmurs, rubs, clicks or gallops  Abdomen - soft, nontender, nondistended, no masses or organomegaly  Neurological - alert, oriented, normal speech, no focal findings or movement disorder noted  Extremities - peripheral pulses normal, 2+  pedal edema, no clubbing or cyanosis  Skin - normal coloration and turgor, no rashes, no suspicious skin lesions noted      EKG: normal sinus rhythm, nonspecific ST and T waves changes    Orders Placed This Encounter   Procedures    EKG 12 Lead       ASSESSMENT:     Diagnosis Orders   1. History of atrial fibrillation  EKG 12 Lead      2. Paroxysmal atrial fibrillation (HCC)        3. Coronary artery disease involving native coronary artery of native heart without angina pectoris        4. Acute diastolic congestive heart failure ([address]        5. Essential hypertension        6. GALINA (obstructive sleep apnea)        7. Mixed hyperlipidemia              PLAN:         As always, aggressive risk factor modification is strongly recommended.  We should adhere to the JNC VIII guidelines for HTN management and the NCEP ATP III guidelines for LDL-C management. Cardiac diet is always recommended with low fat, cholesterol, calories and sodium. Continue medications at current doses. Pulmonary for SOB, PHTN, GALINA     Check EKG today    Cont with DOAC. Xarelto    Continue with sotalol 80mg BID. Monitor QTC     CHF clinic follow up    No  CARDIZEM due to bradycardia. The importance of daily weights, dietary sodium restriction, and contact with cardiology if weight is increased more than 3 lbs in any 48 hour period was stressed. The patient has been advised to contact us if they experience progressive SOB, orthopnea, PND or progressive edema. Patient was advised and encouraged to check blood pressure at home or at a pharmacy, maintain a logbook, and also call us back if blood pressure are above the target ranges or if it is low. Patient clearly understands and agrees to the instructions. We will need to continue to monitor muscle and liver enzymes, BUN, CR, and electrolytes. Details of medical condition explained and patient was warned about adverse consequences of uncontrolled medical conditions and possible side effects of prescribed medications.

## 2022-10-28 ENCOUNTER — OFFICE VISIT (OUTPATIENT)
Dept: CARDIOLOGY CLINIC | Age: 72
End: 2022-10-28
Payer: MEDICARE

## 2022-10-28 VITALS
BODY MASS INDEX: 41.96 KG/M2 | RESPIRATION RATE: 14 BRPM | SYSTOLIC BLOOD PRESSURE: 126 MMHG | WEIGHT: 228 LBS | DIASTOLIC BLOOD PRESSURE: 86 MMHG | HEART RATE: 60 BPM | OXYGEN SATURATION: 99 % | HEIGHT: 62 IN

## 2022-10-28 DIAGNOSIS — E78.2 MIXED HYPERLIPIDEMIA: ICD-10-CM

## 2022-10-28 DIAGNOSIS — I50.31 ACUTE DIASTOLIC CONGESTIVE HEART FAILURE (HCC): ICD-10-CM

## 2022-10-28 DIAGNOSIS — I25.10 CORONARY ARTERY DISEASE INVOLVING NATIVE CORONARY ARTERY OF NATIVE HEART WITHOUT ANGINA PECTORIS: ICD-10-CM

## 2022-10-28 DIAGNOSIS — I48.0 PAROXYSMAL ATRIAL FIBRILLATION (HCC): Primary | ICD-10-CM

## 2022-10-28 DIAGNOSIS — G47.33 OSA (OBSTRUCTIVE SLEEP APNEA): ICD-10-CM

## 2022-10-28 DIAGNOSIS — I10 ESSENTIAL HYPERTENSION: ICD-10-CM

## 2022-10-28 PROCEDURE — G8400 PT W/DXA NO RESULTS DOC: HCPCS | Performed by: INTERNAL MEDICINE

## 2022-10-28 PROCEDURE — G8484 FLU IMMUNIZE NO ADMIN: HCPCS | Performed by: INTERNAL MEDICINE

## 2022-10-28 PROCEDURE — 1090F PRES/ABSN URINE INCON ASSESS: CPT | Performed by: INTERNAL MEDICINE

## 2022-10-28 PROCEDURE — 1123F ACP DISCUSS/DSCN MKR DOCD: CPT | Performed by: INTERNAL MEDICINE

## 2022-10-28 PROCEDURE — 99213 OFFICE O/P EST LOW 20 MIN: CPT | Performed by: INTERNAL MEDICINE

## 2022-10-28 PROCEDURE — 1036F TOBACCO NON-USER: CPT | Performed by: INTERNAL MEDICINE

## 2022-10-28 PROCEDURE — 3017F COLORECTAL CA SCREEN DOC REV: CPT | Performed by: INTERNAL MEDICINE

## 2022-10-28 PROCEDURE — G8427 DOCREV CUR MEDS BY ELIG CLIN: HCPCS | Performed by: INTERNAL MEDICINE

## 2022-10-28 PROCEDURE — G8417 CALC BMI ABV UP PARAM F/U: HCPCS | Performed by: INTERNAL MEDICINE

## 2022-10-28 PROCEDURE — 3078F DIAST BP <80 MM HG: CPT | Performed by: INTERNAL MEDICINE

## 2022-10-28 PROCEDURE — 3074F SYST BP LT 130 MM HG: CPT | Performed by: INTERNAL MEDICINE

## 2022-10-28 PROCEDURE — 93000 ELECTROCARDIOGRAM COMPLETE: CPT | Performed by: INTERNAL MEDICINE

## 2022-10-28 RX ORDER — ACETAMINOPHEN AND CODEINE PHOSPHATE 300; 30 MG/1; MG/1
TABLET ORAL
COMMUNITY
Start: 2022-09-20

## 2022-10-28 NOTE — PROGRESS NOTES
Chief Complaint   Patient presents with    Follow-up    Atrial Fibrillation       5-21-21: Patient presents for initial medical evaluation. Patient is followed on a regular basis by Dr. Luis Patrick MD. S/p hospitalization for CP/heaviness. Had negative wokup in ER. Symptoms occurred at rest and was worse with carrying groceries. Had radiation to right shoulder. + associated SOB. No hx of MI, CHF or arrhythmia.  was worried about her during the episode. Never seen her like this. No hx of MI, CHF or arrhythmia. No hx of cath. Pt denies   nausea, vomiting, diarrhea, constipation, motor weakness, insomnia, weight loss, syncope, dizziness, lightheadedness, palpitations, PND, orthopnea, or claudication. Does have history of iron deficiency anemia ranging from 8-10. Does have hx of endoscopy. 7-2-21:  Patient is a 79 y.o. female who presented for elective stress test and echo. Patient is followed on a regular basis by Dr. Luis Patrick MD.  Patient with past medical 3 of hypertension, hyperlipidemia and diabetes. Patient status post recent hospitalization for chest pain/heaviness and had negative work-up in the emergency department. Patient developed chest pain that occurred at rest and was worsened with exertion with radiation to the right shoulder associated with shortness of breath. Patient was noted to be in new onset atrial fibrillation with rapid ventricular response on presentation to the stress test.  Patient does not sense that she is in atrial fibrillation. Preliminary stress test images reviewed showing mild anterior/anterior apical ischemia versus breast attenuation artifact. Patient currently is chest pain-free. Hematology oncology. Tanda Bun No previous history of myocardial infarction, congestive heart failure. No history of cardiac catheterization. Patient with history of iron deficiency anemia ranging from 8-10.   She was referred to        7-2-21: s/p abnormal nuclear stress test s/p LHC on 6-4-21 with very mild CAD, EF of 55%. S/p ECHO with EF of55%, mod LVH, no valve abn, IAS consistently bowed to the right indicative of elevated severely dilated LA. , mod PHTN, RVSP of 54mmHg,  LA pressure She continues to have SOB worse with exertion. Struggled at [employer] on vacation and going up steps. Pt denies   nausea, vomiting, diarrhea, constipation, motor weakness, insomnia, weight loss, syncope, dizziness, lightheadedness, palpitations, PND, orthopnea, or claudication. Followed with Heme/PCP for low MCV, Hgb is 12.4.  + LE edema. She is on cardizem and DOAC. Losartan was stopped due to mild renal insuff. S/p CT of chest in 5/2021 with moderate sized hiatal hernia, ? Basilar atelectasis or fibrosis  + hx of DM, HTN, HLD. EKG today in office afib at 114bpm.        9-9-21: Status post external cardioversion with successful conversion to sinus bradycardia. Patient was started on sotalol 80 mg twice daily. She is follow-up with the CHF clinic. S/p sleep study with mod GALINA and severe O2 desaturation as low as 74%. Patient is on Xarelto 20 mg daily. + hx of DM, HTN, HLD. LHC on 6-4-21 with very mild CAD, EF of 55%. S/p ECHO with EF of55%, mod LVH, no valve abn, IAS consistently bowed to the right indicative of elevated severely dilated LA. , mod PHTN, RVSP of 54mmHg,  EKG today with sinus bradycardia normal QTc interval.    3-10-22: on CPAP now for GALINA. Following with CHF clinic. States pulse is low at times. Hx of Pafib s/p CVN and on sotalol and DOAC.   + hx of DM, HTN, HLD  hx of LHC on 6-4-21 with very mild CAD, EF of 55%. S/p ECHO with EF of55%, mod LVH, no valve abn, IAS consistently bowed to the right indicative of elevated severely dilated LA. , mod PHTN, RVSP of 54mmHg,  EKG with SB at 45bpm, normal QTC.       9-9-22: Patient is doing well overall. No angina or heart failure type symptoms.   She did have an episode of tachycardia with heart rate into the 180s briefly and none since. She does have history of paroxysmal atrial fibrillation status post cardioversion and on sotalol as well as full dose oral anticoagulation. No bleeding issues. hx of LHC on 21 with very mild CAD, EF of 55%. S/p ECHO with EF of55%, mod LVH, no valve abn, IAS consistently bowed to the right indicative of elevated severely dilated LA. , mod PHTN, RVSP of 54mmHg  EKG today was sinus bradycardia, normal QTc interval right bundle branch block. Patient does have history of diabetes hypertension hyperlipidemia. DM is under good control. HGBA1C is 5.7    10-28-22: Patient with occasional heart rates in the 40s according to her apple watch. EKG today with heart rate of 49 bpm and chronic right bundle branch block. She is completely asymptomatic no lightheadedness dizziness or passing out episodes. hx of [facility] on 21 with very mild CAD, EF of 55%.  S/p ECHO with EF of55%, mod LVH, no valve abn, IAS consistently bowed to the right indicative of elevated severely dilated LA. , mod PHTN, RVSP of 54mmHg        Patient Active Problem List   Diagnosis    Essential hypertension    Mixed hyperlipidemia    Hypothyroidism    Osteoarthritis of knee    Type 2 diabetes mellitus with diabetic polyneuropathy, without long-term current use of insulin (HCC)    Benign neoplasm of ascending colon    Diverticulosis of large intestine without diverticulitis    Anemia    HH (hiatus hernia)    Chronic Georges lesion    Adenomatous polyp of transverse colon    A-fib (HCC)    Bilateral leg edema    Diastolic congestive heart failure (HCC)    GALINA (obstructive sleep apnea)    Coronary artery disease involving native coronary artery of native heart without angina pectoris       Past Surgical History:   Procedure Laterality Date     SECTION      COLONOSCOPY      needs     COLONOSCOPY N/A 2021    COLONOSCOPY DIAGNOSTIC performed by Willian Wu MD at [place] [address] IND N/A 4/13/2018    COLONOSCOPY adenoma    UPPER GASTROINTESTINAL ENDOSCOPY N/A 1/11/2021    EGD DIAGNOSTIC ONLY performed by Malena Hassan MD at [facility] History     Socioeconomic History    Marital status:    Tobacco Use    Smoking status: Never     Passive exposure: Never    Smokeless tobacco: Never   Vaping Use    Vaping Use: Never used   Substance and Sexual Activity    Alcohol use: Yes     Comment: rare     Drug use: No    Sexual activity: Yes     Partners: Male       Family History   Problem Relation Age of Onset    Colon Cancer Maternal Grandmother        Current Outpatient Medications   Medication Sig Dispense Refill    acetaminophen-codeine (TYLENOL #3) 300-30 MG per tablet TAKE 1 TABLET BY MOUTH EVERY 6 HOURS AS NEEDED FOR PAIN      XARELTO 20 MG TABS tablet TAKE 1 TABLET DAILY WITH   BREAKFAST 90 tablet 2    pantoprazole (PROTONIX) 40 MG tablet TAKE 1 TABLET BY MOUTH EVERY DAY 90 tablet 2    furosemide (LASIX) 40 MG tablet TAKE 1 TABLET BY MOUTH EVERY DAY 90 tablet 3    sotalol AF 80 MG TABS Take one tablet twice daily. 180 tablet 3    CPAP Machine MISC by Does not apply route New CPAP at 11 cm  And supply. 1 each 0    Ferrous Sulfate (IRON) 325 (65 Fe) MG TABS Take 1 tablet by mouth daily       rosuvastatin (CRESTOR) 10 MG tablet TAKE 1 TABLET BY MOUTH ONCE DAILY (Patient taking differently: Take 10 mg by mouth daily TAKE 1 TABLET BY MOUTH ONCE DAILY) 90 tablet 1    levothyroxine (SYNTHROID) 125 MCG tablet Take 1 tablet by mouth daily -except Sundays 90 tablet 0    metFORMIN (GLUCOPHAGE) 500 MG tablet TAKE 1 TABLET BY MOUTH TWICE A DAY WITH FOOD (Patient taking differently: Take 500 mg by mouth 2 times daily (with meals) TAKE 1 TABLET BY MOUTH TWICE A DAY WITH FOOD) 180 tablet 0    VITAMIN E Take 400 Units by mouth daily       vitamin B-12 (CYANOCOBALAMIN) 1000 MCG tablet Take 1,000 mcg by mouth daily.         Omega-3 Fatty Acids (FISH OIL) 1200 MG CAPS Take 1 capsule by mouth daily       Current Facility-Administered Medications   Medication Dose Route Frequency Provider Last Rate Last Admin    triamcinolone acetonide (KENALOG-40) injection 80 mg  80 mg IntraMUSCular Once [facility], APRN - CNP        methylPREDNISolone acetate (DEPO-MEDROL) injection 40 mg  40 mg IntraMUSCular Once Malissa Lopez MD           Patient has no known allergies. Review of Systems:  General ROS: negative  Psychological ROS: negative  Hematological and Lymphatic ROS: No history of blood clots or bleeding disorder. Respiratory ROS: positive for - shortness of breath  Cardiovascular ROS: positive for - chest pain, dyspnea on exertion and shortness of breath  Gastrointestinal ROS: no abdominal pain, change in bowel habits, or black or bloody stools  Genito-Urinary ROS: no dysuria, trouble voiding, or hematuria  Musculoskeletal ROS: negative  Neurological ROS: no TIA or stroke symptoms  Dermatological ROS: negative    VITALS:  Blood pressure 126/86, pulse 60, resp. rate 14, height 5' 2\" (1.575 m), weight 228 lb (103.4 kg), SpO2 99 %, not currently breastfeeding. Body mass index is 41.7 kg/m². Physical Examination:  General appearance - alert, well appearing, and in no distress  Mental status - alert, oriented to person, place, and time  Neck - Neck is supple, no JVD or carotid bruits. No thyromegaly or adenopathy.    Chest - clear to auscultation, no wheezes, rales or rhonchi, symmetric air entry  Heart - normal rate, regular rhythm, normal S1, S2, no murmurs, rubs, clicks or gallops  Abdomen - soft, nontender, nondistended, no masses or organomegaly  Neurological - alert, oriented, normal speech, no focal findings or movement disorder noted  Extremities - peripheral pulses normal, 2+  pedal edema, no clubbing or cyanosis  Skin - normal coloration and turgor, no rashes, no suspicious skin lesions noted      EKG: normal sinus rhythm, nonspecific ST and T waves changes    Orders Placed This Encounter   Procedures    EKG 12 lead       ASSESSMENT:     Diagnosis Orders   1. Paroxysmal atrial fibrillation (HCC)  EKG 12 lead      2. Coronary artery disease involving native coronary artery of native heart without angina pectoris        3. Acute diastolic congestive heart failure ([address]        4. Essential hypertension        5. Mixed hyperlipidemia        6. GALINA (obstructive sleep apnea)              PLAN:         As always, aggressive risk factor modification is strongly recommended. We should adhere to the JNC VIII guidelines for HTN management and the NCEP ATP III guidelines for LDL-C management. Cardiac diet is always recommended with low fat, cholesterol, calories and sodium. Continue medications at current doses. Pulmonary for SOB, PHTN, GALINA     Check EKG today    Cont with DOAC. Xarelto    Continue with sotalol 80mg BID. Monitor QTC. No change at this time due to sinus bradycardia. Patient is completely asymptomatic without any significant pauses. We will continue to monitor. CHF clinic follow up    No  CARDIZEM due to bradycardia. The importance of daily weights, dietary sodium restriction, and contact with cardiology if weight is increased more than 3 lbs in any 48 hour period was stressed. The patient has been advised to contact us if they experience progressive SOB, orthopnea, PND or progressive edema. Patient was advised and encouraged to check blood pressure at home or at a pharmacy, maintain a logbook, and also call us back if blood pressure are above the target ranges or if it is low. Patient clearly understands and agrees to the instructions. We will need to continue to monitor muscle and liver enzymes, BUN, CR, and electrolytes. Details of medical condition explained and patient was warned about adverse consequences of uncontrolled medical conditions and possible side effects of prescribed medications.

## 2022-12-15 ENCOUNTER — OFFICE VISIT (OUTPATIENT)
Dept: PULMONOLOGY | Age: 72
End: 2022-12-15
Payer: MEDICARE

## 2022-12-15 VITALS
SYSTOLIC BLOOD PRESSURE: 158 MMHG | OXYGEN SATURATION: 97 % | HEART RATE: 66 BPM | DIASTOLIC BLOOD PRESSURE: 90 MMHG | WEIGHT: 227 LBS | BODY MASS INDEX: 41.52 KG/M2

## 2022-12-15 DIAGNOSIS — G47.33 OSA (OBSTRUCTIVE SLEEP APNEA): Primary | ICD-10-CM

## 2022-12-15 DIAGNOSIS — E66.01 MORBID OBESITY (HCC): ICD-10-CM

## 2022-12-15 PROCEDURE — 3078F DIAST BP <80 MM HG: CPT | Performed by: INTERNAL MEDICINE

## 2022-12-15 PROCEDURE — G8417 CALC BMI ABV UP PARAM F/U: HCPCS | Performed by: INTERNAL MEDICINE

## 2022-12-15 PROCEDURE — 1090F PRES/ABSN URINE INCON ASSESS: CPT | Performed by: INTERNAL MEDICINE

## 2022-12-15 PROCEDURE — 99214 OFFICE O/P EST MOD 30 MIN: CPT | Performed by: INTERNAL MEDICINE

## 2022-12-15 PROCEDURE — G8427 DOCREV CUR MEDS BY ELIG CLIN: HCPCS | Performed by: INTERNAL MEDICINE

## 2022-12-15 PROCEDURE — 1036F TOBACCO NON-USER: CPT | Performed by: INTERNAL MEDICINE

## 2022-12-15 PROCEDURE — 3017F COLORECTAL CA SCREEN DOC REV: CPT | Performed by: INTERNAL MEDICINE

## 2022-12-15 PROCEDURE — G8484 FLU IMMUNIZE NO ADMIN: HCPCS | Performed by: INTERNAL MEDICINE

## 2022-12-15 PROCEDURE — G8400 PT W/DXA NO RESULTS DOC: HCPCS | Performed by: INTERNAL MEDICINE

## 2022-12-15 PROCEDURE — 3074F SYST BP LT 130 MM HG: CPT | Performed by: INTERNAL MEDICINE

## 2022-12-15 PROCEDURE — 1123F ACP DISCUSS/DSCN MKR DOCD: CPT | Performed by: INTERNAL MEDICINE

## 2022-12-15 ASSESSMENT — ENCOUNTER SYMPTOMS
WHEEZING: 0
SHORTNESS OF BREATH: 0
ABDOMINAL PAIN: 0
VOMITING: 0
SORE THROAT: 0
EYE ITCHING: 0
SINUS PRESSURE: 0
TROUBLE SWALLOWING: 0
RHINORRHEA: 0
DIARRHEA: 0
VOICE CHANGE: 0
CHEST TIGHTNESS: 0
NAUSEA: 0
COUGH: 0
EYE DISCHARGE: 0

## 2022-12-15 NOTE — PROGRESS NOTES
Subjective:     Tera Meng is a 67 y.o. female who complains today of:     Chief Complaint   Patient presents with    Follow-up     4m f/u for GALINA       HPI  She is using CPAP with   11 centimeters of H2O with heated humidity. She is using CPAP for about  6-7   hours every night. She is using CPAP with  nasal pillow mask Mask. She said  sleep is restful with the CPAP use. She is compliant with CPAP therapy and benefiting with CPAP use. No snoring with CPAP use. No complaint of daytime sleepiness or tiredness with CPAP use. She denies taking naps. No sleepiness with driving. She denies difficulty falling asleep or staying asleep. I reviewed compliance report with patient regarding CPAP therapy. She is using  CPAP for 26 days out of 30 days. Average usage of days used is 6 hours and 17 min , average AHI 1.1 with CPAP use. Allergies:  Patient has no known allergies.   Past Medical History:   Diagnosis Date    Angina at rest [facility]) 2021    Bilateral leg edema 3/7/2275    Diastolic congestive heart failure ([facility] 2021    DM2 (diabetes mellitus, type 2) ([facility]     Hyperlipidemia     Hypertension     Hypothyroidism     Shortness of breath 2021     Past Surgical History:   Procedure Laterality Date     SECTION      COLONOSCOPY  2008    needs 2018    COLONOSCOPY N/A 2021    COLONOSCOPY DIAGNOSTIC performed by Radha Ziegler MD at [address] [place] [address] [address] [place] [address] N/A 2018    COLONOSCOPY adenoma    UPPER GASTROINTESTINAL ENDOSCOPY N/A 2021    EGD DIAGNOSTIC ONLY performed by Radha Ziegler MD at [facility]     Family History   Problem Relation Age of Onset    Colon Cancer Maternal Grandmother      Social History     Socioeconomic History    Marital status:      Spouse name: Not on file    Number of children: Not on file    Years of education: Not on file    Highest education level: Not on file   Occupational History    Not on file   Tobacco Use    Smoking status: Never     Passive exposure: Never    Smokeless tobacco: Never   Vaping Use    Vaping Use: Never used   Substance and Sexual Activity    Alcohol use: Yes     Comment: rare     Drug use: No    Sexual activity: Yes     Partners: Male   Other Topics Concern    Not on file   Social History Narrative    Not on file     Social Determinants of Health     Financial Resource Strain: Not on file   Food Insecurity: Not on file   Transportation Needs: Not on file   Physical Activity: Not on file   Stress: Not on file   Social Connections: Not on file   Intimate Partner Violence: Not on file   Housing Stability: Not on file         Review of Systems   Constitutional:  Negative for chills, diaphoresis, fatigue and fever. HENT:  Negative for congestion, mouth sores, nosebleeds, postnasal drip, rhinorrhea, sinus pressure, sneezing, sore throat, trouble swallowing and voice change. Eyes:  Negative for discharge, itching and visual disturbance. Respiratory:  Negative for cough, chest tightness, shortness of breath and wheezing. Cardiovascular:  Negative for chest pain, palpitations and leg swelling. Gastrointestinal:  Negative for abdominal pain, diarrhea, nausea and vomiting. Genitourinary:  Negative for difficulty urinating and hematuria. Musculoskeletal:  Negative for arthralgias, joint swelling and myalgias. Skin:  Negative for rash. Allergic/Immunologic: Negative for environmental allergies and food allergies. Neurological:  Negative for dizziness, tremors, weakness and headaches. Psychiatric/Behavioral:  Positive for sleep disturbance. Negative for behavioral problems.         :     Vitals:    12/15/22 1119 12/15/22 1126   BP: (!) 160/92 (!) 158/90   Site: Right Lower Arm Left Lower Arm   Position: Sitting Sitting   Cuff Size: Medium Adult Medium Adult   Pulse: 66    SpO2: 97%    Weight: 227 lb (103 kg)      Wt Readings from Last 3 Encounters:   12/15/22 227 lb (103 kg)   10/28/22 228 lb (103.4 kg)   09/09/22 227 lb (103 kg)         Physical Exam  Constitutional:       General: She is not in acute distress. Appearance: She is well-developed. She is obese. She is not diaphoretic. HENT:      Head: Normocephalic and atraumatic. Nose: Nose normal.   Eyes:      Pupils: Pupils are equal, round, and reactive to light. Neck:      Thyroid: No thyromegaly. Vascular: No JVD. Trachea: No tracheal deviation. Cardiovascular:      Rate and Rhythm: Normal rate and regular rhythm. Heart sounds: No murmur heard. No friction rub. No gallop. Pulmonary:      Effort: No respiratory distress. Breath sounds: No wheezing or rales. Chest:      Chest wall: No tenderness. Abdominal:      General: There is no distension. Tenderness: There is no abdominal tenderness. There is no rebound. Musculoskeletal:         General: Normal range of motion. Lymphadenopathy:      Cervical: No cervical adenopathy. Skin:     General: Skin is warm and dry. Neurological:      Mental Status: She is alert and oriented to person, place, and time. Coordination: Coordination normal.   Psychiatric:         Mood and Affect: Mood normal.         Behavior: Behavior normal.       Current Outpatient Medications   Medication Sig Dispense Refill    acetaminophen-codeine (TYLENOL #3) 300-30 MG per tablet TAKE 1 TABLET BY MOUTH EVERY 6 HOURS AS NEEDED FOR PAIN      XARELTO 20 MG TABS tablet TAKE 1 TABLET DAILY WITH   BREAKFAST 90 tablet 2    pantoprazole (PROTONIX) 40 MG tablet TAKE 1 TABLET BY MOUTH EVERY DAY 90 tablet 2    furosemide (LASIX) 40 MG tablet TAKE 1 TABLET BY MOUTH EVERY DAY 90 tablet 3    sotalol AF 80 MG TABS Take one tablet twice daily. 180 tablet 3    CPAP Machine MISC by Does not apply route New CPAP at 11 cm  And supply.  1 each 0    Ferrous Sulfate (IRON) 325 (65 Fe) MG TABS Take 1 tablet by mouth daily       rosuvastatin (CRESTOR) 10 MG tablet TAKE 1 TABLET BY MOUTH ONCE DAILY (Patient taking differently: Take 10 mg by mouth daily TAKE 1 TABLET BY MOUTH ONCE DAILY) 90 tablet 1    levothyroxine (SYNTHROID) 125 MCG tablet Take 1 tablet by mouth daily -except Sundays 90 tablet 0    metFORMIN (GLUCOPHAGE) 500 MG tablet TAKE 1 TABLET BY MOUTH TWICE A DAY WITH FOOD (Patient taking differently: Take 500 mg by mouth 2 times daily (with meals) TAKE 1 TABLET BY MOUTH TWICE A DAY WITH FOOD) 180 tablet 0    VITAMIN E Take 400 Units by mouth daily       vitamin B-12 (CYANOCOBALAMIN) 1000 MCG tablet Take 1,000 mcg by mouth daily. Omega-3 Fatty Acids (FISH OIL) 1200 MG CAPS Take 1 capsule by mouth daily       Current Facility-Administered Medications   Medication Dose Route Frequency Provider Last Rate Last Admin    triamcinolone acetonide (KENALOG-40) injection 80 mg  80 mg IntraMUSCular Once [facility], APRN - CNP        methylPREDNISolone acetate (DEPO-MEDROL) injection 40 mg  40 mg IntraMUSCular Once Fei Sylvester MD               Assessment/Plan:     1. GALINA (obstructive sleep apnea)  She is using CPAP with   11 centimeters of H2O with heated humidity. She is using CPAP for about  6-7   hours every night. She is using CPAP with  nasal pillow mask Mask. She said  sleep is restful with the CPAP use. She is compliant with CPAP therapy and benefiting with CPAP use. No snoring with CPAP use. No complaint of daytime sleepiness or tiredness with CPAP use. I reviewed compliance report with patient regarding CPAP therapy. She is using  CPAP for 26 days out of 30 days. Average usage of days used is 6 hours and 17 min , average AHI 1.1 with CPAP use. Counseling: CPAP/BiPAP uses, She advised to use CPAP at least 5-6 hours every night. Driving: She is advised for extreme caution when driving or operating machinery if there is a feeling of drowsiness, especially while driving it is preferable to stop driving and take a brief nap.   Sleep hygiene:Avoid supine sleep, sleep on  sides. Avoid  sleep deprivation. Explained sleep hygiene. Advice to avoid Alcohol and sedative    Time spend over 30 min. Face to face. with greater than 50 % time with CPAP therapy including review compliance, counseling and advised regarding CPAP therapy. 2. Morbid obesity ([address]  She is advised try to lose weight. obesity related risk explained to the patient ,  Current weight:  227 lb (103 kg) Lbs. BMI:  Body mass index is 41.52 kg/m². Suggested weight control approaches, including dietary changes , exercise, behavioral modification. Return in about 4 months (around 4/15/2023) for eulalio.       Aleja Suh MD

## 2023-01-03 RX ORDER — FUROSEMIDE 40 MG/1
TABLET ORAL
Qty: 90 TABLET | Refills: 3 | Status: SHIPPED | OUTPATIENT
Start: 2023-01-03

## 2023-01-03 NOTE — TELEPHONE ENCOUNTER
Requesting medication refill. Please approve or deny this request.    Rx requested:  Requested Prescriptions     Pending Prescriptions Disp Refills    furosemide (LASIX) 40 MG tablet [Pharmacy Med Name: FUROSEMIDE 40 MG TABLET] 90 tablet 3     Sig: TAKE 1 TABLET BY [address] Office Visit:   Visit date not found      Next Visit Date:  Future Appointments   Date Time Provider James Hodgson   2/8/2023  9:00 AM [address] [facility] EMERGENCY [facility] CENTER AT [place]   4/17/2023 10:00 AM Nuria Salgado MD [facility]   6/23/2023  8:15 AM Wes Monnie Meckel, DO [facility]               Last refill 1/20/22. Please approve or deny.

## 2023-01-04 ENCOUNTER — TELEPHONE (OUTPATIENT)
Dept: PULMONOLOGY | Age: 73
End: 2023-01-04

## 2023-01-04 NOTE — TELEPHONE ENCOUNTER
PATIENT NEEDS AN ORDER FOR CPAP SUPPLIES SENT TO [address] [place] [address]    Rx requested:  Requested Prescriptions      No prescriptions requested or ordered in this encounter       Last Office Visit:   12/15/2022      Next Visit Date:  Future Appointments   Date Time Provider James Hodgson   1/30/2023  7:40 AM [address]   2/8/2023  9:00 AM JASWINDER Colon FACUNDO [facility] EMERGENCY [facility] AT [place]   4/17/2023 10:00 AM [id number] Smitha Aguirre MD [facility]   6/23/2023  8:15 AM Seamus Collado DO [facility]

## 2023-01-26 ENCOUNTER — HOSPITAL ENCOUNTER (OUTPATIENT)
Dept: WOMENS IMAGING | Age: 73
Discharge: HOME | End: 2023-01-28
Payer: MEDICARE

## 2023-01-26 DIAGNOSIS — Z12.31 ENCOUNTER FOR SCREENING MAMMOGRAM FOR MALIGNANT NEOPLASM OF BREAST: ICD-10-CM

## 2023-01-26 PROCEDURE — 77067 SCR MAMMO BI INCL CAD: CPT

## 2023-02-08 ENCOUNTER — OFFICE VISIT (OUTPATIENT)
Dept: CARDIOLOGY CLINIC | Age: 73
End: 2023-02-08
Payer: MEDICARE

## 2023-02-08 VITALS
OXYGEN SATURATION: 97 % | SYSTOLIC BLOOD PRESSURE: 148 MMHG | BODY MASS INDEX: 41.67 KG/M2 | DIASTOLIC BLOOD PRESSURE: 80 MMHG | RESPIRATION RATE: 14 BRPM | WEIGHT: 227.8 LBS | HEART RATE: 53 BPM

## 2023-02-08 DIAGNOSIS — Z86.79 HISTORY OF ATRIAL FIBRILLATION: ICD-10-CM

## 2023-02-08 DIAGNOSIS — I50.32 CHRONIC DIASTOLIC CHF (CONGESTIVE HEART FAILURE), NYHA CLASS 1 (HCC): ICD-10-CM

## 2023-02-08 DIAGNOSIS — I27.20 PULMONARY HTN (HCC): ICD-10-CM

## 2023-02-08 DIAGNOSIS — I25.10 MILD CAD: ICD-10-CM

## 2023-02-08 DIAGNOSIS — G47.33 OSA (OBSTRUCTIVE SLEEP APNEA): ICD-10-CM

## 2023-02-08 DIAGNOSIS — Z86.79 HISTORY OF SINUS BRADYCARDIA: ICD-10-CM

## 2023-02-08 DIAGNOSIS — E11.69 DIABETES MELLITUS TYPE 2 IN OBESE (HCC): ICD-10-CM

## 2023-02-08 DIAGNOSIS — N18.2 CKD (CHRONIC KIDNEY DISEASE) STAGE 2, GFR 60-89 ML/MIN: ICD-10-CM

## 2023-02-08 DIAGNOSIS — E66.9 DIABETES MELLITUS TYPE 2 IN OBESE (HCC): ICD-10-CM

## 2023-02-08 DIAGNOSIS — I10 HYPERTENSION, UNSPECIFIED TYPE: ICD-10-CM

## 2023-02-08 DIAGNOSIS — E78.5 DYSLIPIDEMIA: ICD-10-CM

## 2023-02-08 PROCEDURE — 1090F PRES/ABSN URINE INCON ASSESS: CPT | Performed by: PHYSICIAN ASSISTANT

## 2023-02-08 PROCEDURE — G8417 CALC BMI ABV UP PARAM F/U: HCPCS | Performed by: PHYSICIAN ASSISTANT

## 2023-02-08 PROCEDURE — 1036F TOBACCO NON-USER: CPT | Performed by: PHYSICIAN ASSISTANT

## 2023-02-08 PROCEDURE — 2022F DILAT RTA XM EVC RTNOPTHY: CPT | Performed by: PHYSICIAN ASSISTANT

## 2023-02-08 PROCEDURE — 1123F ACP DISCUSS/DSCN MKR DOCD: CPT | Performed by: PHYSICIAN ASSISTANT

## 2023-02-08 PROCEDURE — G8400 PT W/DXA NO RESULTS DOC: HCPCS | Performed by: PHYSICIAN ASSISTANT

## 2023-02-08 PROCEDURE — 3078F DIAST BP <80 MM HG: CPT | Performed by: PHYSICIAN ASSISTANT

## 2023-02-08 PROCEDURE — 3046F HEMOGLOBIN A1C LEVEL >9.0%: CPT | Performed by: PHYSICIAN ASSISTANT

## 2023-02-08 PROCEDURE — 3017F COLORECTAL CA SCREEN DOC REV: CPT | Performed by: PHYSICIAN ASSISTANT

## 2023-02-08 PROCEDURE — G8484 FLU IMMUNIZE NO ADMIN: HCPCS | Performed by: PHYSICIAN ASSISTANT

## 2023-02-08 PROCEDURE — 99214 OFFICE O/P EST MOD 30 MIN: CPT | Performed by: PHYSICIAN ASSISTANT

## 2023-02-08 PROCEDURE — 3077F SYST BP >= 140 MM HG: CPT | Performed by: PHYSICIAN ASSISTANT

## 2023-02-08 PROCEDURE — G8427 DOCREV CUR MEDS BY ELIG CLIN: HCPCS | Performed by: PHYSICIAN ASSISTANT

## 2023-02-08 RX ORDER — LOSARTAN POTASSIUM 25 MG/1
25 TABLET ORAL DAILY
Qty: 90 TABLET | Refills: 1 | Status: SHIPPED | OUTPATIENT
Start: 2023-02-08

## 2023-02-08 RX ORDER — AMOXICILLIN 500 MG
CAPSULE ORAL
COMMUNITY
Start: 2019-05-29 | End: 2023-02-08 | Stop reason: SDUPTHER

## 2023-02-08 RX ORDER — FUROSEMIDE 40 MG/1
40 TABLET ORAL DAILY PRN
Qty: 90 TABLET | Refills: 3
Start: 2023-02-08

## 2023-02-08 ASSESSMENT — ENCOUNTER SYMPTOMS
ABDOMINAL PAIN: 0
VOMITING: 0
COLOR CHANGE: 0
BLOOD IN STOOL: 0
SHORTNESS OF BREATH: 0
CHEST TIGHTNESS: 0
NAUSEA: 0
COUGH: 0
ABDOMINAL DISTENTION: 0

## 2023-02-08 NOTE — PROGRESS NOTES
Patient: Cordelia Novak  YOB: 1950  MRN: [id number]    Chief Complaint:  Chief Complaint   Patient presents with    Atrial Fibrillation    Congestive Heart Failure         Subjective/HPI       2/8/23: Here for follow-up of chronic diastolic congestive heart failure with EF of 55% per echo 6/4/2021. Was last seen in CHF clinic on 5/11/2022 and since that time has been doing very well with no new or worsening heart failure symptoms. She states that she is currently taking Lasix more on an as-needed basis as it makes her urinate frequently and she is constantly on the go. She continues to weigh herself every morning and denies any rapid weight gain. She is compliant with low-sodium diet and fluid restriction. She denies shortness of breath or dyspnea on exertion, chest pain, palpitations, diaphoresis, paroxysmal nocturnal dyspnea, orthopnea, worsening lower extremity edema, dizziness, lightheadedness, syncope, fever or chills. She is compliant with CPAP at bedtime and states that since starting her CPAP she is getting a very restful night sleep and waking in the morning feeling very well rested. She had most recent office appointment with Dr. Cano Number on 10/28/2022 as her PCP was concerned that her heart rates are frequently low and into the 40s. EKG at that office visit showed sinus bradycardia with heart rate of 49 bpm but as patient was completely asymptomatic without any episodes of lightheadedness or dizziness no medication changes were made. She was to continue on sotalol 80 mg p.o. twice daily. EKG 10/28/22: SB 49, sinus arrhythmia, QTc 425ms      Most recent labs from [facility] PCP office reviewed and documented below. CMP on 10/19/2022: Sodium 142, potassium 4.3, chloride 105, total CO2 26, BUN 16, creatinine 0.88, GFR 70, glucose 94. ALT 11, AST 19. Lipid panel 10/19/2022:  Total cholesterol 145, triglycerides 153, HDL 50, LDL 64  CMP on 4/19/2022: Sodium 142, potassium 3.8, chloride 103, total CO2 of 31, BUN 19, creatinine 0.96, GFR 63, glucose 103. ALT 11, AST 17  Hemoglobin A1c on 4/19/2022: 5.7  Free T4 on 4/19/2022: 1.0  TSH on 4/19/2022: 0.77    Vital signs stable in office today but blood pressure mildly elevated 142/78 on the right and 148/80 on the left, heart rate 53 bpm, pulse ox 97% on room air. Weight is 227.8 pounds compared to 228 pounds at last office visit on 5/11/2022.        5/11/22: Here for follow-up of chronic diastolic congestive heart failure. Was last seen in CHF clinic on 11/10/2021. Since that time, she has been doing well overall with no new or worsening heart failure symptoms. She most recently followed up with Dr. Neel Vasquez on 3/10/2022 at which time her Cardizem was discontinued completely due to persistent sinus bradycardia with heart rate of 45 bpm on EKG. She was continued on sotalol 80 mg p.o. twice daily. She reports her heart rates are consistently in the mid to high 50s range now. Also since her last visit, she was started on CPAP in January 2022 and states that overall she is feeling much better and more well rested. She denies shortness of breath or dyspnea on exertion, chest pain, palpitations, diaphoresis, paroxysmal nocturnal dyspnea, orthopnea, dizziness, lightheadedness, syncope, fever or chills. She will occasionally experience lower extremity edema but denies any significant or rapid weight gain. She periodically checks her blood pressure at home and reports that it has been stable. She is compliant with all medications including oral anticoagulation with Xarelto and denies any bleeding issues. She most recently followed up with her PCP, Dr. Elliot Herrera on 4/19/2022 and states that she had blood work completed at that time. Will contact PCP office to obtain recent blood work for my review. She is following with pulmonology for management of obstructive sleep apnea. Vital signs stable in office today.   Blood pressure 126/82, heart rate bradycardic at 51, pulse ox 98% on room air. Weight is 228 pounds compared to 224 pounds at last office visit on 11/10/2021.        11/10/21: Here for follow-up of diastolic congestive heart failure. Was last seen in the office on 8/11/2021 and following that visit, her Cardizem was decreased to 240 mg daily from 360 mg daily due to persistent bradycardia post cardioversion. Since her last office visit with me, she also underwent outpatient sleep study on 8/24/2021 which revealed moderate obstructive sleep apnea for which CPAP therapy indicated and she subsequently underwent CPAP titration study and is scheduled to follow-up with pulmonology today. She states that she is doing very well since her last visit with no new or worsening heart failure symptoms. She denies shortness of breath or dyspnea on exertion, chest pain, palpitations, diaphoresis, paroxysmal nocturnal dyspnea, orthopnea, worsening lower extremity edema, dizziness, lightheadedness, syncope, fever or chills. She is compliant with all of her medications including oral anticoagulation with Xarelto. She denies any bleeding issues including hematochezia, melena or hematuria. She continues to check routine blood pressures at home and BP stable typically with systolic blood pressure between 110s to 130s range. Heart rate on home monitoring is typically between 40s to 50s range but occasionally in the 60s. She reports that her heart rate readings are prior to taking her morning medications. Most recent labs from PCP office reviewed and documented below  CBC 10/19/2021: WBC 6.6, hemoglobin 12.9, hematocrit 41.0, platelets 163  TSH 09/27/9438: 1.60  Free T4 10/19/2021: 1.30  CMP 10/19/2021: Sodium 145, potassium 4.2, chloride 105, total CO2 30, BUN 18, creatinine 1.0, GFR 55, glucose 101. AST 19, ALT 13. Lipid panel 10/19/2021: Total cholesterol 143, HDL 50, LDL 59, triglycerides 168. Vital signs stable in office today. Blood pressure 122/72, heart rate bradycardic at 50, pulse ox 97% on room air. Weight is 224 pounds which is the exact same weight she was at last office visit on 8/11/2021 8/11/21: Here for follow-up of chronic diastolic congestive heart failure. Was seen for initial CHF clinic evaluation on 7/7/2021. EKG in office at that time showed A. fib with rapid rate of 123 bpm.  Following office visit, Cardizem CD was increased to 360 mg daily from 180 mg daily. On 7/21/2021 she underwent cardioversion with Dr. Kirk Pat converting from A. fib to sinus rhythm/sinus bradycardia. Her Toprol-XL was discontinued and she was initiated on sotalol 80 mg p.o. twice daily. She has continued to check routine blood pressure and heart rate since last office visit. The initial 2 weeks post first office visit, heart rates had stabilized in the 70s to 80s range but over the past 2 weeks patient has been bradycardic on a.m. vitals with heart rates typically 40s to 50s before morning meds. She is completely asymptomatic with the bradycardia. She denies any new or worsening heart failure symptoms. She is compliant with low-sodium diet and fluid restriction. She continues to weigh herself daily and weight has remained stable typically between 220 to 223 pounds. She remains on oral anticoagulation with Xarelto and denies any bleeding issues. Dr. Kirk Pat had previously ordered a sleep study and patient is pending approval through her insurance. Most recent labs reviewed and documented below.   BMP 7/21/2021: Sodium 140, potassium 3.7, chloride 103, total CO2 27, BUN 20, creatinine 1.06, GFR low at 51.1, glucose 114  BMP on 7/7/2021: Sodium 143, potassium 4.5, chloride 100, total CO2 31, BUN 15, creatinine 1.15, GFR low at 46.5, glucose 104  proBNP on 7/7/2021: 682    Given bradycardia with documented heart rates in the 40s to 50s on routine vitals at home, discussed with patient possibly ordering a 2-week event monitor for further evaluation of A. fib and possibly underlying sick sinus syndrome. She is wishing to defer event monitor for now. So given bradycardia will decrease Cardizem CD to 240 mg daily from 360 daily and continue to monitor heart rates closely. Vital signs stable in office today. Blood pressure 130/67, heart rate 54, pulse ox 98% on room air. Weight is 224 pounds compared to 222 pounds at last office visit on 7/7/2021.        7/7/21 CHF clinic visit #1: This is a 72-year-old  female with past medical history significant for hypertension, dyslipidemia and diabetes who was recently diagnosed with new onset atrial fibrillation and diastolic congestive heart failure. Patient had undergone outpatient stress test on 6/4/2021 and during stress testing was noticed to develop rapid atrial fibrillation. She was subsequently hospitalized and due to mildly abnormal stress test with new onset A. fib and multiple risk factors for CAD, she underwent left heart catheterization by Dr. Duarte Clark on 6/7/2021. This heart catheterization revealed normal coronary arteries and normal LVEDP. Echocardiogram completed on 6/4/2021 revealed normal LV systolic function with EF of 55%, moderate concentric left ventricular hypertrophy, mild mitral valve regurgitation, RVSP elevated 53 mmHg. During her hospital admission her rate control medications were adjusted for improved heart rate control including Toprol-XL and Cardizem CD. She was started on oral anticoagulation with Xarelto 20 mg p.o. daily. She recently had follow-up with Dr. Vivienne Joyner on 7/2/2021 at which time she was initiated on Lasix 40 mg p.o. daily as she was complaining of significant bilateral lower extremity edema and symptoms concerning for diastolic heart failure. She was also referred for sleep study to evaluate for underlying obstructive sleep apnea and was referred to pulmonology for evaluation of shortness of breath and pulmonary hypertension. Given persistent A. fib with rapid rates, Dr. Cunningham Whiteside for eventual cardioversion plus or minus drug loading. Since being started on Lasix at this recent office visit with Dr. Jose Mann, patient states her lower extremity edema has significantly improved. She has no complaint of shortness of breath or dyspnea on exertion. She has very rare palpitations/fluttering heartbeat but for the most part is asymptomatic with her A. fib. She denies chest pain, nausea, vomiting, dizziness, lightheadedness, diaphoresis, paroxysmal nocturnal dyspnea, orthopnea, hematochezia, melena, hematuria, syncope, fever or chills. She is compliant with all of her medications and with low-sodium diet and fluid restriction. I educated patient and  at length regarding recommendation of 2000 mg sodium diet and 2 L daily fluid restriction. She will be provided with both scale and blood pressure seen for routine monitoring of both daily weights and blood pressures. EKG in office today: A. fib with rapid ventricular response with ventricular rate of 123 bpm, low voltage QRS, incomplete right bundle branch block, nonspecific ST and T wave changes,  ms    Recent labs reviewed and documented below. BMP today 7/7/2021: Sodium 143, potassium 4.5, chloride 100, total CO2 31, BUN 15, creatinine elevated 1.15, GFR low at 46.5, glucose 104. CBC 6/7/2021: WBC 10.0, hemoglobin 12.4, hematocrit 38.3, platelets 112  BMP on 6/7/2021: Sodium 141, potassium 3.9, chloride 103, total CO2 22, BUN 15, creatinine elevated 1.03, GFR low at 52.8, glucose 118  TSH 6/6/2021: 0.421  proBNP 6/4/2021: 2929    Recent diagnostic testing including echocardiogram, stress test and cardiac catheterization reviewed and documented below. [facility] 6/4/21:  Procedure Summary  no sig. CAD  LARS II flow indicative of micro vacsular disease  Normal LVF  Recommendations  Aggressive risk factor management. Maximize medical therapy.    Angiographic Findings Cardiac Arteries and Lesion Findings  LMCA: Normal.  LAD: Diffuse irregularity. LCx: Diffuse irregularity. RCA: Diffuse irregularity. Dominance: Left  LV function assessed as:Normal.  Ejection Fraction    - Method: LV gram. EF%: 55. Stress test 6/4/21:  IMPRESSION:  1. Abnormal stress myocardial perfusion examination. There is  reversible ischemia in the [id number] anterior wall as well as the LV  apex of moderate size and moderate intensity. Cannot exclude underlying  ischemia versus breast attenuation artifact. Clinical correlation is  advised. 2.  Normal hemodynamic response to Lexiscan infusion. 3.  Normal LV function with calculated ejection fraction of 54%. 4.  Normal TID ratio equals to 0.90.  5.  Atrial fibrillation with rapid ventricular response. 6.  No reported chest pain, chest pressure or syncope. MARY OLMSTEAD DO      Echocardiogram 6/4/21:  Conclusions   Summary   Atrial fibrillation. Normal left ventricular systolic function, no regional wall motion   abnormalities, estimated ejection fraction of 55%. Normal left ventricular size and function. Moderate concentric left ventricular hypertrophy. Mild (1+) mitral regurgitation is present. No evidence of mitral valve stenosis. No evidence of aortic valve regurgitation . No evidence of aortic valve stenosis. The left atrium is Severely dilated. IAS consistently bowed to the right indicative of elevated LA pressure. There is mild to moderate tricuspid regurgitation with estimated RVSP of   53 mm Hg. Right ventricular systolic pressure of 53 mm Hg consistent with moderate   pulmonary hypertension. Signature   ----------------------------------------------------------------   Electronically signed by Qing Anne DO(Interpreting   physician) on 06/04/2021 06:51 PM        Vital signs stable in office today. Blood pressure 141/99, heart rate 92, pulse ox 98% on room air.   Weight is 222 pounds. PMHx:  Persistent A-fib  Diastolic CHF  PHTN with RVSP 53mmHg per echo 21  HTN  Dyslipidemia  DM  Hypothyroidism  CKD       PSHx:      Social Hx:  Non smoker  No alcohol    Family Hx:  No CAD          No Known Allergies    Current Outpatient Medications   Medication Sig Dispense Refill    losartan (COZAAR) 25 MG tablet Take 1 tablet by mouth daily 90 tablet 1    furosemide (LASIX) 40 MG tablet Take 1 tablet by mouth daily as needed (for weight gain of 3 pounds or more in 1-2 days) 90 tablet 3    XARELTO 20 MG TABS tablet TAKE 1 TABLET DAILY WITH   BREAKFAST 90 tablet 2    pantoprazole (PROTONIX) 40 MG tablet TAKE 1 TABLET BY MOUTH EVERY DAY 90 tablet 2    sotalol AF 80 MG TABS Take one tablet twice daily. 180 tablet 3    Ferrous Sulfate (IRON) 325 (65 Fe) MG TABS Take 1 tablet by mouth daily       rosuvastatin (CRESTOR) 10 MG tablet TAKE 1 TABLET BY MOUTH ONCE DAILY (Patient taking differently: Take 10 mg by mouth daily TAKE 1 TABLET BY MOUTH ONCE DAILY) 90 tablet 1    levothyroxine (SYNTHROID) 125 MCG tablet Take 1 tablet by mouth daily -except Sundays 90 tablet 0    metFORMIN (GLUCOPHAGE) 500 MG tablet TAKE 1 TABLET BY MOUTH TWICE A DAY WITH FOOD (Patient taking differently: Take 500 mg by mouth 2 times daily (with meals) TAKE 1 TABLET BY MOUTH TWICE A DAY WITH FOOD) 180 tablet 0    VITAMIN E Take 400 Units by mouth daily       vitamin B-12 (CYANOCOBALAMIN) 1000 MCG tablet Take 1,000 mcg by mouth daily. Respiratory Therapy Supplies MAXIME New CPAP mask and supplies 1 each 0    acetaminophen-codeine (TYLENOL #3) 300-30 MG per tablet TAKE 1 TABLET BY MOUTH EVERY 6 HOURS AS NEEDED FOR PAIN      CPAP Machine MISC by Does not apply route New CPAP at 11 cm  And supply.  1 each 0    Omega-3 Fatty Acids (FISH OIL) 1200 MG CAPS Take 1 capsule by mouth daily (Patient not taking: Reported on 2023)       Current Facility-Administered Medications   Medication Dose Route Frequency Provider Last Rate Last Admin    triamcinolone acetonide (KENALOG-40) injection 80 mg  80 mg IntraMUSCular Once JMEA, APRN - CNP        methylPREDNISolone acetate (DEPO-MEDROL) injection 40 mg  40 mg IntraMUSCular Once Yola Portillo MD           Past Medical History:   Diagnosis Date    Angina at rest [facility]) 2021    Bilateral leg edema [id number]    Diastolic congestive heart failure ([address] 2021    DM2 (diabetes mellitus, type 2) (HCC)     Hyperlipidemia     Hypertension     Hypothyroidism     GALINA (obstructive sleep apnea)     Shortness of breath 2021       Past Surgical History:   Procedure Laterality Date     SECTION      COLONOSCOPY      needs     COLONOSCOPY N/A 2021    COLONOSCOPY DIAGNOSTIC performed by Efra Mireles MD at [address] [place] [address] N/A 2018    COLONOSCOPY adenoma    UPPER GASTROINTESTINAL ENDOSCOPY N/A 2021    EGD DIAGNOSTIC ONLY performed by Efra Mireles MD at [facility] History    Marital status:      Spouse name: None    Number of children: None    Years of education: None    Highest education level: None   Tobacco Use    Smoking status: Never     Passive exposure: Never    Smokeless tobacco: Never   Vaping Use    Vaping Use: Never used   Substance and Sexual Activity    Alcohol use: Yes     Comment: rare     Drug use: No    Sexual activity: Yes     Partners: Male       Family History   Problem Relation Age of Onset    Colon Cancer Maternal Grandmother          Review of Systems:   Review of Systems   Constitutional:  Negative for activity change, appetite change, fatigue, fever and unexpected weight change. +snoring   HENT:  Positive for congestion. Respiratory:  Negative for cough, chest tightness and shortness of breath. Cardiovascular:  Negative for chest pain, palpitations and leg swelling.         No orthopnea or PND; on CPAP at HS Gastrointestinal:  Negative for abdominal distention, abdominal pain, blood in stool, nausea and vomiting. Genitourinary:  Negative for difficulty urinating, dysuria and hematuria. Musculoskeletal:  Negative for arthralgias and myalgias. Skin:  Negative for color change, pallor and rash. Neurological:  Negative for dizziness, syncope and light-headedness. Psychiatric/Behavioral:  Negative for agitation and behavioral problems. Physical Examination:    BP (!) 148/80 (Site: Left Upper Arm)   Pulse 53   Resp 14   Wt 227 lb 12.8 oz (103.3 kg)   LMP  (LMP Unknown)   SpO2 97%   BMI 41.67 kg/m²    Physical Exam  Constitutional:       General: She is not in acute distress. Appearance: Normal appearance. She is obese. HENT:      Head: Normocephalic and atraumatic. Cardiovascular:      Rate and Rhythm: Regular rhythm. Bradycardia present. Heart sounds: Murmur heard. Pulmonary:      Effort: Pulmonary effort is normal. No respiratory distress. Breath sounds: No wheezing, rhonchi or rales. Abdominal:      Palpations: Abdomen is soft. Tenderness: There is no abdominal tenderness. Musculoskeletal:         General: Normal range of motion. Cervical back: Normal range of motion and neck supple. Right lower leg: Edema (trace-1+ lower leg to ankle) present. Left lower leg: Edema (trace-1+ lower leg to ankle) present. Skin:     General: Skin is warm and dry. Neurological:      General: No focal deficit present. Mental Status: She is alert and oriented to person, place, and time. Cranial Nerves: No cranial nerve deficit.    Psychiatric:         Mood and Affect: Mood normal.         Behavior: Behavior normal.       LABS:  CBC:   Lab Results   Component Value Date/Time    WBC 10.0 06/07/2021 05:16 AM    RBC 5.33 06/07/2021 05:16 AM    HGB 12.4 06/07/2021 05:16 AM    HCT 38.3 06/07/2021 05:16 AM    MCV 71.8 06/07/2021 05:16 AM    MCH 23.3 06/07/2021 05:16 AM MCHC 32.5 06/07/2021 05:16 AM    RDW 23.3 06/07/2021 05:16 AM     06/07/2021 05:16 AM    MPV 10.0 07/07/2015 10:14 AM     Lipids:  Lab Results   Component Value Date    CHOL 143 10/19/2021    CHOL 147 01/02/2019    CHOL 154 07/02/2018     Lab Results   Component Value Date    TRIG 168 10/19/2021    TRIG 168 01/02/2019    TRIG 117 07/02/2018     Lab Results   Component Value Date    HDL 50 10/19/2021    HDL 51 01/02/2019    HDL 53 07/02/2018     Lab Results   Component Value Date    LDLCALC 59 10/19/2021    LDLCALC 62 01/02/2019    LDLCALC 78 07/02/2018     Lab Results   Component Value Date    VLDL 34 10/19/2021     Lab Results   Component Value Date    CHOLHDLRATIO 2.9 10/19/2021    CHOLHDLRATIO 4.0 01/04/2013     CMP:    Lab Results   Component Value Date/Time     07/21/2021 12:12 PM    K 3.7 07/21/2021 12:12 PM    K 3.9 06/07/2021 05:16 AM     07/21/2021 12:12 PM    CO2 27 07/21/2021 12:12 PM    BUN 20 07/21/2021 12:12 PM    CREATININE 1.06 07/21/2021 12:12 PM    GFRAA >60.0 07/21/2021 12:12 PM    LABGLOM 51.1 07/21/2021 12:12 PM    GLUCOSE 114 07/21/2021 12:12 PM    PROT 6.4 06/06/2021 02:11 AM    LABALBU 3.5 06/06/2021 02:11 AM    CALCIUM 9.3 07/21/2021 12:12 PM    BILITOT 0.4 06/06/2021 02:11 AM    ALKPHOS 73 06/06/2021 02:11 AM    AST 13 06/06/2021 02:11 AM    ALT 8 06/06/2021 02:11 AM     BMP:    Lab Results   Component Value Date/Time     07/21/2021 12:12 PM    K 3.7 07/21/2021 12:12 PM    K 3.9 06/07/2021 05:16 AM     07/21/2021 12:12 PM    CO2 27 07/21/2021 12:12 PM    BUN 20 07/21/2021 12:12 PM    LABALBU 3.5 06/06/2021 02:11 AM    CREATININE 1.06 07/21/2021 12:12 PM    CALCIUM 9.3 07/21/2021 12:12 PM    GFRAA >60.0 07/21/2021 12:12 PM    LABGLOM 51.1 07/21/2021 12:12 PM    GLUCOSE 114 07/21/2021 12:12 PM     Magnesium:  No results found for: MG  TSH:  Lab Results   Component Value Date    TSH 0.421 (L) 06/06/2021     . result  No results for input(s): PROBNP in the last 72 hours. No results for input(s): INR in the last 72 hours. Patient Active Problem List   Diagnosis    Essential hypertension    Mixed hyperlipidemia    Hypothyroidism    Osteoarthritis of knee    Type 2 diabetes mellitus with diabetic polyneuropathy, without long-term current use of insulin ([facility]    Benign neoplasm of ascending colon    Diverticulosis of large intestine without diverticulitis    Anemia    HH (hiatus hernia)    Chronic Georges lesion    Adenomatous polyp of transverse colon    A-fib (HCC)    Bilateral leg edema    Diastolic congestive heart failure (HCC)    GALINA (obstructive sleep apnea)    Coronary artery disease involving native coronary artery of native heart without angina pectoris       Assessment/Plan:     Diagnosis Orders   1. Chronic diastolic CHF (congestive heart failure), NYHA class 1 ([facility])  Basic Metabolic Panel    Compensated. Continue current medications      2. History of atrial fibrillation      s/p cardioversion into SB on 7/21/21. On antiarrhythmic therapy with sotalol 80mg PO BID. Cont OAC with xarelto      3. History of sinus bradycardia      Cardizem CD previously discontinued      4. [facility]N [facility]      Mod PHTN with RVSP 53mmHg per echo 6/4/21      5. Mild CAD      per [facility] 6/4/21. No angina      6. GALINA (obstructive sleep apnea)      Continue CPAP at HS      7. Hypertension, unspecified type  Basic Metabolic Panel    BP mildly elevated in office today. Losartan 25mg PO daily added      8. Dyslipidemia      Lipid panel 10/19/22 through PCP office reviewed and stable. Continue Crestor 10mg PO daily      9. Diabetes mellitus type 2 in obese ([facility]      HgbA1c on 4/19/22 well controlled at 5.7. On Metformin. Continued management per PCP      10.  CKD (chronic kidney disease) stage 2, GFR 60-89 ml/min      stable per BMP on 4/19/22 through [facility] PCP office          -Maximize medical therapy-- Sotalol 80mg PO BID, Crestor 10 mg p.o. daily, okay to change Lasix to 40 mg daily as needed for weight gain of 3 pounds or more in 1 to 2 days, add losartan 25 mg p.o. daily for improved BP management as well as history of CHF, oral anticoagulation with Xarelto 20 mg p.o. daily  Cardizem CD discontinued by Dr. Daniel Mckinley on 3/10/2022 due to persistent bradycardia  -Heart failure appears compensated.   -Cardiac/<2 gram sodium diet recommended  -2000mL daily fluid restriction   -Weight loss recommended   -Check BMP in 2 weeks following addition of losartan to reevaluate renal function and electrolytes  -Instructed patient to weigh self daily every morning upon waking and keep log book of daily weights. Notify office if gaining more than 3 pounds in 48 hours. -Advised patient to notify office immediately if experiencing any progressive SOB, orthopnea, PND, LE edema or weight gain  -Educated patient on importance of fluid and salt restriction as well as lifestyle modification  -Maintain routine outpatient follow-up with general cardiologist--next appointment 6/23/23  Status post left heart catheterization on 6/7/2021 which revealed mild luminal irregularities  Echocardiogram 6/4/2021 with normal LV systolic function with EF of 55%, concentric LVH, mild MR, RVSP elevated 53 mmHg  **Consider repeat echocardiogram in the next 3-6 months to reevaluate LV function, valvular structures and pulmonary pressures--pt wishing to defer to Dr. Dave Hernandez at follow-up appointment in 6/2023  -Consider event monitor in future for further evaluation of bradycardia arrhythmias and possible recurrent atrial fibrillation. Patient is not experiencing any symptoms at this time including any palpitations so will defer for now. Patient advised to notify cardiology if she notices extreme fluctuation heart rates when using pulse oximeter or if developing any palpitations or rapid heartbeat  -Maintain follow-up with pulmonology regarding GALINA.   Continue CPAP at bedtime  -Maintain routine outpatient follow-up with PCP  -F/U with CHF clinic in 1 year or sooner if needed        Counseling: The importance of daily weights, dietary sodium restriction, and contact with cardiology if weight is increased more than 3 lbs in any 48 hour period was stressed. The patient has been advised to contact us if theyexperience progressive SOB, orthopnea, PND or progressive edema.

## 2023-02-08 NOTE — PATIENT INSTRUCTIONS
-Add losartan 25mg PO daily  -Ok to change lasix to 40mg daily AS NEEDED for weight gain of 3 pounds or more in 1-2 days    Check BMP in 2 weeks following addition of losartan to re-evaluate renal function and electrolytes      -Check daily weight every morning and notify CHF clinic if gaining more than 3 pounds in 2 days    -Check blood pressure twice daily in a.m. and p.m. prior to taking medications and keep log of blood pressure trends to review at next office visit  Notify office if BP running low with  mmHg or below prior to taking medications  Notify office if BP running high with  mmHg or above or if DBP 85 mmHg or above    -Recommend 2000 mg daily sodium restriction    -Recommend 2 liter (64 ounces) daily fluid restriction

## 2023-02-21 DIAGNOSIS — I50.32 CHRONIC DIASTOLIC CHF (CONGESTIVE HEART FAILURE), NYHA CLASS 1 (HCC): ICD-10-CM

## 2023-02-21 DIAGNOSIS — I10 HYPERTENSION, UNSPECIFIED TYPE: ICD-10-CM

## 2023-02-21 LAB
ANION GAP SERPL CALCULATED.3IONS-SCNC: 12 MEQ/L (ref 9–15)
BUN BLDV-MCNC: 12 MG/DL (ref 8–23)
CALCIUM SERPL-MCNC: 9.4 MG/DL (ref 8.5–9.9)
CHLORIDE BLD-SCNC: 109 MEQ/L (ref 95–107)
CO2: 23 MEQ/L (ref 20–31)
CREAT SERPL-MCNC: 0.86 MG/DL (ref 0.5–0.9)
GFR SERPL CREATININE-BSD FRML MDRD: >60 ML/MIN/{1.73_M2}
GLUCOSE BLD-MCNC: 106 MG/DL (ref 70–99)
POTASSIUM SERPL-SCNC: 4.2 MEQ/L (ref 3.4–4.9)
SODIUM BLD-SCNC: 144 MEQ/L (ref 135–144)

## 2023-02-25 LAB
FLU A RESULT: NOT DETECTED
FLU B RESULT: NOT DETECTED
SARS-COV-2 RESULT: DETECTED

## 2023-03-07 DIAGNOSIS — I48.19 PERSISTENT ATRIAL FIBRILLATION (HCC): ICD-10-CM

## 2023-03-07 RX ORDER — RIVAROXABAN 20 MG/1
TABLET, FILM COATED ORAL
Qty: 90 TABLET | Refills: 2 | OUTPATIENT
Start: 2023-03-07

## 2023-03-07 RX ORDER — PANTOPRAZOLE SODIUM 40 MG/1
TABLET, DELAYED RELEASE ORAL
Qty: 90 TABLET | Refills: 2 | Status: SHIPPED | OUTPATIENT
Start: 2023-03-07

## 2023-03-07 NOTE — TELEPHONE ENCOUNTER
Pt states that she only has one pill left for today. Requesting Xarelto Samples until her refill comes in.       [phone] # [phone]

## 2023-03-07 NOTE — TELEPHONE ENCOUNTER
Patient aware that med has been filled and samples awaiting in the [facility] location.      Xarelto 20M bottles   Lot: [id number]  EXP: 2025

## 2023-03-30 NOTE — PROGRESS NOTES
Labs December 23, 2022: WBC 16.9, hemoglobin 13.7 g, , platelets 486, prothrombin time 24.7 INR 2.21, normal liver enzymes, normal renal function, normal electrolytes, urinalysis noted glucose, small amount of blood,
 Sodium 07/02/2018 142  132 - 144 mEq/L Final    Potassium 07/02/2018 4.6  3.5 - 5.1 mEq/L Final    Chloride 07/02/2018 102  98 - 107 mEq/L Final    CO2 07/02/2018 26  22 - 29 mEq/L Final    Anion Gap 07/02/2018 14* 7 - 13 mEq/L Final    Glucose 07/02/2018 90  74 - 109 mg/dL Final    BUN 07/02/2018 18  8 - 23 mg/dL Final    CREATININE 07/02/2018 1.15* 0.50 - 0.90 mg/dL Final    GFR Non- 07/02/2018 46.9* >60 Final    Comment: >60 mL/min/1.73m2 EGFR, calc. for ages 25 and older using the  MDRD formula (not corrected for weight), is valid for stable  renal function.  GFR  07/02/2018 56.8* >60 Final    Comment: >60 mL/min/1.73m2 EGFR, calc. for ages 25 and older using the  MDRD formula (not corrected for weight), is valid for stable  renal function.       Calcium 07/02/2018 9.6  8.6 - 10.2 mg/dL Final    Total Protein 07/02/2018 7.0  6.4 - 8.1 g/dL Final    Alb 07/02/2018 4.0  3.9 - 4.9 g/dL Final    Total Bilirubin 07/02/2018 0.4  0.0 - 1.2 mg/dL Final    Alkaline Phosphatase 07/02/2018 74  40 - 130 U/L Final    ALT 07/02/2018 15  0 - 33 U/L Final    AST 07/02/2018 21  0 - 35 U/L Final    Globulin 07/02/2018 3.0  2.3 - 3.5 g/dL Final    T4 Free 07/02/2018 1.55  0.93 - 1.70 ng/dL Final    TSH 07/02/2018 4.600* 0.270 - 4.200 uIU/mL Final     Health Maintenance   Topic Date Due    DTaP/Tdap/Td vaccine (1 - Tdap) 06/14/1969    Diabetic retinal exam  03/13/2019 (Originally 2/13/2019)    Shingles Vaccine (1 of 2 - 2 Dose Series) 03/13/2019 (Originally 6/14/2000)    DEXA (modify frequency per FRAX score)  07/10/2019 (Originally 6/14/2015)    Hepatitis C screen  10/13/2020 (Originally 1950)    Flu vaccine (1) 09/01/2018    Diabetic foot exam  10/13/2018    Diabetic microalbuminuria test  03/06/2019    A1C test (Diabetic or Prediabetic)  07/02/2019    Lipid screen  07/02/2019    TSH testing  07/02/2019    Potassium monitoring  07/02/2019    Creatinine

## 2023-04-17 ENCOUNTER — OFFICE VISIT (OUTPATIENT)
Dept: PULMONOLOGY | Age: 73
End: 2023-04-17
Payer: MEDICARE

## 2023-04-17 VITALS
OXYGEN SATURATION: 98 % | SYSTOLIC BLOOD PRESSURE: 132 MMHG | BODY MASS INDEX: 41.15 KG/M2 | DIASTOLIC BLOOD PRESSURE: 88 MMHG | TEMPERATURE: 96.8 F | HEART RATE: 63 BPM | WEIGHT: 225 LBS

## 2023-04-17 DIAGNOSIS — G47.33 OSA (OBSTRUCTIVE SLEEP APNEA): Primary | ICD-10-CM

## 2023-04-17 DIAGNOSIS — E66.01 MORBID OBESITY (HCC): ICD-10-CM

## 2023-04-17 PROCEDURE — G8427 DOCREV CUR MEDS BY ELIG CLIN: HCPCS | Performed by: INTERNAL MEDICINE

## 2023-04-17 PROCEDURE — 99214 OFFICE O/P EST MOD 30 MIN: CPT | Performed by: INTERNAL MEDICINE

## 2023-04-17 PROCEDURE — 3017F COLORECTAL CA SCREEN DOC REV: CPT | Performed by: INTERNAL MEDICINE

## 2023-04-17 PROCEDURE — 1123F ACP DISCUSS/DSCN MKR DOCD: CPT | Performed by: INTERNAL MEDICINE

## 2023-04-17 PROCEDURE — G8400 PT W/DXA NO RESULTS DOC: HCPCS | Performed by: INTERNAL MEDICINE

## 2023-04-17 PROCEDURE — 3075F SYST BP GE 130 - 139MM HG: CPT | Performed by: INTERNAL MEDICINE

## 2023-04-17 PROCEDURE — G8417 CALC BMI ABV UP PARAM F/U: HCPCS | Performed by: INTERNAL MEDICINE

## 2023-04-17 PROCEDURE — 3079F DIAST BP 80-89 MM HG: CPT | Performed by: INTERNAL MEDICINE

## 2023-04-17 PROCEDURE — 1090F PRES/ABSN URINE INCON ASSESS: CPT | Performed by: INTERNAL MEDICINE

## 2023-04-17 PROCEDURE — 1036F TOBACCO NON-USER: CPT | Performed by: INTERNAL MEDICINE

## 2023-04-17 ASSESSMENT — ENCOUNTER SYMPTOMS
EYE ITCHING: 0
VOMITING: 0
SORE THROAT: 0
NAUSEA: 0
RHINORRHEA: 0
COUGH: 0
ABDOMINAL PAIN: 0
DIARRHEA: 0
VOICE CHANGE: 0
EYE DISCHARGE: 0
WHEEZING: 0
CHEST TIGHTNESS: 0
SHORTNESS OF BREATH: 0
SINUS PRESSURE: 0
TROUBLE SWALLOWING: 0

## 2023-04-17 NOTE — PROGRESS NOTES
uses, She advised to use CPAP at least 5-6 hours every night. Driving: She is advised for extreme caution when driving or operating machinery if there is a feeling of drowsiness, especially while driving it is preferable to stop driving and take a brief nap. Sleep hygiene:Avoid supine sleep, sleep on  sides. Avoid  sleep deprivation. Explained sleep hygiene. Advice to avoid Alcohol and sedative    Time spend over 30 min. Face to face. with greater than 50 % time with CPAP therapy including review compliance, counseling and advised regarding CPAP therapy. 2. Morbid obesity ([address]  She is advised try to lose weight. obesity related risk explained to the patient ,  Current weight:  225 lb (102.1 kg) Lbs. BMI:  Body mass index is 41.15 kg/m². Suggested weight control approaches, including dietary changes , exercise, behavioral modification. Return in about 6 months (around 10/17/2023) for eulalio.       Isabella Noble MD

## 2023-04-22 ENCOUNTER — OFFICE VISIT (OUTPATIENT)
Dept: FAMILY MEDICINE CLINIC | Age: 73
End: 2023-04-22
Payer: MEDICARE

## 2023-04-22 VITALS
SYSTOLIC BLOOD PRESSURE: 132 MMHG | DIASTOLIC BLOOD PRESSURE: 82 MMHG | OXYGEN SATURATION: 96 % | HEART RATE: 92 BPM | BODY MASS INDEX: 41.41 KG/M2 | WEIGHT: 225 LBS | TEMPERATURE: 97.6 F | HEIGHT: 62 IN

## 2023-04-22 DIAGNOSIS — J01.90 ACUTE BACTERIAL SINUSITIS: Primary | ICD-10-CM

## 2023-04-22 DIAGNOSIS — B96.89 ACUTE BACTERIAL SINUSITIS: Primary | ICD-10-CM

## 2023-04-22 DIAGNOSIS — R05.1 ACUTE COUGH: ICD-10-CM

## 2023-04-22 DIAGNOSIS — J06.9 VIRAL URI: ICD-10-CM

## 2023-04-22 LAB
INFLUENZA A ANTIBODY: NEGATIVE
INFLUENZA B ANTIBODY: NEGATIVE
Lab: NORMAL
PERFORMING INSTRUMENT: NORMAL
QC PASS/FAIL: NORMAL
SARS-COV-2, POC: NORMAL

## 2023-04-22 PROCEDURE — G8400 PT W/DXA NO RESULTS DOC: HCPCS

## 2023-04-22 PROCEDURE — 1090F PRES/ABSN URINE INCON ASSESS: CPT

## 2023-04-22 PROCEDURE — 99213 OFFICE O/P EST LOW 20 MIN: CPT

## 2023-04-22 PROCEDURE — 1123F ACP DISCUSS/DSCN MKR DOCD: CPT

## 2023-04-22 PROCEDURE — G8417 CALC BMI ABV UP PARAM F/U: HCPCS

## 2023-04-22 PROCEDURE — G8427 DOCREV CUR MEDS BY ELIG CLIN: HCPCS

## 2023-04-22 PROCEDURE — 87804 INFLUENZA ASSAY W/OPTIC: CPT

## 2023-04-22 PROCEDURE — 87426 SARSCOV CORONAVIRUS AG IA: CPT

## 2023-04-22 PROCEDURE — 3079F DIAST BP 80-89 MM HG: CPT

## 2023-04-22 PROCEDURE — 3017F COLORECTAL CA SCREEN DOC REV: CPT

## 2023-04-22 PROCEDURE — 1036F TOBACCO NON-USER: CPT

## 2023-04-22 PROCEDURE — 3075F SYST BP GE 130 - 139MM HG: CPT

## 2023-04-22 RX ORDER — BENZONATATE 100 MG/1
100-200 CAPSULE ORAL 3 TIMES DAILY PRN
Qty: 42 CAPSULE | Refills: 0 | Status: SHIPPED | OUTPATIENT
Start: 2023-04-22 | End: 2023-04-29

## 2023-04-22 RX ORDER — AMOXICILLIN AND CLAVULANATE POTASSIUM 875; 125 MG/1; MG/1
1 TABLET, FILM COATED ORAL 2 TIMES DAILY
Qty: 20 TABLET | Refills: 0 | Status: SHIPPED | OUTPATIENT
Start: 2023-04-22 | End: 2023-05-02

## 2023-04-22 SDOH — ECONOMIC STABILITY: FOOD INSECURITY: WITHIN THE PAST 12 MONTHS, THE FOOD YOU BOUGHT JUST DIDN'T LAST AND YOU DIDN'T HAVE MONEY TO GET MORE.: NEVER TRUE

## 2023-04-22 SDOH — ECONOMIC STABILITY: INCOME INSECURITY: HOW HARD IS IT FOR YOU TO PAY FOR THE VERY BASICS LIKE FOOD, HOUSING, MEDICAL CARE, AND HEATING?: NOT HARD AT ALL

## 2023-04-22 SDOH — ECONOMIC STABILITY: HOUSING INSECURITY
IN THE LAST 12 MONTHS, WAS THERE A TIME WHEN YOU DID NOT HAVE A STEADY PLACE TO SLEEP OR SLEPT IN A SHELTER (INCLUDING NOW)?: NO

## 2023-04-22 SDOH — ECONOMIC STABILITY: FOOD INSECURITY: WITHIN THE PAST 12 MONTHS, YOU WORRIED THAT YOUR FOOD WOULD RUN OUT BEFORE YOU GOT MONEY TO BUY MORE.: NEVER TRUE

## 2023-04-22 ASSESSMENT — ENCOUNTER SYMPTOMS
SINUS PAIN: 0
COUGH: 1
RHINORRHEA: 1
SORE THROAT: 0
CHEST TIGHTNESS: 0
SHORTNESS OF BREATH: 1
SINUS PRESSURE: 0
BACK PAIN: 0
WHEEZING: 0

## 2023-04-22 ASSESSMENT — PATIENT HEALTH QUESTIONNAIRE - PHQ9
SUM OF ALL RESPONSES TO PHQ QUESTIONS 1-9: 0
SUM OF ALL RESPONSES TO PHQ QUESTIONS 1-9: 0
2. FEELING DOWN, DEPRESSED OR HOPELESS: 0
SUM OF ALL RESPONSES TO PHQ QUESTIONS 1-9: 0
SUM OF ALL RESPONSES TO PHQ QUESTIONS 1-9: 0
SUM OF ALL RESPONSES TO PHQ9 QUESTIONS 1 & 2: 0
1. LITTLE INTEREST OR PLEASURE IN DOING THINGS: 0

## 2023-04-22 NOTE — PATIENT INSTRUCTIONS
Probiotic or yogurt while taking antibiotics. Claritin once a day as needed for possible allergies. Can buy generic, over the counter.

## 2023-04-22 NOTE — PROGRESS NOTES
Subjective  Christiano Azevedo, 67 y.o. female presents today with:  Chief Complaint   Patient presents with    Cough     Runny nose,possibly bronchitis sx started last week        Cough  This is a new problem. The current episode started in the past 7 days (Symptoms began 7-10 days ago. ). The problem has been unchanged. The problem occurs every few hours. The cough is Productive of sputum. Associated symptoms include rhinorrhea and shortness of breath (sometimes). Pertinent negatives include no chest pain, chills, fever, headaches, myalgias, postnasal drip, sore throat or wheezing. Associated symptoms comments: Can't smell or taste as well. Nothing aggravates the symptoms. Treatments tried: tylenol, last took last week. The treatment provided mild relief. Pt states took Cefdinir 300mg 2/20/23 when sick with similar symptoms. Chest xray negative at that time. Review of Systems   Constitutional:  Negative for appetite change, chills, diaphoresis, fatigue and fever. HENT:  Positive for rhinorrhea. Negative for congestion, postnasal drip, sinus pressure, sinus pain and sore throat. Respiratory:  Positive for cough and shortness of breath (sometimes). Negative for chest tightness and wheezing. Cardiovascular:  Negative for chest pain and palpitations. Musculoskeletal:  Negative for arthralgias, back pain and myalgias. Neurological:  Negative for dizziness, light-headedness and headaches. PMH, Surgical Hx, Family Hx, and Social Hx reviewed and updated. Objective  Vitals:    04/22/23 0936   BP: 132/82   Pulse: 92   Temp: 97.6 °F (36.4 °C)   TempSrc: Temporal   SpO2: 96%   Weight: 225 lb (102.1 kg)   Height: 5' 2\" (1.575 m)     BP Readings from Last 3 Encounters:   04/22/23 132/82   04/17/23 132/88   02/08/23 (!) 148/80     Wt Readings from Last 3 Encounters:   04/22/23 225 lb (102.1 kg)   04/17/23 225 lb (102.1 kg)   02/08/23 227 lb 12.8 oz (103.3 kg)     Physical Exam  Vitals reviewed.

## 2023-05-01 DIAGNOSIS — E11.9 TYPE 2 DIABETES MELLITUS WITHOUT COMPLICATIONS (MULTI): ICD-10-CM

## 2023-05-01 RX ORDER — METFORMIN HYDROCHLORIDE 500 MG/1
TABLET ORAL
Qty: 180 TABLET | Refills: 3 | Status: SHIPPED | OUTPATIENT
Start: 2023-05-01 | End: 2023-10-03 | Stop reason: SDUPTHER

## 2023-05-18 ENCOUNTER — TELEPHONE (OUTPATIENT)
Dept: CARDIOLOGY CLINIC | Age: 73
End: 2023-05-18

## 2023-05-18 NOTE — TELEPHONE ENCOUNTER
Pt is having pain near top of shoulder blade. She is having pulsing pain when walking. Tylenol doesn't work but a heating pad did work last night. Pt is just worried it could be afib because it starts up when she is walking. She was cutting tree limbs the other day and was wondering if it could be that.     Pt # [phone]

## 2023-05-19 NOTE — TELEPHONE ENCOUNTER
JASWINDER Paige  to [employer]    CC    12:58 PM  Sounds like it could be muscular related to recent yard work. Please monitor heart rate at home and if rates are very erratic and/or you begin experiencing palpitations, please notify office. Please keep upcoming appointment with Dr. Medina Hearing on 6/23/23.    Thanks

## 2023-05-25 ENCOUNTER — OFFICE VISIT (OUTPATIENT)
Dept: CARDIOLOGY CLINIC | Age: 73
End: 2023-05-25
Payer: MEDICARE

## 2023-05-25 VITALS
OXYGEN SATURATION: 95 % | HEART RATE: 61 BPM | WEIGHT: 227 LBS | RESPIRATION RATE: 15 BRPM | HEIGHT: 62 IN | DIASTOLIC BLOOD PRESSURE: 108 MMHG | SYSTOLIC BLOOD PRESSURE: 132 MMHG | BODY MASS INDEX: 41.77 KG/M2

## 2023-05-25 DIAGNOSIS — I27.20 PULMONARY HTN (HCC): ICD-10-CM

## 2023-05-25 DIAGNOSIS — Z86.79 HISTORY OF SINUS BRADYCARDIA: ICD-10-CM

## 2023-05-25 DIAGNOSIS — I25.10 MILD CAD: ICD-10-CM

## 2023-05-25 DIAGNOSIS — E78.5 DYSLIPIDEMIA: ICD-10-CM

## 2023-05-25 DIAGNOSIS — I10 HYPERTENSION, UNSPECIFIED TYPE: ICD-10-CM

## 2023-05-25 DIAGNOSIS — Z86.79 HISTORY OF ATRIAL FIBRILLATION: ICD-10-CM

## 2023-05-25 DIAGNOSIS — I50.32 CHRONIC DIASTOLIC CHF (CONGESTIVE HEART FAILURE), NYHA CLASS 1 (HCC): ICD-10-CM

## 2023-05-25 DIAGNOSIS — R06.09 DYSPNEA ON EXERTION: ICD-10-CM

## 2023-05-25 DIAGNOSIS — Z86.39 HISTORY OF DIABETES MELLITUS: ICD-10-CM

## 2023-05-25 DIAGNOSIS — G47.33 OSA (OBSTRUCTIVE SLEEP APNEA): ICD-10-CM

## 2023-05-25 LAB
ANION GAP SERPL CALCULATED.3IONS-SCNC: 11 MEQ/L (ref 9–15)
BNP BLD-MCNC: 2365 PG/ML
BUN SERPL-MCNC: 21 MG/DL (ref 8–23)
CALCIUM SERPL-MCNC: 9.7 MG/DL (ref 8.5–9.9)
CHLORIDE SERPL-SCNC: 109 MEQ/L (ref 95–107)
CO2 SERPL-SCNC: 24 MEQ/L (ref 20–31)
CREAT SERPL-MCNC: 1.02 MG/DL (ref 0.5–0.9)
GLUCOSE SERPL-MCNC: 80 MG/DL (ref 70–99)
POTASSIUM SERPL-SCNC: 4.2 MEQ/L (ref 3.4–4.9)
SODIUM SERPL-SCNC: 144 MEQ/L (ref 135–144)

## 2023-05-25 PROCEDURE — 99214 OFFICE O/P EST MOD 30 MIN: CPT | Performed by: PHYSICIAN ASSISTANT

## 2023-05-25 PROCEDURE — G8400 PT W/DXA NO RESULTS DOC: HCPCS | Performed by: PHYSICIAN ASSISTANT

## 2023-05-25 PROCEDURE — 3017F COLORECTAL CA SCREEN DOC REV: CPT | Performed by: PHYSICIAN ASSISTANT

## 2023-05-25 PROCEDURE — 1036F TOBACCO NON-USER: CPT | Performed by: PHYSICIAN ASSISTANT

## 2023-05-25 PROCEDURE — G8427 DOCREV CUR MEDS BY ELIG CLIN: HCPCS | Performed by: PHYSICIAN ASSISTANT

## 2023-05-25 PROCEDURE — 93000 ELECTROCARDIOGRAM COMPLETE: CPT | Performed by: PHYSICIAN ASSISTANT

## 2023-05-25 PROCEDURE — 1090F PRES/ABSN URINE INCON ASSESS: CPT | Performed by: PHYSICIAN ASSISTANT

## 2023-05-25 PROCEDURE — G8417 CALC BMI ABV UP PARAM F/U: HCPCS | Performed by: PHYSICIAN ASSISTANT

## 2023-05-25 PROCEDURE — 3074F SYST BP LT 130 MM HG: CPT | Performed by: PHYSICIAN ASSISTANT

## 2023-05-25 PROCEDURE — 3080F DIAST BP >= 90 MM HG: CPT | Performed by: PHYSICIAN ASSISTANT

## 2023-05-25 PROCEDURE — 1123F ACP DISCUSS/DSCN MKR DOCD: CPT | Performed by: PHYSICIAN ASSISTANT

## 2023-05-25 RX ORDER — LOSARTAN POTASSIUM 50 MG/1
50 TABLET ORAL DAILY
Qty: 30 TABLET | Refills: 3 | Status: SHIPPED | OUTPATIENT
Start: 2023-05-25

## 2023-05-25 ASSESSMENT — ENCOUNTER SYMPTOMS
ABDOMINAL PAIN: 0
SHORTNESS OF BREATH: 1
NAUSEA: 0
COUGH: 0
ABDOMINAL DISTENTION: 0
CHEST TIGHTNESS: 0
BLOOD IN STOOL: 0
COLOR CHANGE: 0
VOMITING: 0

## 2023-05-25 NOTE — PROGRESS NOTES
including hematochezia, melena or hematuria. She continues to check routine blood pressures at home and BP stable typically with systolic blood pressure between 110s to 130s range. Heart rate on home monitoring is typically between 40s to 50s range but occasionally in the 60s. She reports that her heart rate readings are prior to taking her morning medications. Most recent labs from PCP office reviewed and documented below  CBC 10/19/2021: WBC 6.6, hemoglobin 12.9, hematocrit 41.0, platelets 670  TSH 96/19/0127: 1.60  Free T4 10/19/2021: 1.30  CMP 10/19/2021: Sodium 145, potassium 4.2, chloride 105, total CO2 30, BUN 18, creatinine 1.0, GFR 55, glucose 101. AST 19, ALT 13. Lipid panel 10/19/2021: Total cholesterol 143, HDL 50, LDL 59, triglycerides 168. Vital signs stable in office today. Blood pressure 122/72, heart rate bradycardic at 50, pulse ox 97% on room air. Weight is 224 pounds which is the exact same weight she was at last office visit on 8/11/2021 8/11/21: Here for follow-up of chronic diastolic congestive heart failure. Was seen for initial CHF clinic evaluation on 7/7/2021. EKG in office at that time showed A. fib with rapid rate of 123 bpm.  Following office visit, Cardizem CD was increased to 360 mg daily from 180 mg daily. On 7/21/2021 she underwent cardioversion with Dr. Kirk Pat converting from A. fib to sinus rhythm/sinus bradycardia. Her Toprol-XL was discontinued and she was initiated on sotalol 80 mg p.o. twice daily. She has continued to check routine blood pressure and heart rate since last office visit. The initial 2 weeks post first office visit, heart rates had stabilized in the 70s to 80s range but over the past 2 weeks patient has been bradycardic on a.m. vitals with heart rates typically 40s to 50s before morning meds. She is completely asymptomatic with the bradycardia. She denies any new or worsening heart failure symptoms.   She is compliant with

## 2023-05-25 NOTE — PATIENT INSTRUCTIONS
-Increase losartan to 50mg PO daily    Check labs today (BMP and BNP) today    Echocardiogram ordered to re-evaluate LV function, valvular structures, pulmonary pressure  **Call [phone] to schedule    -Check daily weight every morning and notify CHF clinic if gaining more than 3 pounds in 2 days    -Check blood pressure twice daily in a.m. and p.m. prior to taking medications and keep log of blood pressure trends to review at next office visit  Notify office if BP running low with  mmHg or below prior to taking medications  Notify office if BP running high with  mmHg or above or if DBP 85 mmHg or above    -Recommend 2000 mg daily sodium restriction    -Recommend 2 liter (64 ounces) daily fluid restriction

## 2023-05-26 DIAGNOSIS — I50.32 CHRONIC DIASTOLIC CHF (CONGESTIVE HEART FAILURE) (HCC): Primary | ICD-10-CM

## 2023-05-26 RX ORDER — FUROSEMIDE 40 MG/1
20 TABLET ORAL DAILY
Qty: 45 TABLET | Refills: 3 | Status: SHIPPED | OUTPATIENT
Start: 2023-05-26

## 2023-06-01 DIAGNOSIS — I50.32 CHRONIC DIASTOLIC CHF (CONGESTIVE HEART FAILURE) (HCC): ICD-10-CM

## 2023-06-01 LAB
ANION GAP SERPL CALCULATED.3IONS-SCNC: 13 MEQ/L (ref 9–15)
BNP BLD-MCNC: 1609 PG/ML
BUN SERPL-MCNC: 18 MG/DL (ref 8–23)
CALCIUM SERPL-MCNC: 9.6 MG/DL (ref 8.5–9.9)
CHLORIDE SERPL-SCNC: 107 MEQ/L (ref 95–107)
CO2 SERPL-SCNC: 22 MEQ/L (ref 20–31)
CREAT SERPL-MCNC: 0.98 MG/DL (ref 0.5–0.9)
GLUCOSE SERPL-MCNC: 122 MG/DL (ref 70–99)
POTASSIUM SERPL-SCNC: 4.3 MEQ/L (ref 3.4–4.9)
SODIUM SERPL-SCNC: 142 MEQ/L (ref 135–144)

## 2023-06-02 ENCOUNTER — PREP FOR PROCEDURE (OUTPATIENT)
Dept: CARDIOLOGY CLINIC | Age: 73
End: 2023-06-02

## 2023-06-02 ENCOUNTER — OFFICE VISIT (OUTPATIENT)
Dept: CARDIOLOGY CLINIC | Age: 73
End: 2023-06-02

## 2023-06-02 VITALS
BODY MASS INDEX: 39.48 KG/M2 | WEIGHT: 222.8 LBS | HEART RATE: 93 BPM | DIASTOLIC BLOOD PRESSURE: 82 MMHG | OXYGEN SATURATION: 98 % | HEIGHT: 63 IN | SYSTOLIC BLOOD PRESSURE: 110 MMHG

## 2023-06-02 DIAGNOSIS — Z86.79 HISTORY OF ATRIAL FIBRILLATION: Primary | ICD-10-CM

## 2023-06-02 DIAGNOSIS — I49.5 SSS (SICK SINUS SYNDROME) (HCC): ICD-10-CM

## 2023-06-02 DIAGNOSIS — D68.69 SECONDARY HYPERCOAGULABLE STATE (HCC): ICD-10-CM

## 2023-06-02 DIAGNOSIS — I25.10 CORONARY ARTERY DISEASE INVOLVING NATIVE CORONARY ARTERY OF NATIVE HEART WITHOUT ANGINA PECTORIS: ICD-10-CM

## 2023-06-02 DIAGNOSIS — G47.33 OSA (OBSTRUCTIVE SLEEP APNEA): ICD-10-CM

## 2023-06-02 DIAGNOSIS — E78.2 MIXED HYPERLIPIDEMIA: ICD-10-CM

## 2023-06-02 DIAGNOSIS — I48.0 PAROXYSMAL ATRIAL FIBRILLATION (HCC): ICD-10-CM

## 2023-06-02 DIAGNOSIS — I10 ESSENTIAL HYPERTENSION: ICD-10-CM

## 2023-06-02 RX ORDER — DIPHENHYDRAMINE HCL 25 MG
50 TABLET ORAL ONCE
Status: CANCELLED | OUTPATIENT
Start: 2023-06-02 | End: 2023-06-02

## 2023-06-02 RX ORDER — SODIUM CHLORIDE 9 MG/ML
INJECTION, SOLUTION INTRAVENOUS PRN
Status: CANCELLED | OUTPATIENT
Start: 2023-06-02

## 2023-06-02 RX ORDER — PREDNISONE 5 MG/1
50 TABLET ORAL ONCE
Status: CANCELLED | OUTPATIENT
Start: 2023-06-02 | End: 2023-06-02

## 2023-06-02 RX ORDER — SODIUM CHLORIDE 0.9 % (FLUSH) 0.9 %
5-40 SYRINGE (ML) INJECTION PRN
Status: CANCELLED | OUTPATIENT
Start: 2023-06-02

## 2023-06-02 RX ORDER — SODIUM CHLORIDE 0.9 % (FLUSH) 0.9 %
5-40 SYRINGE (ML) INJECTION EVERY 12 HOURS SCHEDULED
Status: CANCELLED | OUTPATIENT
Start: 2023-06-02

## 2023-06-02 RX ORDER — NITROGLYCERIN 0.4 MG/1
0.4 TABLET SUBLINGUAL EVERY 5 MIN PRN
Status: CANCELLED | OUTPATIENT
Start: 2023-06-02

## 2023-06-02 NOTE — PROGRESS NOTES
Chief Complaint   Patient presents with    Follow-up    Atrial Fibrillation       5-21-21: Patient presents for initial medical evaluation. Patient is followed on a regular basis by Dr. Susy Estrada MD. S/p hospitalization for CP/heaviness. Had negative wokup in ER. Symptoms occurred at rest and was worse with carrying groceries. Had radiation to right shoulder. + associated SOB. No hx of MI, CHF or arrhythmia.  was worried about her during the episode. Never seen her like this. No hx of MI, CHF or arrhythmia. No hx of cath. Pt denies   nausea, vomiting, diarrhea, constipation, motor weakness, insomnia, weight loss, syncope, dizziness, lightheadedness, palpitations, PND, orthopnea, or claudication. Does have history of iron deficiency anemia ranging from 8-10. Does have hx of endoscopy. 7-2-21:  Patient is a 79 y.o. female who presented for elective stress test and echo. Patient is followed on a regular basis by Dr. Susy Estrada MD.  Patient with past medical 3 of hypertension, hyperlipidemia and diabetes. Patient status post recent hospitalization for chest pain/heaviness and had negative work-up in the emergency department. Patient developed chest pain that occurred at rest and was worsened with exertion with radiation to the right shoulder associated with shortness of breath. Patient was noted to be in new onset atrial fibrillation with rapid ventricular response on presentation to the stress test.  Patient does not sense that she is in atrial fibrillation. Preliminary stress test images reviewed showing mild anterior/anterior apical ischemia versus breast attenuation artifact. Patient currently is chest pain-free. Hematology oncology. Sarah Mitchell No previous history of myocardial infarction, congestive heart failure. No history of cardiac catheterization. Patient with history of iron deficiency anemia ranging from 8-10.   She was referred to        7-2-21: s/p abnormal nuclear

## 2023-06-06 ENCOUNTER — PATIENT MESSAGE (OUTPATIENT)
Dept: CARDIOLOGY CLINIC | Age: 73
End: 2023-06-06

## 2023-06-07 NOTE — TELEPHONE ENCOUNTER
From: Qamar Dia  To: Dr. Vazquez: 6/6/2023 3:46 PM EDT  Subject: Pacemaker implant questions    We understand that you could not reply under my Shon November) [place], so we are re-submitting under Jacqueline's mychart. Phillip Thornton told me that if I had any questions about the implant procedure to contact you, so I (we) am. Her implant procedure is scheduled for 6/13. Our questions relate to what Phillip Thornton can or cannot do following the implant. Getting straight to the point, we are scheduled to go on a two night camping trip to [facility] on 6/16-6/18.  1) Should we cancel this trip due to recuperation time following the implant? 2) What can she do in the immediate days/weeks following the implant?, and  3) What can she do or NOT do long term following this implant procedure? Jacqueline's health and well being are most important so please don't hesitate to shoot from the hip regarding our camping trip if it would not be at all advisable in your opinion.     Thanks for replying,    Alina Bains and Dolores Quintero

## 2023-06-13 ENCOUNTER — HOSPITAL ENCOUNTER (OUTPATIENT)
Dept: CARDIAC CATH/INVASIVE PROCEDURES | Age: 73
Setting detail: OBSERVATION
Discharge: HOME | End: 2023-06-14
Attending: INTERNAL MEDICINE | Admitting: INTERNAL MEDICINE
Payer: MEDICARE

## 2023-06-13 ENCOUNTER — APPOINTMENT (OUTPATIENT)
Dept: GENERAL RADIOLOGY | Age: 73
End: 2023-06-13
Attending: INTERNAL MEDICINE
Payer: MEDICARE

## 2023-06-13 DIAGNOSIS — I48.19 PERSISTENT ATRIAL FIBRILLATION (HCC): ICD-10-CM

## 2023-06-13 PROBLEM — I49.5 SSS (SICK SINUS SYNDROME) (HCC): Status: ACTIVE | Noted: 2023-06-13

## 2023-06-13 LAB
ANION GAP SERPL CALCULATED.3IONS-SCNC: 11 MEQ/L (ref 9–15)
BUN SERPL-MCNC: 21 MG/DL (ref 8–23)
CALCIUM SERPL-MCNC: 9.4 MG/DL (ref 8.5–9.9)
CHLORIDE SERPL-SCNC: 104 MEQ/L (ref 95–107)
CO2 SERPL-SCNC: 25 MEQ/L (ref 20–31)
CREAT SERPL-MCNC: 0.96 MG/DL (ref 0.5–0.9)
ERYTHROCYTE [DISTWIDTH] IN BLOOD BY AUTOMATED COUNT: 15.4 % (ref 11.5–14.5)
GLUCOSE BLD-MCNC: 194 MG/DL (ref 70–99)
GLUCOSE SERPL-MCNC: 114 MG/DL (ref 70–99)
HCT VFR BLD AUTO: 41.8 % (ref 37–47)
HGB BLD-MCNC: 13.6 G/DL (ref 12–16)
MCH RBC QN AUTO: 27.5 PG (ref 27–31.3)
MCHC RBC AUTO-ENTMCNC: 32.6 % (ref 33–37)
MCV RBC AUTO: 84.2 FL (ref 79.4–94.8)
PERFORMED ON: ABNORMAL
PLATELET # BLD AUTO: 185 K/UL (ref 130–400)
POTASSIUM SERPL-SCNC: 4 MEQ/L (ref 3.4–4.9)
RBC # BLD AUTO: 4.96 M/UL (ref 4.2–5.4)
SODIUM SERPL-SCNC: 140 MEQ/L (ref 135–144)
WBC # BLD AUTO: 8.9 K/UL (ref 4.8–10.8)

## 2023-06-13 PROCEDURE — 33208 INSRT HEART PM ATRIAL & VENT: CPT | Performed by: INTERNAL MEDICINE

## 2023-06-13 PROCEDURE — 2580000003 HC RX 258: Performed by: INTERNAL MEDICINE

## 2023-06-13 PROCEDURE — 2580000003 HC RX 258

## 2023-06-13 PROCEDURE — C1785 PMKR, DUAL, RATE-RESP: HCPCS

## 2023-06-13 PROCEDURE — C1894 INTRO/SHEATH, NON-LASER: HCPCS

## 2023-06-13 PROCEDURE — 6360000002 HC RX W HCPCS: Performed by: INTERNAL MEDICINE

## 2023-06-13 PROCEDURE — 2500000003 HC RX 250 WO HCPCS

## 2023-06-13 PROCEDURE — G0378 HOSPITAL OBSERVATION PER HR: HCPCS

## 2023-06-13 PROCEDURE — 6360000002 HC RX W HCPCS

## 2023-06-13 PROCEDURE — 2709999900 HC NON-CHARGEABLE SUPPLY

## 2023-06-13 PROCEDURE — 6370000000 HC RX 637 (ALT 250 FOR IP): Performed by: INTERNAL MEDICINE

## 2023-06-13 PROCEDURE — 33208 INSRT HEART PM ATRIAL & VENT: CPT

## 2023-06-13 PROCEDURE — 71045 X-RAY EXAM CHEST 1 VIEW: CPT

## 2023-06-13 PROCEDURE — 85027 COMPLETE CBC AUTOMATED: CPT

## 2023-06-13 PROCEDURE — 80048 BASIC METABOLIC PNL TOTAL CA: CPT

## 2023-06-13 PROCEDURE — 99152 MOD SED SAME PHYS/QHP 5/>YRS: CPT | Performed by: INTERNAL MEDICINE

## 2023-06-13 PROCEDURE — C1898 LEAD, PMKR, OTHER THAN TRANS: HCPCS

## 2023-06-13 RX ORDER — ONDANSETRON 2 MG/ML
4 INJECTION INTRAMUSCULAR; INTRAVENOUS EVERY 6 HOURS PRN
Status: DISCONTINUED | OUTPATIENT
Start: 2023-06-13 | End: 2023-06-14 | Stop reason: HOSPADM

## 2023-06-13 RX ORDER — FUROSEMIDE 20 MG/1
20 TABLET ORAL DAILY
Status: DISCONTINUED | OUTPATIENT
Start: 2023-06-13 | End: 2023-06-14 | Stop reason: HOSPADM

## 2023-06-13 RX ORDER — PANTOPRAZOLE SODIUM 20 MG/1
20 TABLET, DELAYED RELEASE ORAL
Status: DISCONTINUED | OUTPATIENT
Start: 2023-06-14 | End: 2023-06-14 | Stop reason: HOSPADM

## 2023-06-13 RX ORDER — SODIUM CHLORIDE 0.9 % (FLUSH) 0.9 %
5-40 SYRINGE (ML) INJECTION EVERY 12 HOURS SCHEDULED
Status: DISCONTINUED | OUTPATIENT
Start: 2023-06-13 | End: 2023-06-14 | Stop reason: HOSPADM

## 2023-06-13 RX ORDER — ONDANSETRON 2 MG/ML
INJECTION INTRAMUSCULAR; INTRAVENOUS
Status: DISPENSED
Start: 2023-06-13 | End: 2023-06-14

## 2023-06-13 RX ORDER — MORPHINE SULFATE 4 MG/ML
4 INJECTION, SOLUTION INTRAMUSCULAR; INTRAVENOUS
Status: DISCONTINUED | OUTPATIENT
Start: 2023-06-13 | End: 2023-06-14 | Stop reason: HOSPADM

## 2023-06-13 RX ORDER — SODIUM CHLORIDE 0.9 % (FLUSH) 0.9 %
5-40 SYRINGE (ML) INJECTION PRN
Status: DISCONTINUED | OUTPATIENT
Start: 2023-06-13 | End: 2023-06-14 | Stop reason: HOSPADM

## 2023-06-13 RX ORDER — FERROUS SULFATE 325(65) MG
325 TABLET ORAL DAILY
Status: DISCONTINUED | OUTPATIENT
Start: 2023-06-13 | End: 2023-06-14 | Stop reason: HOSPADM

## 2023-06-13 RX ORDER — ROSUVASTATIN CALCIUM 5 MG/1
10 TABLET, COATED ORAL DAILY
Status: DISCONTINUED | OUTPATIENT
Start: 2023-06-14 | End: 2023-06-14 | Stop reason: HOSPADM

## 2023-06-13 RX ORDER — MORPHINE SULFATE 4 MG/ML
2 INJECTION, SOLUTION INTRAMUSCULAR; INTRAVENOUS
Status: DISCONTINUED | OUTPATIENT
Start: 2023-06-13 | End: 2023-06-14 | Stop reason: HOSPADM

## 2023-06-13 RX ORDER — SODIUM CHLORIDE 9 MG/ML
INJECTION, SOLUTION INTRAVENOUS
Status: DISPENSED
Start: 2023-06-13 | End: 2023-06-14

## 2023-06-13 RX ORDER — NITROGLYCERIN 0.4 MG/1
0.4 TABLET SUBLINGUAL EVERY 5 MIN PRN
Status: DISCONTINUED | OUTPATIENT
Start: 2023-06-13 | End: 2023-06-14 | Stop reason: HOSPADM

## 2023-06-13 RX ORDER — LOSARTAN POTASSIUM 50 MG/1
50 TABLET ORAL DAILY
Status: DISCONTINUED | OUTPATIENT
Start: 2023-06-14 | End: 2023-06-14 | Stop reason: HOSPADM

## 2023-06-13 RX ORDER — SODIUM CHLORIDE 9 MG/ML
INJECTION, SOLUTION INTRAVENOUS PRN
Status: DISCONTINUED | OUTPATIENT
Start: 2023-06-13 | End: 2023-06-14 | Stop reason: HOSPADM

## 2023-06-13 RX ORDER — SOTALOL HYDROCHLORIDE 80 MG/1
120 TABLET ORAL 2 TIMES DAILY
Status: DISCONTINUED | OUTPATIENT
Start: 2023-06-13 | End: 2023-06-14 | Stop reason: HOSPADM

## 2023-06-13 RX ORDER — DIPHENHYDRAMINE HCL 25 MG
50 TABLET ORAL ONCE
Status: DISCONTINUED | OUTPATIENT
Start: 2023-06-13 | End: 2023-06-14 | Stop reason: HOSPADM

## 2023-06-13 RX ORDER — 0.9 % SODIUM CHLORIDE 0.9 %
250 INTRAVENOUS SOLUTION INTRAVENOUS ONCE
Status: COMPLETED | OUTPATIENT
Start: 2023-06-13 | End: 2023-06-13

## 2023-06-13 RX ORDER — PREDNISONE 20 MG/1
50 TABLET ORAL ONCE
Status: DISCONTINUED | OUTPATIENT
Start: 2023-06-13 | End: 2023-06-14 | Stop reason: HOSPADM

## 2023-06-13 RX ADMIN — SODIUM CHLORIDE 250 ML: 9 INJECTION, SOLUTION INTRAVENOUS at 18:28

## 2023-06-13 RX ADMIN — CEFAZOLIN 1000 MG: 1 INJECTION, POWDER, FOR SOLUTION INTRAMUSCULAR; INTRAVENOUS at 22:00

## 2023-06-13 RX ADMIN — CEFAZOLIN 1000 MG: 1 INJECTION, POWDER, FOR SOLUTION INTRAMUSCULAR; INTRAVENOUS at 13:58

## 2023-06-13 RX ADMIN — METFORMIN HYDROCHLORIDE 500 MG: 500 TABLET ORAL at 18:29

## 2023-06-13 RX ADMIN — SOTALOL HYDROCHLORIDE 120 MG: 80 TABLET ORAL at 22:17

## 2023-06-13 RX ADMIN — SODIUM CHLORIDE, PRESERVATIVE FREE 10 ML: 5 INJECTION INTRAVENOUS at 22:19

## 2023-06-13 RX ADMIN — ONDANSETRON 4 MG: 2 INJECTION INTRAMUSCULAR; INTRAVENOUS at 18:24

## 2023-06-13 NOTE — BRIEF OP NOTE
Section of Cardiology  Adult Brief Pacemaker Procedure Note        Procedure(s):  Pacemaker, dual chamber    Pre-operative Diagnosis:  SSS    H&P Status: Completed and reviewed. Post-operative Diagnosis:  Successful PPM placement    Findings: see full report    Complications:  none    Primary Proceduralist:   Dr. Malissa Rojas procedure care as usual.   Increase sotalol to 120mg BID  Consider CVN in future if has sustained afib per pacer checks.      Full procedure note to follow

## 2023-06-13 NOTE — FLOWSHEET NOTE
1755- Patient having nausea and vomiting. BP 87/71- right 77/36- left. Dr. Xiao Akers made aware. Patient instructed to stay in bed while the BP is so low. Electronically signed by Bhavna Garcia on 6/13/2023 at 5:57 PM

## 2023-06-13 NOTE — PROGRESS NOTES
Pt arrived to pre/post cath from home. Alert and oriented. Consent signed. Pre op antibiotic started. Prepped for procedure and taken to cath lab.

## 2023-06-14 VITALS
SYSTOLIC BLOOD PRESSURE: 126 MMHG | TEMPERATURE: 98.1 F | WEIGHT: 222 LBS | DIASTOLIC BLOOD PRESSURE: 90 MMHG | BODY MASS INDEX: 39.33 KG/M2 | RESPIRATION RATE: 16 BRPM | HEART RATE: 93 BPM | OXYGEN SATURATION: 97 %

## 2023-06-14 LAB
ERYTHROCYTE [DISTWIDTH] IN BLOOD BY AUTOMATED COUNT: 15.4 % (ref 11.5–14.5)
HCT VFR BLD AUTO: 37.2 % (ref 37–47)
HGB BLD-MCNC: 11.9 G/DL (ref 12–16)
MCH RBC QN AUTO: 26.9 PG (ref 27–31.3)
MCHC RBC AUTO-ENTMCNC: 31.9 % (ref 33–37)
MCV RBC AUTO: 84.3 FL (ref 79.4–94.8)
PLATELET # BLD AUTO: 152 K/UL (ref 130–400)
RBC # BLD AUTO: 4.42 M/UL (ref 4.2–5.4)
WBC # BLD AUTO: 9.8 K/UL (ref 4.8–10.8)

## 2023-06-14 PROCEDURE — 6360000002 HC RX W HCPCS: Performed by: INTERNAL MEDICINE

## 2023-06-14 PROCEDURE — 85027 COMPLETE CBC AUTOMATED: CPT

## 2023-06-14 PROCEDURE — G0378 HOSPITAL OBSERVATION PER HR: HCPCS

## 2023-06-14 PROCEDURE — 36415 COLL VENOUS BLD VENIPUNCTURE: CPT

## 2023-06-14 PROCEDURE — 6370000000 HC RX 637 (ALT 250 FOR IP): Performed by: INTERNAL MEDICINE

## 2023-06-14 PROCEDURE — 2580000003 HC RX 258: Performed by: INTERNAL MEDICINE

## 2023-06-14 PROCEDURE — 93280 PM DEVICE PROGR EVAL DUAL: CPT

## 2023-06-14 PROCEDURE — 99238 HOSP IP/OBS DSCHRG MGMT 30/<: CPT | Performed by: INTERNAL MEDICINE

## 2023-06-14 RX ORDER — SOTALOL HYDROCHLORIDE 120 MG/1
120 TABLET ORAL 2 TIMES DAILY
Qty: 60 TABLET | Refills: 3 | Status: SHIPPED | OUTPATIENT
Start: 2023-06-14

## 2023-06-14 RX ADMIN — LOSARTAN POTASSIUM 50 MG: 50 TABLET, FILM COATED ORAL at 09:33

## 2023-06-14 RX ADMIN — ROSUVASTATIN CALCIUM 10 MG: 5 TABLET, FILM COATED ORAL at 09:34

## 2023-06-14 RX ADMIN — SODIUM CHLORIDE, PRESERVATIVE FREE 10 ML: 5 INJECTION INTRAVENOUS at 09:35

## 2023-06-14 RX ADMIN — METFORMIN HYDROCHLORIDE 500 MG: 500 TABLET ORAL at 09:35

## 2023-06-14 RX ADMIN — PANTOPRAZOLE SODIUM 20 MG: 20 TABLET, DELAYED RELEASE ORAL at 06:31

## 2023-06-14 RX ADMIN — FERROUS SULFATE TAB 325 MG (65 MG ELEMENTAL FE) 325 MG: 325 (65 FE) TAB at 09:33

## 2023-06-14 RX ADMIN — LEVOTHYROXINE SODIUM 125 MCG: 0.03 TABLET ORAL at 06:30

## 2023-06-14 RX ADMIN — SOTALOL HYDROCHLORIDE 120 MG: 80 TABLET ORAL at 09:33

## 2023-06-14 RX ADMIN — FUROSEMIDE 20 MG: 20 TABLET ORAL at 09:33

## 2023-06-14 RX ADMIN — CEFAZOLIN 1000 MG: 1 INJECTION, POWDER, FOR SOLUTION INTRAMUSCULAR; INTRAVENOUS at 06:26

## 2023-06-14 ASSESSMENT — PAIN SCALES - GENERAL: PAINLEVEL_OUTOF10: 0

## 2023-06-14 NOTE — DISCHARGE INSTR - ACTIVITY
Do not raise your arm (on the side of your body where the pacemaker is located) above shoulder level until your doctor says it's okay. This helps keep the pacemaker and leads in place while you heal. Your doctor may recommend gentle range-of-motion exercises for your shoulder. For at least 3 or 4 weeks, or for as long as your doctor says, avoid activities that strain your chest or upper arm muscles. This includes mopping floors or pushing a  or vacuum. It also includes swinging a golf club or tennis racquet or swimming.

## 2023-06-14 NOTE — FLOWSHEET NOTE
Discharge instructions given to patient and , both who verbalize understanding. Transport at bedside ready to wheel patient out. Electronically signed by Mayo Ying on 6/14/2023 at 11:06 AM

## 2023-06-14 NOTE — PROGRESS NOTES
Bedside device check completed at this time. Pt has a Medtronic dual lead pacemaker that was implanted yesterday, presenting EGM displays afib/VS @ 86bpm in AAIR=DDDR mode. Pacing AP<0.1%   5.4% over night. AF [place] 100% , NO other alerts or arrhythmias to report. RA/RV lead impedance WNL. RA sensitivity measured 0.6mV , device sensitivity programmed to 0.15mV, RV sensing and capture threshold WNL. Dr. Torito Trent updated and a copy of report was placed in the bedside chart. Pt was educated on remote monitoring, wound dressing care and future follow up device care.

## 2023-06-14 NOTE — DISCHARGE INSTR - DIET
Good nutrition is important when healing from an illness, injury, or surgery. Follow any nutrition recommendations given to you during your hospital stay. If you were given an oral nutrition supplement while in the hospital, continue to take this supplement at home. You can take it with meals, in-between meals, and/or before bedtime. These supplements can be purchased at most local grocery stores, pharmacies, and chain [employer]-stores. If you have any questions about your diet or nutrition, call the hospital and ask for the dietitian.   Diabetic, Heart Healthy

## 2023-06-14 NOTE — DISCHARGE SUMMARY
Final  MCV                                           Date: 06/13/2023  Value: 84.2        Ref range: 79.4 - 94.8 fL     Status: Final  MCH                                           Date: 06/13/2023  Value: 27.5        Ref range: 27.0 - 31.3 pg     Status: Final  MCHC                                          Date: 06/13/2023  Value: 32.6 (L)    Ref range: 33.0 - 37.0 %      Status: Final  RDW                                           Date: 06/13/2023  Value: 15.4 (H)    Ref range: 11.5 - 14.5 %      Status: Final  Platelets                                     Date: 06/13/2023  Value: 185         Ref range: 130 - 400 K/uL     Status: Final  Sodium                                        Date: 06/13/2023  Value: 140         Ref range: 135 - 144 mEq/L    Status: Final  Potassium                                     Date: 06/13/2023  Value: 4.0         Ref range: 3.4 - 4.9 mEq/L    Status: Final  Chloride                                      Date: 06/13/2023  Value: 104         Ref range: 95 - 107 mEq/L     Status: Final  CO2                                           Date: 06/13/2023  Value: 25          Ref range: 20 - 31 mEq/L      Status: Final  Anion Gap                                     Date: 06/13/2023  Value: 11          Ref range: 9 - 15 mEq/L       Status: Final  Glucose                                       Date: 06/13/2023  Value: 114 (H)     Ref range: 70 - 99 mg/dL      Status: Final  BUN                                           Date: 06/13/2023  Value: 21          Ref range: 8 - 23 mg/dL       Status: Final  Creatinine                                    Date: 06/13/2023  Value: 0.96 (H)    Ref range: 0.50 - 0.90 mg/dL  Status: Final  Est, Glom Filt Rate                           Date: 06/13/2023  Value: >60.0       Ref range: >60                Status: Final                Comment: Pediatric calculator link  [email or web address] org/professionals/kdoqi/gfr_calculatorped  Effective Oct 3, 2022  These results

## 2023-06-15 ENCOUNTER — TELEPHONE (OUTPATIENT)
Dept: CARDIOLOGY CLINIC | Age: 73
End: 2023-06-15

## 2023-06-16 NOTE — PROCEDURES
[address]                      [address], [address] [facility]                                 PROCEDURE NOTE    PATIENT NAME: Lindsey Orozco                    :        1950  MED REC NO:   [id number]                            ROOM:       L326  ACCOUNT NO:   [de-identified]                           ADMIT DATE: 2023  PROVIDER:     Keshawn Knott DO    DATE OF PROCEDURE:  2023    PROCEDURE PERFORMED:  Dual chamber permanent pacemaker placement. PROCEDURE PERFORMED BY:  Seamus Knott DO    INDICATIONS:  Sick sinus syndrome. COMPLICATIONS:  None. DESCRIPTION OF PROCEDURE:  After the risks, benefits and alternatives of  the above mentioned procedure were explained in detail with the patient,  informed consent was obtained verbally and in writing, and placed in the  chart. The patient was taken to the cardiac cath lab suite where they  were sterilely prepped and draped in the usual fashion. Lidocaine 1%  was used to anesthetize the left subclavian site. A thin-walled 18-gauge Argon needle was used to cannulate the left  subclavian vein. A guidewire was inserted through the needle into the  vascular lumen under fluoroscopic guidance. The needle was removed. Another thin-walled 18-gauge Argon needle was used to cannulate the left  subclavian vein. A guidewire was through the needle into the vascular  lumen under fluoroscopic guidance. The needle was removed and both  guidewires were attached to the field. A 1.5 inch incision was made  utilizing a #15 blade in the left subclavian side. Hemostasis was made  complete. Electrocautery along with digital blunt dissection was  utilized to dissect to the level of the pectoralis muscle fascia. Dissection was carried cephalad along the pectoralis muscle plane. Digital blunt dissection was used to create a pocket large enough to  accommodate the generator.   A venous sheath and dilator were

## 2023-06-19 ENCOUNTER — TELEPHONE (OUTPATIENT)
Dept: CARDIOLOGY CLINIC | Age: 73
End: 2023-06-19

## 2023-06-19 LAB
EKG ATRIAL RATE: 129 BPM
EKG ATRIAL RATE: 312 BPM
EKG Q-T INTERVAL: 312 MS
EKG Q-T INTERVAL: 386 MS
EKG QRS DURATION: 92 MS
EKG QRS DURATION: 98 MS
EKG QTC CALCULATION (BAZETT): 443 MS
EKG QTC CALCULATION (BAZETT): 450 MS
EKG R AXIS: -25 DEGREES
EKG R AXIS: -9 DEGREES
EKG T AXIS: 26 DEGREES
EKG T AXIS: 34 DEGREES
EKG VENTRICULAR RATE: 121 BPM
EKG VENTRICULAR RATE: 82 BPM

## 2023-06-19 NOTE — TELEPHONE ENCOUNTER
Requesting medication refill. Please approve or deny this request.    Rx requested:  Requested Prescriptions     Pending Prescriptions Disp Refills    losartan (COZAAR) 50 MG tablet [Pharmacy Med Name: LOSARTAN POTASSIUM 50 MG TAB] 30 tablet 3     Sig: TAKE 1 TABLET BY MOUTH EVERY DAY         Last Office Visit:   5/25/2023      Next Visit Date:  Future Appointments   Date Time Provider James Hodgson   6/23/2023  8:15 AM Seamus Solorio DO [address] [place] [address]   7/5/2023 10:00 AM JASWINDER Rasheed [facility] [place]   10/24/2023  8:30 AM Yovani Morrow MD [facility]   2/8/2024  9:00 AM JASWINDER Rasheed [facility] [place] [facility] [place]               Last refill 5/25/2023. Please approve or deny.

## 2023-06-19 NOTE — TELEPHONE ENCOUNTER
PATIENT CALLED OFFICE STATES SHE RECENTLY HAD A PACEMAKER PLACED,  AND WANTS TO KNOW IF IT IS OK TO WEAR A FRONT CLOSED BRA OR IF SHE SHOULD WAIT A FEW DAYS  PACEMAKER PLACED ON 6/13/23      PLEASE ADVISE

## 2023-06-20 RX ORDER — LOSARTAN POTASSIUM 50 MG/1
TABLET ORAL
Qty: 90 TABLET | Refills: 2 | Status: SHIPPED | OUTPATIENT
Start: 2023-06-20

## 2023-06-23 ENCOUNTER — OFFICE VISIT (OUTPATIENT)
Dept: CARDIOLOGY CLINIC | Age: 73
End: 2023-06-23

## 2023-06-23 ENCOUNTER — PREP FOR PROCEDURE (OUTPATIENT)
Dept: CARDIOLOGY CLINIC | Age: 73
End: 2023-06-23

## 2023-06-23 VITALS
SYSTOLIC BLOOD PRESSURE: 110 MMHG | WEIGHT: 224 LBS | BODY MASS INDEX: 39.68 KG/M2 | HEART RATE: 108 BPM | DIASTOLIC BLOOD PRESSURE: 80 MMHG

## 2023-06-23 DIAGNOSIS — I48.0 PAROXYSMAL ATRIAL FIBRILLATION (HCC): Primary | ICD-10-CM

## 2023-06-23 DIAGNOSIS — I50.22 CHRONIC SYSTOLIC (CONGESTIVE) HEART FAILURE (HCC): ICD-10-CM

## 2023-06-23 DIAGNOSIS — I48.19 PERSISTENT ATRIAL FIBRILLATION (HCC): ICD-10-CM

## 2023-06-23 DIAGNOSIS — E78.2 MIXED HYPERLIPIDEMIA: ICD-10-CM

## 2023-06-23 DIAGNOSIS — I49.5 SSS (SICK SINUS SYNDROME) (HCC): ICD-10-CM

## 2023-06-23 DIAGNOSIS — I10 ESSENTIAL HYPERTENSION: ICD-10-CM

## 2023-06-23 DIAGNOSIS — I50.31 ACUTE DIASTOLIC CONGESTIVE HEART FAILURE (HCC): ICD-10-CM

## 2023-06-23 DIAGNOSIS — I25.10 CORONARY ARTERY DISEASE INVOLVING NATIVE CORONARY ARTERY OF NATIVE HEART WITHOUT ANGINA PECTORIS: ICD-10-CM

## 2023-06-23 RX ORDER — DIPHENHYDRAMINE HCL 25 MG
50 TABLET ORAL ONCE
Status: CANCELLED | OUTPATIENT
Start: 2023-06-23 | End: 2023-06-23

## 2023-06-23 RX ORDER — SODIUM CHLORIDE 9 MG/ML
INJECTION, SOLUTION INTRAVENOUS PRN
Status: CANCELLED | OUTPATIENT
Start: 2023-06-23

## 2023-06-23 RX ORDER — SODIUM CHLORIDE 0.9 % (FLUSH) 0.9 %
5-40 SYRINGE (ML) INJECTION EVERY 12 HOURS SCHEDULED
Status: CANCELLED | OUTPATIENT
Start: 2023-06-23

## 2023-06-23 RX ORDER — SODIUM CHLORIDE 0.9 % (FLUSH) 0.9 %
5-40 SYRINGE (ML) INJECTION PRN
Status: CANCELLED | OUTPATIENT
Start: 2023-06-23

## 2023-06-23 RX ORDER — PREDNISONE 5 MG/1
50 TABLET ORAL ONCE
Status: CANCELLED | OUTPATIENT
Start: 2023-06-23 | End: 2023-06-23

## 2023-06-23 NOTE — PROGRESS NOTES
Chief Complaint   Patient presents with    Atrial Fibrillation    Hypertension    Hyperlipidemia    Results     ECHOCARDIOGRAM       5-21-21: Patient presents for initial medical evaluation. Patient is followed on a regular basis by Dr. Mundo Rivera MD. S/p hospitalization for CP/heaviness. Had negative wokup in ER. Symptoms occurred at rest and was worse with carrying groceries. Had radiation to right shoulder. + associated SOB. No hx of MI, CHF or arrhythmia.  was worried about her during the episode. Never seen her like this. No hx of MI, CHF or arrhythmia. No hx of cath. Pt denies   nausea, vomiting, diarrhea, constipation, motor weakness, insomnia, weight loss, syncope, dizziness, lightheadedness, palpitations, PND, orthopnea, or claudication. Does have history of iron deficiency anemia ranging from 8-10. Does have hx of endoscopy. 7-2-21:  Patient is a 79 y.o. female who presented for elective stress test and echo. Patient is followed on a regular basis by Dr. Mundo Rivera MD.  Patient with past medical 3 of hypertension, hyperlipidemia and diabetes. Patient status post recent hospitalization for chest pain/heaviness and had negative work-up in the emergency department. Patient developed chest pain that occurred at rest and was worsened with exertion with radiation to the right shoulder associated with shortness of breath. Patient was noted to be in new onset atrial fibrillation with rapid ventricular response on presentation to the stress test.  Patient does not sense that she is in atrial fibrillation. Preliminary stress test images reviewed showing mild anterior/anterior apical ischemia versus breast attenuation artifact. Patient currently is chest pain-free. Hematology oncology. Mary Suarez No previous history of myocardial infarction, congestive heart failure. No history of cardiac catheterization. Patient with history of iron deficiency anemia ranging from 8-10.   She was

## 2023-07-03 ENCOUNTER — HOSPITAL ENCOUNTER (OUTPATIENT)
Dept: CARDIAC CATH/INVASIVE PROCEDURES | Age: 73
Discharge: HOME | End: 2023-07-03
Attending: INTERNAL MEDICINE | Admitting: INTERNAL MEDICINE
Payer: MEDICARE

## 2023-07-03 ENCOUNTER — ANESTHESIA EVENT (OUTPATIENT)
Dept: CARDIAC CATH/INVASIVE PROCEDURES | Age: 73
End: 2023-07-03
Payer: MEDICARE

## 2023-07-03 ENCOUNTER — ANESTHESIA (OUTPATIENT)
Dept: CARDIAC CATH/INVASIVE PROCEDURES | Age: 73
End: 2023-07-03
Payer: MEDICARE

## 2023-07-03 VITALS
SYSTOLIC BLOOD PRESSURE: 126 MMHG | TEMPERATURE: 98.9 F | BODY MASS INDEX: 39.15 KG/M2 | WEIGHT: 221 LBS | RESPIRATION RATE: 19 BRPM | DIASTOLIC BLOOD PRESSURE: 68 MMHG | OXYGEN SATURATION: 100 % | HEART RATE: 62 BPM

## 2023-07-03 PROCEDURE — 92960 CARDIOVERSION ELECTRIC EXT: CPT

## 2023-07-03 PROCEDURE — 3700000001 HC ADD 15 MINUTES (ANESTHESIA)

## 2023-07-03 PROCEDURE — 6360000002 HC RX W HCPCS: Performed by: REGISTERED NURSE

## 2023-07-03 PROCEDURE — 3700000000 HC ANESTHESIA ATTENDED CARE

## 2023-07-03 RX ORDER — PROPOFOL 10 MG/ML
INJECTION, EMULSION INTRAVENOUS PRN
Status: DISCONTINUED | OUTPATIENT
Start: 2023-07-03 | End: 2023-07-03 | Stop reason: SDUPTHER

## 2023-07-03 RX ORDER — PREDNISONE 50 MG/1
50 TABLET ORAL ONCE
Status: DISCONTINUED | OUTPATIENT
Start: 2023-07-03 | End: 2023-07-03 | Stop reason: HOSPADM

## 2023-07-03 RX ORDER — SODIUM CHLORIDE 0.9 % (FLUSH) 0.9 %
5-40 SYRINGE (ML) INJECTION PRN
Status: DISCONTINUED | OUTPATIENT
Start: 2023-07-03 | End: 2023-07-03 | Stop reason: HOSPADM

## 2023-07-03 RX ORDER — SODIUM CHLORIDE 9 MG/ML
INJECTION, SOLUTION INTRAVENOUS PRN
Status: DISCONTINUED | OUTPATIENT
Start: 2023-07-03 | End: 2023-07-03 | Stop reason: HOSPADM

## 2023-07-03 RX ORDER — SODIUM CHLORIDE 0.9 % (FLUSH) 0.9 %
5-40 SYRINGE (ML) INJECTION EVERY 12 HOURS SCHEDULED
Status: DISCONTINUED | OUTPATIENT
Start: 2023-07-03 | End: 2023-07-03 | Stop reason: HOSPADM

## 2023-07-03 RX ORDER — DIPHENHYDRAMINE HCL 25 MG
50 TABLET ORAL ONCE
Status: DISCONTINUED | OUTPATIENT
Start: 2023-07-03 | End: 2023-07-03 | Stop reason: HOSPADM

## 2023-07-03 RX ADMIN — PROPOFOL 100 MG: 10 INJECTION, EMULSION INTRAVENOUS at 13:18

## 2023-07-03 ASSESSMENT — PAIN SCALES - GENERAL: PAINLEVEL_OUTOF10: 0

## 2023-07-03 NOTE — DISCHARGE INSTRUCTIONS
No driving for 24 hours  Resume same medications as at home  Follow up in 4 weeks with Dr. Giselle Quinonez, please call for appointment

## 2023-07-03 NOTE — PROGRESS NOTES
Arrived to pre/post from the cath lab and report from [facility]. Attached to monitor and vitals are stable, patient in sinus. Awake and talking. Offered fluids.   Will continue to monitor

## 2023-07-03 NOTE — PROGRESS NOTES
Patient discharge instructions given and verbalizes understanding. IV dc'd intact.   Patient discharged to care of family by wheelchair

## 2023-07-03 NOTE — ANESTHESIA POSTPROCEDURE EVALUATION
Department of Anesthesiology  Postprocedure Note    Patient: Yahir Schwartz  MRN: [id number]  YOB: 1950  Date of evaluation: 7/3/2023      Procedure Summary     Date: 07/03/23 Room / Location: Cardiac Cath Services    Anesthesia Start: 3598 Anesthesia Stop: 2339    Procedure: CATH LAB WITH ANESTHESIA Diagnosis:     Scheduled Providers:  Responsible Provider: Alvin Chavez MD    Anesthesia Type: MAC ASA Status: 3          Anesthesia Type: No value filed.     Heather Phase I:      Heather Phase II:        Anesthesia Post Evaluation    Patient location during evaluation: bedside  Patient participation: complete - patient participated  Level of consciousness: awake and awake and alert  Airway patency: patent  Nausea & Vomiting: no nausea and no vomiting  Complications: no  Cardiovascular status: blood pressure returned to baseline and hemodynamically stable  Respiratory status: acceptable  Hydration status: euvolemic

## 2023-07-03 NOTE — BRIEF OP NOTE
Cardioversion Procedure Note- final     Indication: atrial fibrillation    Consent: The patient was counseled regarding the procedure, its indications, risks, potential complications and alternatives, and any questions were answered. Consent was obtained to proceed. Pre-Medication: propofol intravenously per anesthesia. Procedure: The patient was placed in the supine position and the chest area was exposed. The cardioversion pads were applied in the standard manner and configuration. Attempt #1: The defibrillator was set on the synchronous mode and charged to 200 joules. A charge was then delivered which resulted in conversion to normal sinus rhythm. Attempt #2: Not necessary    Attempt #3: Not necessary    The patient tolerated the procedure well.     Complications: None    Electronically signed by Mehdi Holbrook DO on 7/3/2023 at 1:25 PM

## 2023-07-03 NOTE — ANESTHESIA PRE PROCEDURE
Department of Anesthesiology  Preprocedure Note       Name:  Bruno Hobson   Age:  68 y.o.  :  1950                                          MRN:  [id number]         Date:  7/3/2023      Surgeon: * No surgeons listed *    Procedure:     Medications prior to admission:   Prior to Admission medications    Medication Sig Start Date End Date Taking? Authorizing Provider   losartan (COZAAR) 50 MG tablet TAKE 1 TABLET BY MOUTH EVERY DAY 23   Seamus J Holiday, DO   sotalol (BETAPACE) 120 MG tablet Take 1 tablet by mouth 2 times daily 23   Seamus J Holiday, DO   furosemide (LASIX) 40 MG tablet Take 0.5 tablets by mouth daily 23   JASWINDER De La Fuente   rivaroxaban (XARELTO) 20 MG TABS tablet Take 1 tablet by mouth daily (with breakfast) 3/20/23   JASWINDER De La Fuente   pantoprazole (PROTONIX) 40 MG tablet TAKE 1 TABLET DAILY 3/7/23   Cassandra Thompson MD   Respiratory Therapy Supplies MAXIME New CPAP mask and supplies 23   Payal Martin MD   acetaminophen-codeine (TYLENOL #3) 300-30 MG per tablet TAKE 1 TABLET BY MOUTH EVERY 6 HOURS AS NEEDED FOR PAIN 22   Historical Provider, MD   CPAP Machine MISC by Does not apply route New CPAP at 11 cm  And supply.  11/10/21   Payal Martin MD   Ferrous Sulfate (IRON) 325 (65 Fe) MG TABS Take 1 tablet by mouth daily  21   Historical Provider, MD   rosuvastatin (CRESTOR) 10 MG tablet TAKE 1 TABLET BY MOUTH ONCE DAILY  Patient taking differently: Take 1 tablet by mouth daily TAKE 1 TABLET BY MOUTH ONCE DAILY 19   Gladis Carpenter MD   levothyroxine (SYNTHROID) 125 MCG tablet Take 1 tablet by mouth daily -except Sundays 12/14/18   Gladis Carpenter MD   metFORMIN (GLUCOPHAGE) 500 MG tablet TAKE 1 TABLET BY MOUTH TWICE A DAY WITH FOOD  Patient taking differently: Take 1 tablet by mouth 2 times daily (with meals) TAKE 1 TABLET BY MOUTH TWICE A DAY WITH FOOD 18   Gladis Carpenter MD   VITAMIN E Take 400 Units by mouth daily

## 2023-07-05 ENCOUNTER — OFFICE VISIT (OUTPATIENT)
Dept: CARDIOLOGY CLINIC | Age: 73
End: 2023-07-05
Payer: MEDICARE

## 2023-07-05 VITALS
DIASTOLIC BLOOD PRESSURE: 82 MMHG | OXYGEN SATURATION: 96 % | SYSTOLIC BLOOD PRESSURE: 136 MMHG | BODY MASS INDEX: 39.69 KG/M2 | HEART RATE: 83 BPM | HEIGHT: 63 IN | RESPIRATION RATE: 15 BRPM | WEIGHT: 224 LBS

## 2023-07-05 DIAGNOSIS — E11.69 DIABETES MELLITUS TYPE 2 IN OBESE (HCC): ICD-10-CM

## 2023-07-05 DIAGNOSIS — I10 HYPERTENSION, UNSPECIFIED TYPE: ICD-10-CM

## 2023-07-05 DIAGNOSIS — Z99.89 OSA ON CPAP: ICD-10-CM

## 2023-07-05 DIAGNOSIS — I25.10 MILD CAD: ICD-10-CM

## 2023-07-05 DIAGNOSIS — E78.5 DYSLIPIDEMIA: ICD-10-CM

## 2023-07-05 DIAGNOSIS — E66.9 DIABETES MELLITUS TYPE 2 IN OBESE (HCC): ICD-10-CM

## 2023-07-05 DIAGNOSIS — I27.20 PULMONARY HTN (HCC): ICD-10-CM

## 2023-07-05 DIAGNOSIS — G47.33 OSA ON CPAP: ICD-10-CM

## 2023-07-05 DIAGNOSIS — I38 VALVULAR HEART DISEASE: ICD-10-CM

## 2023-07-05 DIAGNOSIS — I50.41 ACUTE COMBINED SYSTOLIC AND DIASTOLIC CHF, NYHA CLASS 1 (HCC): ICD-10-CM

## 2023-07-05 DIAGNOSIS — I42.9 CARDIOMYOPATHY, UNSPECIFIED TYPE (HCC): ICD-10-CM

## 2023-07-05 DIAGNOSIS — I50.810 RVF (RIGHT VENTRICULAR FAILURE) (HCC): ICD-10-CM

## 2023-07-05 DIAGNOSIS — Z86.79 HISTORY OF ATRIAL FIBRILLATION: ICD-10-CM

## 2023-07-05 DIAGNOSIS — I49.5 TACHY-BRADY SYNDROME (HCC): ICD-10-CM

## 2023-07-05 PROCEDURE — G8427 DOCREV CUR MEDS BY ELIG CLIN: HCPCS | Performed by: PHYSICIAN ASSISTANT

## 2023-07-05 PROCEDURE — 2022F DILAT RTA XM EVC RTNOPTHY: CPT | Performed by: PHYSICIAN ASSISTANT

## 2023-07-05 PROCEDURE — 3079F DIAST BP 80-89 MM HG: CPT | Performed by: PHYSICIAN ASSISTANT

## 2023-07-05 PROCEDURE — G8400 PT W/DXA NO RESULTS DOC: HCPCS | Performed by: PHYSICIAN ASSISTANT

## 2023-07-05 PROCEDURE — 1036F TOBACCO NON-USER: CPT | Performed by: PHYSICIAN ASSISTANT

## 2023-07-05 PROCEDURE — G8417 CALC BMI ABV UP PARAM F/U: HCPCS | Performed by: PHYSICIAN ASSISTANT

## 2023-07-05 PROCEDURE — 1090F PRES/ABSN URINE INCON ASSESS: CPT | Performed by: PHYSICIAN ASSISTANT

## 2023-07-05 PROCEDURE — 3075F SYST BP GE 130 - 139MM HG: CPT | Performed by: PHYSICIAN ASSISTANT

## 2023-07-05 PROCEDURE — 3046F HEMOGLOBIN A1C LEVEL >9.0%: CPT | Performed by: PHYSICIAN ASSISTANT

## 2023-07-05 PROCEDURE — 3017F COLORECTAL CA SCREEN DOC REV: CPT | Performed by: PHYSICIAN ASSISTANT

## 2023-07-05 PROCEDURE — 1123F ACP DISCUSS/DSCN MKR DOCD: CPT | Performed by: PHYSICIAN ASSISTANT

## 2023-07-05 PROCEDURE — 99214 OFFICE O/P EST MOD 30 MIN: CPT | Performed by: PHYSICIAN ASSISTANT

## 2023-07-05 ASSESSMENT — ENCOUNTER SYMPTOMS
CHEST TIGHTNESS: 0
NAUSEA: 0
ABDOMINAL PAIN: 0
COLOR CHANGE: 0
BLOOD IN STOOL: 0
COUGH: 0
VOMITING: 0
SHORTNESS OF BREATH: 0
ABDOMINAL DISTENTION: 0

## 2023-07-05 NOTE — PROGRESS NOTES
122/72, heart rate bradycardic at 50, pulse ox 97% on room air. Weight is 224 pounds which is the exact same weight she was at last office visit on 8/11/2021 8/11/21: Here for follow-up of chronic diastolic congestive heart failure. Was seen for initial CHF clinic evaluation on 7/7/2021. EKG in office at that time showed A. fib with rapid rate of 123 bpm.  Following office visit, Cardizem CD was increased to 360 mg daily from 180 mg daily. On 7/21/2021 she underwent cardioversion with Dr. Lauren Alexander converting from A. fib to sinus rhythm/sinus bradycardia. Her Toprol-XL was discontinued and she was initiated on sotalol 80 mg p.o. twice daily. She has continued to check routine blood pressure and heart rate since last office visit. The initial 2 weeks post first office visit, heart rates had stabilized in the 70s to 80s range but over the past 2 weeks patient has been bradycardic on a.m. vitals with heart rates typically 40s to 50s before morning meds. She is completely asymptomatic with the bradycardia. She denies any new or worsening heart failure symptoms. She is compliant with low-sodium diet and fluid restriction. She continues to weigh herself daily and weight has remained stable typically between 220 to 223 pounds. She remains on oral anticoagulation with Xarelto and denies any bleeding issues. Dr. Lauren Alexander had previously ordered a sleep study and patient is pending approval through her insurance. Most recent labs reviewed and documented below.   BMP 7/21/2021: Sodium 140, potassium 3.7, chloride 103, total CO2 27, BUN 20, creatinine 1.06, GFR low at 51.1, glucose 114  BMP on 7/7/2021: Sodium 143, potassium 4.5, chloride 100, total CO2 31, BUN 15, creatinine 1.15, GFR low at 46.5, glucose 104  proBNP on 7/7/2021: 682    Given bradycardia with documented heart rates in the 40s to 50s on routine vitals at home, discussed with patient possibly ordering a 2-week event monitor for further

## 2023-07-05 NOTE — PATIENT INSTRUCTIONS
Add Jardiance 10mg PO daily--will need prior authorization through insurance so will give samples for now. Please notify if co-pay is too costly to afford  STOP Losartan 50mg daily  START Entresto 24/26mg PO twice daily--start tomorrow 7/6/23. Will give 30 day free trial card.  Please notify if co-pay on medication is too costly to afford    Check labs (BMP and BNP) in 2 weeks around 7/19/23      -Check daily weight every morning and notify CHF clinic if gaining more than 3 pounds in 2 days    -Check blood pressure twice daily in a.m. and p.m. prior to taking medications and keep log of blood pressure trends to review at next office visit  Notify office if BP running low with  mmHg or below prior to taking medications  Notify office if BP running high with  mmHg or above or if DBP 85 mmHg or above    -Recommend 2000 mg daily sodium restriction    -Recommend 1.5 liter (48 ounces) daily fluid restriction

## 2023-07-14 LAB
EKG ATRIAL RATE: 357 BPM
EKG ATRIAL RATE: 60 BPM
EKG P AXIS: 47 DEGREES
EKG P-R INTERVAL: 232 MS
EKG Q-T INTERVAL: 350 MS
EKG Q-T INTERVAL: 430 MS
EKG QRS DURATION: 96 MS
EKG QRS DURATION: 98 MS
EKG QTC CALCULATION (BAZETT): 430 MS
EKG QTC CALCULATION (BAZETT): 458 MS
EKG R AXIS: -24 DEGREES
EKG R AXIS: -27 DEGREES
EKG T AXIS: 27 DEGREES
EKG T AXIS: 51 DEGREES
EKG VENTRICULAR RATE: 103 BPM
EKG VENTRICULAR RATE: 60 BPM

## 2023-07-14 RX ORDER — SOTALOL HYDROCHLORIDE 120 MG/1
TABLET ORAL
Qty: 60 TABLET | Refills: 3 | Status: SHIPPED | OUTPATIENT
Start: 2023-07-14

## 2023-07-18 DIAGNOSIS — I42.9 CARDIOMYOPATHY, UNSPECIFIED TYPE (HCC): ICD-10-CM

## 2023-07-18 DIAGNOSIS — I50.41 ACUTE COMBINED SYSTOLIC AND DIASTOLIC CHF, NYHA CLASS 1 (HCC): ICD-10-CM

## 2023-07-18 LAB
ANION GAP SERPL CALCULATED.3IONS-SCNC: 12 MEQ/L (ref 9–15)
BNP BLD-MCNC: 813 PG/ML
BUN SERPL-MCNC: 18 MG/DL (ref 8–23)
CALCIUM SERPL-MCNC: 9.1 MG/DL (ref 8.5–9.9)
CHLORIDE SERPL-SCNC: 107 MEQ/L (ref 95–107)
CO2 SERPL-SCNC: 22 MEQ/L (ref 20–31)
CREAT SERPL-MCNC: 0.87 MG/DL (ref 0.5–0.9)
GLUCOSE SERPL-MCNC: 104 MG/DL (ref 70–99)
POTASSIUM SERPL-SCNC: 4.1 MEQ/L (ref 3.4–4.9)
SODIUM SERPL-SCNC: 141 MEQ/L (ref 135–144)

## 2023-07-25 ENCOUNTER — TELEPHONE (OUTPATIENT)
Dept: CARDIOLOGY CLINIC | Age: 73
End: 2023-07-25

## 2023-07-25 NOTE — TELEPHONE ENCOUNTER
Pt calling in regards to pcemaker that was put in last month, 6/13.     She was wondering if it's ok for her to swin in a pool    Please advise

## 2023-08-01 NOTE — TELEPHONE ENCOUNTER
Pt called to let provider know that the Landon Uriostegui is going to be over $400.00 a month. Pt wondering is there anything we can do or can you prescribe something else?     Pt # [phone]

## 2023-08-21 RX ORDER — SOTALOL HYDROCHLORIDE 120 MG/1
TABLET ORAL
Qty: 180 TABLET | Refills: 1 | OUTPATIENT
Start: 2023-08-21

## 2023-08-21 NOTE — TELEPHONE ENCOUNTER
Comments: pt called for refill    Last Office Visit (last PCP visit):   7/5/2023    Next Visit Date:  Future Appointments   Date Time Provider [address]   9/6/2023  9:00 AM Thamas Essex, [address] FACUNDO [facility] [place]   9/15/2023  1:00 -[address]   10/24/2023  8:30 AM Geno Posadas MD [facility]       **If hasn't been seen in over a year OR hasn't followed up according to last diabetes/ADHD visit, make appointment for patient before sending refill to provider.     Rx requested:  Requested Prescriptions     Pending Prescriptions Disp Refills    sacubitril-valsartan (ENTRESTO) 24-26 MG per tablet 180 tablet 2     Sig: Take 1 tablet by mouth 2 times daily Lot [id number]  Exp 2/2025   2 bottles

## 2023-09-06 ENCOUNTER — OFFICE VISIT (OUTPATIENT)
Dept: CARDIOLOGY CLINIC | Age: 73
End: 2023-09-06
Payer: MEDICARE

## 2023-09-06 VITALS
SYSTOLIC BLOOD PRESSURE: 120 MMHG | RESPIRATION RATE: 16 BRPM | HEART RATE: 87 BPM | OXYGEN SATURATION: 98 % | BODY MASS INDEX: 39.68 KG/M2 | DIASTOLIC BLOOD PRESSURE: 82 MMHG | WEIGHT: 224 LBS

## 2023-09-06 DIAGNOSIS — Z95.0 S/P PLACEMENT OF CARDIAC PACEMAKER: ICD-10-CM

## 2023-09-06 DIAGNOSIS — I25.10 MILD CAD: ICD-10-CM

## 2023-09-06 DIAGNOSIS — I42.9 CARDIOMYOPATHY, UNSPECIFIED TYPE (HCC): ICD-10-CM

## 2023-09-06 DIAGNOSIS — I50.810 RVF (RIGHT VENTRICULAR FAILURE) (HCC): ICD-10-CM

## 2023-09-06 DIAGNOSIS — Z99.89 OSA ON CPAP: ICD-10-CM

## 2023-09-06 DIAGNOSIS — I50.42 CHRONIC COMBINED SYSTOLIC AND DIASTOLIC CHF, NYHA CLASS 1 (HCC): ICD-10-CM

## 2023-09-06 DIAGNOSIS — E78.5 DYSLIPIDEMIA: ICD-10-CM

## 2023-09-06 DIAGNOSIS — I27.20 PULMONARY HTN (HCC): ICD-10-CM

## 2023-09-06 DIAGNOSIS — E66.9 DIABETES MELLITUS TYPE 2 IN OBESE (HCC): ICD-10-CM

## 2023-09-06 DIAGNOSIS — E11.69 DIABETES MELLITUS TYPE 2 IN OBESE (HCC): ICD-10-CM

## 2023-09-06 DIAGNOSIS — I10 HYPERTENSION, UNSPECIFIED TYPE: ICD-10-CM

## 2023-09-06 DIAGNOSIS — Z86.79 HISTORY OF ATRIAL FIBRILLATION: ICD-10-CM

## 2023-09-06 DIAGNOSIS — G47.33 OSA ON CPAP: ICD-10-CM

## 2023-09-06 DIAGNOSIS — I38 VALVULAR HEART DISEASE: ICD-10-CM

## 2023-09-06 PROCEDURE — 93000 ELECTROCARDIOGRAM COMPLETE: CPT | Performed by: PHYSICIAN ASSISTANT

## 2023-09-06 PROCEDURE — G8427 DOCREV CUR MEDS BY ELIG CLIN: HCPCS | Performed by: PHYSICIAN ASSISTANT

## 2023-09-06 PROCEDURE — 3074F SYST BP LT 130 MM HG: CPT | Performed by: PHYSICIAN ASSISTANT

## 2023-09-06 PROCEDURE — 3017F COLORECTAL CA SCREEN DOC REV: CPT | Performed by: PHYSICIAN ASSISTANT

## 2023-09-06 PROCEDURE — 2022F DILAT RTA XM EVC RTNOPTHY: CPT | Performed by: PHYSICIAN ASSISTANT

## 2023-09-06 PROCEDURE — 3079F DIAST BP 80-89 MM HG: CPT | Performed by: PHYSICIAN ASSISTANT

## 2023-09-06 PROCEDURE — 1123F ACP DISCUSS/DSCN MKR DOCD: CPT | Performed by: PHYSICIAN ASSISTANT

## 2023-09-06 PROCEDURE — 3046F HEMOGLOBIN A1C LEVEL >9.0%: CPT | Performed by: PHYSICIAN ASSISTANT

## 2023-09-06 PROCEDURE — 99214 OFFICE O/P EST MOD 30 MIN: CPT | Performed by: PHYSICIAN ASSISTANT

## 2023-09-06 PROCEDURE — G8400 PT W/DXA NO RESULTS DOC: HCPCS | Performed by: PHYSICIAN ASSISTANT

## 2023-09-06 PROCEDURE — G8417 CALC BMI ABV UP PARAM F/U: HCPCS | Performed by: PHYSICIAN ASSISTANT

## 2023-09-06 PROCEDURE — 1090F PRES/ABSN URINE INCON ASSESS: CPT | Performed by: PHYSICIAN ASSISTANT

## 2023-09-06 PROCEDURE — 1036F TOBACCO NON-USER: CPT | Performed by: PHYSICIAN ASSISTANT

## 2023-09-06 RX ORDER — METOPROLOL SUCCINATE 25 MG/1
25 TABLET, EXTENDED RELEASE ORAL DAILY
Qty: 90 TABLET | Refills: 3 | Status: SHIPPED | OUTPATIENT
Start: 2023-09-06

## 2023-09-06 RX ORDER — FUROSEMIDE 20 MG/1
20 TABLET ORAL DAILY
Qty: 90 TABLET | Refills: 2 | Status: SHIPPED | OUTPATIENT
Start: 2023-09-06

## 2023-09-06 ASSESSMENT — ENCOUNTER SYMPTOMS
ABDOMINAL DISTENTION: 0
CHEST TIGHTNESS: 0
BLOOD IN STOOL: 0
COUGH: 0
VOMITING: 0
SHORTNESS OF BREATH: 1
COLOR CHANGE: 0
NAUSEA: 0
ABDOMINAL PAIN: 0

## 2023-09-06 NOTE — PATIENT INSTRUCTIONS
-Limited echocardiogram to re-evaluate LV function to be completed after 9/15/23  **Call [phone] to schedule    -Check daily weight every morning and notify CHF clinic if gaining more than 3 pounds in 2 days    -Check blood pressure twice daily in a.m. and p.m. prior to taking medications and keep log of blood pressure trends to review at next office visit  Notify office if BP running low with  mmHg or below prior to taking medications  Notify office if BP running high with  mmHg or above or if DBP 90 mmHg or above    -Recommend 2000 mg daily sodium restriction    -Recommend 1.5 liter (48 ounces) daily fluid restriction

## 2023-09-06 NOTE — PROGRESS NOTES
Patient: Yadi Marlow  YOB: 1950  MRN: [id number]    Chief Complaint:  Chief Complaint   Patient presents with    Congestive Heart Failure     Class 1    Coronary Artery Disease         Subjective/HPI       9/6/23: Here for follow-up of systolic congestive heart failure and presumably nonischemic cardiomyopathy with EF of 20 to 25% per echo 6/12/2023. At last office visit on 7/5/2023, patient's losartan was discontinued and she was initiated on Entresto 24/26 mg p.o. twice daily and also added on Jardiance 10 mg p.o. daily to further optimize heart failure management given new onset cardiomyopathy. Patient states she has been doing well overall since her last office visit with no new or worsening heart failure symptoms. She will occasionally experience shortness of breath with more moderate exertion or fast walking but this is alleviated with brief rest.  She also reports 1 episode of palpitations which occurred when walking a long distance to one of her [employer] football games. She denies chest pain or chest pressure, orthopnea, PND, worsening lower extremity edema, abdominal pain or distention, dysuria hematuria, hematochezia or melena, dizziness, lightheadedness, syncope, fever or chills. He is compliant with all of her medications and with low-sodium diet and fluid restriction. She continues to check daily weight and blood pressure at home. Weight is stable. Blood pressure typically 574G to 219H systolically over 02S to 97Y diastolically. Heart rate is anywhere from 60s to 90s range. Patient states that Jan Velasquez was extremely costly through her local pharmacy but as her  is a federal employee they went through [facility] and her Jan Garb cost her $120 for 3-month supply which is affordable for her. She states she is paying about $30 a month for Jardiance but is can have her  check into the federal government program for Fiez too.   Has been

## 2023-09-07 DIAGNOSIS — I42.9 CARDIOMYOPATHY, UNSPECIFIED TYPE (HCC): Primary | ICD-10-CM

## 2023-09-07 RX ORDER — SOTALOL HYDROCHLORIDE 120 MG/1
TABLET ORAL
Qty: 180 TABLET | Refills: 1 | Status: SHIPPED | OUTPATIENT
Start: 2023-09-07

## 2023-09-07 NOTE — TELEPHONE ENCOUNTER
Requesting medication refill. Please approve or deny this request.    Rx requested:  Requested Prescriptions     Pending Prescriptions Disp Refills    sotalol (BETAPACE) 120 MG tablet [Pharmacy Med Name: SOTALOL 120 MG TABLET] 180 tablet 1     Sig: TAKE 1 TABLET BY MOUTH TWICE A DAY         Last Office Visit:   6/23/2023      Next Visit Date:  Future Appointments   Date Time Provider [address] [place] [address]   9/15/2023  1:00 PM [id number]   10/24/2023  8:30 AM Royal Blair MD [facility]   12/8/2023  9:00 AM Seamus Bellamy DO [address] [facility]   3/6/2024  9:00 AM JASWINDER Jarvis FACUNDO [facility] [place]               Last refill 7/7/2023. Please approve or deny.

## 2023-09-15 ENCOUNTER — HOSPITAL ENCOUNTER (OUTPATIENT)
Dept: CARDIOLOGY | Age: 73
Discharge: HOME | End: 2023-09-15
Payer: MEDICARE

## 2023-09-15 PROCEDURE — 93280 PM DEVICE PROGR EVAL DUAL: CPT

## 2023-09-18 ENCOUNTER — TELEPHONE (OUTPATIENT)
Dept: CARDIOLOGY CLINIC | Age: 73
End: 2023-09-18

## 2023-09-18 DIAGNOSIS — I50.22 HEART FAILURE, SYSTOLIC, CHRONIC (HCC): ICD-10-CM

## 2023-09-18 DIAGNOSIS — I50.22 CHRONIC SYSTOLIC HEART FAILURE (HCC): ICD-10-CM

## 2023-09-18 NOTE — TELEPHONE ENCOUNTER
----- Message from Annette Bush sent at 9/18/2023 11:38 AM EDT -----  Regarding: Cardiac Rehab Order  Silviano Richter can you please enter a cardiac rehab for for this patient? The diagnosis is Herat Failure, the specific ICD-10 code is I50.22. She follows Dr. Mason Montalvo. Thank you!  Electronically signed by Annette Bush on 9/18/2023 at 11:39 AM

## 2023-09-25 DIAGNOSIS — I42.9 CARDIOMYOPATHY, UNSPECIFIED TYPE (HCC): ICD-10-CM

## 2023-09-25 RX ORDER — FUROSEMIDE 20 MG/1
20 TABLET ORAL DAILY
Qty: 90 TABLET | Refills: 3 | Status: SHIPPED | OUTPATIENT
Start: 2023-09-25

## 2023-09-25 RX ORDER — METOPROLOL SUCCINATE 25 MG/1
25 TABLET, EXTENDED RELEASE ORAL DAILY
Qty: 90 TABLET | Refills: 3 | Status: SHIPPED | OUTPATIENT
Start: 2023-09-25

## 2023-09-25 RX ORDER — SOTALOL HYDROCHLORIDE 120 MG/1
120 TABLET ORAL 2 TIMES DAILY
Qty: 180 TABLET | Refills: 3 | Status: SHIPPED | OUTPATIENT
Start: 2023-09-25

## 2023-09-25 NOTE — TELEPHONE ENCOUNTER
DUE TO INSURANCE RX NEEDS TO GO TO MAIL ORDER  RX PENDED    Requesting medication refill. Please approve or deny this request.    Rx requested:  Requested Prescriptions     Pending Prescriptions Disp Refills    empagliflozin (JARDIANCE) 10 MG tablet 90 tablet 3     Sig: Take 1 tablet by mouth daily    metoprolol succinate (TOPROL XL) 25 MG extended release tablet 90 tablet 3     Sig: Take 1 tablet by mouth daily    furosemide (LASIX) 20 MG tablet 90 tablet 3     Sig: Take 1 tablet by mouth daily    sotalol (BETAPACE) 120 MG tablet 180 tablet 3     Sig: Take 1 tablet by mouth 2 times daily         Last Office Visit:   6/23/2023      Next Visit Date:  Future Appointments   Date Time Provider [address]   10/4/2023 11:00 AM Brenda Jarvis   10/24/2023  8:30 AM Leland Link, [address] [facility]   10/25/2023  8:30 AM MLO ECHO 1 MLOZ  [facility]   12/8/2023  9:00 AM Seamus Saeed DO [address] [facility]   3/6/2024  9:00 AM [address] [place] [address] FACUNDO [facility] [place] AT [place]               Last refill 9/7/23. Please approve or deny.

## 2023-09-28 PROBLEM — E11.9 TYPE 2 DIABETES MELLITUS (MULTI): Status: ACTIVE | Noted: 2023-09-28

## 2023-09-28 PROBLEM — K57.32 DIVERTICULITIS OF COLON: Status: ACTIVE | Noted: 2023-09-28

## 2023-09-28 PROBLEM — E78.2 MIXED HYPERLIPIDEMIA: Status: ACTIVE | Noted: 2023-09-28

## 2023-09-28 PROBLEM — N18.31 CKD STAGE G3A/A2, GFR 45-59 AND ALBUMIN CREATININE RATIO 30-299 MG/G (MULTI): Status: ACTIVE | Noted: 2023-09-28

## 2023-09-28 PROBLEM — I51.7 CARDIOMEGALY: Status: ACTIVE | Noted: 2023-09-28

## 2023-09-28 PROBLEM — D64.9 ANEMIA, MILD: Status: ACTIVE | Noted: 2023-09-28

## 2023-09-28 PROBLEM — I10 ESSENTIAL HYPERTENSION: Status: ACTIVE | Noted: 2023-09-28

## 2023-09-28 PROBLEM — E07.9 THYROID DISEASE: Status: ACTIVE | Noted: 2023-09-28

## 2023-09-28 PROBLEM — I48.91 ATRIAL FIBRILLATION (MULTI): Status: ACTIVE | Noted: 2023-09-28

## 2023-10-03 ENCOUNTER — OFFICE VISIT (OUTPATIENT)
Dept: PRIMARY CARE | Facility: CLINIC | Age: 73
End: 2023-10-03
Payer: MEDICARE

## 2023-10-03 VITALS
WEIGHT: 219 LBS | DIASTOLIC BLOOD PRESSURE: 62 MMHG | BODY MASS INDEX: 40.3 KG/M2 | TEMPERATURE: 97.1 F | HEIGHT: 62 IN | SYSTOLIC BLOOD PRESSURE: 118 MMHG | OXYGEN SATURATION: 98 % | RESPIRATION RATE: 18 BRPM | HEART RATE: 90 BPM

## 2023-10-03 DIAGNOSIS — E11.9 TYPE 2 DIABETES MELLITUS WITHOUT COMPLICATIONS (MULTI): ICD-10-CM

## 2023-10-03 DIAGNOSIS — Z00.00 ROUTINE GENERAL MEDICAL EXAMINATION AT HEALTH CARE FACILITY: Primary | ICD-10-CM

## 2023-10-03 DIAGNOSIS — I10 ESSENTIAL HYPERTENSION: ICD-10-CM

## 2023-10-03 DIAGNOSIS — D64.9 ANEMIA, MILD: ICD-10-CM

## 2023-10-03 DIAGNOSIS — E66.01 MORBID (SEVERE) OBESITY DUE TO EXCESS CALORIES (MULTI): ICD-10-CM

## 2023-10-03 DIAGNOSIS — I48.91 ATRIAL FIBRILLATION, UNSPECIFIED TYPE (MULTI): ICD-10-CM

## 2023-10-03 DIAGNOSIS — Z00.00 ENCOUNTER FOR MEDICARE ANNUAL WELLNESS EXAM: ICD-10-CM

## 2023-10-03 DIAGNOSIS — E07.9 THYROID DISEASE: ICD-10-CM

## 2023-10-03 DIAGNOSIS — E78.2 MIXED HYPERLIPIDEMIA: ICD-10-CM

## 2023-10-03 DIAGNOSIS — I25.119 ATHEROSCLEROSIS OF NATIVE CORONARY ARTERY OF NATIVE HEART WITH ANGINA PECTORIS (CMS-HCC): ICD-10-CM

## 2023-10-03 DIAGNOSIS — N18.31 CKD STAGE G3A/A2, GFR 45-59 AND ALBUMIN CREATININE RATIO 30-299 MG/G (MULTI): ICD-10-CM

## 2023-10-03 DIAGNOSIS — Z12.31 VISIT FOR SCREENING MAMMOGRAM: ICD-10-CM

## 2023-10-03 PROCEDURE — 1036F TOBACCO NON-USER: CPT | Performed by: FAMILY MEDICINE

## 2023-10-03 PROCEDURE — 1160F RVW MEDS BY RX/DR IN RCRD: CPT | Performed by: FAMILY MEDICINE

## 2023-10-03 PROCEDURE — 3074F SYST BP LT 130 MM HG: CPT | Performed by: FAMILY MEDICINE

## 2023-10-03 PROCEDURE — 3078F DIAST BP <80 MM HG: CPT | Performed by: FAMILY MEDICINE

## 2023-10-03 PROCEDURE — G0439 PPPS, SUBSEQ VISIT: HCPCS | Performed by: FAMILY MEDICINE

## 2023-10-03 PROCEDURE — 1170F FXNL STATUS ASSESSED: CPT | Performed by: FAMILY MEDICINE

## 2023-10-03 PROCEDURE — 99213 OFFICE O/P EST LOW 20 MIN: CPT | Performed by: FAMILY MEDICINE

## 2023-10-03 PROCEDURE — 1159F MED LIST DOCD IN RCRD: CPT | Performed by: FAMILY MEDICINE

## 2023-10-03 RX ORDER — PANTOPRAZOLE SODIUM 40 MG/1
40 TABLET, DELAYED RELEASE ORAL
COMMUNITY
Start: 2021-03-04

## 2023-10-03 RX ORDER — LUTEIN 6 MG
TABLET ORAL
COMMUNITY
Start: 2019-05-29

## 2023-10-03 RX ORDER — FERROUS SULFATE 325(65) MG
1 TABLET ORAL DAILY
COMMUNITY

## 2023-10-03 RX ORDER — FUROSEMIDE 20 MG/1
20 TABLET ORAL DAILY
COMMUNITY
Start: 2023-09-06

## 2023-10-03 RX ORDER — LEVOTHYROXINE SODIUM 125 UG/1
TABLET ORAL
Qty: 90 TABLET | Refills: 3 | Status: SHIPPED | OUTPATIENT
Start: 2023-10-03

## 2023-10-03 RX ORDER — SACUBITRIL AND VALSARTAN 24; 26 MG/1; MG/1
1 TABLET, FILM COATED ORAL 2 TIMES DAILY
COMMUNITY
Start: 2023-08-21

## 2023-10-03 RX ORDER — METOPROLOL SUCCINATE 25 MG/1
25 TABLET, EXTENDED RELEASE ORAL DAILY
COMMUNITY
Start: 2023-09-06

## 2023-10-03 RX ORDER — EMPAGLIFLOZIN 10 MG/1
10 TABLET, FILM COATED ORAL DAILY
COMMUNITY
Start: 2023-09-25

## 2023-10-03 RX ORDER — SOTALOL HYDROCHLORIDE 120 MG/1
120 TABLET ORAL EVERY 12 HOURS
COMMUNITY
Start: 2023-09-25

## 2023-10-03 RX ORDER — RIVAROXABAN 20 MG/1
1 TABLET, FILM COATED ORAL DAILY
COMMUNITY

## 2023-10-03 RX ORDER — LEVOTHYROXINE SODIUM 125 UG/1
125 TABLET ORAL
COMMUNITY
End: 2023-10-03 | Stop reason: SDUPTHER

## 2023-10-03 RX ORDER — METFORMIN HYDROCHLORIDE 500 MG/1
500 TABLET ORAL
Qty: 180 TABLET | Refills: 3 | Status: SHIPPED | OUTPATIENT
Start: 2023-10-03

## 2023-10-03 RX ORDER — NAPROXEN SODIUM 220 MG/1
TABLET ORAL
COMMUNITY
Start: 2019-05-29

## 2023-10-03 RX ORDER — LANOLIN ALCOHOL/MO/W.PET/CERES
1 CREAM (GRAM) TOPICAL DAILY
COMMUNITY
Start: 2019-05-29

## 2023-10-03 ASSESSMENT — ACTIVITIES OF DAILY LIVING (ADL)
DOING_HOUSEWORK: INDEPENDENT
BATHING: INDEPENDENT
DRESSING: INDEPENDENT
GROCERY_SHOPPING: INDEPENDENT
MANAGING_FINANCES: INDEPENDENT
TAKING_MEDICATION: INDEPENDENT

## 2023-10-03 ASSESSMENT — ENCOUNTER SYMPTOMS
LOSS OF SENSATION IN FEET: 0
OCCASIONAL FEELINGS OF UNSTEADINESS: 0
DEPRESSION: 0

## 2023-10-03 ASSESSMENT — PATIENT HEALTH QUESTIONNAIRE - PHQ9
1. LITTLE INTEREST OR PLEASURE IN DOING THINGS: NOT AT ALL
2. FEELING DOWN, DEPRESSED OR HOPELESS: NOT AT ALL
SUM OF ALL RESPONSES TO PHQ9 QUESTIONS 1 AND 2: 0

## 2023-10-03 NOTE — PROGRESS NOTES
Subjective   Reason for Visit: Alyssa Maldonado is a 73 y.o. female here for a Medicare Wellness visit.   Covid vax: x 5  CRC: due   Mammogram: 2023  Pap: n/a  Lmp: n/a  Exercise program cardiac rehab    CHECKLIST REVIEWED AND COMPLETE FOR AMW    Past Medical, Surgical, and Family History reviewed and updated in chart.    Reviewed all medications by prescribing practitioner or clinical pharmacist (such as prescriptions, OTCs, herbal therapies and supplements) and documented in the medical record.  Medicare Annual Wellness Visit Subsequent, Diabetes, Hypertension, Hyperlipidemia, and Atrial Fibrillation  HPI    Patient Self Assessment of Health Status  Patient Self Assessment: Good    Nutrition and Exercise  Current Diet: HEALTHY Diet always best, minimizing excess carbs  Adequate Fluid Intake: Yes  Caffeine: -aware to minimize intake  Exercise Frequency: Regularly advised    Functional Ability/Level of Safety  Cognitive Impairment Observed: No cognitive impairment observed    Home Safety Risk Factors: None    Patient Care Team:  Lyndsey Fernando MD as PCP - General    HPI  Patient Active Problem List   Diagnosis    Type 2 diabetes mellitus (CMS/HCC)    Atrial fibrillation (CMS/HCC)    Mixed hyperlipidemia    Essential hypertension    Diverticulitis of colon    CKD stage G3a/A2, GFR 45-59 and albumin creatinine ratio  mg/g (CMS/HCC)    Anemia, mild    Cardiomegaly    Thyroid disease    Morbid (severe) obesity due to excess calories (CMS/HCC)    Atherosclerosis of native coronary artery of native heart with angina pectoris (CMS/HCC)      Past Surgical History:   Procedure Laterality Date    CARDIAC CATHETERIZATION      no stents    CARDIOVERSION  2023     SECTION, CLASSIC      ,     COLONOSCOPY W/ POLYPECTOMY  2021    due     ESOPHAGOGASTRODUODENOSCOPY  2021    HH    PACEMAKER PLACEMENT  2023    TONSILLECTOMY         Review of Systems no sz or cva    This patient  "has   NO history of recent Covid nor flu symptoms,  NO Fever nor chills,  NO Chest pain, shortness of breath nor paroxysmal nocturnal dyspnea,  NO Nausea, vomiting, nor diarrhea,  NO Hematochezia nor melena,  NO Dysuria, hematuria, nor new incontinence issues  NO new severe headaches nor neurological complaints,  NO new issues with anxiety nor depression nor new psychiatric complaints,  NO suicidal nor homicidal ideations.     OBJECTIVE:  /62   Pulse 90   Temp 36.2 °C (97.1 °F) (Temporal)   Resp 18   Ht 1.562 m (5' 1.5\")   Wt 99.3 kg (219 lb)   LMP  (LMP Unknown)   SpO2 98%   BMI 40.71 kg/m²      General:  alert, oriented, no acute distress.  No obvious skin rashes noted.   No gait disturbance noted.    Mood is pleasant, not tearful, no signs of emotional distress.  Not appearing intoxicated or altered.   No voiced delusions,   Normal, appropriate behavior.    HEENT: Normocephalic, atraumatic,   Pupils round, reactive to light  Extraocular motions intact and wnl  Tympanic membranes normal    Neck: no nuchal rigidity  No masses palpable.  No carotid bruits.  No thyromegaly.    Respiratory: Equal breath sounds  No wheezes,    rales,    nor rhonchi  No respiratory distress.    Heart: Regular rate and rhythm, no    murmurs  no rubs/gallops    Abdomen: no masses palpable, no rebound nor guarding, no rebound nor guarding overwt.    Extremities: NO cyanosis noted, no clubbing.   No edema noted.  2+dorsalis pedis pulses.    Normal-not antalgic, steady gait.  No foot lesions or evidence neuropathy    No visits with results within 3 Month(s) from this visit.   Latest known visit with results is:   Orders Only on 02/24/2023   Component Date Value Ref Range Status    Flu A Result 02/24/2023 NOT DETECTED  Not Detected Final    Comment:  Respiratory virus testing is performed routinely by PCR    for Influenza A/B and RSV. If Influenza and RSV PCR are    negative, testing for parainfluenza 1,2,3 viruses and    " adenovirus is routinely performed for oncology inpatients    and intensive care unit patients at [facility] and is available   on request on other patients by calling Laboratory Client   Services at [phone].   Not Detected results do not preclude Influenza A/B or RSV   infections since the adequacy of sample collection or low   viral burden may impact the clinical sensitivity of this   test method.      Flu B Result 02/24/2023 NOT DETECTED  Not Detected Final    Comment:  Respiratory virus testing is performed routinely by PCR    for Influenza A/B and RSV. If Influenza and RSV PCR are    negative, testing for parainfluenza 1,2,3 viruses and    adenovirus is routinely performed for oncology inpatients    and intensive care unit patients at [facility] and is available   on request on other patients by calling Laboratory Client   Services at [phone]   Not Detected results do not preclude Influenza A/B or RSV   infections since the adequacy of sample collection or low   viral burden may impact the clinical sensitivity of this   test method.      SARS-CoV-2 Result 02/24/2023 DETECTED (A)  Not Detected Final    Comment: .  This assay is designed to detect the ORF1a/b and E genes of SARS-CoV-2 via   nucleic acid amplification. A Not Detected result does not preclude   2019-nCoV infection since the adequacy of sample collection and/or low viral   burden may result in presence of viral nucleic acids below the clinical   sensitivity of this test method.     Fact sheet for providers: https://www.fda.gov/media/961425/download   Fact sheet for patients: https://www.fda.gov/media/905132/download     This test has received FDA Emergency Use Authorization (EUA) and has been   verified for use by [facility] ([facility]).   This test is only authorized for the duration of time that circumstances   exist to justify the authorization of the emergency use of in vitro   diagnostic tests for the detection of  SARS-CoV-2 virus and/or diagnosis of   COVID-19 infection under section 564(b)(1) of the Act, 21 U.S.C.   360bbb-3(b)(1), unless the authorization is terminated or revoked sooner.                               [facility] is certified under CLIA-88 as   qualified to perform high complexity testing. Testing is performed in the   [facility] laboratories located at [address] [place] [address].          Assessment/Plan     Problem List Items Addressed This Visit       Atrial fibrillation (CMS/[facility])    Relevant Medications    sotalol (Betapace) 120 mg tablet    Entresto 24-26 mg tablet    metoprolol succinate XL (Toprol-XL) 25 mg 24 hr tablet    Other Relevant Orders    CBC and Auto Differential    Comprehensive Metabolic Panel    Hemoglobin A1C    Lipid Panel    Thyroid Stimulating Hormone    Thyroxine, Free    Mixed hyperlipidemia    Relevant Orders    CBC and Auto Differential    Comprehensive Metabolic Panel    Hemoglobin A1C    Lipid Panel    Thyroid Stimulating Hormone    Thyroxine, Free    Essential hypertension    Relevant Orders    CBC and Auto Differential    Comprehensive Metabolic Panel    Hemoglobin A1C    Lipid Panel    Thyroid Stimulating Hormone    Thyroxine, Free    CKD stage G3a/A2, GFR 45-59 and albumin creatinine ratio  mg/g (CMS/[facility])    Relevant Orders    CBC and Auto Differential    Comprehensive Metabolic Panel    Hemoglobin A1C    Lipid Panel    Thyroid Stimulating Hormone    Thyroxine, Free    Anemia, mild    Relevant Orders    CBC and Auto Differential    Comprehensive Metabolic Panel    Hemoglobin A1C    Lipid Panel    Thyroid Stimulating Hormone    Thyroxine, Free    Thyroid disease    Relevant Medications    levothyroxine (Synthroid, Levoxyl) 125 mcg tablet    Other Relevant Orders    CBC and Auto Differential    Comprehensive Metabolic Panel    Hemoglobin A1C    Lipid Panel    Thyroid Stimulating Hormone    Thyroxine, Free    Morbid (severe)  obesity due to excess calories (CMS/[facility])    Atherosclerosis of native coronary artery of native heart with angina pectoris (CMS/[facility])    Relevant Medications    sotalol (Betapace) 120 mg tablet    Entresto 24-26 mg tablet    metoprolol succinate XL (Toprol-XL) 25 mg 24 hr tablet     Other Visit Diagnoses       Routine general medical examination at health care facility    -  Primary    Relevant Orders    CBC and Auto Differential    Comprehensive Metabolic Panel    Hemoglobin A1C    Lipid Panel    Thyroid Stimulating Hormone    Thyroxine, Free    Encounter for Medicare annual wellness exam        Relevant Orders    CBC and Auto Differential    Comprehensive Metabolic Panel    Hemoglobin A1C    Lipid Panel    Thyroid Stimulating Hormone    Thyroxine, Free    Type 2 diabetes mellitus without complications (CMS/[facility])        Relevant Medications    metFORMIN (Glucophage) 500 mg tablet    Other Relevant Orders    CBC and Auto Differential    Comprehensive Metabolic Panel    Hemoglobin A1C    Lipid Panel    Thyroid Stimulating Hormone    Thyroxine, Free    Visit for screening mammogram        Relevant Orders    BI mammo bilateral screening tomosynthesis          Advance Care Planning Note   Discussion Date: 10/03/23   Discussion Participants: patient    The patient wishes to discuss Advance Care Planning today and the following is a brief summary of our discussion.     Patient has capacity to make their own medical decisions: Yes  Health Care Agent/Surrogate Decision Maker documented in chart: Yes  Documents on file and valid:  Advance Directive/Living Will: Yes   Health Care Power of : Yes  Communication of Medical Status/Prognosis:   yes   Communication of Treatment Goals/Options:   yes  Treatment Decisions  yes  Time Statement: Total face to face time spent on advance care planning was <30 minutes with <30 minutes spent in counseling, including the explanation.  Now txd for sleep apnea    SEE ME AT NEXT REGULARLY  SCHEDULED VISIT-sooner if condition deteriorates or new problems arise.  Full code desired  No depression  No dementia  And again had a lengthy discussion w pt  about risks of poorly controlled diabetes including micro and macrovascular complications of DM2 including blindness,MI,CVA and death among other possibilities. Pt aware and agrees to better compliance and adherance to instructions such as regular eye exams q 1-2 y, foot exams,and f/u regularly for hba1c with a goal of 6.5.    NO evidence of neuropathy or nephropathy at this point    Follow up as planned for hba1c and BP checks REGULARLY.  Sees cardio  Needs labs  The patient is aware that results will be forthcoming of ALL planned labs and or tests. If no results are received on my chart or by letter within 1 - 3 weeks, the patient is aware they need to call to obtain results, as this is not usual. Also, if any new conditions arise, or current condition worsens, it is understood that sooner appointment should be made or urgent care/convenient care or emergency room treatment should be sought depending on severity. Otherwise follow up for recheck at regular intervals as we have discussed, at least yearly.    Advised covid flu and rsv

## 2023-10-04 ENCOUNTER — LAB (OUTPATIENT)
Dept: LAB | Facility: LAB | Age: 73
End: 2023-10-04
Payer: MEDICARE

## 2023-10-04 ENCOUNTER — HOSPITAL ENCOUNTER (OUTPATIENT)
Dept: CARDIAC REHAB | Age: 73
Setting detail: THERAPIES SERIES
Discharge: HOME | End: 2023-10-04
Payer: MEDICARE

## 2023-10-04 VITALS — HEIGHT: 63 IN | WEIGHT: 222 LBS | BODY MASS INDEX: 39.34 KG/M2 | OXYGEN SATURATION: 98 % | RESPIRATION RATE: 18 BRPM

## 2023-10-04 DIAGNOSIS — I48.91 ATRIAL FIBRILLATION, UNSPECIFIED TYPE (MULTI): ICD-10-CM

## 2023-10-04 DIAGNOSIS — E78.2 MIXED HYPERLIPIDEMIA: ICD-10-CM

## 2023-10-04 DIAGNOSIS — N18.31 CKD STAGE G3A/A2, GFR 45-59 AND ALBUMIN CREATININE RATIO 30-299 MG/G (MULTI): ICD-10-CM

## 2023-10-04 DIAGNOSIS — D64.9 ANEMIA, MILD: ICD-10-CM

## 2023-10-04 DIAGNOSIS — Z00.00 ROUTINE GENERAL MEDICAL EXAMINATION AT HEALTH CARE FACILITY: ICD-10-CM

## 2023-10-04 DIAGNOSIS — E11.9 TYPE 2 DIABETES MELLITUS WITHOUT COMPLICATIONS (MULTI): ICD-10-CM

## 2023-10-04 DIAGNOSIS — Z00.00 ENCOUNTER FOR MEDICARE ANNUAL WELLNESS EXAM: ICD-10-CM

## 2023-10-04 DIAGNOSIS — I10 ESSENTIAL HYPERTENSION: ICD-10-CM

## 2023-10-04 DIAGNOSIS — E07.9 THYROID DISEASE: ICD-10-CM

## 2023-10-04 LAB
ALBUMIN SERPL BCP-MCNC: 3.8 G/DL (ref 3.4–5)
ALP SERPL-CCNC: 60 U/L (ref 33–136)
ALT SERPL W P-5'-P-CCNC: 10 U/L (ref 7–45)
ANION GAP SERPL CALC-SCNC: 13 MMOL/L (ref 10–20)
AST SERPL W P-5'-P-CCNC: 16 U/L (ref 9–39)
BASOPHILS # BLD AUTO: 0.03 X10*3/UL (ref 0–0.1)
BASOPHILS NFR BLD AUTO: 0.5 %
BILIRUB SERPL-MCNC: 0.7 MG/DL (ref 0–1.2)
BUN SERPL-MCNC: 18 MG/DL (ref 6–23)
CALCIUM SERPL-MCNC: 9.6 MG/DL (ref 8.6–10.3)
CHLORIDE SERPL-SCNC: 108 MMOL/L (ref 98–107)
CHOLEST SERPL-MCNC: 133 MG/DL (ref 0–199)
CHOLESTEROL/HDL RATIO: 2.9
CO2 SERPL-SCNC: 25 MMOL/L (ref 21–32)
CREAT SERPL-MCNC: 0.97 MG/DL (ref 0.5–1.05)
EOSINOPHIL # BLD AUTO: 0.13 X10*3/UL (ref 0–0.4)
EOSINOPHIL NFR BLD AUTO: 2.2 %
ERYTHROCYTE [DISTWIDTH] IN BLOOD BY AUTOMATED COUNT: 15.6 % (ref 11.5–14.5)
EST. AVERAGE GLUCOSE BLD GHB EST-MCNC: 123 MG/DL
GFR SERPL CREATININE-BSD FRML MDRD: 62 ML/MIN/1.73M*2
GLUCOSE SERPL-MCNC: 92 MG/DL (ref 74–99)
HBA1C MFR BLD: 5.9 %
HCT VFR BLD AUTO: 45.4 % (ref 36–46)
HDLC SERPL-MCNC: 45.8 MG/DL
HGB BLD-MCNC: 14.2 G/DL (ref 12–16)
IMM GRANULOCYTES # BLD AUTO: 0.01 X10*3/UL (ref 0–0.5)
IMM GRANULOCYTES NFR BLD AUTO: 0.2 % (ref 0–0.9)
LDLC SERPL CALC-MCNC: 56 MG/DL (ref 140–190)
LYMPHOCYTES # BLD AUTO: 2.14 X10*3/UL (ref 0.8–3)
LYMPHOCYTES NFR BLD AUTO: 35.4 %
MCH RBC QN AUTO: 26.7 PG (ref 26–34)
MCHC RBC AUTO-ENTMCNC: 31.3 G/DL (ref 32–36)
MCV RBC AUTO: 86 FL (ref 80–100)
MONOCYTES # BLD AUTO: 0.5 X10*3/UL (ref 0.05–0.8)
MONOCYTES NFR BLD AUTO: 8.3 %
NEUTROPHILS # BLD AUTO: 3.23 X10*3/UL (ref 1.6–5.5)
NEUTROPHILS NFR BLD AUTO: 53.4 %
NON HDL CHOLESTEROL: 87 MG/DL (ref 0–149)
NRBC BLD-RTO: 0 /100 WBCS (ref 0–0)
PLATELET # BLD AUTO: 167 X10*3/UL (ref 150–450)
PMV BLD AUTO: 11.3 FL (ref 7.5–11.5)
POTASSIUM SERPL-SCNC: 4.2 MMOL/L (ref 3.5–5.3)
PROT SERPL-MCNC: 6.8 G/DL (ref 6.4–8.2)
RBC # BLD AUTO: 5.31 X10*6/UL (ref 4–5.2)
SODIUM SERPL-SCNC: 142 MMOL/L (ref 136–145)
T4 FREE SERPL-MCNC: 1.45 NG/DL (ref 0.61–1.12)
TRIGL SERPL-MCNC: 156 MG/DL (ref 0–149)
TSH SERPL-ACNC: 1.62 MIU/L (ref 0.44–3.98)
VLDL: 31 MG/DL (ref 0–40)
WBC # BLD AUTO: 6 X10*3/UL (ref 4.4–11.3)

## 2023-10-04 PROCEDURE — G0422 INTENS CARDIAC REHAB W/EXERC: HCPCS

## 2023-10-04 PROCEDURE — 36415 COLL VENOUS BLD VENIPUNCTURE: CPT

## 2023-10-04 ASSESSMENT — PATIENT HEALTH QUESTIONNAIRE - PHQ9
8. MOVING OR SPEAKING SO SLOWLY THAT OTHER PEOPLE COULD HAVE NOTICED. OR THE OPPOSITE, BEING SO FIGETY OR RESTLESS THAT YOU HAVE BEEN MOVING AROUND A LOT MORE THAN USUAL: 0
SUM OF ALL RESPONSES TO PHQ QUESTIONS 1-9: 1
9. THOUGHTS THAT YOU WOULD BE BETTER OFF DEAD, OR OF HURTING YOURSELF: 0
SUM OF ALL RESPONSES TO PHQ QUESTIONS 1-9: 1
10. IF YOU CHECKED OFF ANY PROBLEMS, HOW DIFFICULT HAVE THESE PROBLEMS MADE IT FOR YOU TO DO YOUR WORK, TAKE CARE OF THINGS AT HOME, OR GET ALONG WITH OTHER PEOPLE: 0
SUM OF ALL RESPONSES TO PHQ QUESTIONS 1-9: 1
6. FEELING BAD ABOUT YOURSELF - OR THAT YOU ARE A FAILURE OR HAVE LET YOURSELF OR YOUR FAMILY DOWN: 0
SUM OF ALL RESPONSES TO PHQ QUESTIONS 1-9: 1
7. TROUBLE CONCENTRATING ON THINGS, SUCH AS READING THE NEWSPAPER OR WATCHING TELEVISION: 1
3. TROUBLE FALLING OR STAYING ASLEEP: 0
4. FEELING TIRED OR HAVING LITTLE ENERGY: 0
2. FEELING DOWN, DEPRESSED OR HOPELESS: 0
5. POOR APPETITE OR OVEREATING: 0
1. LITTLE INTEREST OR PLEASURE IN DOING THINGS: 0
SUM OF ALL RESPONSES TO PHQ9 QUESTIONS 1 & 2: 0

## 2023-10-04 ASSESSMENT — NEW YORK HEART ASSOCIATION (NYHA) CLASSIFICATION: NYHA FUNCTIONAL CLASS: CLASS I

## 2023-10-04 ASSESSMENT — EXERCISE STRESS TEST
PEAK_RPD: 2
PEAK_BP: 132/72
PEAK_RPE: 13
PEAK_BP: 132/72
PEAK_HR: 149

## 2023-10-04 ASSESSMENT — EJECTION FRACTION
EF_VALUE: 23
EF_VALUE: 23

## 2023-10-06 ENCOUNTER — HOSPITAL ENCOUNTER (OUTPATIENT)
Dept: CARDIAC REHAB | Age: 73
Setting detail: THERAPIES SERIES
Discharge: HOME | End: 2023-10-06
Payer: MEDICARE

## 2023-10-06 PROCEDURE — G0422 INTENS CARDIAC REHAB W/EXERC: HCPCS

## 2023-10-06 NOTE — CARDIO/PULMONARY
[facility] Cardiac Rehab ITP Note      Patient Name: Lexi Knight  MRN: [id number]                  : 1950      ITP Note: initial    Patient completed  sessions of cardiac rehab. Patient arrived to OP Phase II with admitting DX: HF referred by Dr. Payal Newman      Physical examination: BLE 1+ edema, strong pulse BUE, lungs sound clear, heart sounds normal      Exercise: Patient tolerated warm up, six-minute walk test, 10 minutes of sustained CV exercise on Nu-Step seated stepper, and cool-down. Patient complaints/signs/symptoms: none    Patient educated on CVD risk factors, HF warning zones, importance of daily weights, limiting sodium, compliance with medication schedule, and fluid restriction. Patient given hand outs and copy of insurance estimate      POC: Patient will attend OP Phase II program:  ICR 3x weekly for 12 weeks or until 36 sessions are completed.        Past Medical History:   Diagnosis Date    Angina at rest 2021    Bilateral leg edema     Diastolic congestive heart failure ([address]) 2021    DM2 (diabetes mellitus, type 2) (HCC)     Hyperlipidemia     Hypertension     Hypothyroidism     GALINA (obstructive sleep apnea)     Shortness of breath 2021          Current Outpatient Medications   Medication Sig Dispense Refill    empagliflozin (JARDIANCE) 10 MG tablet Take 1 tablet by mouth daily 90 tablet 3    metoprolol succinate (TOPROL XL) 25 MG extended release tablet Take 1 tablet by mouth daily 90 tablet 3    furosemide (LASIX) 20 MG tablet Take 1 tablet by mouth daily 90 tablet 3    sotalol (BETAPACE) 120 MG tablet Take 1 tablet by mouth 2 times daily 180 tablet 3    sacubitril-valsartan (ENTRESTO) 24-26 MG per tablet Take 1 tablet by mouth 2 times daily 180 tablet 2    rivaroxaban (XARELTO) 20 MG TABS tablet Take 1 tablet by mouth daily (with breakfast) 90 tablet 3    pantoprazole (PROTONIX) 40 MG tablet TAKE 1 TABLET DAILY 90 tablet 2    Respiratory

## 2023-10-06 NOTE — PROGRESS NOTES
COVID Screening completed. Patient returns for 2nd session. Patient denies complaints from 1st session. No changes to PMH or medications. Patient reoriented to the gym, machine safety, and s/s to report. Patient tolerates exercise well with longer time on the Nustep. Patient does rest often with ambulation. Patient attends [address] \"[employer]\".  Electronically signed by Charles Bernal RN on 10/6/2023 at 11:46 AM

## 2023-10-09 ENCOUNTER — HOSPITAL ENCOUNTER (OUTPATIENT)
Dept: CARDIAC REHAB | Age: 73
Setting detail: THERAPIES SERIES
Discharge: HOME | End: 2023-10-09
Payer: MEDICARE

## 2023-10-09 PROCEDURE — G0423 INTENS CARDIAC REHAB NO EXER: HCPCS

## 2023-10-09 PROCEDURE — G0422 INTENS CARDIAC REHAB W/EXERC: HCPCS

## 2023-10-09 NOTE — PROGRESS NOTES
Video Education Report - ICR/CR    Name:  Mary Jane Cooney     Date:  10/9/2023  MRN: [id number]     Session #:  1  Session Length: 33:07 min    Recommended Videos        []01 [employer] - Program Overview   34:22    []02 Overview of [employer] Eating Plan             34:10    []03 Becoming a Lis Diaz   33:08     []04 Diseases of Our Time - Part 1   34:22    []05 Calorie Density     33:39   []06 Label Reading - Part 1    32:15   []07 Move it      32.54   []08 Healthy Minds, Bodies, Hearts   32:14   []09 Dining Out - Part 1    32:28   []10 Heart Disease Risk Reduction   48:72   []95 Metabolic Syndrome and Belly Fat  31:52   []12 Facts on Fat     35:29   [x]13 Diseases of Our Time - Part 2   33:07   []14 Biology of Weight Control   32:36   []15 Biomechanical Limitations   35:20   []16 Nutrition Action Plan    34:23    Additional Videos         []17 Hypertension & Heart Disease   32:39        []18 Cooking Breakfasts and Snacks  32:00   []19 Planning Your Eating Strategy   33:30   []20 Label Reading - Part 2    32:36  []21 Cooking Soups and Desserts   31:41  []22 How Our Thoughts Can Heal Our Hearts 33:05  []23 Targeting Your Nutrition Priorities  33:58  []24 Healthy Salads & Dressings   35:32  []25 Dining Out - Part 2    32:35  []26 Cooking Dinner and Sides   35:06  []27 Sleep Disorders     33:14  []28 Menu Workshop     32:06  []29 Decoding Lab Results    32:42     []30 Vitamins and Minerals    32:54  []31 Exercise Action Plan    32:26  []32 Body Composition    34:03  []33 Improving Performance    32:13  []34 Fueling a Healthy Body    31:32  []35 Introduction to Yoga    33:47  []36 Aging-Enhancing the Quality of Your Life 33:22  []37 Smoking Cessation    36:19    Comments:  Video completed

## 2023-10-09 NOTE — CARDIO/PULMONARY
COVID Screening completed. Patient denies complaints, no changes to PMH or medications. Patient tolerates exercise well. Weekly education, Discussed sleep disorders and their affect on cardiovascular health (ie; insomnia, narcolepsy, RLS, and sleep apnea). Educated how poor sleep can contribute to CVD risk factors such as obesity, HTN, DM, Stress, inflammation and depression. Patient viewed [employer] video \"Diseases of our time, Part 2. Focusing on diabetes\". All equipment used in the care for this patient has been cleaned.   Electronically signed by Rolando Huber RN on 10/9/2023 at 1:43 PM

## 2023-10-11 ENCOUNTER — HOSPITAL ENCOUNTER (OUTPATIENT)
Dept: CARDIAC REHAB | Age: 73
Setting detail: THERAPIES SERIES
Discharge: HOME | End: 2023-10-11
Payer: MEDICARE

## 2023-10-11 PROCEDURE — G0422 INTENS CARDIAC REHAB W/EXERC: HCPCS

## 2023-10-11 NOTE — CARDIO/PULMONARY
COVID Screening completed. Patient denies complaints, no changes to PMH or medications. Patient tolerates exercise well. Patient unable to attend [employer] workshop due to prior obligations. All equipment used in the care for this patient has been cleaned.   Electronically signed by Aramis Guajardo RN on 10/11/2023 at 11:29 AM

## 2023-10-13 ENCOUNTER — HOSPITAL ENCOUNTER (OUTPATIENT)
Dept: CARDIAC REHAB | Age: 73
Setting detail: THERAPIES SERIES
Discharge: HOME | End: 2023-10-13
Payer: MEDICARE

## 2023-10-13 PROCEDURE — G0423 INTENS CARDIAC REHAB NO EXER: HCPCS

## 2023-10-13 PROCEDURE — G0422 INTENS CARDIAC REHAB W/EXERC: HCPCS

## 2023-10-13 NOTE — CARDIO/PULMONARY
COVID Screening completed. Patient denies complaints, no changes to PMH or medications. Patient tolerates exercise well. Patient attended [employer] \"Tasty appetizers and snacks\". All equipment used in the care for this patient has been cleaned.   Electronically signed by Amparo Gaytan RN on 10/13/2023 at 4:15 PM

## 2023-10-16 ENCOUNTER — HOSPITAL ENCOUNTER (OUTPATIENT)
Dept: CARDIAC REHAB | Age: 73
Setting detail: THERAPIES SERIES
Discharge: HOME | End: 2023-10-16
Payer: MEDICARE

## 2023-10-16 PROCEDURE — G0422 INTENS CARDIAC REHAB W/EXERC: HCPCS

## 2023-10-18 ENCOUNTER — HOSPITAL ENCOUNTER (OUTPATIENT)
Dept: CARDIAC REHAB | Age: 73
Setting detail: THERAPIES SERIES
Discharge: HOME | End: 2023-10-18
Payer: MEDICARE

## 2023-10-18 PROCEDURE — G0423 INTENS CARDIAC REHAB NO EXER: HCPCS

## 2023-10-18 PROCEDURE — G0422 INTENS CARDIAC REHAB W/EXERC: HCPCS

## 2023-10-18 NOTE — PROGRESS NOTES
COVID Screening completed. Patient denies complaints, no changes to PMH or medications. Patient tolerates exercise well. Patient attends [employer] \"Healthy Sleep for a Healthy Heart\". Patient has 1:1 with Jennifer Richardson RD.  Electronically signed by Karen Diego RN on 10/18/2023 at 12:47 PM

## 2023-10-18 NOTE — CARDIO/PULMONARY
Cardiac Rehab Nutrition Assessment - 1:1 Evaluation           NAME: Brayan Zapata : 1950 AGE: 68 y.o. GENDER: female  CARDIAC REHAB ADMITTING DIAGNOSIS: HF  Other dx: Afib, Type two diabetes    PROBLEM LIST:  Active Problems:    * No active hospital problems. *  Resolved Problems:    * No resolved hospital problems. *          LABS:   Lab Results   Component Value Date    LABA1C 6.0 10/19/2021     No results found for: \"EAG\"   Lab Results   Component Value Date/Time    CHOL 143 10/19/2021 12:00 AM    HDL 50 10/19/2021 12:00 AM    VLDL 34 10/19/2021 12:00 AM     Lab Results   Component Value Date/Time     2023 07:56 AM    K 4.1 2023 07:56 AM    K 3.9 2021 05:16 AM     2023 07:56 AM    CO2 22 2023 07:56 AM    BUN 18 2023 07:56 AM    CREATININE 0.87 2023 07:56 AM    GLUCOSE 104 2023 07:56 AM    GLUCOSE 114 2023 01:49 PM    GLUCOSE 122 2023 07:41 AM    CALCIUM 9.1 2023 07:56 AM         MEDICATIONS/SUPPLEMENTS:   Prior to Admission medications    Medication Sig Start Date End Date Taking?  Authorizing Provider   empagliflozin (JARDIANCE) 10 MG tablet Take 1 tablet by mouth daily 23   Seamus Knott DO   metoprolol succinate (TOPROL XL) 25 MG extended release tablet Take 1 tablet by mouth daily 23   Seamus Knott DO   furosemide (LASIX) 20 MG tablet Take 1 tablet by mouth daily 23   Seamus Knott DO   sotalol (BETAPACE) 120 MG tablet Take 1 tablet by mouth 2 times daily 23   Seamus Knott DO   sacubitril-valsartan (ENTRESTO) 24-26 MG per tablet Take 1 tablet by mouth 2 times daily 23   JASWINDER Hernandez   rivaroxaban Darral Galeano) 20 MG TABS tablet Take 1 tablet by mouth daily (with breakfast) 3/20/23   JASWINDER Hernandez   pantoprazole (PROTONIX) 40 MG tablet TAKE 1 TABLET DAILY 3/7/23   Amalia Austin MD   Respiratory Therapy Supplies [employer] New CPAP mask and supplies 23   Melani Calloway

## 2023-10-20 ENCOUNTER — HOSPITAL ENCOUNTER (OUTPATIENT)
Dept: CARDIAC REHAB | Age: 73
Setting detail: THERAPIES SERIES
Discharge: HOME | End: 2023-10-20
Payer: MEDICARE

## 2023-10-20 PROCEDURE — G0422 INTENS CARDIAC REHAB W/EXERC: HCPCS

## 2023-10-20 NOTE — PROGRESS NOTES
COVID Screening completed. Patient denies complaints, no changes to PMH or medications. Patient tolerates exercise well. Patient unable to attend [employer] due to babysitting.  Electronically signed by Coty Boyle RN on 10/20/2023 at 10:57 AM

## 2023-10-23 ENCOUNTER — HOSPITAL ENCOUNTER (OUTPATIENT)
Dept: CARDIAC REHAB | Age: 73
Setting detail: THERAPIES SERIES
Discharge: HOME | End: 2023-10-23
Payer: MEDICARE

## 2023-10-23 PROCEDURE — G0423 INTENS CARDIAC REHAB NO EXER: HCPCS

## 2023-10-23 PROCEDURE — G0422 INTENS CARDIAC REHAB W/EXERC: HCPCS

## 2023-10-23 NOTE — CARDIO/PULMONARY
COVID Screening completed. Patient denies complaints, no changes to PMH or medications. Patient tolerates exercise well. Weekly education; Discussed what blood pressure is, SBP and DBP, HTN with risk factors, types of blood pressure medication and how they work on the body. Discussed angina and use of SL nitro for chest pain. Handouts given. Patient viewed [employer] video \"Label Reading\". Discussed patients medication. Patient is going to try taking her metoprolol at lunch time tomorrow to see if there is any improvement in her low BP. All equipment used in the care for this patient has been cleaned.   Electronically signed by Daquan Han RN on 10/23/2023 at 11:31 AM

## 2023-10-23 NOTE — CARDIO/PULMONARY
Video Education Report - ICR/CR    Name:  Wilbur Collins     Date:  10/23/2023  MRN: [id number]     Session #:  2  Session Length: 32:15 min    Recommended Videos        []01 [employer] - Program Overview   34:22    []02 Overview of [employer] Eating Plan             34:10    []03 Becoming a [employer]   33:08     []04 Diseases of Our Time - Part 1   34:22    []05 Calorie Density     33:39   [x]06 Label Reading - Part 1    32:15   []07 Move it      32.54   []08 Healthy Minds, Bodies, Hearts   32:14   []09 Dining Out - Part 1    32:28   []10 Heart Disease Risk Reduction   94:02   []80 Metabolic Syndrome and Belly Fat  31:52   []12 Facts on Fat     35:29   []13 Diseases of Our Time - Part 2   33:07   []14 Biology of Weight Control   32:36   []15 Biomechanical Limitations   35:20   []16 Nutrition Action Plan    34:23    Additional Videos         []17 Hypertension & Heart Disease   32:39        []18 Cooking Breakfasts and Snacks  32:00   []19 Planning Your Eating Strategy   33:30   []20 Label Reading - Part 2    32:36  []21 Cooking Soups and Desserts   31:41  []22 How Our Thoughts Can Heal Our Hearts 33:05  []23 Targeting Your Nutrition Priorities  33:58  []24 Healthy Salads & Dressings   35:32  []25 Dining Out - Part 2    32:35  []26 Cooking Dinner and Sides   35:06  []27 Sleep Disorders     33:14  []28 Menu Workshop     32:06  []29 Decoding Lab Results    32:42     []30 Vitamins and Minerals    32:54  []31 Exercise Action Plan    32:26  []32 Body Composition    34:03  []33 Improving Performance    32:13  []34 Fueling a Healthy Body    31:32  []35 Introduction to Yoga    33:47  []36 Aging-Enhancing the Quality of Your Life 33:22  []37 Smoking Cessation    36:19    Comments:  Video completed, all questions answered

## 2023-10-24 ENCOUNTER — OFFICE VISIT (OUTPATIENT)
Dept: PULMONOLOGY | Age: 73
End: 2023-10-24
Payer: MEDICARE

## 2023-10-24 VITALS
SYSTOLIC BLOOD PRESSURE: 110 MMHG | HEART RATE: 86 BPM | TEMPERATURE: 97.1 F | OXYGEN SATURATION: 96 % | BODY MASS INDEX: 38.97 KG/M2 | WEIGHT: 220 LBS | DIASTOLIC BLOOD PRESSURE: 68 MMHG

## 2023-10-24 DIAGNOSIS — E66.9 OBESITY (BMI 30-39.9): ICD-10-CM

## 2023-10-24 DIAGNOSIS — G47.33 OSA (OBSTRUCTIVE SLEEP APNEA): Primary | ICD-10-CM

## 2023-10-24 PROCEDURE — 3017F COLORECTAL CA SCREEN DOC REV: CPT | Performed by: INTERNAL MEDICINE

## 2023-10-24 PROCEDURE — 3074F SYST BP LT 130 MM HG: CPT | Performed by: INTERNAL MEDICINE

## 2023-10-24 PROCEDURE — G8417 CALC BMI ABV UP PARAM F/U: HCPCS | Performed by: INTERNAL MEDICINE

## 2023-10-24 PROCEDURE — G8484 FLU IMMUNIZE NO ADMIN: HCPCS | Performed by: INTERNAL MEDICINE

## 2023-10-24 PROCEDURE — 1123F ACP DISCUSS/DSCN MKR DOCD: CPT | Performed by: INTERNAL MEDICINE

## 2023-10-24 PROCEDURE — 99214 OFFICE O/P EST MOD 30 MIN: CPT | Performed by: INTERNAL MEDICINE

## 2023-10-24 PROCEDURE — 1036F TOBACCO NON-USER: CPT | Performed by: INTERNAL MEDICINE

## 2023-10-24 PROCEDURE — G8427 DOCREV CUR MEDS BY ELIG CLIN: HCPCS | Performed by: INTERNAL MEDICINE

## 2023-10-24 PROCEDURE — G8400 PT W/DXA NO RESULTS DOC: HCPCS | Performed by: INTERNAL MEDICINE

## 2023-10-24 PROCEDURE — 1090F PRES/ABSN URINE INCON ASSESS: CPT | Performed by: INTERNAL MEDICINE

## 2023-10-24 PROCEDURE — 3078F DIAST BP <80 MM HG: CPT | Performed by: INTERNAL MEDICINE

## 2023-10-24 ASSESSMENT — ENCOUNTER SYMPTOMS
SINUS PRESSURE: 0
COUGH: 0
NAUSEA: 0
WHEEZING: 0
DIARRHEA: 0
EYE DISCHARGE: 0
SORE THROAT: 0
CHEST TIGHTNESS: 0
EYE ITCHING: 0
TROUBLE SWALLOWING: 0
VOMITING: 0
RHINORRHEA: 0
ABDOMINAL PAIN: 0
VOICE CHANGE: 0
SHORTNESS OF BREATH: 0

## 2023-10-24 NOTE — PROGRESS NOTES
Subjective:     Mikael Alba is a 68 y.o. female who complains today of:     Chief Complaint   Patient presents with    Follow-up     6m f/u on GALINA        HPI  She is using CPAP with  11 centimeters of H2O with heated humidity. She is using CPAP for about  7  hours  almost every night. She is using CPAP with  nasal pillow mask Mask. She said Sleep is restful with the CPAP use. She is compliant with CPAP therapy and benefiting with CPAP use. No snoring with CPAP use. No complaint of daytime sleepiness or tiredness with CPAP use. She denies taking daily  naps. No sleepiness with driving. She denies difficulty falling asleep or staying asleep. I reviewed compliance report with patient regarding CPAP therapy. She is using CPAP for 26 days out of 30 days. Average usage of days used is 6 hours and 54 min , average AHI 1.4 with CPAP use    Allergies:  Patient has no known allergies.   Past Medical History:   Diagnosis Date    Angina at rest 2021    Bilateral leg edema     Diastolic congestive heart failure ([address]) 2021    DM2 (diabetes mellitus, type 2) (HCC)     Hyperlipidemia     Hypertension     Hypothyroidism     GALINA (obstructive sleep apnea)     Shortness of breath 2021     Past Surgical History:   Procedure Laterality Date     SECTION      COLONOSCOPY  2008    needs 2018    COLONOSCOPY N/A 2021    COLONOSCOPY DIAGNOSTIC performed by Stephany Haley MD at [facility]  2023    GA COLON CA SCRN NOT HI [address] IND N/A 2018    COLONOSCOPY adenoma    UPPER GASTROINTESTINAL ENDOSCOPY N/A 2021    EGD DIAGNOSTIC ONLY performed by Stephany Haley MD at [facility]     Family History   Problem Relation Age of Onset    Colon Cancer Maternal Grandmother      Social History     Socioeconomic History    Marital status:      Spouse name: Not on file    Number of children: Not on file    Years of education: Not on

## 2023-10-25 ENCOUNTER — HOSPITAL ENCOUNTER (OUTPATIENT)
Dept: CARDIAC REHAB | Age: 73
Setting detail: THERAPIES SERIES
Discharge: HOME | End: 2023-10-25
Payer: MEDICARE

## 2023-10-25 ENCOUNTER — HOSPITAL ENCOUNTER (OUTPATIENT)
Age: 73
Discharge: HOME | End: 2023-10-27
Payer: MEDICARE

## 2023-10-25 VITALS
DIASTOLIC BLOOD PRESSURE: 68 MMHG | SYSTOLIC BLOOD PRESSURE: 110 MMHG | WEIGHT: 220.02 LBS | HEIGHT: 63 IN | BODY MASS INDEX: 38.98 KG/M2

## 2023-10-25 DIAGNOSIS — I38 VALVULAR HEART DISEASE: ICD-10-CM

## 2023-10-25 DIAGNOSIS — I50.810 RVF (RIGHT VENTRICULAR FAILURE) (HCC): ICD-10-CM

## 2023-10-25 DIAGNOSIS — I42.9 CARDIOMYOPATHY, UNSPECIFIED TYPE (HCC): ICD-10-CM

## 2023-10-25 PROCEDURE — 93325 DOPPLER ECHO COLOR FLOW MAPG: CPT

## 2023-10-25 PROCEDURE — 93321 DOPPLER ECHO F-UP/LMTD STD: CPT | Performed by: INTERNAL MEDICINE

## 2023-10-25 PROCEDURE — 93308 TTE F-UP OR LMTD: CPT | Performed by: INTERNAL MEDICINE

## 2023-10-25 PROCEDURE — G0422 INTENS CARDIAC REHAB W/EXERC: HCPCS

## 2023-10-25 PROCEDURE — 93325 DOPPLER ECHO COLOR FLOW MAPG: CPT | Performed by: INTERNAL MEDICINE

## 2023-10-25 ASSESSMENT — EXERCISE STRESS TEST: PEAK_BP: 104/60

## 2023-10-25 ASSESSMENT — EJECTION FRACTION: EF_VALUE: 23

## 2023-10-25 NOTE — CARDIO/PULMONARY
COVID Screening completed. Patient denies complaints, no changes to PMH or medications. Patient tolerates exercise well. Patient had an Echo this morning. Patient unable to stay for [facility] workshop due to showing up later following Echo. All equipment used in the care for this patient has been cleaned.   Electronically signed by Jonas Apgar, RN on 10/25/2023 at 10:11 AM

## 2023-10-25 NOTE — CARDIO/PULMONARY
[facility] Cardiac Rehab ITP Note      Patient Name: Pooja Harden  MRN: [id number]                  : 1950      ITP Note: re-assessment    Patient completed 10/36 sessions of cardiac rehab. Past Medical History:   Diagnosis Date    Angina at rest 2021    Bilateral leg edema 82/15/6402    Diastolic congestive heart failure ([address]) 2021    DM2 (diabetes mellitus, type 2) ([address])     Hyperlipidemia     Hypertension     Hypothyroidism     GALINA (obstructive sleep apnea)     Shortness of breath 2021          Current Outpatient Medications   Medication Sig Dispense Refill    empagliflozin (JARDIANCE) 10 MG tablet Take 1 tablet by mouth daily 90 tablet 3    metoprolol succinate (TOPROL XL) 25 MG extended release tablet Take 1 tablet by mouth daily 90 tablet 3    furosemide (LASIX) 20 MG tablet Take 1 tablet by mouth daily 90 tablet 3    sotalol (BETAPACE) 120 MG tablet Take 1 tablet by mouth 2 times daily 180 tablet 3    sacubitril-valsartan (ENTRESTO) 24-26 MG per tablet Take 1 tablet by mouth 2 times daily 180 tablet 2    rivaroxaban (XARELTO) 20 MG TABS tablet Take 1 tablet by mouth daily (with breakfast) 90 tablet 3    pantoprazole (PROTONIX) 40 MG tablet TAKE 1 TABLET DAILY 90 tablet 2    Respiratory Therapy Supplies MAXIME New CPAP mask and supplies 1 each 0    acetaminophen-codeine (TYLENOL #3) 300-30 MG per tablet TAKE 1 TABLET BY MOUTH EVERY 6 HOURS AS NEEDED FOR PAIN      CPAP Machine MISC by Does not apply route New CPAP at 11 cm  And supply.  1 each 0    Ferrous Sulfate (IRON) 325 (65 Fe) MG TABS Take 1 tablet by mouth daily       rosuvastatin (CRESTOR) 10 MG tablet TAKE 1 TABLET BY MOUTH ONCE DAILY (Patient taking differently: Take 1 tablet by mouth daily TAKE 1 TABLET BY MOUTH ONCE DAILY) 90 tablet 1    levothyroxine (SYNTHROID) 125 MCG tablet Take 1 tablet by mouth daily -except Sundays 90 tablet 0    metFORMIN (GLUCOPHAGE) 500 MG tablet TAKE 1 TABLET BY MOUTH TWICE A DAY

## 2023-10-26 ENCOUNTER — HOSPITAL ENCOUNTER (OUTPATIENT)
Dept: CARDIAC REHAB | Age: 73
Setting detail: THERAPIES SERIES
Discharge: HOME | End: 2023-10-26
Payer: MEDICARE

## 2023-10-26 LAB
ECHO BSA: 2.11 M2
ECHO EST RA PRESSURE: 10 MMHG
ECHO LA VOL 2C: 88 ML (ref 22–52)
ECHO LA VOL 2C: 91 ML (ref 22–52)
ECHO LA VOL 4C: 100 ML (ref 22–52)
ECHO LA VOL 4C: 96 ML (ref 22–52)
ECHO LA VOLUME AREA LENGTH: 98 ML
ECHO LA VOLUME INDEX AREA LENGTH: 49 ML/M2 (ref 16–34)
ECHO LV E' LATERAL VELOCITY: 10 CM/S
ECHO LV E' SEPTAL VELOCITY: 7 CM/S
ECHO LV EDV A2C: 85 ML
ECHO LV EDV A4C: 58 ML
ECHO LV EDV BP: 78 ML (ref 56–104)
ECHO LV EDV INDEX A4C: 29 ML/M2
ECHO LV EDV INDEX BP: 39 ML/M2
ECHO LV EDV NDEX A2C: 42 ML/M2
ECHO LV EJECTION FRACTION A2C: 39 %
ECHO LV EJECTION FRACTION A4C: 50 %
ECHO LV EJECTION FRACTION BIPLANE: 46 % (ref 55–100)
ECHO LV ESV A2C: 52 ML
ECHO LV ESV A4C: 29 ML
ECHO LV ESV BP: 42 ML (ref 19–49)
ECHO LV ESV INDEX A2C: 26 ML/M2
ECHO LV ESV INDEX A4C: 14 ML/M2
ECHO LV ESV INDEX BP: 21 ML/M2
ECHO LV FRACTIONAL SHORTENING: 21 % (ref 28–44)
ECHO LV INTERNAL DIMENSION DIASTOLE INDEX: 2.39 CM/M2
ECHO LV INTERNAL DIMENSION DIASTOLIC: 4.8 CM (ref 3.9–5.3)
ECHO LV INTERNAL DIMENSION SYSTOLIC INDEX: 1.89 CM/M2
ECHO LV INTERNAL DIMENSION SYSTOLIC: 3.8 CM
ECHO LV IVSD: 1 CM (ref 0.6–0.9)
ECHO LV IVSS: 1.1 CM
ECHO LV MASS 2D: 147.8 G (ref 67–162)
ECHO LV MASS INDEX 2D: 73.5 G/M2 (ref 43–95)
ECHO LV POSTERIOR WALL DIASTOLIC: 0.8 CM (ref 0.6–0.9)
ECHO LV POSTERIOR WALL SYSTOLIC: 1 CM
ECHO LV RELATIVE WALL THICKNESS RATIO: 0.33
ECHO MV A VELOCITY: 0.01 M/S
ECHO MV E DECELERATION TIME (DT): 210.5 MS
ECHO MV E VELOCITY: 1.2 M/S
ECHO MV E/A RATIO: 120
ECHO MV E/E' LATERAL: 12
ECHO MV E/E' RATIO (AVERAGED): 14.57
ECHO MV E/E' SEPTAL: 17.14
ECHO RIGHT VENTRICULAR SYSTOLIC PRESSURE (RVSP): 32 MMHG
ECHO RV INTERNAL DIMENSION: 2.6 CM
ECHO TV REGURGITANT MAX VELOCITY: 2.33 M/S
ECHO TV REGURGITANT PEAK GRADIENT: 22 MMHG

## 2023-10-26 PROCEDURE — G0422 INTENS CARDIAC REHAB W/EXERC: HCPCS

## 2023-10-26 NOTE — CARDIO/PULMONARY
COVID Screening completed. Patient denies complaints, no changes to PMH or medications. Patient tolerates exercise well.  Electronically signed by Aubrey White on 10/26/2023 at 3:44 PM

## 2023-10-27 ENCOUNTER — HOSPITAL ENCOUNTER (OUTPATIENT)
Dept: CARDIAC REHAB | Age: 73
Setting detail: THERAPIES SERIES
Discharge: HOME | End: 2023-10-27
Payer: MEDICARE

## 2023-10-30 ENCOUNTER — HOSPITAL ENCOUNTER (OUTPATIENT)
Dept: CARDIAC REHAB | Age: 73
Setting detail: THERAPIES SERIES
Discharge: HOME | End: 2023-10-30
Payer: MEDICARE

## 2023-10-30 PROCEDURE — G0423 INTENS CARDIAC REHAB NO EXER: HCPCS

## 2023-10-30 PROCEDURE — G0422 INTENS CARDIAC REHAB W/EXERC: HCPCS

## 2023-10-30 NOTE — CARDIO/PULMONARY
Video Education Report - ICR/CR    Name:  Leeanne Tucker     Date:  10/30/2023  MRN: [id number]     Session #:  3  Session Length: 32:14 min    Recommended Videos        []01 [employer] - Program Overview   34:22    []02 Overview of Pritikin Eating Plan             34:10    []03 Becoming a Jamal Expose   33:08     []04 Diseases of Our Time - Part 1   34:22    []05 Calorie Density     33:39   []06 Label Reading - Part 1    32:15   []07 Move it      32.54   [x]08 Healthy Minds, Bodies, Hearts   32:14   []09 Dining Out - Part 1    32:28   []10 Heart Disease Risk Reduction   80:03   []38 Metabolic Syndrome and Belly Fat  31:52   []12 Facts on Fat     35:29   []13 Diseases of Our Time - Part 2   33:07   []14 Biology of Weight Control   32:36   []15 Biomechanical Limitations   35:20   []16 Nutrition Action Plan    34:23    Additional Videos         []17 Hypertension & Heart Disease   32:39        []18 Cooking Breakfasts and Snacks  32:00   []19 Planning Your Eating Strategy   33:30   []20 Label Reading - Part 2    32:36  []21 Cooking Soups and Desserts   31:41  []22 How Our Thoughts Can Heal Our Hearts 33:05  []23 Targeting Your Nutrition Priorities  33:58  []24 Healthy Salads & Dressings   35:32  []25 Dining Out - Part 2    32:35  []26 Cooking Dinner and Sides   35:06  []27 Sleep Disorders     33:14  []28 Menu Workshop     32:06  []29 Decoding Lab Results    32:42     []30 Vitamins and Minerals    32:54  []31 Exercise Action Plan    32:26  []32 Body Composition    34:03  []33 Improving Performance    32:13  []34 Fueling a Healthy Body    31:32  []35 Introduction to Yoga    33:47  []36 Aging-Enhancing the Quality of Your Life 33:22  []37 Smoking Cessation    36:19    Comments:  Video completed, all questions answered

## 2023-10-30 NOTE — CARDIO/PULMONARY
COVID Screening completed. Patient denies complaints, no changes to PMH or medications. Patient tolerates exercise well. Weekly Education: Discussed Areli Yon is cholesterol\"; patients latest cholesterol numbers; types of cholesterol; cholesterol medication; and lifestyle changes to improve cholesterol, including modifiable risk factors to decrease risk of CAD. Patient viewed [employer] video \"Healthy Minds, Bodies, and Hearts\". All equipment used in the care for this patient has been cleaned.   Electronically signed by Daphne Albert RN on 10/30/2023 at 10:22 AM

## 2023-11-01 ENCOUNTER — HOSPITAL ENCOUNTER (OUTPATIENT)
Dept: CARDIAC REHAB | Age: 73
Setting detail: THERAPIES SERIES
Discharge: HOME | End: 2023-11-01
Payer: MEDICARE

## 2023-11-01 PROCEDURE — G0422 INTENS CARDIAC REHAB W/EXERC: HCPCS

## 2023-11-01 PROCEDURE — G0423 INTENS CARDIAC REHAB NO EXER: HCPCS

## 2023-11-01 NOTE — CARDIO/PULMONARY
COVID Screening completed. Patient denies complaints, no changes to PMH or medications. Patient tolerates exercise well. BP improved with medication time adjustments and hydration. Patient attended [facility] workshop \"Exercise Biomechanics\". All equipment used in the care for this patient has been cleaned.   Electronically signed by Hi Carl RN on 11/1/2023 at 10:21 AM

## 2023-11-03 ENCOUNTER — HOSPITAL ENCOUNTER (OUTPATIENT)
Dept: CARDIAC REHAB | Age: 73
Setting detail: THERAPIES SERIES
Discharge: HOME | End: 2023-11-03
Payer: MEDICARE

## 2023-11-03 PROCEDURE — G0423 INTENS CARDIAC REHAB NO EXER: HCPCS

## 2023-11-03 PROCEDURE — G0422 INTENS CARDIAC REHAB W/EXERC: HCPCS

## 2023-11-03 NOTE — CARDIO/PULMONARY
COVID Screening completed. Patient denies complaints, no changes to PMH or medications. Patient tolerates exercise well. Patient attended [employer] \"[employer]\". All equipment used in the care for this patient has been cleaned.   Electronically signed by Tanya Min RN on 11/3/2023 at 11:08 AM

## 2023-11-06 ENCOUNTER — HOSPITAL ENCOUNTER (OUTPATIENT)
Dept: CARDIAC REHAB | Age: 73
Setting detail: THERAPIES SERIES
Discharge: HOME | End: 2023-11-06
Payer: MEDICARE

## 2023-11-06 PROCEDURE — G0423 INTENS CARDIAC REHAB NO EXER: HCPCS

## 2023-11-06 PROCEDURE — G0422 INTENS CARDIAC REHAB W/EXERC: HCPCS

## 2023-11-06 RX ORDER — PANTOPRAZOLE SODIUM 40 MG/1
TABLET, DELAYED RELEASE ORAL
Qty: 90 TABLET | Refills: 2 | Status: SHIPPED | OUTPATIENT
Start: 2023-11-06

## 2023-11-06 NOTE — PROGRESS NOTES
Video Education Report - ICR/CR    Name:  Marcin Oh     Date:  11/6/2023  MRN: [id number]     Session #:  4  Session Length: 32:13 min    Recommended Videos        []01 [employer] - Program Overview   34:22    []02 Overview of Pritikin Eating Plan             34:10    []03 Becoming a Suzanna March   33:08     []04 Diseases of Our Time - Part 1   34:22    []05 Calorie Density     33:39   []06 Label Reading - Part 1    32:15   []07 Move it      32.54   []08 Healthy Minds, Bodies, Hearts   32:14   []09 Dining Out - Part 1    32:28   [x]10 Heart Disease Risk Reduction   98:49   []01 Metabolic Syndrome and Belly Fat  31:52   []12 Facts on Fat     35:29   []13 Diseases of Our Time - Part 2   33:07   []14 Biology of Weight Control   32:36   []15 Biomechanical Limitations   35:20   []16 Nutrition Action Plan    34:23    Additional Videos         []17 Hypertension & Heart Disease   32:39        []18 Cooking Breakfasts and Snacks  32:00   []19 Planning Your Eating Strategy   33:30   []20 Label Reading - Part 2    32:36  []21 Cooking Soups and Desserts   31:41  []22 How Our Thoughts Can Heal Our Hearts 33:05  []23 Targeting Your Nutrition Priorities  33:58  []24 Healthy Salads & Dressings   35:32  []25 Dining Out - Part 2    32:35  []26 Cooking Dinner and Sides   35:06  []27 Sleep Disorders     33:14  []28 Menu Workshop     32:06  []29 Decoding Lab Results    32:42     []30 Vitamins and Minerals    32:54  []31 Exercise Action Plan    32:26  []32 Body Composition    34:03  []33 Improving Performance    32:13  []34 Fueling a Healthy Body    31:32  []35 Introduction to Yoga    33:47  []36 Aging-Enhancing the Quality of Your Life 33:22  []37 Smoking Cessation    36:19    Comments:  Video completed.

## 2023-11-06 NOTE — CARDIO/PULMONARY
COVID Screening completed. Patient denies complaints, no changes to PMH or medications. Patient tolerates exercise well. Weekly education: Discussed how to create a healthy eating pattern and reading labels. Discussed how to manage weight; keeping track of calories in versus calories out, portion control, staying active, and eating a heart healthy diet. Patient viewed [employer] video \"Heart Disease Risk Reduction\". All equipment used in the care for this patient has been cleaned.   Electronically signed by Sonu Cantu RN on 11/6/2023 at 1:37 PM

## 2023-11-08 ENCOUNTER — HOSPITAL ENCOUNTER (OUTPATIENT)
Dept: CARDIAC REHAB | Age: 73
Setting detail: THERAPIES SERIES
Discharge: HOME | End: 2023-11-08
Payer: MEDICARE

## 2023-11-08 PROCEDURE — G0422 INTENS CARDIAC REHAB W/EXERC: HCPCS

## 2023-11-08 PROCEDURE — G0423 INTENS CARDIAC REHAB NO EXER: HCPCS

## 2023-11-08 NOTE — CARDIO/PULMONARY
COVID Screening completed. Patient denies complaints, no changes to PMH or medications. Patient tolerates exercise well. Patient attended [facility] workshop \"Dining Out and Menu's\". All equipment used in the care for this patient has been cleaned.   Electronically signed by Daquan Han RN on 11/8/2023 at 12:24 PM

## 2023-11-08 NOTE — CARDIO/PULMONARY
Alyson CHINCHILLA.:  1950    Acct Number: [de-identified]   MRN:  [id number]                         [facility] NUTRITION WORKSHOP             Date: 2023        Session # __3_____    Ruth Lopes class covered:    ()  Fueling a Healthy Body  ()  Label Reading  (x)  Menu Selection  ()  Mindful Eating  ()  Targeting Nutrition Priorities    Readiness to change:    ( ) Pre-contemplative   ( ) Contemplative - ambivalent about change    ( x) Action - ready to set action plan and implement   ( ) Maintenance - has made change and is trying, and or practicing different alternative behaviors     Notes:      Braxton Haley was in the Workshop with the Dietitian for 50 minutes. The content was presented via Powerpoint, lecture, and patient participation based format. Motivational interviewing was utilized when needed, to promote change. Patient voiced understanding.     Electronically signed by Turner Agudelo RD on 2023 at 11:39 AM

## 2023-11-10 ENCOUNTER — HOSPITAL ENCOUNTER (OUTPATIENT)
Dept: CARDIAC REHAB | Age: 73
Setting detail: THERAPIES SERIES
Discharge: HOME | End: 2023-11-10
Payer: MEDICARE

## 2023-11-10 PROCEDURE — G0422 INTENS CARDIAC REHAB W/EXERC: HCPCS

## 2023-11-10 PROCEDURE — G0423 INTENS CARDIAC REHAB NO EXER: HCPCS

## 2023-11-10 NOTE — CARDIO/PULMONARY
COVID Screening completed. Patient denies complaints, no changes to PMH or medications. Patient tolerates exercise well. Patient attended [employer] \"Easy Entertaining\". All equipment used in the care for this patient has been cleaned.   Electronically signed by Christopher Holland RN on 11/10/2023 at 11:36 AM

## 2023-11-13 ENCOUNTER — HOSPITAL ENCOUNTER (OUTPATIENT)
Dept: CARDIAC REHAB | Age: 73
Setting detail: THERAPIES SERIES
Discharge: HOME | End: 2023-11-13
Payer: MEDICARE

## 2023-11-13 PROCEDURE — G0422 INTENS CARDIAC REHAB W/EXERC: HCPCS

## 2023-11-13 PROCEDURE — G0423 INTENS CARDIAC REHAB NO EXER: HCPCS

## 2023-11-13 NOTE — CARDIO/PULMONARY
COVID Screening completed. Patient denies complaints, no changes to PMH or medications. Patient tolerates exercise well. Weekly education: Discussed stress and its effect on our body, relaxation techniques (ie progressive muscle relaxation, meditation, guided imagery,and deep breathing). Patient given handouts on stress and the life stress test.   Patient had 1:1 with [employer] for resistance training, weight machines, etc.   Patient viewed [employer] video \"Exercise Action Plan\". All equipment used in the care for this patient has been cleaned.   Electronically signed by Jarrod Saldaña RN on 11/13/2023 at 9:47 AM

## 2023-11-13 NOTE — PROGRESS NOTES
Video Education Report - ICR/CR    Name:  Leeanne Tucker     Date:  11/13/2023  MRN: [id number]     Session #:  5  Session Length: 32:26 min    Recommended Videos        []01 [employer] - Program Overview   34:22    []02 Overview of Pritikin Eating Plan             34:10    []03 Becoming a Jamal Expose   33:08     []04 Diseases of Our Time - Part 1   34:22    []05 Calorie Density     33:39   []06 Label Reading - Part 1    32:15   []07 Move it      32.54   []08 Healthy Minds, Bodies, Hearts   32:14   []09 Dining Out - Part 1    32:28   []10 Heart Disease Risk Reduction   61:21   []29 Metabolic Syndrome and Belly Fat  31:52   []12 Facts on Fat     35:29   []13 Diseases of Our Time - Part 2   33:07   []14 Biology of Weight Control   32:36   []15 Biomechanical Limitations   35:20   []16 Nutrition Action Plan    34:23    Additional Videos         []17 Hypertension & Heart Disease   32:39        []18 Cooking Breakfasts and Snacks  32:00   []19 Planning Your Eating Strategy   33:30   []20 Label Reading - Part 2    32:36  []21 Cooking Soups and Desserts   31:41  []22 How Our Thoughts Can Heal Our Hearts 33:05  []23 Targeting Your Nutrition Priorities  33:58  []24 Healthy Salads & Dressings   35:32  []25 Dining Out - Part 2    32:35  []26 Cooking Dinner and Sides   35:06  []27 Sleep Disorders     33:14  []28 Menu Workshop     32:06  []29 Decoding Lab Results    32:42     []30 Vitamins and Minerals    32:54  [x]31 Exercise Action Plan    32:26  []32 Body Composition    34:03  []33 Improving Performance    32:13  []34 Fueling a Healthy Body    31:32  []35 Introduction to Yoga    33:47  []36 Aging-Enhancing the Quality of Your Life 33:22  []37 Smoking Cessation    36:19    Comments:  Video completed

## 2023-11-15 ENCOUNTER — HOSPITAL ENCOUNTER (OUTPATIENT)
Dept: CARDIAC REHAB | Age: 73
Setting detail: THERAPIES SERIES
Discharge: HOME | End: 2023-11-15
Payer: MEDICARE

## 2023-11-15 PROCEDURE — G0423 INTENS CARDIAC REHAB NO EXER: HCPCS

## 2023-11-15 PROCEDURE — G0422 INTENS CARDIAC REHAB W/EXERC: HCPCS

## 2023-11-15 NOTE — CARDIO/PULMONARY
COVID Screening completed. Patient denies complaints, no changes to PMH or medications. Patient tolerates exercise well. Patient attended [facility] workshop \"From Head to Heart, The Power of a Healthy [place]\". All equipment used in the care for this patient has been cleaned.   Electronically signed by Sachin Jensen RN on 11/15/2023 at 11:34 AM

## 2023-11-16 ASSESSMENT — EJECTION FRACTION: EF_VALUE: 45

## 2023-11-16 ASSESSMENT — EXERCISE STRESS TEST: PEAK_BP: 108/62

## 2023-11-16 NOTE — PROGRESS NOTES
WITH FOOD (Patient taking differently: Take 1 tablet by mouth 2 times daily (with meals) TAKE 1 TABLET BY MOUTH TWICE A DAY WITH FOOD) 180 tablet 0    VITAMIN E Take 400 Units by mouth daily       vitamin B-12 (CYANOCOBALAMIN) 1000 MCG tablet Take 1 tablet by mouth daily      Omega-3 Fatty Acids (FISH OIL) 1200 MG CAPS Take 1,200 mg by mouth daily       Current Facility-Administered Medications   Medication Dose Route Frequency Provider Last Rate Last Admin    triamcinolone acetonide (KENALOG-40) injection 80 mg  80 mg IntraMUSCular Once Kezia Chun, APRN - CNP            Labs:       Lipid Panel 10/4/2023  Total Cholesterol 133  Triglycerides 156  HDL 45.8  LDL 56    HgbA1C 5.9 10/4/2023         Ejection Fraction & Date: 45-50% Echo 10/25/2023.  Improved from 20-25% from 6/2023    [facility] Activity Status Index: See [address]: See Epic Flowsheet        PHQ9: See Epic Flowsheet        Falls Risk: See Epic Flowsheet        Cardiac Knowledge Pre- Test: See Epic Flowsheet        Rate Your Plate: See Epic Flowsheet        Anna Marie White RN  11/16/2023

## 2023-11-17 ENCOUNTER — HOSPITAL ENCOUNTER (OUTPATIENT)
Dept: CARDIAC REHAB | Age: 73
Setting detail: THERAPIES SERIES
Discharge: HOME | End: 2023-11-17
Payer: MEDICARE

## 2023-11-17 PROCEDURE — G0422 INTENS CARDIAC REHAB W/EXERC: HCPCS

## 2023-11-17 PROCEDURE — G0423 INTENS CARDIAC REHAB NO EXER: HCPCS

## 2023-11-17 NOTE — PROGRESS NOTES
COVID Screening completed. Patient denies complaints, no changes to PMH or medications. Patient tolerates exercise well. Patient attends [employer] \"Satisfying Salads and Dressings\".  Electronically signed by Kami Javed RN on 11/17/2023 at 11:39 AM

## 2023-11-20 ENCOUNTER — HOSPITAL ENCOUNTER (OUTPATIENT)
Dept: CARDIAC REHAB | Age: 73
Setting detail: THERAPIES SERIES
Discharge: HOME | End: 2023-11-20
Payer: MEDICARE

## 2023-11-20 PROCEDURE — G0423 INTENS CARDIAC REHAB NO EXER: HCPCS

## 2023-11-20 PROCEDURE — G0422 INTENS CARDIAC REHAB W/EXERC: HCPCS

## 2023-11-20 NOTE — CARDIO/PULMONARY
Patient arrives to cardiac rehab for session. Covid screening complete. Patient denies any complaints. Patient denies changes to PMH and medication. Patient tolerates exercise without complaint. Weekly education: Discussed \"Making sure the holidays dont derail your diet\" by focusing on naturally healthy foods, making smart swaps, not filling up on appetizers, and to get moving, handout offered. Patient also given heart healthy Holiday recipes. Patient viewed [employer] video \"HTN and Heart disease\". All equipment used in the care for this patient has been cleaned.   Electronically signed by Steve Michel RN on 11/20/2023 at 1:28 PM

## 2023-11-20 NOTE — PROGRESS NOTES
Video Education Report - ICR/CR    Name:  Efrain Olivarez     Date:  11/20/2023  MRN: [id number]     Session #:  6  Session Length: 32:39 min    Recommended Videos        []01 [employer] - Program Overview   34:22    []02 Overview of Pritikin Eating Plan             34:10    []03 Becoming a Zygmunt Stair   33:08     []04 Diseases of Our Time - Part 1   34:22    []05 Calorie Density     33:39   []06 Label Reading - Part 1    32:15   []07 Move it      32.54   []08 Healthy Minds, Bodies, Hearts   32:14   []09 Dining Out - Part 1    32:28   []10 Heart Disease Risk Reduction   48:57   []22 Metabolic Syndrome and Belly Fat  31:52   []12 Facts on Fat     35:29   []13 Diseases of Our Time - Part 2   33:07   []14 Biology of Weight Control   32:36   []15 Biomechanical Limitations   35:20   []16 Nutrition Action Plan    34:23    Additional Videos         [x]17 Hypertension & Heart Disease   32:39        []18 Cooking Breakfasts and Snacks  32:00   []19 Planning Your Eating Strategy   33:30   []20 Label Reading - Part 2    32:36  []21 Cooking Soups and Desserts   31:41  []22 How Our Thoughts Can Heal Our Hearts 33:05  []23 Targeting Your Nutrition Priorities  33:58  []24 Healthy Salads & Dressings   35:32  []25 Dining Out - Part 2    32:35  []26 Cooking Dinner and Sides   35:06  []27 Sleep Disorders     33:14  []28 Menu Workshop     32:06  []29 Decoding Lab Results    32:42     []30 Vitamins and Minerals    32:54  []31 Exercise Action Plan    32:26  []32 Body Composition    34:03  []33 Improving Performance    32:13  []34 Fueling a Healthy Body    31:32  []35 Introduction to Yoga    33:47  []36 Aging-Enhancing the Quality of Your Life 33:22  []37 Smoking Cessation    36:19    Comments:  Video completed

## 2023-11-22 ENCOUNTER — HOSPITAL ENCOUNTER (OUTPATIENT)
Dept: CARDIAC REHAB | Age: 73
Setting detail: THERAPIES SERIES
Discharge: HOME | End: 2023-11-22
Payer: MEDICARE

## 2023-11-22 PROCEDURE — G0423 INTENS CARDIAC REHAB NO EXER: HCPCS

## 2023-11-22 PROCEDURE — G0422 INTENS CARDIAC REHAB W/EXERC: HCPCS

## 2023-11-22 NOTE — CARDIO/PULMONARY
Lucy Rose YOB: 1950    Acct Number: [de-identified]   MRN:  [id number]                         [facility] NUTRITION WORKSHOP             Date: 2023        Session # __4_____    [employer] class covered:    ()  Fueling a Healthy Body  ()  Label Reading  ()  Menu Selection  (x)  Mindful Eating  ()  Targeting Nutrition Priorities    Readiness to change:    ( ) Pre-contemplative   ( ) Contemplative - ambivalent about change    (x ) Action - ready to set action plan and implement   ( ) Maintenance - has made change and is trying, and or practicing different alternative behaviors     Notes:      Gisselle Goodman was in the Workshop with the Dietitian for 50 minutes. The content was presented via Powerpoint, lecture, and patient participation based format. Motivational interviewing was utilized when needed, to promote change. Patient voiced understanding.     Electronically signed by Lynda Kruse RD on 2023 at 1:54 PM

## 2023-11-22 NOTE — CARDIO/PULMONARY
Patient arrives to cardiac rehab for session. Covid screening complete. Patient denies any complaints. Patient denies changes to PMH and medication. Patient tolerates exercise without complaint. Patient attended [facility] workshop [facility] Eating\". All equipment used in the care for this patient has been cleaned.   Electronically signed by Sri Barrett RN on 11/22/2023 at 11:22 AM

## 2023-11-24 ENCOUNTER — APPOINTMENT (OUTPATIENT)
Dept: CARDIAC REHAB | Age: 73
End: 2023-11-24
Payer: MEDICARE

## 2023-11-27 ENCOUNTER — HOSPITAL ENCOUNTER (OUTPATIENT)
Dept: CARDIAC REHAB | Age: 73
Setting detail: THERAPIES SERIES
Discharge: HOME | End: 2023-11-27
Payer: MEDICARE

## 2023-11-27 PROCEDURE — G0422 INTENS CARDIAC REHAB W/EXERC: HCPCS

## 2023-11-27 PROCEDURE — G0423 INTENS CARDIAC REHAB NO EXER: HCPCS

## 2023-11-27 NOTE — CARDIO/PULMONARY
Patient arrives to cardiac rehab for session. Covid screening complete. Patient denies any complaints. Patient denies changes to PMH and medication. Patient tolerates exercise without complaint. All equipment used in the care for this patient has been cleaned. Patient given copy of \"Winter Heart Safety\" and educated on cold weather precautions for exercise.  Patient attended [employer] video education \"facts on fats\" Electronically signed by Molli Sandhoff on 11/27/2023 at 11:22 AM

## 2023-11-27 NOTE — PROGRESS NOTES
Video Education Report - ICR/CR    Name:  Haim Cisse     Date:  11/27/2023  MRN: [id number]     Session #:  7  Session Length: 35:29 min    Recommended Videos        []01 [employer] - Program Overview   34:22    []02 Overview of Pritikin Eating Plan             34:10    []03 Becoming a [place]   33:08     []04 Diseases of Our Time - Part 1   34:22    []05 Calorie Density     33:39   []06 Label Reading - Part 1    32:15   []07 Move it      32.54   []08 Healthy Minds, Bodies, Hearts   32:14   []09 Dining Out - Part 1    32:28   []10 Heart Disease Risk Reduction   08:25   []25 Metabolic Syndrome and Belly Fat  31:52   [x]12 Facts on Fat     35:29   []13 Diseases of Our Time - Part 2   33:07   []14 Biology of Weight Control   32:36   []15 Biomechanical Limitations   35:20   []16 Nutrition Action Plan    34:23    Additional Videos         []17 Hypertension & Heart Disease   32:39        []18 Cooking Breakfasts and Snacks  32:00   []19 Planning Your Eating Strategy   33:30   []20 Label Reading - Part 2    32:36  []21 Cooking Soups and Desserts   31:41  []22 How Our Thoughts Can Heal Our Hearts 33:05  []23 Targeting Your Nutrition Priorities  33:58  []24 Healthy Salads & Dressings   35:32  []25 Dining Out - Part 2    32:35  []26 Cooking Dinner and Sides   35:06  []27 Sleep Disorders     33:14  []28 Menu Workshop     32:06  []29 Decoding Lab Results    32:42     []30 Vitamins and Minerals    32:54  []31 Exercise Action Plan    32:26  []32 Body Composition    34:03  []33 Improving Performance    32:13  []34 Fueling a Healthy Body    31:32  []35 Introduction to Yoga    33:47  []36 Aging-Enhancing the Quality of Your Life 33:22  []37 Smoking Cessation    36:19    Comments:  Video completed

## 2023-11-29 ENCOUNTER — HOSPITAL ENCOUNTER (OUTPATIENT)
Dept: CARDIAC REHAB | Age: 73
Setting detail: THERAPIES SERIES
Discharge: HOME | End: 2023-11-29
Payer: MEDICARE

## 2023-11-29 PROCEDURE — G0422 INTENS CARDIAC REHAB W/EXERC: HCPCS

## 2023-11-29 PROCEDURE — G0423 INTENS CARDIAC REHAB NO EXER: HCPCS

## 2023-11-29 NOTE — CARDIO/PULMONARY
Pooja No   :  1950    Acct Number: [de-identified]   MRN:  [id number]                         [facility] NUTRITION WORKSHOP             Date: 2023        Session # 5_______    Lisha Villafuerte class covered:    ()  Fueling a Healthy Body  ()  Label Reading  ()  Menu Selection  ()  Mindful Eating  (x)  Targeting Nutrition Priorities    Readiness to change:    ( ) Pre-contemplative   ( ) Contemplative - ambivalent about change    (x ) Action - ready to set action plan and implement   ( ) Maintenance - has made change and is trying, and or practicing different alternative behaviors     Notes:      Delfin Eng was in the Workshop with the Dietitian for 50 minutes. The content was presented via Powerpoint, lecture, and patient participation based format. Motivational interviewing was utilized when needed, to promote change. Patient voiced understanding.     Electronically signed by Felix Roberts RD on 2023 at 4:38 PM

## 2023-11-29 NOTE — PROGRESS NOTES
Patient arrives to cardiac rehab for session. Covid screening complete. Patient denies any complaints. Patient denies changes to PMH and medication. Patient tolerates exercise without complaint. Patient attends Nutrition Workshop \"Targeting Your Nutrition Priorities\".  Electronically signed by Cecile Cortez RN on 11/29/2023 at 12:01 PM

## 2023-12-01 ENCOUNTER — HOSPITAL ENCOUNTER (OUTPATIENT)
Dept: CARDIAC REHAB | Age: 73
Setting detail: THERAPIES SERIES
Discharge: HOME | End: 2023-12-01
Payer: MEDICARE

## 2023-12-01 PROCEDURE — G0423 INTENS CARDIAC REHAB NO EXER: HCPCS

## 2023-12-01 PROCEDURE — G0422 INTENS CARDIAC REHAB W/EXERC: HCPCS

## 2023-12-01 NOTE — CARDIO/PULMONARY
Patient arrives to cardiac rehab for session. Covid screening complete. Patient denies any complaints. Patient denies changes to PMH and medication. Patient tolerates exercise without complaint. Patient attended [employer] \"[employer]\". All equipment used in the care for this patient has been cleaned.   Electronically signed by Sharon White RN on 12/1/2023 at 9:46 AM

## 2023-12-04 ENCOUNTER — HOSPITAL ENCOUNTER (OUTPATIENT)
Dept: CARDIAC REHAB | Age: 73
Setting detail: THERAPIES SERIES
Discharge: HOME | End: 2023-12-04
Payer: MEDICARE

## 2023-12-04 PROCEDURE — G0422 INTENS CARDIAC REHAB W/EXERC: HCPCS

## 2023-12-04 PROCEDURE — G0423 INTENS CARDIAC REHAB NO EXER: HCPCS

## 2023-12-04 ASSESSMENT — EXERCISE STRESS TEST: PEAK_BP: 122/64

## 2023-12-04 ASSESSMENT — EJECTION FRACTION: EF_VALUE: 45

## 2023-12-04 NOTE — PROGRESS NOTES
[facility] ITP Note      Patient Name: Haim Cisse  MRN: [id number]                  : 1950      ITP Note: re-assessment    Patient completed  sessions of cardiac rehab. Past Medical History:   Diagnosis Date    Angina at rest 2021    Bilateral leg edema     Diastolic congestive heart failure ([address]) 2021    DM2 (diabetes mellitus, type 2) (HCC)     Hyperlipidemia     Hypertension     Hypothyroidism     GALINA (obstructive sleep apnea)     Shortness of breath 2021          Current Outpatient Medications   Medication Sig Dispense Refill    pantoprazole (PROTONIX) 40 MG tablet TAKE 1 TABLET DAILY 90 tablet 2    empagliflozin (JARDIANCE) 10 MG tablet Take 1 tablet by mouth daily 90 tablet 3    metoprolol succinate (TOPROL XL) 25 MG extended release tablet Take 1 tablet by mouth daily 90 tablet 3    furosemide (LASIX) 20 MG tablet Take 1 tablet by mouth daily 90 tablet 3    sotalol (BETAPACE) 120 MG tablet Take 1 tablet by mouth 2 times daily 180 tablet 3    sacubitril-valsartan (ENTRESTO) 24-26 MG per tablet Take 1 tablet by mouth 2 times daily 180 tablet 2    rivaroxaban (XARELTO) 20 MG TABS tablet Take 1 tablet by mouth daily (with breakfast) 90 tablet 3    Respiratory Therapy Supplies MAXIME New CPAP mask and supplies 1 each 0    acetaminophen-codeine (TYLENOL #3) 300-30 MG per tablet TAKE 1 TABLET BY MOUTH EVERY 6 HOURS AS NEEDED FOR PAIN      CPAP Machine MISC by Does not apply route New CPAP at 11 cm  And supply.  1 each 0    Ferrous Sulfate (IRON) 325 (65 Fe) MG TABS Take 1 tablet by mouth daily       rosuvastatin (CRESTOR) 10 MG tablet TAKE 1 TABLET BY MOUTH ONCE DAILY (Patient taking differently: Take 1 tablet by mouth daily TAKE 1 TABLET BY MOUTH ONCE DAILY) 90 tablet 1    levothyroxine (SYNTHROID) 125 MCG tablet Take 1 tablet by mouth daily -except Sundays 90 tablet 0    metFORMIN (GLUCOPHAGE) 500 MG tablet TAKE 1 TABLET BY MOUTH TWICE A DAY

## 2023-12-04 NOTE — CARDIO/PULMONARY
Covid screening complete. Patient denies any complaints. Patient denies changes to PMH and medication. Patient tolerates exercise without complaint. All equipment used in the care for this patient has been cleaned. Patient given copy of education focused on modifiable and non-modifiable risk factors for coronary artery disease; education includes identification of risk factors, how to modify, and general guidelines for smoking, alcohol consumption, physical activity, diet, blood pressure, and weight management.  Patient attends ICR video education \"Body Composition\" Electronically signed by Crescencio Miles on 12/4/2023 at 12:00 PM

## 2023-12-04 NOTE — PROGRESS NOTES
Video Education Report - ICR/CR    Name:  Pooja No     Date:  12/4/2023  MRN: [id number]     Session #: 8  Session Length: 34:03 min    Recommended Videos        []01 [employer] - Program Overview   34:22    []02 Overview of [employer] Eating Plan             34:10    []03 Becoming a Anise Bears   33:08     []04 Diseases of Our Time - Part 1   34:22    []05 Calorie Density     33:39   []06 Label Reading - Part 1    32:15   []07 Move it      32.54   []08 Healthy Minds, Bodies, Hearts   32:14   []09 Dining Out - Part 1    32:28   []10 Heart Disease Risk Reduction   17:22   []19 Metabolic Syndrome and Belly Fat  31:52   []12 Facts on Fat     35:29   []13 Diseases of Our Time - Part 2   33:07   []14 Biology of Weight Control   32:36   []15 Biomechanical Limitations   35:20   []16 Nutrition Action Plan    34:23    Additional Videos         []17 Hypertension & Heart Disease   32:39        []18 Cooking Breakfasts and Snacks  32:00   []19 Planning Your Eating Strategy   33:30   []20 Label Reading - Part 2    32:36  []21 Cooking Soups and Desserts   31:41  []22 How Our Thoughts Can Heal Our Hearts 33:05  []23 Targeting Your Nutrition Priorities  33:58  []24 Healthy Salads & Dressings   35:32  []25 Dining Out - Part 2    32:35  []26 Cooking Dinner and Sides   35:06  []27 Sleep Disorders     33:14  []28 Menu Workshop     32:06  []29 Decoding Lab Results    32:42     []30 Vitamins and Minerals    32:54  []31 Exercise Action Plan    32:26  [x]32 Body Composition    34:03  []33 Improving Performance    32:13  []34 Fueling a Healthy Body    31:32  []35 Introduction to Yoga    33:47  []36 Aging-Enhancing the Quality of Your Life 33:22  []37 Smoking Cessation    36:19    Comments:  Video completed

## 2023-12-06 ENCOUNTER — APPOINTMENT (OUTPATIENT)
Dept: CARDIAC REHAB | Age: 73
End: 2023-12-06
Payer: MEDICARE

## 2023-12-08 ENCOUNTER — HOSPITAL ENCOUNTER (OUTPATIENT)
Dept: CARDIAC REHAB | Age: 73
Setting detail: THERAPIES SERIES
Discharge: HOME | End: 2023-12-08
Payer: MEDICARE

## 2023-12-08 ENCOUNTER — OFFICE VISIT (OUTPATIENT)
Dept: CARDIOLOGY CLINIC | Age: 73
End: 2023-12-08
Payer: MEDICARE

## 2023-12-08 VITALS
DIASTOLIC BLOOD PRESSURE: 82 MMHG | OXYGEN SATURATION: 98 % | HEART RATE: 92 BPM | SYSTOLIC BLOOD PRESSURE: 112 MMHG | WEIGHT: 215.4 LBS | HEIGHT: 63 IN | BODY MASS INDEX: 38.16 KG/M2

## 2023-12-08 DIAGNOSIS — I49.5 SSS (SICK SINUS SYNDROME) (HCC): Primary | ICD-10-CM

## 2023-12-08 DIAGNOSIS — I10 ESSENTIAL HYPERTENSION: ICD-10-CM

## 2023-12-08 DIAGNOSIS — E78.2 MIXED HYPERLIPIDEMIA: ICD-10-CM

## 2023-12-08 DIAGNOSIS — I50.22 CHRONIC SYSTOLIC (CONGESTIVE) HEART FAILURE (HCC): ICD-10-CM

## 2023-12-08 DIAGNOSIS — I25.10 CORONARY ARTERY DISEASE INVOLVING NATIVE CORONARY ARTERY OF NATIVE HEART WITHOUT ANGINA PECTORIS: ICD-10-CM

## 2023-12-08 DIAGNOSIS — I48.0 PAROXYSMAL ATRIAL FIBRILLATION (HCC): ICD-10-CM

## 2023-12-08 PROCEDURE — 3017F COLORECTAL CA SCREEN DOC REV: CPT | Performed by: INTERNAL MEDICINE

## 2023-12-08 PROCEDURE — 1036F TOBACCO NON-USER: CPT | Performed by: INTERNAL MEDICINE

## 2023-12-08 PROCEDURE — 3079F DIAST BP 80-89 MM HG: CPT | Performed by: INTERNAL MEDICINE

## 2023-12-08 PROCEDURE — 3074F SYST BP LT 130 MM HG: CPT | Performed by: INTERNAL MEDICINE

## 2023-12-08 PROCEDURE — 1090F PRES/ABSN URINE INCON ASSESS: CPT | Performed by: INTERNAL MEDICINE

## 2023-12-08 PROCEDURE — G8427 DOCREV CUR MEDS BY ELIG CLIN: HCPCS | Performed by: INTERNAL MEDICINE

## 2023-12-08 PROCEDURE — 1123F ACP DISCUSS/DSCN MKR DOCD: CPT | Performed by: INTERNAL MEDICINE

## 2023-12-08 PROCEDURE — G8484 FLU IMMUNIZE NO ADMIN: HCPCS | Performed by: INTERNAL MEDICINE

## 2023-12-08 PROCEDURE — G8400 PT W/DXA NO RESULTS DOC: HCPCS | Performed by: INTERNAL MEDICINE

## 2023-12-08 PROCEDURE — 99214 OFFICE O/P EST MOD 30 MIN: CPT | Performed by: INTERNAL MEDICINE

## 2023-12-08 PROCEDURE — G0422 INTENS CARDIAC REHAB W/EXERC: HCPCS

## 2023-12-08 PROCEDURE — G8417 CALC BMI ABV UP PARAM F/U: HCPCS | Performed by: INTERNAL MEDICINE

## 2023-12-08 NOTE — PROGRESS NOTES
Patient arrives to cardiac rehab for session. Covid screening complete. Patient denies any complaints. Patient denies changes to PMH and medication. Patient tolerates exercise without complaint. Patient had follow up appointment with Dr Giselle Quinonez today. EF has increased to 45-50% from previous EF of 20-25%. No further orders at this time. Patient unable to attend ICR education today.  Electronically signed by Amina Ely RN on 12/8/2023 at 10:42 AM

## 2023-12-08 NOTE — PROGRESS NOTES
Chief Complaint   Patient presents with    Follow-up     3 month        5-21-21: Patient presents for initial medical evaluation. Patient is followed on a regular basis by Dr. Julian Mccoy MD. S/p hospitalization for CP/heaviness. Had negative wokup in ER. Symptoms occurred at rest and was worse with carrying groceries. Had radiation to right shoulder. + associated SOB. No hx of MI, CHF or arrhythmia.  was worried about her during the episode. Never seen her like this. No hx of MI, CHF or arrhythmia. No hx of cath. Pt denies   nausea, vomiting, diarrhea, constipation, motor weakness, insomnia, weight loss, syncope, dizziness, lightheadedness, palpitations, PND, orthopnea, or claudication. Does have history of iron deficiency anemia ranging from 8-10. Does have hx of endoscopy. 7-2-21:  Patient is a 79 y.o. female who presented for elective stress test and echo. Patient is followed on a regular basis by Dr. Julian Mccoy MD.  Patient with past medical 3 of hypertension, hyperlipidemia and diabetes. Patient status post recent hospitalization for chest pain/heaviness and had negative work-up in the emergency department. Patient developed chest pain that occurred at rest and was worsened with exertion with radiation to the right shoulder associated with shortness of breath. Patient was noted to be in new onset atrial fibrillation with rapid ventricular response on presentation to the stress test.  Patient does not sense that she is in atrial fibrillation. Preliminary stress test images reviewed showing mild anterior/anterior apical ischemia versus breast attenuation artifact. Patient currently is chest pain-free. Hematology oncology. Chloe Fears No previous history of myocardial infarction, congestive heart failure. No history of cardiac catheterization. Patient with history of iron deficiency anemia ranging from 8-10.   She was referred to        7-2-21: s/p abnormal nuclear stress test

## 2023-12-11 ENCOUNTER — HOSPITAL ENCOUNTER (OUTPATIENT)
Dept: CARDIAC REHAB | Age: 73
Setting detail: THERAPIES SERIES
Discharge: HOME | End: 2023-12-11
Payer: MEDICARE

## 2023-12-11 PROCEDURE — G0423 INTENS CARDIAC REHAB NO EXER: HCPCS

## 2023-12-11 PROCEDURE — G0422 INTENS CARDIAC REHAB W/EXERC: HCPCS

## 2023-12-11 NOTE — PROGRESS NOTES
Video Education Report - ICR/CR    Name:  Nick Manual     Date:  12/11/2023  MRN: [id number]     Session #: 9  Session Length: 34:23 min    Recommended Videos        []01 [employer] - Program Overview   34:22    []02 Overview of [employer] Eating Plan             34:10    []03 Becoming a Lorn Bottoms   33:08     []04 Diseases of Our Time - Part 1   34:22    []05 Calorie Density     33:39   []06 Label Reading - Part 1    32:15   []07 Move it      32.54   []08 Healthy Minds, Bodies, Hearts   32:14   []09 Dining Out - Part 1    32:28   []10 Heart Disease Risk Reduction   74:19   []83 Metabolic Syndrome and Belly Fat  31:52   []12 Facts on Fat     35:29   []13 Diseases of Our Time - Part 2   33:07   []14 Biology of Weight Control   32:36   []15 Biomechanical Limitations   35:20   [x]16 Nutrition Action Plan    34:23    Additional Videos         []17 Hypertension & Heart Disease   32:39        []18 Cooking Breakfasts and Snacks  32:00   []19 Planning Your Eating Strategy   33:30   []20 Label Reading - Part 2    32:36  []21 Cooking Soups and Desserts   31:41  []22 How Our Thoughts Can Heal Our Hearts 33:05  []23 Targeting Your Nutrition Priorities  33:58  []24 Healthy Salads & Dressings   35:32  []25 Dining Out - Part 2    32:35  []26 Cooking Dinner and Sides   35:06  []27 Sleep Disorders     33:14  []28 Menu Workshop     32:06  []29 Decoding Lab Results    32:42     []30 Vitamins and Minerals    32:54  []31 Exercise Action Plan    32:26  []32 Body Composition    34:03  []33 Improving Performance    32:13  []34 Fueling a Healthy Body    31:32  []35 Introduction to Yoga    33:47  []36 Aging-Enhancing the Quality of Your Life 33:22  []37 Smoking Cessation    36:19    Comments:  Video completed

## 2023-12-11 NOTE — CARDIO/PULMONARY
Patient arrives to cardiac rehab for session. Covid screening complete. Patient denies any complaints. Patient denies changes to PMH and medication. Patient tolerates exercise without complaint. Patient given handout \"What is your plan for home exercise\" detailing various types of exercise, silver sneakers memberships, silver sneakers locations, and importance of working with cardiac rehab staff before beginning home exercise. Patient reporting stress with family- patient educated on stress management techniques. Patient plans on exercising in a home gym with nu-step, weights, bands, and silver sneakers. Patient attends [facility] education \"Nutrition Action Plan\".  Electronically signed by Hakeem Mars on 12/11/2023 at 9:28 AM

## 2023-12-13 ENCOUNTER — HOSPITAL ENCOUNTER (OUTPATIENT)
Dept: CARDIAC REHAB | Age: 73
Setting detail: THERAPIES SERIES
Discharge: HOME | End: 2023-12-13
Payer: MEDICARE

## 2023-12-13 PROCEDURE — G0423 INTENS CARDIAC REHAB NO EXER: HCPCS

## 2023-12-13 PROCEDURE — G0422 INTENS CARDIAC REHAB W/EXERC: HCPCS

## 2023-12-13 NOTE — CARDIO/PULMONARY
Patient arrives to cardiac rehab for session. Covid screening complete. Patient denies any complaints. Patient denies changes to PMH and medication. Patient tolerates exercise without complaint. Patient attended [facility] workshop [employer] and Fall Prevention\". All equipment used in the care for this patient has been cleaned.   Electronically signed by Andrew Dang RN on 12/13/2023 at 11:30 AM

## 2023-12-15 ENCOUNTER — HOSPITAL ENCOUNTER (OUTPATIENT)
Dept: CARDIAC REHAB | Age: 73
Setting detail: THERAPIES SERIES
Discharge: HOME | End: 2023-12-15
Payer: MEDICARE

## 2023-12-15 PROCEDURE — G0422 INTENS CARDIAC REHAB W/EXERC: HCPCS

## 2023-12-15 PROCEDURE — G0423 INTENS CARDIAC REHAB NO EXER: HCPCS

## 2023-12-15 NOTE — CARDIO/PULMONARY
Patient arrives to cardiac rehab for session. Covid screening complete. Patient denies any complaints. Patient denies changes to PMH and medication. Patient tolerates exercise without complaint. Patient attended [employer] [employer] education \"Comforting weekend breakfasts\" .  Electronically signed by Mackie Cowden on 12/15/2023 at 1:47 PM

## 2023-12-27 ENCOUNTER — HOSPITAL ENCOUNTER (OUTPATIENT)
Dept: CARDIAC REHAB | Age: 73
Setting detail: THERAPIES SERIES
Discharge: HOME | End: 2023-12-27
Payer: MEDICARE

## 2023-12-27 PROCEDURE — G0423 INTENS CARDIAC REHAB NO EXER: HCPCS

## 2023-12-27 PROCEDURE — G0422 INTENS CARDIAC REHAB W/EXERC: HCPCS

## 2023-12-27 NOTE — PROGRESS NOTES
MG tablet TAKE 1 TABLET BY MOUTH TWICE A DAY WITH FOOD (Patient taking differently: Take 1 tablet by mouth 2 times daily (with meals) TAKE 1 TABLET BY MOUTH TWICE A DAY WITH FOOD) 180 tablet 0    VITAMIN E Take 400 Units by mouth daily       vitamin B-12 (CYANOCOBALAMIN) 1000 MCG tablet Take 1 tablet by mouth daily      Omega-3 Fatty Acids (FISH OIL) 1200 MG CAPS Take 1,200 mg by mouth daily       Current Facility-Administered Medications   Medication Dose Route Frequency Provider Last Rate Last Admin    triamcinolone acetonide (KENALOG-40) injection 80 mg  80 mg IntraMUSCular Once Kezia Chun, APRN - CNP            Labs:   No results for input(s): \"GLUCOSE\" in the last 72 hours. Lipid Panel 10/4/2023  Total Cholesterol 133  Trigylcerdies  HDL 45.8  LDL 56    HgbA1c 5.9 10/4/2023    Ejection Fraction & Date: 10/25/2023 Echo 45-50%, improved from 6/2023 20-25%.        [facility] Activity Status Index: See Epic Flowsheet         [facility]: See [employer] Flowsheet        PHQ9: See Epic Flowsheet        Falls Risk: See Epic Flowsheet        Cardiac Knowledge Pre- Test: See Epic Flowsheet        Rate Your Plate: See Epic Flowsheet        Aramis Simon RN  12/27/2023

## 2023-12-27 NOTE — CARDIO/PULMONARY
Patient arrives to cardiac rehab for session. Covid screening complete. Patient denies any complaints. Patient denies changes to PMH and medication. Patient tolerates exercise without complaint. Weekly education: Discussed 5 ways to lower your risk of a second heart attack (ie; taking your medication, following up with MD's, participating in Cardiac Rehab, managing risk factors, and getting support from family, friends, etc). Discussed signs and symptoms of stroke (FAST) and stroke prevention. Handouts from [facility] provided to patient. Patient attended [facility] workshop \"Focused Goals and Sustainable Change\". Discussed discharge paperwork with patient, to be returned prior to last session. All equipment used in the care for this patient has been cleaned.   Electronically signed by Analilia Berry RN on 12/27/2023 at 10:28 AM

## 2023-12-29 ENCOUNTER — HOSPITAL ENCOUNTER (OUTPATIENT)
Dept: CARDIAC REHAB | Age: 73
Setting detail: THERAPIES SERIES
Discharge: HOME | End: 2023-12-29
Payer: MEDICARE

## 2023-12-29 ENCOUNTER — TELEPHONE (OUTPATIENT)
Dept: CARDIOLOGY CLINIC | Age: 73
End: 2023-12-29

## 2023-12-29 DIAGNOSIS — I50.22 CHRONIC SYSTOLIC (CONGESTIVE) HEART FAILURE (HCC): Primary | ICD-10-CM

## 2023-12-29 PROCEDURE — G0422 INTENS CARDIAC REHAB W/EXERC: HCPCS

## 2023-12-29 PROCEDURE — G0423 INTENS CARDIAC REHAB NO EXER: HCPCS

## 2023-12-29 NOTE — TELEPHONE ENCOUNTER
----- Message from Brayan Palmer sent at 12/29/2023  1:48 PM EST -----  Regarding: FW: Phase 3 Order  Светлана please enter a phase III order for this patient with \"Phase III\" in the notes section of the order. Patient follows Dr. Knott.   Electronically signed by Brayan Palmer on 12/29/2023 at 1:49 PM          ----- Message -----  From: Lina Pugh RN  Sent: 12/29/2023  11:26 AM EST  To: Brayan Palmer; Seamus Knott DO; #  Subject: Phase 3 Order                                    Hi all,  Can we please place a Phase 3 order in for patient?  Patient is graduating from Phase 2 on Wednesday 1/3/2023.  Patient wants to continue exercising on Friday!    Thank you,  Lu Pugh RN

## 2023-12-29 NOTE — PROGRESS NOTES
Patient arrives to cardiac rehab for session. Covid screening complete. Patient denies any complaints. Patient denies changes to PMH and medication. Patient tolerates exercise without complaint. Attempted to perform discharge 6MWT. Patient complains of SOB. Patient admits to increase Na+ intake with popcorn yesterday. Swelling to RLE, 1-2+ edema. Patient states swelling is more than normal.  Patient denies CP. Patient is able to speak in full sentences with staff while using the Arm Ergometer without any complaints of SOB. Discussed with patient CHF symptoms. Patient is taking Lasix 20mg daily. Patient's weight is up 1lb from 2 weeks ago. Patient is instructed to closely monitor her Na+ intake over the weekend, take her medication as instructed. If symptoms worse patient is instructed to contact CHF Clinic or report to ER if necessary. Patient returns discharge paperwork. Plans for patient to perform 6MWT on Wednesday with her final day. Patient attends [address]".    Electronically signed by Noman Castro RN on 12/29/2023 at 12:32 PM

## 2024-01-03 ENCOUNTER — HOSPITAL ENCOUNTER (OUTPATIENT)
Dept: CARDIAC REHAB | Age: 74
Setting detail: THERAPIES SERIES
Discharge: HOME | End: 2024-01-03
Payer: MEDICARE

## 2024-01-03 PROCEDURE — G0422 INTENS CARDIAC REHAB W/EXERC: HCPCS

## 2024-01-03 PROCEDURE — G0423 INTENS CARDIAC REHAB NO EXER: HCPCS

## 2024-01-03 ASSESSMENT — EXERCISE STRESS TEST
PEAK_BP: 134/68
PEAK_RPD: 2
PEAK_HR: 140
PEAK_RPE: 12
PEAK_BP: 134/68

## 2024-01-03 ASSESSMENT — EJECTION FRACTION: EF_VALUE: 45

## 2024-01-03 NOTE — CARDIO/PULMONARY
Evette Londono YOB: 1950    Acct Number: [id number]   MRN:  [id number]                         [facility] NUTRITION WORKSHOP             Date: 1/3/2024        Session # 2_______    Today’s class covered:    ()  Fueling a Healthy Body  (x)  Label Reading  ()  Menu Selection  ()  Mindful Eating  ()  Targeting Nutrition Priorities    Readiness to change:    ( ) Pre-contemplative   ( ) Contemplative - ambivalent about change    ( x) Action - ready to set action plan and implement   ( ) Maintenance - has made change and is trying, and or practicing different alternative behaviors     Notes:      Evette was in the Workshop with the Dietitian for 50 minutes.  The content was presented via Powerpoint, lecture, and patient participation based format.  Motivational interviewing was utilized when needed, to promote change.  Patient voiced understanding.    Electronically signed by Jordyn Meredith RD on 1/3/2024 at 12:00 PMSmc Londono YOB: 1950    Acct Number: [id number]   MRN:  [id number]                         [facility] NUTRITION WORKSHOP             Date: 1/3/2024        Session # 2_______    Today’s class covered:    ()  Fueling a Healthy Body  (x)  Label Reading  ()  Menu Selection  ()  Mindful Eating  ()  Targeting Nutrition Priorities    Readiness to change:    ( ) Pre-contemplative   ( ) Contemplative - ambivalent about change    (x ) Action - ready to set action plan and implement   ( ) Maintenance - has made change and is trying, and or practicing different alternative behaviors     Notes:      Evette was in the Workshop with the Dietitian for 50 minutes.  The content was presented via Powerpoint, lecture, and patient participation based format.  Motivational interviewing was utilized when needed, to promote change.  Patient voiced understanding.    Electronically signed by Jordyn Meredith RD on 1/3/2024 at 12:00 PM  
Patient arrives to cardiac rehab for session. Covid screening complete. Patient denies any complaints. Patient denies changes to PMH and medication. Patient tolerates exercise without complaint.   Weekly education, Discussed DASH (Dietary Approaches to Stop Hypertension) eating plan. Handout with sample DASH menu, visual plate guide, and serving size offered to patient.   Patient completed 6 minute walk test with 8.5 laps which is a 3.5 lap improvement from her initial walk test.   Patient attended [facility] workshop \"Label Reading\".   Patient aware of signs and symptoms to monitor for, who to report them to, and when to call 911/ go to ER. Patient plans to continue with cardiac rehab phase 3 upon completion.  All equipment used in the care for this patient has been cleaned.  Electronically signed by Suly Moise RN on 1/3/2024 at 12:00 PM    
tablet TAKE 1 TABLET BY MOUTH TWICE A DAY WITH FOOD (Patient taking differently: Take 1 tablet by mouth 2 times daily (with meals) TAKE 1 TABLET BY MOUTH TWICE A DAY WITH FOOD) 180 tablet 0    VITAMIN E Take 400 Units by mouth daily       vitamin B-12 (CYANOCOBALAMIN) 1000 MCG tablet Take 1 tablet by mouth daily      Omega-3 Fatty Acids (FISH OIL) 1200 MG CAPS Take 1,200 mg by mouth daily       Current Facility-Administered Medications   Medication Dose Route Frequency Provider Last Rate Last Admin    triamcinolone acetonide (KENALOG-40) injection 80 mg  80 mg IntraMUSCular Once Kezia Chun, APRN - CNP            Labs:   10/4/2023 Hgb A1C     10/4/2023 Lipid panel  Total: 133  Tri  HDL: 45.8  LDL: 56       Ejection Fraction & Date: 10/25/23 Echo  EF 45-50%        Suly Moise, RN  1/3/2024

## 2024-01-05 ENCOUNTER — HOSPITAL ENCOUNTER (OUTPATIENT)
Dept: CARDIAC REHAB | Age: 74
Discharge: HOME | End: 2024-01-05

## 2024-01-26 DIAGNOSIS — E78.2 MIXED HYPERLIPIDEMIA: ICD-10-CM

## 2024-01-26 RX ORDER — ROSUVASTATIN CALCIUM 10 MG/1
TABLET, COATED ORAL
Qty: 90 TABLET | Refills: 3 | Status: SHIPPED | OUTPATIENT
Start: 2024-01-26

## 2024-01-29 ENCOUNTER — HOSPITAL ENCOUNTER (OUTPATIENT)
Dept: WOMENS IMAGING | Age: 74
Discharge: HOME | End: 2024-01-31
Payer: MEDICARE

## 2024-01-29 ENCOUNTER — APPOINTMENT (OUTPATIENT)
Dept: RADIOLOGY | Facility: HOSPITAL | Age: 74
End: 2024-01-29
Payer: MEDICARE

## 2024-01-29 VITALS — BODY MASS INDEX: 36.95 KG/M2 | HEIGHT: 64 IN

## 2024-01-29 DIAGNOSIS — Z12.31 VISIT FOR SCREENING MAMMOGRAM: ICD-10-CM

## 2024-01-29 PROCEDURE — 77063 BREAST TOMOSYNTHESIS BI: CPT

## 2024-02-05 ENCOUNTER — HOSPITAL ENCOUNTER (OUTPATIENT)
Dept: CARDIAC REHAB | Age: 74
Discharge: HOME | End: 2024-02-05

## 2024-02-05 PROCEDURE — 9900000038

## 2024-02-29 DIAGNOSIS — I48.19 PERSISTENT ATRIAL FIBRILLATION (HCC): ICD-10-CM

## 2024-02-29 RX ORDER — RIVAROXABAN 20 MG/1
20 TABLET, FILM COATED ORAL
Qty: 90 TABLET | Refills: 3 | Status: SHIPPED | OUTPATIENT
Start: 2024-02-29

## 2024-02-29 NOTE — TELEPHONE ENCOUNTER
Requesting medication refill. Please approve or deny this request.    Rx requested:  Requested Prescriptions     Pending Prescriptions Disp Refills    XARELTO 20 MG TABS tablet [Pharmacy Med Name: XARELTO TAB 20MG] 90 tablet 3     Sig: TAKE 1 TABLET DAILY WITH   BREAKFAST         Last Office Visit:   9/6/2023      Next Visit Date:  Future Appointments   Date Time Provider Department Center   3/1/2024  9:00 AM SCHEDULE, [facility]   3/6/2024  9:00 AM Lucia Zimmerman PA [facility] CHF [facility]   4/23/2024  8:45 AM Abdiel Hill MD [place] [facility] [place]               Last refill 03/20/2023. Please approve or deny.

## 2024-03-01 ENCOUNTER — HOSPITAL ENCOUNTER (OUTPATIENT)
Dept: CARDIAC REHAB | Age: 74
Discharge: HOME | End: 2024-03-01

## 2024-03-01 PROCEDURE — 9900000038 HC CARDIAC REHAB PHASE 3 - MONTHLY

## 2024-03-06 ENCOUNTER — OFFICE VISIT (OUTPATIENT)
Dept: CARDIOLOGY CLINIC | Age: 74
End: 2024-03-06
Payer: MEDICARE

## 2024-03-06 VITALS
WEIGHT: 211.8 LBS | DIASTOLIC BLOOD PRESSURE: 80 MMHG | OXYGEN SATURATION: 99 % | SYSTOLIC BLOOD PRESSURE: 132 MMHG | HEART RATE: 78 BPM | BODY MASS INDEX: 36.34 KG/M2

## 2024-03-06 DIAGNOSIS — Z95.0 S/P PLACEMENT OF CARDIAC PACEMAKER: ICD-10-CM

## 2024-03-06 DIAGNOSIS — E66.9 DIABETES MELLITUS TYPE 2 IN OBESE (HCC): ICD-10-CM

## 2024-03-06 DIAGNOSIS — I50.22 CHRONIC SYSTOLIC CHF (CONGESTIVE HEART FAILURE), NYHA CLASS 1 (HCC): ICD-10-CM

## 2024-03-06 DIAGNOSIS — E78.5 DYSLIPIDEMIA: ICD-10-CM

## 2024-03-06 DIAGNOSIS — I27.20 PULMONARY HTN (HCC): ICD-10-CM

## 2024-03-06 DIAGNOSIS — I42.8 NONISCHEMIC CARDIOMYOPATHY (HCC): ICD-10-CM

## 2024-03-06 DIAGNOSIS — I50.810 RVF (RIGHT VENTRICULAR FAILURE) (HCC): ICD-10-CM

## 2024-03-06 DIAGNOSIS — I48.19 PERSISTENT ATRIAL FIBRILLATION (HCC): ICD-10-CM

## 2024-03-06 DIAGNOSIS — R31.0 INTERMITTENT GROSS HEMATURIA: ICD-10-CM

## 2024-03-06 DIAGNOSIS — E11.69 DIABETES MELLITUS TYPE 2 IN OBESE (HCC): ICD-10-CM

## 2024-03-06 DIAGNOSIS — I25.10 MILD CAD: ICD-10-CM

## 2024-03-06 DIAGNOSIS — I10 HYPERTENSION, UNSPECIFIED TYPE: ICD-10-CM

## 2024-03-06 DIAGNOSIS — G47.33 OSA ON CPAP: ICD-10-CM

## 2024-03-06 PROCEDURE — 3079F DIAST BP 80-89 MM HG: CPT | Performed by: PHYSICIAN ASSISTANT

## 2024-03-06 PROCEDURE — 1123F ACP DISCUSS/DSCN MKR DOCD: CPT | Performed by: PHYSICIAN ASSISTANT

## 2024-03-06 PROCEDURE — 1090F PRES/ABSN URINE INCON ASSESS: CPT | Performed by: PHYSICIAN ASSISTANT

## 2024-03-06 PROCEDURE — 2022F DILAT RTA XM EVC RTNOPTHY: CPT | Performed by: PHYSICIAN ASSISTANT

## 2024-03-06 PROCEDURE — G8427 DOCREV CUR MEDS BY ELIG CLIN: HCPCS | Performed by: PHYSICIAN ASSISTANT

## 2024-03-06 PROCEDURE — 99214 OFFICE O/P EST MOD 30 MIN: CPT | Performed by: PHYSICIAN ASSISTANT

## 2024-03-06 PROCEDURE — G8417 CALC BMI ABV UP PARAM F/U: HCPCS | Performed by: PHYSICIAN ASSISTANT

## 2024-03-06 PROCEDURE — 3075F SYST BP GE 130 - 139MM HG: CPT | Performed by: PHYSICIAN ASSISTANT

## 2024-03-06 PROCEDURE — 3017F COLORECTAL CA SCREEN DOC REV: CPT | Performed by: PHYSICIAN ASSISTANT

## 2024-03-06 PROCEDURE — 1036F TOBACCO NON-USER: CPT | Performed by: PHYSICIAN ASSISTANT

## 2024-03-06 PROCEDURE — G8400 PT W/DXA NO RESULTS DOC: HCPCS | Performed by: PHYSICIAN ASSISTANT

## 2024-03-06 PROCEDURE — 3046F HEMOGLOBIN A1C LEVEL >9.0%: CPT | Performed by: PHYSICIAN ASSISTANT

## 2024-03-06 PROCEDURE — G8484 FLU IMMUNIZE NO ADMIN: HCPCS | Performed by: PHYSICIAN ASSISTANT

## 2024-03-06 ASSESSMENT — ENCOUNTER SYMPTOMS
NAUSEA: 0
BLOOD IN STOOL: 0
CHEST TIGHTNESS: 0
ABDOMINAL DISTENTION: 0
VOMITING: 0
ABDOMINAL PAIN: 0
COUGH: 0
SHORTNESS OF BREATH: 1
COLOR CHANGE: 0

## 2024-03-06 NOTE — PATIENT INSTRUCTIONS
**Call Dr. Redding to notify about intermittent blood in urine    -Check daily weight every morning and notify CHF clinic if gaining more than 3 pounds in 2 days    -Check blood pressure twice daily in a.m. and p.m. prior to taking medications and keep log of blood pressure trends to review at next office visit  Notify office if BP running low with  mmHg or below prior to taking medications  Notify office if BP running high with  mmHg or above or if DBP 90 mmHg or above    -Recommend 2000 mg daily sodium restriction    -Recommend 2 liter (64 ounces) daily fluid restriction

## 2024-03-06 NOTE — PROGRESS NOTES
Patient: Evette Londono  YOB: 1950  MRN: [id number]    Chief Complaint:  Chief Complaint   Patient presents with    6 Month Follow-Up    Congestive Heart Failure    Coronary Artery Disease    Hematuria         Subjective/HPI       3/6/24: Here for follow-up of chronic systolic congestive heart failure and nonischemic cardiomyopathy with EF improved to 45 to 50% per limited echo 10/25/2023 compared to 20 to 25% per echo 6/12/2023.    Patient has been doing very well since her last CHF clinic visit on 9/6/2023.  She states that she had signed up for 3 additional months of cardiac rehab which she is still undergoing and then plans to join the gym.  She recently had an episode of some shortness of breath while walking in the mall with her daughter about 3 weeks ago but otherwise has had no shortness of breath complaints.  She states that she exercises in cardiac rehab without experiencing any symptoms and normally can go about her normal daily activities without any shortness of breath.  She denies chest pain, palpitations, diaphoresis, paroxysmal nocturnal dyspnea, orthopnea, worsening lower extremity edema, dizziness, lightheadedness, syncope, fever or chills.  She is compliant with CPAP at bedtime.  She is compliant with all of her medications and with low-sodium diet and fluid restriction.  She is checking daily weights and denies any rapid weight gain.  She has lost 12 pounds since her last office visit.  Her only complaint at this time is that she is having intermittent hematuria for the past month.  She denies any other urinary symptoms such as dysuria, frequency, etc.  I have asked that patient contact her PCP to notify of this issue and see what further workup she recommends.  She does have her regular follow-up with her PCP in 1 month but I have advised her to contact her ASAP.    Most recent remote pacemaker check on 12/27/2023 showed patient to be persistent A-fib with 1 episode of

## 2024-03-14 PROBLEM — G47.33 OSA (OBSTRUCTIVE SLEEP APNEA): Status: ACTIVE | Noted: 2024-03-14

## 2024-03-14 PROBLEM — I50.22 CHRONIC SYSTOLIC (CONGESTIVE) HEART FAILURE (MULTI): Status: ACTIVE | Noted: 2023-06-23

## 2024-03-14 PROBLEM — I49.5 SSS (SICK SINUS SYNDROME) (MULTI): Status: ACTIVE | Noted: 2023-06-13

## 2024-03-25 ENCOUNTER — OFFICE VISIT (OUTPATIENT)
Dept: PRIMARY CARE | Facility: CLINIC | Age: 74
End: 2024-03-25
Payer: MEDICARE

## 2024-03-25 ENCOUNTER — LAB (OUTPATIENT)
Dept: LAB | Facility: LAB | Age: 74
End: 2024-03-25
Payer: MEDICARE

## 2024-03-25 VITALS
BODY MASS INDEX: 39.2 KG/M2 | HEIGHT: 62 IN | OXYGEN SATURATION: 98 % | WEIGHT: 213 LBS | DIASTOLIC BLOOD PRESSURE: 62 MMHG | SYSTOLIC BLOOD PRESSURE: 124 MMHG | RESPIRATION RATE: 16 BRPM | TEMPERATURE: 97.1 F | HEART RATE: 88 BPM

## 2024-03-25 DIAGNOSIS — I10 ESSENTIAL HYPERTENSION: ICD-10-CM

## 2024-03-25 DIAGNOSIS — I48.91 ATRIAL FIBRILLATION, UNSPECIFIED TYPE (MULTI): ICD-10-CM

## 2024-03-25 DIAGNOSIS — R31.9 HEMATURIA, UNSPECIFIED TYPE: Primary | ICD-10-CM

## 2024-03-25 DIAGNOSIS — I49.5 SSS (SICK SINUS SYNDROME) (MULTI): ICD-10-CM

## 2024-03-25 DIAGNOSIS — E66.01 MORBID (SEVERE) OBESITY DUE TO EXCESS CALORIES (MULTI): ICD-10-CM

## 2024-03-25 DIAGNOSIS — N18.31 CKD STAGE G3A/A2, GFR 45-59 AND ALBUMIN CREATININE RATIO 30-299 MG/G (MULTI): ICD-10-CM

## 2024-03-25 DIAGNOSIS — I50.22 CHRONIC SYSTOLIC (CONGESTIVE) HEART FAILURE (MULTI): ICD-10-CM

## 2024-03-25 DIAGNOSIS — R31.9 HEMATURIA, UNSPECIFIED TYPE: ICD-10-CM

## 2024-03-25 DIAGNOSIS — R31.29 OTHER MICROSCOPIC HEMATURIA: Primary | ICD-10-CM

## 2024-03-25 DIAGNOSIS — I25.119 ATHEROSCLEROSIS OF NATIVE CORONARY ARTERY OF NATIVE HEART WITH ANGINA PECTORIS (CMS-HCC): ICD-10-CM

## 2024-03-25 DIAGNOSIS — G47.33 OSA (OBSTRUCTIVE SLEEP APNEA): ICD-10-CM

## 2024-03-25 DIAGNOSIS — E78.2 MIXED HYPERLIPIDEMIA: ICD-10-CM

## 2024-03-25 DIAGNOSIS — D64.9 ANEMIA, MILD: ICD-10-CM

## 2024-03-25 DIAGNOSIS — R31.29 OTHER MICROSCOPIC HEMATURIA: ICD-10-CM

## 2024-03-25 DIAGNOSIS — R82.998 DARK URINE: ICD-10-CM

## 2024-03-25 DIAGNOSIS — E11.69 TYPE 2 DIABETES MELLITUS WITH OTHER SPECIFIED COMPLICATION, UNSPECIFIED WHETHER LONG TERM INSULIN USE (MULTI): ICD-10-CM

## 2024-03-25 LAB
ALBUMIN SERPL BCP-MCNC: 3.8 G/DL (ref 3.4–5)
ALP SERPL-CCNC: 64 U/L (ref 33–136)
ALT SERPL W P-5'-P-CCNC: 14 U/L (ref 7–45)
ANION GAP SERPL CALC-SCNC: 13 MMOL/L (ref 10–20)
AST SERPL W P-5'-P-CCNC: 21 U/L (ref 9–39)
BASOPHILS # BLD AUTO: 0.03 X10*3/UL (ref 0–0.1)
BASOPHILS NFR BLD AUTO: 0.4 %
BILIRUB SERPL-MCNC: 0.8 MG/DL (ref 0–1.2)
BUN SERPL-MCNC: 17 MG/DL (ref 6–23)
CALCIUM SERPL-MCNC: 9.4 MG/DL (ref 8.6–10.3)
CHLORIDE SERPL-SCNC: 107 MMOL/L (ref 98–107)
CO2 SERPL-SCNC: 28 MMOL/L (ref 21–32)
CREAT SERPL-MCNC: 0.97 MG/DL (ref 0.5–1.05)
EGFRCR SERPLBLD CKD-EPI 2021: 62 ML/MIN/1.73M*2
EOSINOPHIL # BLD AUTO: 0.08 X10*3/UL (ref 0–0.4)
EOSINOPHIL NFR BLD AUTO: 1 %
ERYTHROCYTE [DISTWIDTH] IN BLOOD BY AUTOMATED COUNT: 14.5 % (ref 11.5–14.5)
EST. AVERAGE GLUCOSE BLD GHB EST-MCNC: 120 MG/DL
GLUCOSE SERPL-MCNC: 86 MG/DL (ref 74–99)
HBA1C MFR BLD: 5.8 %
HCT VFR BLD AUTO: 43.4 % (ref 36–46)
HGB BLD-MCNC: 14.1 G/DL (ref 12–16)
IMM GRANULOCYTES # BLD AUTO: 0.01 X10*3/UL (ref 0–0.5)
IMM GRANULOCYTES NFR BLD AUTO: 0.1 % (ref 0–0.9)
LYMPHOCYTES # BLD AUTO: 2.22 X10*3/UL (ref 0.8–3)
LYMPHOCYTES NFR BLD AUTO: 27.3 %
MCH RBC QN AUTO: 28.4 PG (ref 26–34)
MCHC RBC AUTO-ENTMCNC: 32.5 G/DL (ref 32–36)
MCV RBC AUTO: 88 FL (ref 80–100)
MONOCYTES # BLD AUTO: 0.55 X10*3/UL (ref 0.05–0.8)
MONOCYTES NFR BLD AUTO: 6.8 %
NEUTROPHILS # BLD AUTO: 5.24 X10*3/UL (ref 1.6–5.5)
NEUTROPHILS NFR BLD AUTO: 64.4 %
NRBC BLD-RTO: 0 /100 WBCS (ref 0–0)
PLATELET # BLD AUTO: 165 X10*3/UL (ref 150–450)
POC APPEARANCE, URINE: CLEAR
POC BILIRUBIN, URINE: NEGATIVE
POC BLOOD, URINE: ABNORMAL
POC COLOR, URINE: YELLOW
POC GLUCOSE, URINE: ABNORMAL MG/DL
POC KETONES, URINE: NEGATIVE MG/DL
POC LEUKOCYTES, URINE: NEGATIVE
POC NITRITE,URINE: NEGATIVE
POC PH, URINE: 6 PH
POC PROTEIN, URINE: ABNORMAL MG/DL
POC SPECIFIC GRAVITY, URINE: 1.02
POC UROBILINOGEN, URINE: 1 EU/DL
POTASSIUM SERPL-SCNC: 4 MMOL/L (ref 3.5–5.3)
PROT SERPL-MCNC: 6.5 G/DL (ref 6.4–8.2)
RBC # BLD AUTO: 4.96 X10*6/UL (ref 4–5.2)
SODIUM SERPL-SCNC: 144 MMOL/L (ref 136–145)
WBC # BLD AUTO: 8.1 X10*3/UL (ref 4.4–11.3)

## 2024-03-25 PROCEDURE — 3078F DIAST BP <80 MM HG: CPT | Performed by: FAMILY MEDICINE

## 2024-03-25 PROCEDURE — 1159F MED LIST DOCD IN RCRD: CPT | Performed by: FAMILY MEDICINE

## 2024-03-25 PROCEDURE — 99214 OFFICE O/P EST MOD 30 MIN: CPT | Performed by: FAMILY MEDICINE

## 2024-03-25 PROCEDURE — 1160F RVW MEDS BY RX/DR IN RCRD: CPT | Performed by: FAMILY MEDICINE

## 2024-03-25 PROCEDURE — 1036F TOBACCO NON-USER: CPT | Performed by: FAMILY MEDICINE

## 2024-03-25 PROCEDURE — 36415 COLL VENOUS BLD VENIPUNCTURE: CPT

## 2024-03-25 PROCEDURE — 80053 COMPREHEN METABOLIC PANEL: CPT

## 2024-03-25 PROCEDURE — 85025 COMPLETE CBC W/AUTO DIFF WBC: CPT

## 2024-03-25 PROCEDURE — 87086 URINE CULTURE/COLONY COUNT: CPT

## 2024-03-25 PROCEDURE — 3074F SYST BP LT 130 MM HG: CPT | Performed by: FAMILY MEDICINE

## 2024-03-25 PROCEDURE — 81003 URINALYSIS AUTO W/O SCOPE: CPT | Performed by: FAMILY MEDICINE

## 2024-03-25 PROCEDURE — 83036 HEMOGLOBIN GLYCOSYLATED A1C: CPT

## 2024-03-25 RX ORDER — NITROFURANTOIN 25; 75 MG/1; MG/1
100 CAPSULE ORAL 2 TIMES DAILY
Qty: 14 CAPSULE | Refills: 0 | Status: SHIPPED | OUTPATIENT
Start: 2024-03-25 | End: 2024-04-01

## 2024-03-25 NOTE — PROGRESS NOTES
Covid vax: UTD  Flu: UTD  Tdap: UTD  Pneumo: UTD  RSV: UTD  Shingles: UTD    CRC: due 2026  Mammogram: 1/2024  Pap: n/a  Lmp: n/a

## 2024-03-25 NOTE — PROGRESS NOTES
Subjective   Patient ID: Alyssa Maldonado is a 73 y.o. female who presents for Diabetes, Hypertension, Chronic Kidney Disease, and Urinary Problem (Concerned with blood in urine).  Covid vax: UTD  Flu: UTD  Tdap: UTD  Pneumo: UTD  RSV: UTD  Shingles: UTD     CRC: due   Mammogram: 2024  Pap: n/a  Lmp: n/a   Hba1c 5.9  Occ dysuria not  HPI today    Empiric macrobid sent This medications risks, benefits, and alternatives were discussed with patient at length.  If any unwanted side effects occur-discontinue medicine and call the office for discussion.    Patient Active Problem List   Diagnosis    Type 2 diabetes mellitus (CMS/HCC)    Atrial fibrillation (CMS/HCC)    Mixed hyperlipidemia    Essential hypertension    Diverticulitis of colon    CKD stage G3a/A2, GFR 45-59 and albumin creatinine ratio  mg/g (CMS/HCC)    Anemia, mild    Cardiomegaly    Thyroid disease    Morbid (severe) obesity due to excess calories (CMS/HCC)    Atherosclerosis of native coronary artery of native heart with angina pectoris (CMS/HCC)    HIRAL (obstructive sleep apnea)    Chronic systolic (congestive) heart failure (CMS/HCC)    SSS (sick sinus syndrome) (CMS/[facility])       Past Surgical History:   Procedure Laterality Date    CARDIAC CATHETERIZATION      no stents    CARDIOVERSION  2023     SECTION, CLASSIC      ,     COLONOSCOPY W/ POLYPECTOMY  2021    due     ESOPHAGOGASTRODUODENOSCOPY  2021    HH    PACEMAKER PLACEMENT  2023    TONSILLECTOMY         Review of Systems  This patient has  NO history of seizures/ CAD or CVA    NO history of recent Covid nor flu symptoms,  NO Fever nor chills,  NO Chest pain, shortness of breath nor paroxysmal nocturnal dyspnea,  NO Nausea, vomiting, nor diarrhea,  NO Hematochezia nor melena,  NO Dysuria, hematuria, nor new incontinence issues  NO new severe headaches nor neurological complaints,  NO new issues with anxiety nor depression nor new psychiatric  "complaints,  NO suicidal nor homicidal ideations.     OBJECTIVE:  /62   Pulse 88   Temp 36.2 °C (97.1 °F) (Temporal)   Resp 16   Ht 1.562 m (5' 1.5\")   Wt 96.6 kg (213 lb)   LMP  (LMP Unknown)   SpO2 98%   BMI 39.59 kg/m²      General:  alert, oriented, no acute distress.  No obvious skin rashes noted.   No gait disturbance noted.    Mood is pleasant,  no signs of emotional distress.   Not appearing intoxicated or altered.   No voiced delusions,   Normal, appropriate behavior.    HEENT: Normocephalic, atraumatic,   Pupils round, reactive to light  Extraocular motions intact and wnl  Tympanic membranes normal    Neck: no nuchal rigidity  No masses palpable.  No carotid bruits.  No thyromegaly.    Respiratory: Equal breath sounds  No wheezes,    rales,    nor rhonchi  No respiratory distress.    Heart:irregular /paced rate and rhythm, no    murmurs  no rubs/gallops    Abdomen: no masses palpable, nontender, no rebound nor guarding.overwt    Extremities: NO cyanosis noted, no clubbing.   Trace le  edema noted.  2+dorsalis pedis pulses.    Normal-not antalgic, steady gait.    Office Visit on 03/25/2024   Component Date Value Ref Range Status    POC Color, Urine 03/25/2024 Yellow  Straw, Yellow, Light-Yellow Final    POC Appearance, Urine 03/25/2024 Clear  Clear Final    POC Glucose, Urine 03/25/2024 500 (3+) (A)  NEGATIVE mg/dl Final    POC Bilirubin, Urine 03/25/2024 NEGATIVE  NEGATIVE Final    POC Ketones, Urine 03/25/2024 NEGATIVE  NEGATIVE mg/dl Final    POC Specific Gravity, Urine 03/25/2024 1.020  1.005 - 1.035 Final    POC Blood, Urine 03/25/2024 LARGE (3+) (A)  NEGATIVE Final    POC PH, Urine 03/25/2024 6.0  No Reference Range Established PH Final    POC Protein, Urine 03/25/2024 30 (1+)  NEGATIVE, 30 (1+) mg/dl Final    POC Urobilinogen, Urine 03/25/2024 1.0  0.2, 1.0 EU/DL Final    Poc Nitrite, Urine 03/25/2024 NEGATIVE  NEGATIVE Final    POC Leukocytes, Urine 03/25/2024 NEGATIVE  NEGATIVE Final "        Assessment/Plan     Problem List Items Addressed This Visit       Type 2 diabetes mellitus (CMS/[facility])    Atrial fibrillation (CMS/[facility])    Mixed hyperlipidemia    Essential hypertension    CKD stage G3a/A2, GFR 45-59 and albumin creatinine ratio  mg/g (CMS/[facility])    Anemia, mild    Morbid (severe) obesity due to excess calories (CMS/[facility])    Atherosclerosis of native coronary artery of native heart with angina pectoris (CMS/HCC)    HIRAL (obstructive sleep apnea)    Chronic systolic (congestive) heart failure (CMS/[facility])    SSS (sick sinus syndrome) (CMS/HCC)     Other Visit Diagnoses       Dark urine        Relevant Orders    POCT UA Automated manually resulted (Completed)    Urine Culture    Hematuria, unspecified type                Follow up at next scheduled visit 3-4mo  And again had a lengthy discussion w pt  about risks of poorly controlled diabetes including micro and macrovascular complications of DM2 including blindness,MI,CVA and death among other possibilities. Pt aware and agrees to better compliance and adherance to instructions such as regular eye exams q 1-2 y, foot exams,and f/u regularly for hba1c with a goal of 6.5.    NO evidence of neuropathy or nephropathy at this point    Follow up as planned for hba1c and BP checks REGULARLY.  To uro-reqs dr marinelli  Sees dr trotter-cardio    Hold off on macrobid  Dx possibilities for hematuria

## 2024-03-26 LAB — BACTERIA UR CULT: NORMAL

## 2024-03-29 ENCOUNTER — HOSPITAL ENCOUNTER (OUTPATIENT)
Dept: RADIOLOGY | Facility: CLINIC | Age: 74
Discharge: HOME | End: 2024-03-29
Payer: MEDICARE

## 2024-03-29 DIAGNOSIS — R31.29 OTHER MICROSCOPIC HEMATURIA: ICD-10-CM

## 2024-03-29 PROCEDURE — 74176 CT ABD & PELVIS W/O CONTRAST: CPT

## 2024-03-29 PROCEDURE — 74176 CT ABD & PELVIS W/O CONTRAST: CPT | Performed by: RADIOLOGY

## 2024-04-01 DIAGNOSIS — Z95.0 PACEMAKER: Primary | ICD-10-CM

## 2024-04-01 NOTE — PROGRESS NOTES
Called to explain findings  And rule out CA needed  Answered ?s  She agrees  Wanted to see dr Hein and wasnt taking new pts  Also explained she reported NO hx gross hematuria and reminded she had micro hematuria back in 2021-ordered repeat and she couldn't urinate and never returned to make sure blood resolved  Had gone from large amt to trace    Explained ct findings  Re-referral to uro  Still wants to try dr hein and see if he will take her=make exception  See me 2mo  Advised dr hein within 1mo

## 2024-04-02 ENCOUNTER — TELEPHONE (OUTPATIENT)
Dept: PRIMARY CARE | Facility: CLINIC | Age: 74
End: 2024-04-02
Payer: MEDICARE

## 2024-04-02 DIAGNOSIS — R31.9 HEMATURIA, UNSPECIFIED TYPE: ICD-10-CM

## 2024-04-02 DIAGNOSIS — R93.5 ABNORMAL COMPUTED TOMOGRAPHY OF ABDOMEN AND PELVIS: ICD-10-CM

## 2024-04-02 NOTE — TELEPHONE ENCOUNTER
Patient declined to see Dr. Brandon Lao as he could not see her prior to June 2024.     Patient was advised to keep her appointment as scheduled with Dr. Hein for 6-3-24.     Thank you.

## 2024-04-02 NOTE — TELEPHONE ENCOUNTER
Patient phones the office today to inform RICHARD she is unable to see Dr. Hein until June 2024 and she would like to know if this is okay or should she seek another physician.     Please advise and contact patient at [phone].     Thank you.

## 2024-04-03 ENCOUNTER — OFFICE VISIT (OUTPATIENT)
Dept: UROLOGY | Facility: CLINIC | Age: 74
End: 2024-04-03
Payer: MEDICARE

## 2024-04-03 ENCOUNTER — TELEPHONE (OUTPATIENT)
Dept: UROLOGY | Facility: CLINIC | Age: 74
End: 2024-04-03

## 2024-04-03 ENCOUNTER — APPOINTMENT (OUTPATIENT)
Dept: PRIMARY CARE | Facility: CLINIC | Age: 74
End: 2024-04-03
Payer: MEDICARE

## 2024-04-03 VITALS — SYSTOLIC BLOOD PRESSURE: 174 MMHG | TEMPERATURE: 97.5 F | HEART RATE: 81 BPM | DIASTOLIC BLOOD PRESSURE: 104 MMHG

## 2024-04-03 DIAGNOSIS — N32.89 BLADDER MASS: ICD-10-CM

## 2024-04-03 DIAGNOSIS — R31.9 HEMATURIA, UNSPECIFIED TYPE: ICD-10-CM

## 2024-04-03 PROCEDURE — 1160F RVW MEDS BY RX/DR IN RCRD: CPT | Performed by: UROLOGY

## 2024-04-03 PROCEDURE — 3077F SYST BP >= 140 MM HG: CPT | Performed by: UROLOGY

## 2024-04-03 PROCEDURE — 3044F HG A1C LEVEL LT 7.0%: CPT | Performed by: UROLOGY

## 2024-04-03 PROCEDURE — 1159F MED LIST DOCD IN RCRD: CPT | Performed by: UROLOGY

## 2024-04-03 PROCEDURE — 1036F TOBACCO NON-USER: CPT | Performed by: UROLOGY

## 2024-04-03 PROCEDURE — 99222 1ST HOSP IP/OBS MODERATE 55: CPT | Performed by: UROLOGY

## 2024-04-03 PROCEDURE — 3080F DIAST BP >= 90 MM HG: CPT | Performed by: UROLOGY

## 2024-04-03 NOTE — TELEPHONE ENCOUNTER
Left message on office voicemail, requesting a return call for an appointment Dx. Bladder Mass    German Lorenzo MD  Director, Robotic Surgery - [facility]  , Urology - [facility] [employer] [facility]  Primary Office:  [facility]  [address] ,   [address]  [place] [address]  County: [facility]  Gender: M   Pager: [phone]  Languages: English  Email:   [email or web address]  Corporate ID: [id number]  Accepting New Patients: Yes  Scheduling Phone: [phone]  Administrative Phone: [phone]  Fax: [phone]  Other Offices:  [facility]  [address],   [place] [address]  Scheduling Phone: [phone]  Administrative Phone: [phone]  Fax: [phone]  Accepting New Patients: Yes  County: [address] [address]  Scheduling Phone: [phone]  Administrative Phone: [phone]  Fax: [phone]  Accepting New Patients: Yes  County: [place]

## 2024-04-03 NOTE — PROGRESS NOTES
"Subjective   Patient ID: Alyssa Maldonado is a 73 y.o. female new patient kindly referred by Dr. Lyndsey Fernando who presents for Renal mass and Blood in Urine.    HPI  Patient relates that her BP is \"cuckoo\" today. Patient has been having intermittent episodes of hematuria for the last few months. Patient denies dysuria. Patient sleeps through the night with CPAP for sleep apnea.     Patient takes Xarelto for afib. She did not have lightheadedness with afib. Patient has never had a heart attack.   Never a smoker.     Patient started taking OTC supplement for mental clarity and memory and felt it was working.    CT a/p wo contrast  IMPRESSION:  1.  Approximately 3.5 cm mass in the posterior right side of the  urinary bladder highly suspicious of a neoplasm. No hydronephrosis.  Kidneys are unremarkable on this unenhanced scan.  2. No abdominal or pelvic lymphadenopathy.  3. Colonic diverticulosis.  4. A hiatal hernia. Moderate cardiomegaly.    Lab Results   Component Value Date    BUN 17 2024    CREATININE 0.97 2024    EGFR 62 2024     Past Medical History  Past Medical History:   Diagnosis Date    History of mammogram 2024    cat 1    Pap test, as part of routine gynecological examination 10/2014    wnl, hpv neg        Surgical History  Past Surgical History:   Procedure Laterality Date    CARDIAC CATHETERIZATION      no stents    CARDIOVERSION  2023     SECTION, CLASSIC      ,     COLONOSCOPY W/ POLYPECTOMY  2021    due     ESOPHAGOGASTRODUODENOSCOPY  2021    HH    PACEMAKER PLACEMENT  2023    TONSILLECTOMY           Social History  She reports that she has never smoked. She has never used smokeless tobacco. She reports that she does not currently use alcohol. She reports that she does not use drugs.    Family History  Family History   Problem Relation Name Age of Onset    Hyperlipidemia Father      Hypertension Father         Medications    Current " Outpatient Medications:     cyanocobalamin (Vitamin B-12) 1,000 mcg tablet, Take 1 tablet (1,000 mcg) by mouth once daily., Disp: , Rfl:     Entresto 24-26 mg tablet, Take 1 tablet by mouth 2 times a day., Disp: , Rfl:     ferrous sulfate 325 (65 Fe) MG tablet, Take 1 tablet (325 mg) by mouth once daily., Disp: , Rfl:     furosemide (Lasix) 20 mg tablet, Take 1 tablet (20 mg) by mouth once daily., Disp: , Rfl:     Jardiance 10 mg, Take 1 tablet (10 mg) by mouth once daily., Disp: , Rfl:     levothyroxine (Synthroid, Levoxyl) 125 mcg tablet, 1 by mouth every day except Sunday, Disp: 90 tablet, Rfl: 3    metFORMIN (Glucophage) 500 mg tablet, Take 1 tablet (500 mg) by mouth 2 times a day with meals., Disp: 180 tablet, Rfl: 3    metoprolol succinate XL (Toprol-XL) 25 mg 24 hr tablet, Take 1 tablet (25 mg) by mouth once daily., Disp: , Rfl:     omega 3-dha-epa-fish oil (Fish OiL) 1,200 (144-216) mg capsule, Take by mouth., Disp: , Rfl:     pantoprazole (ProtoNix) 40 mg EC tablet, Take 1 tablet (40 mg) by mouth once daily in the morning. Take before meals., Disp: , Rfl:     rosuvastatin (Crestor) 10 mg tablet, TAKE 1 TABLET BY MOUTH EVERY DAY, Disp: 90 tablet, Rfl: 3    sotalol (Betapace) 120 mg tablet, Take 1 tablet (120 mg) by mouth every 12 hours., Disp: , Rfl:     vitamin E acid succinate (vitamin E succinate) 268 mg (400 unit) tablet, Take by mouth., Disp: , Rfl:     Xarelto 20 mg tablet, Take 1 tablet (20 mg) by mouth once daily., Disp: , Rfl:      Allergies  Patient has no known allergies.     Review of Systems  A 12 system review was completed and is negative with the exception of those signs and symptoms noted in the history of present illness.    Objective   Physical Exam  General: in NAD, appears stated age  Head: normocephalic, atraumatic  Respiratory: normal effort, no use of accessory muscles  Cardiovascular: no edema noted  Skin: normal turgor, no rashes  Neurologic: grossly intact, oriented to  person/place/time  Psychiatric: mode and affect appropriate     Assessment/Plan   Problem List Items Addressed This Visit    None  Visit Diagnoses         Codes    Bladder mass     N32.89    Relevant Orders    CT urography w 3D volume rendered imaging          We discussed that her CT was done without constrast which is not ideal for visualization. We discussed proceeding with TURBT. We discussed the risks, benefits, and alternatives to the procedure. We briefly discussed the potential outcomes based on pathology results. Order CT Urogram with contrast STAT and schedule cystoscopy and TURBT. She would prefer [facility]     Follow-up with  for continued management of bladder mass.     Scribe Attestation  By signing my name below, I, Ernestina Schroeder , Rosa M   attest that this documentation has been prepared under the direction and in the presence of German Lorenzo MD.

## 2024-04-03 NOTE — H&P (VIEW-ONLY)
"Subjective   Patient ID: Alyssa Maldonado is a 73 y.o. female new patient kindly referred by Dr. Lyndsey Fernando who presents for Renal mass and Blood in Urine.    HPI  Patient relates that her BP is \"cuckoo\" today. Patient has been having intermittent episodes of hematuria for the last few months. Patient denies dysuria. Patient sleeps through the night with CPAP for sleep apnea.     Patient takes Xarelto for afib. She did not have lightheadedness with afib. Patient has never had a heart attack.   Never a smoker.     Patient started taking OTC supplement for mental clarity and memory and felt it was working.    CT a/p wo contrast  IMPRESSION:  1.  Approximately 3.5 cm mass in the posterior right side of the  urinary bladder highly suspicious of a neoplasm. No hydronephrosis.  Kidneys are unremarkable on this unenhanced scan.  2. No abdominal or pelvic lymphadenopathy.  3. Colonic diverticulosis.  4. A hiatal hernia. Moderate cardiomegaly.    Lab Results   Component Value Date    BUN 17 2024    CREATININE 0.97 2024    EGFR 62 2024     Past Medical History  Past Medical History:   Diagnosis Date    History of mammogram 2024    cat 1    Pap test, as part of routine gynecological examination 10/2014    wnl, hpv neg        Surgical History  Past Surgical History:   Procedure Laterality Date    CARDIAC CATHETERIZATION      no stents    CARDIOVERSION  2023     SECTION, CLASSIC      ,     COLONOSCOPY W/ POLYPECTOMY  2021    due     ESOPHAGOGASTRODUODENOSCOPY  2021    HH    PACEMAKER PLACEMENT  2023    TONSILLECTOMY           Social History  She reports that she has never smoked. She has never used smokeless tobacco. She reports that she does not currently use alcohol. She reports that she does not use drugs.    Family History  Family History   Problem Relation Name Age of Onset    Hyperlipidemia Father      Hypertension Father         Medications    Current " Outpatient Medications:     cyanocobalamin (Vitamin B-12) 1,000 mcg tablet, Take 1 tablet (1,000 mcg) by mouth once daily., Disp: , Rfl:     Entresto 24-26 mg tablet, Take 1 tablet by mouth 2 times a day., Disp: , Rfl:     ferrous sulfate 325 (65 Fe) MG tablet, Take 1 tablet (325 mg) by mouth once daily., Disp: , Rfl:     furosemide (Lasix) 20 mg tablet, Take 1 tablet (20 mg) by mouth once daily., Disp: , Rfl:     Jardiance 10 mg, Take 1 tablet (10 mg) by mouth once daily., Disp: , Rfl:     levothyroxine (Synthroid, Levoxyl) 125 mcg tablet, 1 by mouth every day except Sunday, Disp: 90 tablet, Rfl: 3    metFORMIN (Glucophage) 500 mg tablet, Take 1 tablet (500 mg) by mouth 2 times a day with meals., Disp: 180 tablet, Rfl: 3    metoprolol succinate XL (Toprol-XL) 25 mg 24 hr tablet, Take 1 tablet (25 mg) by mouth once daily., Disp: , Rfl:     omega 3-dha-epa-fish oil (Fish OiL) 1,200 (144-216) mg capsule, Take by mouth., Disp: , Rfl:     pantoprazole (ProtoNix) 40 mg EC tablet, Take 1 tablet (40 mg) by mouth once daily in the morning. Take before meals., Disp: , Rfl:     rosuvastatin (Crestor) 10 mg tablet, TAKE 1 TABLET BY MOUTH EVERY DAY, Disp: 90 tablet, Rfl: 3    sotalol (Betapace) 120 mg tablet, Take 1 tablet (120 mg) by mouth every 12 hours., Disp: , Rfl:     vitamin E acid succinate (vitamin E succinate) 268 mg (400 unit) tablet, Take by mouth., Disp: , Rfl:     Xarelto 20 mg tablet, Take 1 tablet (20 mg) by mouth once daily., Disp: , Rfl:      Allergies  Patient has no known allergies.     Review of Systems  A 12 system review was completed and is negative with the exception of those signs and symptoms noted in the history of present illness.    Objective   Physical Exam  General: in NAD, appears stated age  Head: normocephalic, atraumatic  Respiratory: normal effort, no use of accessory muscles  Cardiovascular: no edema noted  Skin: normal turgor, no rashes  Neurologic: grossly intact, oriented to  person/place/time  Psychiatric: mode and affect appropriate     Assessment/Plan   Problem List Items Addressed This Visit    None  Visit Diagnoses         Codes    Bladder mass     N32.89    Relevant Orders    CT urography w 3D volume rendered imaging          We discussed that her CT was done without constrast which is not ideal for visualization. We discussed proceeding with TURBT. We discussed the risks, benefits, and alternatives to the procedure. We briefly discussed the potential outcomes based on pathology results. Order CT Urogram with contrast STAT and schedule cystoscopy and TURBT. She would prefer [facility]     Follow-up with  for continued management of bladder mass.     Scribe Attestation  By signing my name below, I, Ernestina Schroeder , Rosa M   attest that this documentation has been prepared under the direction and in the presence of German Lorenzo MD.

## 2024-04-08 ENCOUNTER — HOSPITAL ENCOUNTER (OUTPATIENT)
Dept: RADIOLOGY | Facility: CLINIC | Age: 74
Discharge: HOME | End: 2024-04-08
Payer: MEDICARE

## 2024-04-08 ENCOUNTER — TELEPHONE (OUTPATIENT)
Dept: PRIMARY CARE | Facility: CLINIC | Age: 74
End: 2024-04-08
Payer: MEDICARE

## 2024-04-08 DIAGNOSIS — N32.89 BLADDER MASS: ICD-10-CM

## 2024-04-08 PROCEDURE — 74170 CT ABD WO CNTRST FLWD CNTRST: CPT | Performed by: STUDENT IN AN ORGANIZED HEALTH CARE EDUCATION/TRAINING PROGRAM

## 2024-04-08 PROCEDURE — 2550000001 HC RX 255 CONTRASTS: Performed by: UROLOGY

## 2024-04-08 PROCEDURE — 76377 3D RENDER W/INTRP POSTPROCES: CPT

## 2024-04-08 PROCEDURE — 76377 3D RENDER W/INTRP POSTPROCES: CPT | Performed by: STUDENT IN AN ORGANIZED HEALTH CARE EDUCATION/TRAINING PROGRAM

## 2024-04-08 RX ADMIN — IOHEXOL 75 ML: 350 INJECTION, SOLUTION INTRAVENOUS at 08:48

## 2024-04-10 RX ORDER — SACUBITRIL AND VALSARTAN 24; 26 MG/1; MG/1
1 TABLET, FILM COATED ORAL 2 TIMES DAILY
Qty: 180 TABLET | Refills: 2 | Status: SHIPPED | OUTPATIENT
Start: 2024-04-10

## 2024-04-10 NOTE — TELEPHONE ENCOUNTER
Requesting medication refill. Please approve or deny this request.    Rx requested:  Requested Prescriptions     Pending Prescriptions Disp Refills    ENTRESTO 24-26 MG per tablet [Pharmacy Med Name: ENTRESTO TAB 24-26MG] 180 tablet 2     Sig: TAKE 1 TABLET TWICE A DAY         Last Office Visit:   3/6/2024      Next Visit Date:  Future Appointments   Date Time Provider Department Center   4/23/2024  8:45 AM Abdiel Hill MD [facility]   6/3/2024  1:30 PM Corey Montesinos PA [facility]   9/4/2024  8:30 AM Lucia Zimmerman PA [facility]               Last refill 08/21/2023. Please approve or deny.     Met and spoke w/ pts girlfriend and HCP  Surekha today , we discussed pt condition and overall condition . Discussed presently unable to clear his infection, despite multiple antbx   and his mentation is only sl improved. Surekha says she has been updated by in house cardiology as well as his out pt cardiologist Dr Lainez. She says she understands he is not a surgical candidate and we spoke about how the cardiology team including Dr Lainez have expressed to her that he is not a good candidate for the ERENDIRA procedure either. She sees he is not improving as she had hoped and he is eating less. We spoke about feeding tubes as an alternate means of nutrition but surekha feels he would not want that .  We discussed his code status again today as well. Discussed that performing cpr and intubating pt will  not improve his underlying conditions, and Surekha said he "never wanted to be kept alive by any machines anyway."  She feels he would not want that at this time. I  discussed that we would need to complete some paper work to put a dnr/dni order in place , Surekha stated understanding, and said she would like to speak w/ Dr Lainez  - pts outpt cardiologist first .  I encouraged her to do that and she has my contact number for any questions or help needed.   We also discussed Quaker support, and if this is important to Liam and Surekha said it is,  and she asked for a  to visit for prayer and sacrament of the sick . Met and spoke w/ pts girlfriend and HCP  Surekha today , we discussed pt condition and overall condition . Discussed presently unable to clear his infection, despite multiple antbx   and his mentation is only sl improved. Surekha says she has been updated by in house cardiology as well as his out pt cardiologist Dr Lainez. She says she understands he is not a surgical candidate and we spoke about how the cardiology team including Dr Lainez have expressed to her that he is not a good candidate for the ERENDIRA procedure either. She sees he is not improving as she had hoped and he is eating less. We spoke about feeding tubes as an alternate means of nutrition but surekha feels he would not want that .  We discussed his code status again today as well. Discussed that performing cpr and intubating pt will  not improve his underlying conditions, and Surekha said he "never wanted to be kept alive by any machines anyway."  She feels he would not want that at this time. I  discussed that we would need to complete some paper work to put a dnr/dni order in place , Surekha stated understanding, and said she would like to speak w/ Dr Lainez  - pts outpt cardiologist first .  I encouraged her to do that and she has my contact number for any questions or help needed.   We also discussed Tenriism support, and if this is important to Liam and Surekha said it is,  and she asked for a  to visit for prayer and sacrament of the sick . Met and spoke w/ pts girlfriend and HCP  Surekha today , we discussed pt condition and overall condition . Discussed presently unable to clear his infection, despite multiple antbx   and his mentation is only sl improved. Surekha says she has been updated by in house cardiology as well as his out pt cardiologist Dr Lainez. She says she understands he is not a surgical candidate and we spoke about how the cardiology team including Dr Lainez have expressed to her that he is not a good candidate for the ERENDIRA procedure either. She sees he is not improving as she had hoped and he is eating less. We spoke about feeding tubes as an alternate means of nutrition but surekha feels he would not want that .  We discussed his code status again today as well. Discussed that performing cpr and intubating pt will  not improve his underlying conditions, and Surekha said he "never wanted to be kept alive by any machines anyway."  She feels he would not want that at this time. I  discussed that we would need to complete some paper work to put a dnr/dni order in place , Surekha stated understanding, and said she would like to speak w/ Dr Lainez  - pts outpt cardiologist first .  I encouraged her to do that and she has my contact number for any questions or help needed.   We also discussed Hoahaoism support, and if this is important to Liam and Surekha said it is,  and she asked for a  to visit for prayer and sacrament of the sick .

## 2024-04-12 ENCOUNTER — PREP FOR PROCEDURE (OUTPATIENT)
Dept: UROLOGY | Facility: HOSPITAL | Age: 74
End: 2024-04-12
Payer: MEDICARE

## 2024-04-12 DIAGNOSIS — R31.0 GROSS HEMATURIA: ICD-10-CM

## 2024-04-12 DIAGNOSIS — N32.89 BLADDER MASS: Primary | ICD-10-CM

## 2024-04-12 RX ORDER — SODIUM CHLORIDE 9 MG/ML
100 INJECTION, SOLUTION INTRAVENOUS CONTINUOUS
Status: CANCELLED | OUTPATIENT
Start: 2024-04-12

## 2024-04-15 ENCOUNTER — TELEPHONE (OUTPATIENT)
Dept: CARDIOLOGY CLINIC | Age: 74
End: 2024-04-15

## 2024-04-15 ASSESSMENT — CHADS2 SCORE
DIABETES: YES
CHADS2 SCORE: 2
HYPERTENSION: YES
CHF: NO
AGE GREATER THAN OR EQUAL TO 75: NO
PRIOR STROKE OR TIA OR THROMBOEMBOLISM: NO

## 2024-04-15 ASSESSMENT — DUKE ACTIVITY SCORE INDEX (DASI)
CAN YOU DO YARD WORK LIKE RAKING LEAVES, WEEDING OR PUSHING A MOWER: YES
CAN YOU PARTICIPATE IN STRENOUS SPORTS LIKE SWIMMING, SINGLES TENNIS, FOOTBALL, BASKETBALL, OR SKIING: NO
CAN YOU PARTICIPATE IN MODERATE RECREATIONAL ACTIVITIES LIKE GOLF, BOWLING, DANCING, DOUBLES TENNIS OR THROWING A BASEBALL OR FOOTBALL: YES
CAN YOU RUN A SHORT DISTANCE: YES
TOTAL_SCORE: 50.7
CAN YOU WALK A BLOCK OR TWO ON LEVEL GROUND: YES
CAN YOU DO HEAVY WORK AROUND THE HOUSE LIKE SCRUBBING FLOORS OR LIFTING AND MOVING HEAVY FURNITURE: YES
CAN YOU CLIMB A FLIGHT OF STAIRS OR WALK UP A HILL: YES
DASI METS SCORE: 9
CAN YOU HAVE SEXUAL RELATIONS: YES
CAN YOU DO LIGHT WORK AROUND THE HOUSE LIKE DUSTING OR WASHING DISHES: YES
CAN YOU TAKE CARE OF YOURSELF (EAT, DRESS, BATHE, OR USE TOILET): YES
CAN YOU WALK INDOORS, SUCH AS AROUND YOUR HOUSE: YES
CAN YOU DO MODERATE WORK AROUND THE HOUSE LIKE VACUUMING, SWEEPING FLOORS OR CARRYING GROCERIES: YES

## 2024-04-15 ASSESSMENT — ACTIVITIES OF DAILY LIVING (ADL): ADL_SCORE: 0

## 2024-04-15 ASSESSMENT — LIFESTYLE VARIABLES: SMOKING_STATUS: NONSMOKER

## 2024-04-15 NOTE — TELEPHONE ENCOUNTER
Pt calling to let Dr Knott know that she got in with an Urologist.    Pt has a PAT appt for tomorrow.     Pt is having Transurethral resection of the bladder tumor done this Friday.   How long should pt hold medications?     Pt requesting a call back ASAP    Pt # [phone]

## 2024-04-16 ENCOUNTER — LAB (OUTPATIENT)
Dept: LAB | Facility: LAB | Age: 74
End: 2024-04-16
Payer: MEDICARE

## 2024-04-16 ENCOUNTER — PRE-ADMISSION TESTING (OUTPATIENT)
Dept: PREADMISSION TESTING | Facility: HOSPITAL | Age: 74
End: 2024-04-16
Payer: MEDICARE

## 2024-04-16 VITALS
DIASTOLIC BLOOD PRESSURE: 76 MMHG | WEIGHT: 211.2 LBS | BODY MASS INDEX: 36.06 KG/M2 | OXYGEN SATURATION: 100 % | HEIGHT: 64 IN | RESPIRATION RATE: 14 BRPM | TEMPERATURE: 97.9 F | HEART RATE: 73 BPM | SYSTOLIC BLOOD PRESSURE: 105 MMHG

## 2024-04-16 DIAGNOSIS — N32.89 BLADDER MASS: ICD-10-CM

## 2024-04-16 DIAGNOSIS — R31.0 GROSS HEMATURIA: ICD-10-CM

## 2024-04-16 DIAGNOSIS — Z01.818 PRE-OP TESTING: Primary | ICD-10-CM

## 2024-04-16 LAB
ANION GAP SERPL CALC-SCNC: 11 MMOL/L (ref 10–20)
BUN SERPL-MCNC: 19 MG/DL (ref 6–23)
CALCIUM SERPL-MCNC: 9.6 MG/DL (ref 8.6–10.3)
CHLORIDE SERPL-SCNC: 107 MMOL/L (ref 98–107)
CO2 SERPL-SCNC: 28 MMOL/L (ref 21–32)
CREAT SERPL-MCNC: 0.98 MG/DL (ref 0.5–1.05)
EGFRCR SERPLBLD CKD-EPI 2021: 61 ML/MIN/1.73M*2
ERYTHROCYTE [DISTWIDTH] IN BLOOD BY AUTOMATED COUNT: 14.3 % (ref 11.5–14.5)
GLUCOSE SERPL-MCNC: 94 MG/DL (ref 74–99)
HCT VFR BLD AUTO: 48.4 % (ref 36–46)
HGB BLD-MCNC: 15 G/DL (ref 12–16)
MCH RBC QN AUTO: 27.5 PG (ref 26–34)
MCHC RBC AUTO-ENTMCNC: 31 G/DL (ref 32–36)
MCV RBC AUTO: 89 FL (ref 80–100)
NRBC BLD-RTO: 0 /100 WBCS (ref 0–0)
PLATELET # BLD AUTO: 195 X10*3/UL (ref 150–450)
POTASSIUM SERPL-SCNC: 4.5 MMOL/L (ref 3.5–5.3)
RBC # BLD AUTO: 5.46 X10*6/UL (ref 4–5.2)
SODIUM SERPL-SCNC: 141 MMOL/L (ref 136–145)
WBC # BLD AUTO: 7.1 X10*3/UL (ref 4.4–11.3)

## 2024-04-16 PROCEDURE — 80048 BASIC METABOLIC PNL TOTAL CA: CPT

## 2024-04-16 PROCEDURE — 93010 ELECTROCARDIOGRAM REPORT: CPT | Performed by: INTERNAL MEDICINE

## 2024-04-16 PROCEDURE — 93005 ELECTROCARDIOGRAM TRACING: CPT

## 2024-04-16 PROCEDURE — 85027 COMPLETE CBC AUTOMATED: CPT

## 2024-04-16 PROCEDURE — 87086 URINE CULTURE/COLONY COUNT: CPT

## 2024-04-16 PROCEDURE — 99203 OFFICE O/P NEW LOW 30 MIN: CPT | Performed by: NURSE PRACTITIONER

## 2024-04-16 PROCEDURE — 36415 COLL VENOUS BLD VENIPUNCTURE: CPT

## 2024-04-16 ASSESSMENT — PAIN - FUNCTIONAL ASSESSMENT: PAIN_FUNCTIONAL_ASSESSMENT: 0-10

## 2024-04-16 ASSESSMENT — PAIN SCALES - GENERAL: PAINLEVEL_OUTOF10: 0 - NO PAIN

## 2024-04-16 NOTE — PREPROCEDURE INSTRUCTIONS
Medication List            Accurate as of April 16, 2024  8:44 AM. Always use your most recent med list.                cyanocobalamin 1,000 mcg tablet  Commonly known as: Vitamin B-12  Medication Adjustments for Surgery: Stop 7 days before surgery     Entresto 24-26 mg tablet  Generic drug: sacubitriL-valsartan  Medication Adjustments for Surgery: Take morning of surgery with sip of water, no other fluids     ferrous sulfate (325 mg ferrous sulfate) tablet  Medication Adjustments for Surgery: Stop 7 days before surgery     Fish OiL 1,200 (144-216) mg capsule  Generic drug: omega 3-dha-epa-fish oil  Medication Adjustments for Surgery: Stop 7 days before surgery     furosemide 20 mg tablet  Commonly known as: Lasix  Medication Adjustments for Surgery: Take morning of surgery with sip of water, no other fluids     Jardiance 10 mg  Generic drug: empagliflozin  Medication Adjustments for Surgery: Stop 3 days before surgery     levothyroxine 125 mcg tablet  Commonly known as: Synthroid, Levoxyl  1 by mouth every day except Sunday  Medication Adjustments for Surgery: Take morning of surgery with sip of water, no other fluids     metFORMIN 500 mg tablet  Commonly known as: Glucophage  Take 1 tablet (500 mg) by mouth 2 times a day with meals.  Medication Adjustments for Surgery: Other (Comment)  Notes to patient: HOLD any evening dose the night before the day of surgery  HOLD the day of surgery     metoprolol succinate XL 25 mg 24 hr tablet  Commonly known as: Toprol-XL  Medication Adjustments for Surgery: Take morning of surgery with sip of water, no other fluids     pantoprazole 40 mg EC tablet  Commonly known as: ProtoNix  Medication Adjustments for Surgery: Take morning of surgery with sip of water, no other fluids     rosuvastatin 10 mg tablet  Commonly known as: Crestor  TAKE 1 TABLET BY MOUTH EVERY DAY  Medication Adjustments for Surgery: Other (Comment)  Notes to patient: May take the morning of surgery if this  medication is prescribed to take in the mornings     sotalol 120 mg tablet  Commonly known as: Betapace  Medication Adjustments for Surgery: Take morning of surgery with sip of water, no other fluids     vitamin E succinate 268 mg (400 unit) tablet  Medication Adjustments for Surgery: Stop 7 days before surgery     Xarelto 20 mg tablet  Generic drug: rivaroxaban  Medication Adjustments for Surgery: Other (Comment)  Notes to patient: Coordinate with prescribing provider for further instructions on this medications prior to surgery.                  PRE-OPERATIVE INSTRUCTIONS    You will receive notification one business day prior to your procedure to confirm your arrival time. It is important that you answer your phone and/or check your messages during this time. If you do not hear from the surgery center by 5 pm. the day before your procedure, please call [phone].     Please enter the building through the Outpatient entrance and take the elevator off the lobby to the 2nd floor then check in at the Outpatient Surgery desk on the 2nd floor.    INSTRUCTIONS:  Talk to your surgeon for instructions if you should stop your aspirin, blood thinner, or diabetes medicines.  DO NOT take any multivitamins or over the counter supplements for 7-10 days before surgery.  If not being admitted, you must have an adult immediately available to drive you home after surgery. We also highly recommend you have someone stay with you for the entire day and night of your surgery.  For children having surgery, a parent or legal guardian must accompany them to the surgery center. If this is not possible, please call [phone] to make additional arrangements.  For adults who are unable to consent or make medical decisions for themselves, a legal guardian or Power of  must accompany them to the surgery center. If this is not possible, please call [phone] to make additional arrangements.  Wear comfortable, loose fitting  clothing.  All jewelry and piercings must be removed. If you are unable to remove an item or have a dermal piercing, please be sure to tell the nurse when you arrive for surgery.  Nail polish and make-up must be removed.  Avoid smoking or consuming alcohol for 24 hours before surgery.  To help prevent infection, please take a shower/bath and wash your hair the night before and/or morning of surgery (or follow other specific bathing instructions provided).    Preoperative Fasting Guidelines    Why must I stop eating and drinking near surgery time?  With sedation, food or liquid in your stomach can enter your lungs causing serious complications  Increases nausea and vomiting    When do I need to stop eating and drinking before my surgery?  Do not eat any solid food after midnight the night before your surgery/procedure.  You may have up to TEN ounces of clear liquid until TWO hours before your instructed arrival time to the hospital.  This includes water, black tea/coffee, (no milk or cream) apple juice, and electrolyte drinks (Gatorade).   You may chew gum until TWO hours before your surgery/procedure    If you have any questions or concerns, please call Pre-Admission Testing at [phone].     Home Preoperative Antibacterial Shower with Chlorhexidine gluconate (CHG)     What is a home preoperative antibacterial shower?  This shower is a way of cleaning the skin with a germ killing solution before surgery. The solution contains chlorhexidine gluconate, commonly known as CHG. CHG is a skin cleanser with germ killing ability. Let your doctor know if you are allergic to chlorhexidine.    Why do I need to take a preoperative antibacterial shower?  Skin is not sterile. It is best to try to make your skin as free of germs as possible before surgery. Proper cleansing with a germ killing soap before surgery can lower the number of germs on your skin. This helps to reduce the risk of infection at the surgical site.  Following the instructions listed below will help you prepare your skin for surgery.    How do I use the solution?  Begin using your CHG soap the night before and again the morning of your procedure.   Do not shave the day before or day of surgery.  Remove all jewelry until after surgery. Take off rings and take out all body-piercing jewelry.  Wash your face and hair with normal soap and shampoo before you use the CHG soap.  Apply the CHG solution to a clean wet washcloth. Move away from the water to avoid premature rinsing of the CHG soap as you are applying. Firmly lather your entire body from the neck down. Do not use CHG on your face, eyes, ears, or genitals.   Pay special attention to the area where your incisions will be located.  Do not scrub your skin too hard.  It is important to allow the CHG soap to sit on your skin for 3-5 minutes.  Rinse the solution off your body completely. Do not wash with your normal soap after the CHG soap solution.  Pat yourself dry with a clean, soft towel.  Do not apply powders, lotions or deodorants as these might block how the CHG soap works.   Dress in clean clothing.  Be sure to sleep with clean, freshly laundered sheets.  Be aware that CHG can cause stains on fabric. Rinse your washcloth and other linens that have contact with CHG completely. Use only non-chlorine detergents to launder the items used.

## 2024-04-16 NOTE — CPM/PAT H&P
CPM/PAT Evaluation       Name: Alyssa Maldonado (Alyssa Maldonado)  /Age: 1950/73 y.o.     In-Person       Chief Complaint: hematuria    HPI    RIVER is a 74 yo female who has has recent hematuria and was referred to urology for further evaluation. Imaging and testing completed which determined a bladder mass. Subsequently she is scheduled for a TURBT. She denies any urgency, frequency, and dysuria.  Denies any recent UTIs.  Mentions that hematuria is intermittent. She feels good and has been keeping up with clean diet and exercise. Otherwise denies any recent illness, fever/chills, chest pains or shortness of breath.     Past Medical History:   Diagnosis Date    Anemia     Arrhythmia     Atrial Fibrillation    Chronic kidney disease     Diabetes mellitus (Multi)     Heart murmur     History of mammogram 2024    cat 1    Hyperlipidemia     Hypertension     Pap test, as part of routine gynecological examination 10/2014    wnl, hpv neg    Sleep apnea     with cpap     PCP: Dr. Fernando  Cards: Dr. Farrell  Pulm: Dr. Mcrae  HF: Kamila MCGREGOR  Uro; Dr. Frias    Echo Oct 2023    Left Ventricle: Mildly reduced left ventricular systolic function with   a visually estimated EF of 45 - 50%. Left ventricle size is normal. Mildly   increased wall thickness. Normal wall motion. Abnormal diastolic function.     Tricuspid Valve: Mildly elevated RVSP. The estimated RVSP is 32 mmHg.     Left Atrium: Left atrium is moderately dilated.     Right Atrium: Right atrium is moderately dilated.     Hemoglobin A1C   Date Value Ref Range Status   2024 5.8 (H) see below % Final      Lab Results   Component Value Date    GLUCOSE 86 2024    CALCIUM 9.4 2024     2024    K 4.0 2024    CO2 28 2024     2024    BUN 17 2024    CREATININE 0.97 2024       Past Surgical History:   Procedure Laterality Date    CARDIAC CATHETERIZATION      no stents    CARDIOVERSION   2023     SECTION, CLASSIC      ,     COLONOSCOPY W/ POLYPECTOMY  2021    due     ESOPHAGOGASTRODUODENOSCOPY  2021    HH    PACEMAKER PLACEMENT  2023    TONSILLECTOMY         Patient  has no history on file for sexual activity.    Family History   Problem Relation Name Age of Onset    Hyperlipidemia Father      Hypertension Father      Colon cancer Paternal Grandmother         No Known Allergies    Prior to Admission medications    Medication Sig Start Date End Date Taking? Authorizing Provider   cyanocobalamin (Vitamin B-12) 1,000 mcg tablet Take 1 tablet (1,000 mcg) by mouth once daily. 19   Historical Provider, MD   Entresto 24-26 mg tablet Take 1 tablet by mouth 2 times a day. 23   Historical Provider, MD   ferrous sulfate 325 (65 Fe) MG tablet Take 1 tablet by mouth once daily.    Historical Provider, MD   furosemide (Lasix) 20 mg tablet Take 1 tablet (20 mg) by mouth once daily. 23   Historical Provider, MD   Jardiance 10 mg Take 1 tablet (10 mg) by mouth once daily. 23   Historical Provider, MD   levothyroxine (Synthroid, Levoxyl) 125 mcg tablet 1 by mouth every day except Simon 10/3/23   Lyndsey Fernando MD   metFORMIN (Glucophage) 500 mg tablet Take 1 tablet (500 mg) by mouth 2 times a day with meals. 10/3/23   Lyndsey Fernando MD   metoprolol succinate XL (Toprol-XL) 25 mg 24 hr tablet Take 1 tablet (25 mg) by mouth once daily. 23   Historical Provider, MD   omega 3-dha-epa-fish oil (Fish OiL) 1,200 (144-216) mg capsule Take by mouth. 19   Historical Provider, MD   pantoprazole (ProtoNix) 40 mg EC tablet Take 1 tablet (40 mg) by mouth once daily in the morning. Take before meals. 3/4/21   Historical Provider, MD   rosuvastatin (Crestor) 10 mg tablet TAKE 1 TABLET BY MOUTH EVERY DAY 24   Lyndsey Fernando MD   sotalol (Betapace) 120 mg tablet Take 1 tablet (120 mg) by mouth every 12 hours. 23   Historical Provider, MD    vitamin E acid succinate (vitamin E succinate) 268 mg (400 unit) tablet Take by mouth. 5/29/19   Historical Provider, MD   Xarelto 20 mg tablet Take 1 tablet (20 mg) by mouth once daily.    Historical Provider, MD TANESHA SRINIVASAN   Constitutional: Negative for fever, chills, or sweats   ENMT: Negative for nasal discharge, congestion, ear pain, mouth pain, throat pain   Respiratory: Negative for cough, wheezing, shortness of breath   Cardiac: Negative for chest pain, dyspnea on exertion, palpitations;  positive for pacemaker in place    Gastrointestinal: Negative for nausea, vomiting, diarrhea, constipation, abdominal pain    Genitourinary: Negative for dysuria, flank pain, frequency; positive intermittent hematuria; known bladder mass     Musculoskeletal: Negative for decreased ROM, pain, swelling, weakness   Neurological: Negative for dizziness, confusion, headache  Psychiatric: Negative for mood changes   Skin: Negative for itching, rash, ulcer    Hematologic/Lymph: Negative for bruising, easy bleeding  Allergic/Immunologic: Negative itching, sneezing, swelling      Physical Exam  Constitutional:       Appearance: She is obese.   HENT:      Head: Normocephalic.      Mouth/Throat:      Mouth: Mucous membranes are moist.   Eyes:      Extraocular Movements: Extraocular movements intact.   Cardiovascular:      Rate and Rhythm: Normal rate. Rhythm irregular.   Pulmonary:      Effort: Pulmonary effort is normal.      Breath sounds: Normal breath sounds.   Abdominal:      General: Abdomen is flat.      Palpations: Abdomen is soft.   Musculoskeletal:         General: Normal range of motion.      Cervical back: Normal range of motion.   Skin:     General: Skin is warm and dry.   Neurological:      General: No focal deficit present.      Mental Status: She is alert.   Psychiatric:         Mood and Affect: Mood normal.        PAT AIRWAY:   Airway:     Neck ROM::  Full  normal     implants    Anesthesia:  Patient denies any  anesthesia complications.     Visit Vitals  /76   Pulse 73   Temp 36.6 °C (97.9 °F)   Resp 14       DASI Risk Score      Flowsheet Row Most Recent Value   DASI SCORE 50.7   METS Score (Will be calculated only when all the questions are answered) 9          Caprini DVT Assessment      Flowsheet Row Most Recent Value   DVT Score 8   Current Status Major surgery planned, including arthroscopic and laproscopic (1-2 hours)   History Prior major surgery   Age 60-75 years   BMI 31-40 (Obesity)          Modified Frailty Index      Flowsheet Row Most Recent Value   Modified Frailty Index Calculator .3636          CHADS2 Stroke Risk  Current as of 11 minutes ago        5.9% 3 to 100%: High Risk   2 to < 3%: Medium Risk   0 to < 2%: Low Risk     No Change          This score determines the patient's risk of having a stroke if the patient has atrial fibrillation.          Points Metrics   1 Has Congestive Heart Failure:  Yes     Patients with congestive heart failure get 1 point.    Current as of 11 minutes ago   1 Has Hypertension:  Yes     Patients with hypertension get 1 point.    Current as of 11 minutes ago   0 Age:  73     Patients who are 75 years of age or older get 1 point.    Current as of 11 minutes ago   1 Has Diabetes:  Yes     Patients with diabetes get 1 point.    Current as of 11 minutes ago   0 Had Stroke:  No  Had TIA:  No  Had Thromboembolism:  No     Patients who have had a stroke, TIA, or thromboembolism get 2 points.    Current as of 11 minutes ago             Revised Cardiac Risk Index    No data to display       Apfel Simplified Score    No data to display       Risk Analysis Index Results This Encounter         4/15/2024  1437             AVILA Cancer History: Patient does not indicate history of cancer    Total Risk Analysis Index Score Without Cancer: 27    Total Risk Analysis Index Score: 27          Stop Bang Score      Flowsheet Row Most Recent Value   Do you snore loudly? 1   Do you often feel  tired or fatigued after your sleep? 0   Has anyone ever observed you stop breathing in your sleep? 1   Do you have or are you being treated for high blood pressure? 1   Recent BMI (Calculated) 39.6   Is BMI greater than 35 kg/m2? 1=Yes   Age older than 50 years old? 1=Yes   Is your neck circumference greater than 17 inches (Male) or 16 inches (Female)? 0   Gender - Male 0=No   STOP-BANG Total Score 5            Assessment and Plan:     72 yo female scheduled for transurethral resection bladder tumor on 4/19/2024 with Dr. Lorenzo. Blood work and urine ordered.  EKG shows atrial fibrillation with v rate of 91 bpm- not a new finding. Follows with cardiology- clearance requested and she is awaiting instructions on Xarelto- last dose taken was on 4/14/2024 so she will off for 5 days on day of surgery. Otherwise no further orders indicated.     ASA 3: A to review    See risk scores as previously documented.

## 2024-04-17 LAB — BACTERIA UR CULT: NORMAL

## 2024-04-18 LAB
Q ONSET: 209 MS
QRS COUNT: 15 BEATS
QRS DURATION: 84 MS
QT INTERVAL: 388 MS
QTC CALCULATION(BAZETT): 477 MS
QTC FREDERICIA: 446 MS
R AXIS: -27 DEGREES
T AXIS: 17 DEGREES
T OFFSET: 403 MS
VENTRICULAR RATE: 91 BPM

## 2024-04-18 NOTE — DISCHARGE INSTRUCTIONS
DEPARTMENT OF UROLOGY  DISCHARGE INSTRUCTIONS CYSTOSCOPY  Outpatient Surgery    C O N F I D E N T I A L   I N F O R M A T I O N    Alyssa Maldonado      Call [phone] during regular daytime business hours (8:00 am - 5:00 pm) and after 5:00 pm ask for the Urology resident with any questions or concerns.      If it is a life-threatening situation, proceed to the nearest emergency department.        Follow-up appointment:  5/7/24     Thank you for the opportunity to care for you today.  Your health and healing are very important to us.  We hope we made you feel as comfortable as possible and are committed to your recovery and continued well-being.      The following is a brief overview of your cystoscopy procedure today. Some of the information contained on this summary may be confidential.  This information should be kept in your records and should be shared with your regular doctor.    Physicians:   Dr. Roldan      Procedure performed: resection of bladder mass   Pending results:  none     Post-op Instructions:   - A catheter has been placed to help your bladder heal .  - You will have an appointment on Monday to have the catheter removed. The office should reach out to you Friday afternoon to schedule this. If you do not hear from anyone, please call the office Monday morning.   - You may restart your blood thinner on Monday after your catheter removal as long as your urine is clear or light pink.           What to Expect During your Recovery and Home Care  Anesthesia Side Effects   You received general  anesthesia today.  You may feel sleepy, tired, or have a sore throat.   You may also feel drowsiness, dizziness, or inability to think clearly.  For your safety, do not drive, drink alcoholic beverages, take any unprescribed medication or make any important decisions for 24 hours.  A responsible adult should be with you for 24 hours.        Activity and Recovery    No heavy lifting today. Rest for the next 24  hours.       Pain Control  Unfortunately, you may experience pain after your procedure.  Frequency and urgency to urinate and mild discomfort are expected. Adequate pain management can include alternative measures to help ease your pain and that can include over the counter Tylenol or ibuprofen which can be taken as prescribed as needed for breakthrough pain. Do not take more than 4,000mg of Tylenol in a 24-hour period.      You have also been prescribed Pyridium for urinary pain. This can be taken as needed for burning with urination for 3 days. This medication will turn the urine bright orange - this is normal. Some insurances do not cover this medication - if so, it can be replaced with the over-the-counter form, called Azo (ask the pharmacist for assistance if needed).         Nausea/Vomiting   Clear liquids are best tolerated at first. Start slow, advance your diet as tolerated to normal foods. Avoid spicy, greasy, heavy foods at first. Also, you may feel nauseous or like you need to vomit if you take any type of medication on an empty stomach.  Call your physician if you are unable to eat or drink and have persistent vomiting.    Signs of Bleeding   You are going to have some blood in your urine. Your urine will be light pink to yellow. If urine becomes thick dark praveen red, has large clots or you are unable to urinate, please notify your physician.    Treatment/wound care:   Keep area(s) clean and dry.   It is okay to shower 24 hours after time of surgery.      Signs of Infection  Signs of infection can include fever, drainage(green/yellow), chills, burning sensation with passing of urine, or severe abdominal pain.  If you see any of these occur, please contact your doctor's office at [phone].  Any fever higher than 100.4, especially if associated with an ill feeling, abdominal pain, chills, or nausea should be reported to your surgeon.        Assist in bowel movements/urination  Increase fiber in  diet  Increase water (6 to 8 glasses)  Increase walking   Urination should occur within 6 hours of anesthesia  If you have tried these methods and your bladder still feels full and you cannot use the bathroom, please go to your nearest Emergency room/contact your physician.    Additional Instructions:   Increase water intake for the next 24 hours to help flush out our urinary system.

## 2024-04-19 ENCOUNTER — ANESTHESIA EVENT (OUTPATIENT)
Dept: OPERATING ROOM | Facility: HOSPITAL | Age: 74
End: 2024-04-19
Payer: MEDICARE

## 2024-04-19 ENCOUNTER — ANESTHESIA (OUTPATIENT)
Dept: OPERATING ROOM | Facility: HOSPITAL | Age: 74
End: 2024-04-19
Payer: MEDICARE

## 2024-04-19 ENCOUNTER — HOSPITAL ENCOUNTER (OUTPATIENT)
Facility: HOSPITAL | Age: 74
Setting detail: OUTPATIENT SURGERY
Discharge: HOME | End: 2024-04-19
Attending: UROLOGY | Admitting: UROLOGY
Payer: MEDICARE

## 2024-04-19 VITALS
SYSTOLIC BLOOD PRESSURE: 119 MMHG | HEART RATE: 84 BPM | OXYGEN SATURATION: 98 % | RESPIRATION RATE: 18 BRPM | BODY MASS INDEX: 39.2 KG/M2 | HEIGHT: 62 IN | WEIGHT: 213 LBS | TEMPERATURE: 97.9 F | DIASTOLIC BLOOD PRESSURE: 86 MMHG

## 2024-04-19 DIAGNOSIS — N32.89 BLADDER MASS: Primary | ICD-10-CM

## 2024-04-19 LAB
GLUCOSE BLD MANUAL STRIP-MCNC: 108 MG/DL (ref 74–99)
GLUCOSE BLD MANUAL STRIP-MCNC: 109 MG/DL (ref 74–99)

## 2024-04-19 PROCEDURE — 99100 ANES PT EXTEME AGE<1 YR&>70: CPT | Performed by: ANESTHESIOLOGY

## 2024-04-19 PROCEDURE — 7100000010 HC PHASE TWO TIME - EACH INCREMENTAL 1 MINUTE: Performed by: UROLOGY

## 2024-04-19 PROCEDURE — 2500000004 HC RX 250 GENERAL PHARMACY W/ HCPCS (ALT 636 FOR OP/ED): Performed by: ANESTHESIOLOGY

## 2024-04-19 PROCEDURE — A52240 PR CYSTOURETHROSCOPY,FULGUR >5 CM LESN: Performed by: ANESTHESIOLOGIST ASSISTANT

## 2024-04-19 PROCEDURE — 88307 TISSUE EXAM BY PATHOLOGIST: CPT | Mod: TC,STJLAB | Performed by: UROLOGY

## 2024-04-19 PROCEDURE — 3700000001 HC GENERAL ANESTHESIA TIME - INITIAL BASE CHARGE: Performed by: UROLOGY

## 2024-04-19 PROCEDURE — 3600000003 HC OR TIME - INITIAL BASE CHARGE - PROCEDURE LEVEL THREE: Performed by: UROLOGY

## 2024-04-19 PROCEDURE — 2500000004 HC RX 250 GENERAL PHARMACY W/ HCPCS (ALT 636 FOR OP/ED): Performed by: UROLOGY

## 2024-04-19 PROCEDURE — 2500000005 HC RX 250 GENERAL PHARMACY W/O HCPCS: Performed by: ANESTHESIOLOGIST ASSISTANT

## 2024-04-19 PROCEDURE — 7100000001 HC RECOVERY ROOM TIME - INITIAL BASE CHARGE: Performed by: UROLOGY

## 2024-04-19 PROCEDURE — 88307 TISSUE EXAM BY PATHOLOGIST: CPT | Performed by: STUDENT IN AN ORGANIZED HEALTH CARE EDUCATION/TRAINING PROGRAM

## 2024-04-19 PROCEDURE — 2500000004 HC RX 250 GENERAL PHARMACY W/ HCPCS (ALT 636 FOR OP/ED): Mod: MUE | Performed by: UROLOGY

## 2024-04-19 PROCEDURE — 2720000007 HC OR 272 NO HCPCS: Performed by: UROLOGY

## 2024-04-19 PROCEDURE — 3700000002 HC GENERAL ANESTHESIA TIME - EACH INCREMENTAL 1 MINUTE: Performed by: UROLOGY

## 2024-04-19 PROCEDURE — A52240 PR CYSTOURETHROSCOPY,FULGUR >5 CM LESN: Performed by: ANESTHESIOLOGY

## 2024-04-19 PROCEDURE — A4217 STERILE WATER/SALINE, 500 ML: HCPCS | Mod: MUE | Performed by: UROLOGY

## 2024-04-19 PROCEDURE — 7100000009 HC PHASE TWO TIME - INITIAL BASE CHARGE: Performed by: UROLOGY

## 2024-04-19 PROCEDURE — 7100000002 HC RECOVERY ROOM TIME - EACH INCREMENTAL 1 MINUTE: Performed by: UROLOGY

## 2024-04-19 PROCEDURE — 2500000004 HC RX 250 GENERAL PHARMACY W/ HCPCS (ALT 636 FOR OP/ED): Performed by: ANESTHESIOLOGIST ASSISTANT

## 2024-04-19 PROCEDURE — 82947 ASSAY GLUCOSE BLOOD QUANT: CPT

## 2024-04-19 PROCEDURE — 52240 CYSTOSCOPY AND TREATMENT: CPT | Performed by: UROLOGY

## 2024-04-19 PROCEDURE — 3600000008 HC OR TIME - EACH INCREMENTAL 1 MINUTE - PROCEDURE LEVEL THREE: Performed by: UROLOGY

## 2024-04-19 RX ORDER — CEFAZOLIN SODIUM 2 G/50ML
2 SOLUTION INTRAVENOUS ONCE
Qty: 50 ML | Refills: 0 | Status: COMPLETED | OUTPATIENT
Start: 2024-04-19 | End: 2024-04-19

## 2024-04-19 RX ORDER — LABETALOL HYDROCHLORIDE 5 MG/ML
5 INJECTION, SOLUTION INTRAVENOUS ONCE AS NEEDED
Status: DISCONTINUED | OUTPATIENT
Start: 2024-04-19 | End: 2024-04-19 | Stop reason: HOSPADM

## 2024-04-19 RX ORDER — SODIUM CHLORIDE, SODIUM LACTATE, POTASSIUM CHLORIDE, CALCIUM CHLORIDE 600; 310; 30; 20 MG/100ML; MG/100ML; MG/100ML; MG/100ML
INJECTION, SOLUTION INTRAVENOUS CONTINUOUS PRN
Status: DISCONTINUED | OUTPATIENT
Start: 2024-04-19 | End: 2024-04-19

## 2024-04-19 RX ORDER — PHENAZOPYRIDINE HYDROCHLORIDE 100 MG/1
95 TABLET, FILM COATED ORAL ONCE
Status: DISCONTINUED | OUTPATIENT
Start: 2024-04-19 | End: 2024-04-19 | Stop reason: HOSPADM

## 2024-04-19 RX ORDER — ONDANSETRON HYDROCHLORIDE 2 MG/ML
INJECTION, SOLUTION INTRAVENOUS AS NEEDED
Status: DISCONTINUED | OUTPATIENT
Start: 2024-04-19 | End: 2024-04-19

## 2024-04-19 RX ORDER — HYDRALAZINE HYDROCHLORIDE 20 MG/ML
10 INJECTION INTRAMUSCULAR; INTRAVENOUS EVERY 30 MIN PRN
Status: DISCONTINUED | OUTPATIENT
Start: 2024-04-19 | End: 2024-04-19 | Stop reason: HOSPADM

## 2024-04-19 RX ORDER — DIPHENHYDRAMINE HYDROCHLORIDE 50 MG/ML
12.5 INJECTION INTRAMUSCULAR; INTRAVENOUS ONCE AS NEEDED
Status: DISCONTINUED | OUTPATIENT
Start: 2024-04-19 | End: 2024-04-19 | Stop reason: HOSPADM

## 2024-04-19 RX ORDER — ACETAMINOPHEN 325 MG/1
975 TABLET ORAL ONCE
Status: DISCONTINUED | OUTPATIENT
Start: 2024-04-19 | End: 2024-04-19 | Stop reason: HOSPADM

## 2024-04-19 RX ORDER — LIDOCAINE HYDROCHLORIDE 20 MG/ML
INJECTION, SOLUTION INFILTRATION; PERINEURAL AS NEEDED
Status: DISCONTINUED | OUTPATIENT
Start: 2024-04-19 | End: 2024-04-19

## 2024-04-19 RX ORDER — SODIUM CHLORIDE 9 MG/ML
100 INJECTION, SOLUTION INTRAVENOUS CONTINUOUS
Status: DISCONTINUED | OUTPATIENT
Start: 2024-04-19 | End: 2024-04-19 | Stop reason: HOSPADM

## 2024-04-19 RX ORDER — PHENAZOPYRIDINE HYDROCHLORIDE 200 MG/1
200 TABLET, FILM COATED ORAL 3 TIMES DAILY PRN
Qty: 10 TABLET | Refills: 0 | Status: SHIPPED | OUTPATIENT
Start: 2024-04-19

## 2024-04-19 RX ORDER — LIDOCAINE HYDROCHLORIDE 10 MG/ML
0.1 INJECTION INFILTRATION; PERINEURAL ONCE
Status: DISCONTINUED | OUTPATIENT
Start: 2024-04-19 | End: 2024-04-19 | Stop reason: HOSPADM

## 2024-04-19 RX ORDER — FENTANYL CITRATE 50 UG/ML
INJECTION, SOLUTION INTRAMUSCULAR; INTRAVENOUS AS NEEDED
Status: DISCONTINUED | OUTPATIENT
Start: 2024-04-19 | End: 2024-04-19

## 2024-04-19 RX ORDER — ESMOLOL HYDROCHLORIDE 10 MG/ML
INJECTION INTRAVENOUS AS NEEDED
Status: DISCONTINUED | OUTPATIENT
Start: 2024-04-19 | End: 2024-04-19

## 2024-04-19 RX ORDER — SODIUM CHLORIDE, SODIUM LACTATE, POTASSIUM CHLORIDE, CALCIUM CHLORIDE 600; 310; 30; 20 MG/100ML; MG/100ML; MG/100ML; MG/100ML
100 INJECTION, SOLUTION INTRAVENOUS CONTINUOUS
Status: DISCONTINUED | OUTPATIENT
Start: 2024-04-19 | End: 2024-04-19 | Stop reason: HOSPADM

## 2024-04-19 RX ORDER — ROCURONIUM BROMIDE 10 MG/ML
INJECTION, SOLUTION INTRAVENOUS AS NEEDED
Status: DISCONTINUED | OUTPATIENT
Start: 2024-04-19 | End: 2024-04-19

## 2024-04-19 RX ORDER — SODIUM CHLORIDE 0.9 G/100ML
IRRIGANT IRRIGATION AS NEEDED
Status: DISCONTINUED | OUTPATIENT
Start: 2024-04-19 | End: 2024-04-19 | Stop reason: HOSPADM

## 2024-04-19 RX ORDER — ALBUTEROL SULFATE 0.83 MG/ML
2.5 SOLUTION RESPIRATORY (INHALATION) ONCE AS NEEDED
Status: DISCONTINUED | OUTPATIENT
Start: 2024-04-19 | End: 2024-04-19 | Stop reason: HOSPADM

## 2024-04-19 RX ORDER — PROPOFOL 10 MG/ML
INJECTION, EMULSION INTRAVENOUS AS NEEDED
Status: DISCONTINUED | OUTPATIENT
Start: 2024-04-19 | End: 2024-04-19

## 2024-04-19 RX ORDER — OXYCODONE HYDROCHLORIDE 5 MG/1
5 TABLET ORAL EVERY 4 HOURS PRN
Status: DISCONTINUED | OUTPATIENT
Start: 2024-04-19 | End: 2024-04-19 | Stop reason: HOSPADM

## 2024-04-19 RX ORDER — ONDANSETRON HYDROCHLORIDE 2 MG/ML
4 INJECTION, SOLUTION INTRAVENOUS ONCE AS NEEDED
Status: DISCONTINUED | OUTPATIENT
Start: 2024-04-19 | End: 2024-04-19 | Stop reason: HOSPADM

## 2024-04-19 RX ORDER — OXYCODONE AND ACETAMINOPHEN 5; 325 MG/1; MG/1
1 TABLET ORAL EVERY 4 HOURS PRN
Status: DISCONTINUED | OUTPATIENT
Start: 2024-04-19 | End: 2024-04-19 | Stop reason: HOSPADM

## 2024-04-19 RX ADMIN — FENTANYL CITRATE 25 MCG: 50 INJECTION, SOLUTION INTRAMUSCULAR; INTRAVENOUS at 11:12

## 2024-04-19 RX ADMIN — SUGAMMADEX 200 MG: 100 INJECTION, SOLUTION INTRAVENOUS at 11:41

## 2024-04-19 RX ADMIN — ESMOLOL HYDROCHLORIDE 40 MG: 100 INJECTION, SOLUTION INTRAVENOUS at 11:48

## 2024-04-19 RX ADMIN — DEXAMETHASONE SODIUM PHOSPHATE 4 MG: 4 INJECTION INTRA-ARTICULAR; INTRALESIONAL; INTRAMUSCULAR; INTRAVENOUS; SOFT TISSUE at 11:08

## 2024-04-19 RX ADMIN — HYDROMORPHONE HYDROCHLORIDE 0.5 MG: 1 INJECTION, SOLUTION INTRAMUSCULAR; INTRAVENOUS; SUBCUTANEOUS at 12:08

## 2024-04-19 RX ADMIN — ESMOLOL HYDROCHLORIDE 30 MG: 100 INJECTION, SOLUTION INTRAVENOUS at 11:32

## 2024-04-19 RX ADMIN — LIDOCAINE HYDROCHLORIDE 100 MG: 20 INJECTION, SOLUTION INFILTRATION; PERINEURAL at 10:57

## 2024-04-19 RX ADMIN — HYDRALAZINE HYDROCHLORIDE 10 MG: 20 INJECTION INTRAMUSCULAR; INTRAVENOUS at 11:56

## 2024-04-19 RX ADMIN — ROCURONIUM BROMIDE 50 MG: 10 INJECTION INTRAVENOUS at 10:57

## 2024-04-19 RX ADMIN — PROPOFOL 50 MG: 10 INJECTION, EMULSION INTRAVENOUS at 11:13

## 2024-04-19 RX ADMIN — FENTANYL CITRATE 25 MCG: 50 INJECTION, SOLUTION INTRAMUSCULAR; INTRAVENOUS at 11:13

## 2024-04-19 RX ADMIN — SODIUM CHLORIDE, POTASSIUM CHLORIDE, SODIUM LACTATE AND CALCIUM CHLORIDE: 600; 310; 30; 20 INJECTION, SOLUTION INTRAVENOUS at 10:41

## 2024-04-19 RX ADMIN — PROPOFOL 150 MG: 10 INJECTION, EMULSION INTRAVENOUS at 10:57

## 2024-04-19 RX ADMIN — CEFAZOLIN SODIUM 2 G: 2 SOLUTION INTRAVENOUS at 11:05

## 2024-04-19 RX ADMIN — HYDROMORPHONE HYDROCHLORIDE 0.5 MG: 1 INJECTION, SOLUTION INTRAMUSCULAR; INTRAVENOUS; SUBCUTANEOUS at 12:23

## 2024-04-19 RX ADMIN — FENTANYL CITRATE 50 MCG: 50 INJECTION, SOLUTION INTRAMUSCULAR; INTRAVENOUS at 10:57

## 2024-04-19 SDOH — HEALTH STABILITY: MENTAL HEALTH: CURRENT SMOKER: 0

## 2024-04-19 ASSESSMENT — PAIN SCALES - GENERAL
PAINLEVEL_OUTOF10: 5 - MODERATE PAIN
PAINLEVEL_OUTOF10: 0 - NO PAIN
PAINLEVEL_OUTOF10: 5 - MODERATE PAIN
PAINLEVEL_OUTOF10: 0 - NO PAIN

## 2024-04-19 ASSESSMENT — COLUMBIA-SUICIDE SEVERITY RATING SCALE - C-SSRS
2. HAVE YOU ACTUALLY HAD ANY THOUGHTS OF KILLING YOURSELF?: NO
1. IN THE PAST MONTH, HAVE YOU WISHED YOU WERE DEAD OR WISHED YOU COULD GO TO SLEEP AND NOT WAKE UP?: NO
6. HAVE YOU EVER DONE ANYTHING, STARTED TO DO ANYTHING, OR PREPARED TO DO ANYTHING TO END YOUR LIFE?: NO

## 2024-04-19 ASSESSMENT — PAIN - FUNCTIONAL ASSESSMENT
PAIN_FUNCTIONAL_ASSESSMENT: 0-10

## 2024-04-19 NOTE — ANESTHESIA POSTPROCEDURE EVALUATION
Patient: Alyssa Maldonado    Procedure Summary       Date: 04/19/24 Room / Location: [facility] OR 08 / Virtual [facility] OR    Anesthesia Start: 1051 Anesthesia Stop: 1158    Procedure: TURBT Diagnosis:       Bladder mass      (Bladder mass [N32.89])    Surgeons: German Lorenzo MD Responsible Provider: Ralph Theodore MD    Anesthesia Type: general ASA Status: 2            Anesthesia Type: general    Vitals Value Taken Time   /65 04/19/24 1300   Temp 36 °C (96.8 °F) 04/19/24 1230   Pulse 88 04/19/24 1310   Resp 27 04/19/24 1310   SpO2 97 % 04/19/24 1310   Vitals shown include unfiled device data.    Anesthesia Post Evaluation    Patient location during evaluation: PACU  Patient participation: complete - patient participated  Level of consciousness: awake and alert  Pain management: satisfactory to patient  Airway patency: patent  Two or more strategies used to mitigate risk of obstructive sleep apnea  Cardiovascular status: acceptable  Respiratory status: acceptable  Hydration status: acceptable  Postoperative Nausea and Vomiting: none        No notable events documented.

## 2024-04-19 NOTE — ANESTHESIA PROCEDURE NOTES
Airway  Date/Time: 4/19/2024 11:00 AM  Urgency: elective    Airway not difficult    Staffing  Performed: LYNDON   Authorized by: Ralph Theodore MD    Performed by: LYNDON Brice  Patient location during procedure: OR    Indications and Patient Condition  Indications for airway management: anesthesia  Spontaneous Ventilation: absent  Sedation level: deep  Preoxygenated: yes  Patient position: sniffing  Mask difficulty assessment: 1 - vent by mask    Final Airway Details  Final airway type: endotracheal airway      Successful airway: ETT  Cuffed: yes   Successful intubation technique: direct laryngoscopy  Facilitating devices/methods: intubating stylet  Endotracheal tube insertion site: oral  Blade: Susan  Blade size: #3  ETT size (mm): 7.5  Cormack-Lehane Classification: grade I - full view of glottis  Placement verified by: chest auscultation and capnometry   Measured from: lips  ETT to lips (cm): 21  Number of attempts at approach: 1

## 2024-04-19 NOTE — ANESTHESIA PREPROCEDURE EVALUATION
Patient: Alyssa Maldonado    Procedure Information       Date/Time: 04/19/24 1115    Procedure: TURBT    Location: [facility] OR 08 / [facility] OR    Surgeons: German Lorenzo MD            Relevant Problems   Cardiac   (+) Atherosclerosis of native coronary artery of native heart with angina pectoris (CMS-HCC)   (+) Atrial fibrillation (Multi)   (+) Essential hypertension   (+) Mixed hyperlipidemia   (+) SSS (sick sinus syndrome) (Multi)      Pulmonary   (+) HIRAL (obstructive sleep apnea)      Endocrine   (+) Morbid (severe) obesity due to excess calories (Multi)   (+) Type 2 diabetes mellitus (Multi)      Hematology   (+) Anemia, mild       Clinical information reviewed:   Tobacco  Allergies  Meds   Med Hx  Surg Hx   Fam Hx  Soc Hx        NPO Detail:  NPO/Void Status  Carbohydrate Drink Given Prior to Surgery? : N  Date of Last Liquid: 04/19/24  Time of Last Liquid: 0600  Date of Last Solid: 04/18/24  Time of Last Solid: 1930  Last Intake Type: Clear fluids  Time of Last Void: 0900         Physical Exam    Airway  Mallampati: II  TM distance: >3 FB     Cardiovascular   Rhythm: regular  Rate: normal     Dental - normal exam     Pulmonary   Breath sounds clear to auscultation     Abdominal            Anesthesia Plan    History of general anesthesia?: yes  History of complications of general anesthesia?: no    ASA 2     general     The patient is not a current smoker.    intravenous induction   Anesthetic plan and risks discussed with patient.    Plan discussed with CAA.

## 2024-04-19 NOTE — OP NOTE
TURBT Operative Note     Date: 2024  OR Location: [facility]    Name: Alyssa Maldonado, : 1950, Age: 73 y.o., MRN: [id number], Sex: female    Diagnosis  Pre-op Diagnosis     * Bladder mass [N32.89] Post-op Diagnosis     * Bladder mass [N32.89]     Procedures  Transurethral resection of bladder tumor, large       Surgeons      * German Lorenzo - Primary    Resident/Fellow/Other Assistant:  Surgeons and Role:     * Sulma Vasques MD - Resident - Assisting    Procedure Summary  Anesthesia: General  ASA: II  Anesthesia Staff: Anesthesiologist: Ralph Theodore MD  C-AA: LYNDON Brice  Estimated Blood Loss: 5mL  Intra-op Medications:   Administrations occurring from 1115 to 1240 on 24:   Medication Name Total Dose   hydrALAZINE (Apresoline) injection 10 mg 10 mg              Anesthesia Record               Intraprocedure I/O Totals          Intake    LR infusion 500.00 mL    Total Intake 500 mL          Specimen:   ID Type Source Tests Collected by Time   1 : BLADDER TUMOR Tissue BLADDER TRANSURETHRAL RESECTION SURGICAL PATHOLOGY EXAM German Lorenzo MD 2024 1116        Staff:   Circulator: Fabiola Ware RN  Scrub Person: Kyle Quinonez         Drains and/or Catheters:   Urethral Catheter 18 Fr. (Active)       Findings: multifocal papillary bladder tumors; large ~5cm tumor just lateral to right UO and 2 <1cm tumors on trigone just medial to left UO    Indications: Alsysa Maldonado is an 73 y.o. female who is having surgery for Bladder mass [N32.89].     The patient was seen in the preoperative area. The risks, benefits, complications, treatment options, non-operative alternatives, expected recovery and outcomes were discussed with the patient. The possibilities of reaction to medication, pulmonary aspiration, injury to surrounding structures, bleeding, recurrent infection, the need for additional procedures, failure to diagnose a condition, and creating a complication requiring  transfusion or operation were discussed with the patient. The patient concurred with the proposed plan, giving informed consent.  The site of surgery was properly noted/marked if necessary per policy. The patient has been actively warmed in preoperative area. Preoperative antibiotics have been ordered and given within 1 hours of incision. Venous thrombosis prophylaxis have been ordered including bilateral sequential compression devices    Procedure Details: This is a 73-year-old female who presented with gross hematuria and was found to have a large bladder mass on CT scan.  She presents for transurethral resection of bladder tumor.  The procedure including risk and benefits were explained to the patient and informed consent was obtained.  She was brought to the operating placed on table in supine position.  A timeout was performed confirming patient and procedure.  Preoperative antibiotics were administered and general anesthesia with paralysis a 26 Azeri resectoscope was placed per urethra and cystoscopy was performed.  There was a large approximately 5 cm papillary tumor on a  was induced.    Stalk just lateral to the right UO.  Additionally there were 2 less than 1 cm papillary tumors on the trigone just medial to the left UO.  The rest of the bladder was examined and there were no other abnormalities.  The bilateral UOs were in orthotopic position and uninvolved in tumor.  The working element was placed within the resectoscope, and the thin bipolar loop was used to resect the tumors.  Resection was carried down to the level of the muscle.  Hemostasis was achieved throughout using electrocautery.  The bladder was irrigated several times to remove all tissue fragments.  These fragments were all sent to pathology for analysis.  At the conclusion of this process, the resection beds were cauterized to achieve hemostasis.  Again additional irrigation was performed and hemostasis was confirmed.  Muscle could be  visualized at the resection bed without evidence of perforation.  However given the large volume tumor resected, the decision was made to leave the catheter.  The resectoscope was removed and an 18 Pashto urethral Roy was placed in standard fashion with 10 cc of water in the balloon.  This concluded the procedure.  Patient tolerated procedure well.  She was taken to PACU in stable condition.  She will have follow-up on Monday for catheter removal and follow-up in 2 weeks to review pathology with Dr. Lorenzo.    Complications:  None; patient tolerated the procedure well.    Disposition: PACU - hemodynamically stable.  Condition: stable         Attending Attestation:  Dr. Lorenzo was present for all key portions of the procedure.     Sulma Vasques MD for       German Lorenzo  Phone Number: [phone]

## 2024-04-22 ENCOUNTER — OFFICE VISIT (OUTPATIENT)
Dept: UROLOGY | Facility: CLINIC | Age: 74
End: 2024-04-22
Payer: MEDICARE

## 2024-04-22 ENCOUNTER — TELEPHONE (OUTPATIENT)
Dept: UROLOGY | Facility: CLINIC | Age: 74
End: 2024-04-22

## 2024-04-22 VITALS
HEIGHT: 62 IN | WEIGHT: 218 LBS | SYSTOLIC BLOOD PRESSURE: 142 MMHG | BODY MASS INDEX: 40.12 KG/M2 | TEMPERATURE: 96 F | DIASTOLIC BLOOD PRESSURE: 77 MMHG

## 2024-04-22 DIAGNOSIS — N32.89 BLADDER MASS: Primary | ICD-10-CM

## 2024-04-22 PROCEDURE — 99024 POSTOP FOLLOW-UP VISIT: CPT | Performed by: NURSE PRACTITIONER

## 2024-04-22 PROCEDURE — 1160F RVW MEDS BY RX/DR IN RCRD: CPT | Performed by: NURSE PRACTITIONER

## 2024-04-22 PROCEDURE — 3077F SYST BP >= 140 MM HG: CPT | Performed by: NURSE PRACTITIONER

## 2024-04-22 PROCEDURE — 3078F DIAST BP <80 MM HG: CPT | Performed by: NURSE PRACTITIONER

## 2024-04-22 PROCEDURE — 3044F HG A1C LEVEL LT 7.0%: CPT | Performed by: NURSE PRACTITIONER

## 2024-04-22 PROCEDURE — 1159F MED LIST DOCD IN RCRD: CPT | Performed by: NURSE PRACTITIONER

## 2024-04-22 NOTE — PROGRESS NOTES
Subjective   Patient ID: Alyssa Maldonado is a 73 y.o. female who presents for Cath removal.  S/p TURBT on 4/19 with Dr. Lorenzo. Feeling well overall. Denies fevers/chills as well as constipation. Presents for TOV        Review of Systems   All other systems reviewed and are negative.      Objective   Physical Exam  Vitals reviewed.     Alert and oriented x3  Moist mucous membranes  Breathes easily on room air  Abdomen soft, nondistended. Obese  No edema  No scleral icterus  No focal neurological deficits  Appears stated age, no acute distress      Assessment/Plan   Diagnoses and all orders for this visit:  Bladder mass    Patient presented today for trial of void.  Patient passed without difficulties.  Patient will be mindful of signs and symptoms of urinary retention and return to the office if necessary.  If these occur after hours patient will present to the emergency room.  Patient verbalized understanding of plan.         JARED Chen-CNP 04/22/24 8:25 AM

## 2024-04-22 NOTE — TELEPHONE ENCOUNTER
Patient called and forgot to ask you if she can start back on her medication?  She take Xalerto?  Please advise

## 2024-04-23 ENCOUNTER — OFFICE VISIT (OUTPATIENT)
Dept: PULMONOLOGY | Age: 74
End: 2024-04-23
Payer: MEDICARE

## 2024-04-23 VITALS
SYSTOLIC BLOOD PRESSURE: 116 MMHG | DIASTOLIC BLOOD PRESSURE: 72 MMHG | BODY MASS INDEX: 36.71 KG/M2 | OXYGEN SATURATION: 96 % | HEART RATE: 82 BPM | WEIGHT: 214 LBS

## 2024-04-23 DIAGNOSIS — E66.9 OBESITY (BMI 30-39.9): ICD-10-CM

## 2024-04-23 DIAGNOSIS — G47.33 OSA (OBSTRUCTIVE SLEEP APNEA): Primary | ICD-10-CM

## 2024-04-23 PROCEDURE — 1036F TOBACCO NON-USER: CPT | Performed by: INTERNAL MEDICINE

## 2024-04-23 PROCEDURE — 3017F COLORECTAL CA SCREEN DOC REV: CPT | Performed by: INTERNAL MEDICINE

## 2024-04-23 PROCEDURE — 99214 OFFICE O/P EST MOD 30 MIN: CPT | Performed by: INTERNAL MEDICINE

## 2024-04-23 PROCEDURE — G8417 CALC BMI ABV UP PARAM F/U: HCPCS | Performed by: INTERNAL MEDICINE

## 2024-04-23 PROCEDURE — 1090F PRES/ABSN URINE INCON ASSESS: CPT | Performed by: INTERNAL MEDICINE

## 2024-04-23 PROCEDURE — 3074F SYST BP LT 130 MM HG: CPT | Performed by: INTERNAL MEDICINE

## 2024-04-23 PROCEDURE — G8400 PT W/DXA NO RESULTS DOC: HCPCS | Performed by: INTERNAL MEDICINE

## 2024-04-23 PROCEDURE — G8427 DOCREV CUR MEDS BY ELIG CLIN: HCPCS | Performed by: INTERNAL MEDICINE

## 2024-04-23 PROCEDURE — 3078F DIAST BP <80 MM HG: CPT | Performed by: INTERNAL MEDICINE

## 2024-04-23 PROCEDURE — 1123F ACP DISCUSS/DSCN MKR DOCD: CPT | Performed by: INTERNAL MEDICINE

## 2024-04-23 ASSESSMENT — ENCOUNTER SYMPTOMS
COUGH: 0
TROUBLE SWALLOWING: 0
VOMITING: 0
ABDOMINAL PAIN: 0
SHORTNESS OF BREATH: 0
DIARRHEA: 0
RHINORRHEA: 0
NAUSEA: 0
VOICE CHANGE: 0
SORE THROAT: 0
SINUS PRESSURE: 0
EYE ITCHING: 0
WHEEZING: 0
EYE DISCHARGE: 0
CHEST TIGHTNESS: 0

## 2024-04-23 NOTE — PROGRESS NOTES
Subjective:             Evette Londono is a 73 y.o. female who complains today of:     Chief Complaint   Patient presents with    Follow-up     6m f/u on GALINA       HPI  She is using CPAP with  11 centimeters of H2O with heated humidity.She is using CPAP for about  7  hours  almost every night.  She is using CPAP with  nasal pillow mask Mask.  She said Sleep is restful with the CPAP use.  She is compliant with CPAP therapy and benefiting with CPAP use.  No snoring with CPAP use.  No complaint of daytime sleepiness or tiredness with CPAP use.  She denies taking daily  naps.  No sleepiness with driving.   She denies difficulty falling asleep or staying asleep.        I reviewed compliance report with patient regarding CPAP therapy. She is using CPAP for 26 days out of 30 days.  Average usage of days used is 7 hours and 3 min , average AHI 0.9  with CPAP use    Allergies:  Patient has no known allergies.  Past Medical History:   Diagnosis Date    Angina at rest 2021    Bilateral leg edema 2021    Diastolic congestive heart failure (HCC) 2021    DM2 (diabetes mellitus, type 2) (HCC)     Hyperlipidemia     Hypertension     Hypothyroidism     GALINA (obstructive sleep apnea)     Shortness of breath 2021     Past Surgical History:   Procedure Laterality Date     SECTION      COLONOSCOPY  2008    needs 2018    COLONOSCOPY N/A 2021    COLONOSCOPY DIAGNOSTIC performed by Mari Tran MD at [facility]    PACEMAKER PLACEMENT  2023    MI COLON CA SCRN NOT HI RSK IND N/A 2018    COLONOSCOPY adenoma    UPPER GASTROINTESTINAL ENDOSCOPY N/A 2021    EGD DIAGNOSTIC ONLY performed by Mari Tran MD at [facility]     Family History   Problem Relation Age of Onset    Colon Cancer Maternal Grandmother      Social History     Socioeconomic History    Marital status:      Spouse name: Not on file    Number of children: Not on file    Years of education: Not

## 2024-04-30 ENCOUNTER — TELEPHONE (OUTPATIENT)
Dept: GASTROENTEROLOGY | Age: 74
End: 2024-04-30

## 2024-04-30 PROCEDURE — 93296 REM INTERROG EVL PM/IDS: CPT | Performed by: INTERNAL MEDICINE

## 2024-04-30 PROCEDURE — 93294 REM INTERROG EVL PM/LDLS PM: CPT | Performed by: INTERNAL MEDICINE

## 2024-04-30 NOTE — TELEPHONE ENCOUNTER
Patient called to say that she needs and prior authorization for (pantoprazole (PROTONIX) 40 MG tablet)

## 2024-05-01 LAB
LABORATORY COMMENT REPORT: NORMAL
PATH REPORT.FINAL DX SPEC: NORMAL
PATH REPORT.GROSS SPEC: NORMAL
PATH REPORT.RELEVANT HX SPEC: NORMAL
PATH REPORT.TOTAL CANCER: NORMAL

## 2024-05-07 ENCOUNTER — TELEPHONE (OUTPATIENT)
Dept: UROLOGY | Facility: CLINIC | Age: 74
End: 2024-05-07

## 2024-05-07 ENCOUNTER — OFFICE VISIT (OUTPATIENT)
Dept: UROLOGY | Facility: CLINIC | Age: 74
End: 2024-05-07
Payer: MEDICARE

## 2024-05-07 VITALS
BODY MASS INDEX: 40.9 KG/M2 | DIASTOLIC BLOOD PRESSURE: 88 MMHG | TEMPERATURE: 96 F | WEIGHT: 220 LBS | SYSTOLIC BLOOD PRESSURE: 130 MMHG | HEART RATE: 94 BPM

## 2024-05-07 DIAGNOSIS — C67.9 MALIGNANT NEOPLASM OF URINARY BLADDER, UNSPECIFIED SITE (MULTI): Primary | ICD-10-CM

## 2024-05-07 PROCEDURE — 3044F HG A1C LEVEL LT 7.0%: CPT | Performed by: UROLOGY

## 2024-05-07 PROCEDURE — 3079F DIAST BP 80-89 MM HG: CPT | Performed by: UROLOGY

## 2024-05-07 PROCEDURE — 99214 OFFICE O/P EST MOD 30 MIN: CPT | Performed by: UROLOGY

## 2024-05-07 PROCEDURE — 3075F SYST BP GE 130 - 139MM HG: CPT | Performed by: UROLOGY

## 2024-05-07 PROCEDURE — 1160F RVW MEDS BY RX/DR IN RCRD: CPT | Performed by: UROLOGY

## 2024-05-07 PROCEDURE — 1036F TOBACCO NON-USER: CPT | Performed by: UROLOGY

## 2024-05-07 PROCEDURE — 1159F MED LIST DOCD IN RCRD: CPT | Performed by: UROLOGY

## 2024-05-07 NOTE — TELEPHONE ENCOUNTER
Pt called office concerned and questioning if BCG was a chemotherapy drug and questioning if she would lose her hair or have anything else happen to her that she should be aware of. Returned call and informed pt that BCG is an immune therapy treatment and that she should not lose her hair or have any other systemic effects from BCG.  Instructed that she may have flu-like symptoms, chills or burning with urination but can take Motrin the day before, the day of and the day after her treatment as needed for symptoms. Pt stated understanding.

## 2024-05-07 NOTE — PROGRESS NOTES
Subjective   Patient ID: Alyssa Maldonado is a 73 y.o. female who presents for path results. Patient is s/p TURBT on 4/19/24.    HPI  Patient had hematuria after the procedure that she is still seeing occasionally. She does not have hematuria today.     Patient and her  are nonsmokers. Her father was a smoker. She has a family member with colon cancer.     Pathology Result  FINAL DIAGNOSIS   A. Urinary bladder, tumor, transurethral resection:      -- Noninvasive papillary urothelial carcinoma, low grade with focal high grade areas (less than 5%).  -- Detrusor muscle (muscularis propria) is present and is negative for malignancy.        Review of Systems  A 12 system review was completed and is negative with the exception of those signs and symptoms noted in the history of present illness.    Objective   Physical Exam  General: in NAD, appears stated age  Head: normocephalic, atraumatic  Respiratory: normal effort, no use of accessory muscles  Cardiovascular: no edema noted  Skin: normal turgor, no rashes  Neurologic: grossly intact, oriented to person/place/time  Psychiatric: mode and affect appropriate     Assessment/Plan   Problem List Items Addressed This Visit    None  Visit Diagnoses         Codes    Malignant neoplasm of urinary bladder, unspecified site (Multi)    -  Primary C67.9          We discussed the pathology results. High-grade cancer was only 5% of the sample. I recommend that we treat with 6 weeks of BCG and 6 weeks after that we schedule cystoscopy.     Follow-up 6 weeks after BCG treatments are completed for continued management of bladder cancer.     Scribe Attestation  By signing my name below, I, Rosa M Ornelas   attest that this documentation has been prepared under the direction and in the presence of German Lorenzo MD.

## 2024-05-14 NOTE — CARDIO/PULMONARY
COVID Screening completed. Patient denies complaints, no changes to PMH or medications. Patient tolerates exercise well. Weekly education, discussed metabolic equivalent (MET) levels with examples of ADL's and their MET level. Discussed RPD and RPE scale. Handout given. Patient unable to attend Pritikin class. Patient has a follow up echo on 10/25. All equipment used in the care for this patient has been cleaned.   Electronically signed by Amparo Gaytan RN on 10/16/2023 at 9:48 AM Yes

## 2024-05-20 ENCOUNTER — TELEPHONE (OUTPATIENT)
Dept: UROLOGY | Facility: CLINIC | Age: 74
End: 2024-05-20
Payer: MEDICARE

## 2024-05-21 RX ORDER — PANTOPRAZOLE SODIUM 40 MG/1
TABLET, DELAYED RELEASE ORAL
Qty: 90 TABLET | Refills: 2 | Status: SHIPPED | OUTPATIENT
Start: 2024-05-21

## 2024-06-03 ENCOUNTER — OFFICE VISIT (OUTPATIENT)
Dept: UROLOGY | Facility: CLINIC | Age: 74
End: 2024-06-03
Payer: MEDICARE

## 2024-06-03 VITALS — TEMPERATURE: 97.1 F | SYSTOLIC BLOOD PRESSURE: 149 MMHG | DIASTOLIC BLOOD PRESSURE: 96 MMHG | HEART RATE: 73 BPM

## 2024-06-03 DIAGNOSIS — N32.89 BLADDER MASS: Primary | ICD-10-CM

## 2024-06-03 DIAGNOSIS — C67.9 MALIGNANT NEOPLASM OF URINARY BLADDER, UNSPECIFIED SITE (MULTI): ICD-10-CM

## 2024-06-03 LAB
POC APPEARANCE, URINE: CLEAR
POC BILIRUBIN, URINE: NEGATIVE
POC BLOOD, URINE: NEGATIVE
POC COLOR, URINE: YELLOW
POC GLUCOSE, URINE: ABNORMAL MG/DL
POC KETONES, URINE: NEGATIVE MG/DL
POC LEUKOCYTES, URINE: NEGATIVE
POC NITRITE,URINE: NEGATIVE
POC PH, URINE: 7 PH
POC PROTEIN, URINE: NEGATIVE MG/DL
POC SPECIFIC GRAVITY, URINE: 1.01
POC UROBILINOGEN, URINE: 0.2 EU/DL

## 2024-06-03 PROCEDURE — 3044F HG A1C LEVEL LT 7.0%: CPT | Performed by: NURSE PRACTITIONER

## 2024-06-03 PROCEDURE — 51720 TREATMENT OF BLADDER LESION: CPT | Performed by: NURSE PRACTITIONER

## 2024-06-03 PROCEDURE — 3080F DIAST BP >= 90 MM HG: CPT | Performed by: NURSE PRACTITIONER

## 2024-06-03 PROCEDURE — 3077F SYST BP >= 140 MM HG: CPT | Performed by: NURSE PRACTITIONER

## 2024-06-03 PROCEDURE — 1036F TOBACCO NON-USER: CPT | Performed by: NURSE PRACTITIONER

## 2024-06-03 PROCEDURE — 1159F MED LIST DOCD IN RCRD: CPT | Performed by: NURSE PRACTITIONER

## 2024-06-03 PROCEDURE — 81003 URINALYSIS AUTO W/O SCOPE: CPT | Performed by: NURSE PRACTITIONER

## 2024-06-03 NOTE — PROGRESS NOTES
Patient ID: Alyssa Maldonado is a 73 y.o. female.    Bladder Catheterization    Date/Time: 6/3/2024 9:39 AM    Performed by: SUKHJINDER Chen  Authorized by: SUKHJINDER Chen    Procedure Details    Procedure: bladder instillation      Position: dorsal lithotomy    Catheter type: red rubber      Catheter size: 14 Fr    Urine characteristics: clear and yellow         Bladder Instillation Details         Instillation agent: chemotherapy          Instillation agent comment: bcg        Treatment number: 1        Total planned treatments: 6    Post-Procedure Details     Outcome: patient tolerated procedure well with no complications      Post-procedure interventions: post-procedure education provided      Disposition: discharged home in satisfactory condition          Office Visit on 06/03/2024   Component Date Value Ref Range Status    POC Color, Urine 06/03/2024 Yellow  Straw, Yellow, Light-Yellow Final    POC Appearance, Urine 06/03/2024 Clear  Clear Final    POC Glucose, Urine 06/03/2024 500 (3+) (A)  NEGATIVE mg/dl Final    POC Bilirubin, Urine 06/03/2024 NEGATIVE  NEGATIVE Final    POC Ketones, Urine 06/03/2024 NEGATIVE  NEGATIVE mg/dl Final    POC Specific Gravity, Urine 06/03/2024 1.010  1.005 - 1.035 Final    POC Blood, Urine 06/03/2024 NEGATIVE  NEGATIVE Final    POC PH, Urine 06/03/2024 7.0  No Reference Range Established PH Final    POC Protein, Urine 06/03/2024 NEGATIVE  NEGATIVE, 30 (1+) mg/dl Final    POC Urobilinogen, Urine 06/03/2024 0.2  0.2, 1.0 EU/DL Final    Poc Nitrite, Urine 06/03/2024 NEGATIVE  NEGATIVE Final    POC Leukocytes, Urine 06/03/2024 NEGATIVE  NEGATIVE Final

## 2024-06-10 ENCOUNTER — PROCEDURE VISIT (OUTPATIENT)
Dept: UROLOGY | Facility: CLINIC | Age: 74
End: 2024-06-10
Payer: MEDICARE

## 2024-06-10 VITALS — HEART RATE: 81 BPM | DIASTOLIC BLOOD PRESSURE: 93 MMHG | SYSTOLIC BLOOD PRESSURE: 139 MMHG | TEMPERATURE: 97.1 F

## 2024-06-10 DIAGNOSIS — C67.9 MALIGNANT NEOPLASM OF URINARY BLADDER, UNSPECIFIED SITE (MULTI): Primary | ICD-10-CM

## 2024-06-10 LAB
POC APPEARANCE, URINE: ABNORMAL
POC BILIRUBIN, URINE: NEGATIVE
POC BLOOD, URINE: ABNORMAL
POC COLOR, URINE: YELLOW
POC GLUCOSE, URINE: ABNORMAL MG/DL
POC KETONES, URINE: ABNORMAL MG/DL
POC LEUKOCYTES, URINE: NEGATIVE
POC NITRITE,URINE: POSITIVE
POC PH, URINE: 5.5 PH
POC PROTEIN, URINE: NEGATIVE MG/DL
POC SPECIFIC GRAVITY, URINE: 1.01
POC UROBILINOGEN, URINE: 0.2 EU/DL

## 2024-06-10 PROCEDURE — 81003 URINALYSIS AUTO W/O SCOPE: CPT | Performed by: UROLOGY

## 2024-06-10 PROCEDURE — 99211 OFF/OP EST MAY X REQ PHY/QHP: CPT | Performed by: UROLOGY

## 2024-06-10 NOTE — PROGRESS NOTES
BCG # 2 instilled via 14 Fr. Enrique lopez without difficulty.  Tolerated first treatment well.  Reviewed post instillation instructions.  Verbalized understanding.  RTC in 1 week.

## 2024-06-12 ENCOUNTER — TELEPHONE (OUTPATIENT)
Dept: GASTROENTEROLOGY | Age: 74
End: 2024-06-12

## 2024-06-12 NOTE — TELEPHONE ENCOUNTER
LILIAM COREAS (Key: [id number])  PA Case ID #: [id number]    Your PA request has been approved.    Authorization Expiration Date: 6/12/2025  Drug  Pantoprazole Sodium 40MG dr tablets

## 2024-06-17 ENCOUNTER — APPOINTMENT (OUTPATIENT)
Dept: UROLOGY | Facility: CLINIC | Age: 74
End: 2024-06-17
Payer: MEDICARE

## 2024-06-17 VITALS
SYSTOLIC BLOOD PRESSURE: 150 MMHG | WEIGHT: 220 LBS | BODY MASS INDEX: 40.48 KG/M2 | TEMPERATURE: 98 F | DIASTOLIC BLOOD PRESSURE: 105 MMHG | HEIGHT: 62 IN | HEART RATE: 90 BPM

## 2024-06-17 DIAGNOSIS — N32.89 BLADDER MASS: Primary | ICD-10-CM

## 2024-06-17 DIAGNOSIS — C67.9 MALIGNANT NEOPLASM OF URINARY BLADDER, UNSPECIFIED SITE (MULTI): ICD-10-CM

## 2024-06-17 LAB
POC APPEARANCE, URINE: ABNORMAL
POC BILIRUBIN, URINE: NEGATIVE
POC BLOOD, URINE: ABNORMAL
POC COLOR, URINE: YELLOW
POC GLUCOSE, URINE: ABNORMAL MG/DL
POC KETONES, URINE: NEGATIVE MG/DL
POC LEUKOCYTES, URINE: ABNORMAL
POC NITRITE,URINE: NEGATIVE
POC PH, URINE: 6.5 PH
POC PROTEIN, URINE: NEGATIVE MG/DL
POC SPECIFIC GRAVITY, URINE: 1.01
POC UROBILINOGEN, URINE: 0.2 EU/DL

## 2024-06-17 NOTE — PROGRESS NOTES
BCG 3 / 6 instilled via 14 Fr. Enrique jessica without difficulty.  Tolerating treatments well.  RTC in 1 week.

## 2024-06-24 ENCOUNTER — APPOINTMENT (OUTPATIENT)
Dept: UROLOGY | Facility: CLINIC | Age: 74
End: 2024-06-24
Payer: MEDICARE

## 2024-06-24 VITALS
BODY MASS INDEX: 40.48 KG/M2 | WEIGHT: 220 LBS | HEIGHT: 62 IN | HEART RATE: 80 BPM | DIASTOLIC BLOOD PRESSURE: 87 MMHG | SYSTOLIC BLOOD PRESSURE: 135 MMHG

## 2024-06-24 DIAGNOSIS — C67.9 MALIGNANT NEOPLASM OF URINARY BLADDER, UNSPECIFIED SITE (MULTI): Primary | ICD-10-CM

## 2024-06-24 DIAGNOSIS — R35.0 URINE FREQUENCY: ICD-10-CM

## 2024-06-24 LAB
POC APPEARANCE, URINE: CLEAR
POC BILIRUBIN, URINE: NEGATIVE
POC BLOOD, URINE: ABNORMAL
POC COLOR, URINE: YELLOW
POC GLUCOSE, URINE: ABNORMAL MG/DL
POC KETONES, URINE: NEGATIVE MG/DL
POC LEUKOCYTES, URINE: NEGATIVE
POC NITRITE,URINE: NEGATIVE
POC PH, URINE: 5.5 PH
POC PROTEIN, URINE: NEGATIVE MG/DL
POC SPECIFIC GRAVITY, URINE: <=1.005
POC UROBILINOGEN, URINE: 0.2 EU/DL

## 2024-06-24 PROCEDURE — 51720 TREATMENT OF BLADDER LESION: CPT | Performed by: NURSE PRACTITIONER

## 2024-06-24 PROCEDURE — 81003 URINALYSIS AUTO W/O SCOPE: CPT | Performed by: NURSE PRACTITIONER

## 2024-06-24 NOTE — PROGRESS NOTES
Patient ID: Alyssa Maldondao is a 74 y.o. female.    Bladder Catheterization    Date/Time: 6/24/2024 8:31 AM    Performed by: SUKHJINDER Chen  Authorized by: SUKHJINDER Chen    Procedure Details    Procedure: bladder instillation      Position: dorsal lithotomy    Catheter type: red rubber      Catheter size: 14 Fr    Urine characteristics: clear and yellow         Bladder Instillation Details         Instillation agent: chemotherapy          Instillation agent comment: BCG        Treatment number: 4        Total planned treatments: 6    Post-Procedure Details     Outcome: patient tolerated procedure well with no complications      Post-procedure interventions: post-procedure education provided      Disposition: discharged home in satisfactory condition          Procedure Visit on 06/24/2024   Component Date Value Ref Range Status    POC Color, Urine 06/24/2024 Yellow  Straw, Yellow, Light-Yellow Final    POC Appearance, Urine 06/24/2024 Clear  Clear Final    POC Glucose, Urine 06/24/2024 250 (2+) (A)  NEGATIVE mg/dl Final    POC Bilirubin, Urine 06/24/2024 NEGATIVE  NEGATIVE Final    POC Ketones, Urine 06/24/2024 NEGATIVE  NEGATIVE mg/dl Final    POC Specific Gravity, Urine 06/24/2024 <=1.005  1.005 - 1.035 Final    POC Blood, Urine 06/24/2024 TRACE-Intact (A)  NEGATIVE Final    POC PH, Urine 06/24/2024 5.5  No Reference Range Established PH Final    POC Protein, Urine 06/24/2024 NEGATIVE  NEGATIVE, 30 (1+) mg/dl Final    POC Urobilinogen, Urine 06/24/2024 0.2  0.2, 1.0 EU/DL Final    Poc Nitrite, Urine 06/24/2024 NEGATIVE  NEGATIVE Final    POC Leukocytes, Urine 06/24/2024 NEGATIVE  NEGATIVE Final

## 2024-07-01 ENCOUNTER — APPOINTMENT (OUTPATIENT)
Dept: UROLOGY | Facility: CLINIC | Age: 74
End: 2024-07-01
Payer: MEDICARE

## 2024-07-01 VITALS — SYSTOLIC BLOOD PRESSURE: 138 MMHG | DIASTOLIC BLOOD PRESSURE: 94 MMHG | TEMPERATURE: 96.2 F | HEART RATE: 80 BPM

## 2024-07-01 DIAGNOSIS — N32.89 BLADDER MASS: Primary | ICD-10-CM

## 2024-07-01 DIAGNOSIS — C67.9 MALIGNANT NEOPLASM OF URINARY BLADDER, UNSPECIFIED SITE (MULTI): ICD-10-CM

## 2024-07-01 PROCEDURE — 51720 TREATMENT OF BLADDER LESION: CPT | Performed by: NURSE PRACTITIONER

## 2024-07-01 PROCEDURE — 81003 URINALYSIS AUTO W/O SCOPE: CPT | Performed by: NURSE PRACTITIONER

## 2024-07-01 NOTE — PROGRESS NOTES
Procedure Visit on 07/01/2024   Component Date Value Ref Range Status    POC Color, Urine 07/01/2024 Yellow  Straw, Yellow, Light-Yellow Final    POC Appearance, Urine 07/01/2024 Clear  Clear Final    POC Glucose, Urine 07/01/2024 250 (2+) (A)  NEGATIVE mg/dl Final    POC Bilirubin, Urine 07/01/2024 NEGATIVE  NEGATIVE Final    POC Ketones, Urine 07/01/2024 NEGATIVE  NEGATIVE mg/dl Final    POC Specific Gravity, Urine 07/01/2024 1.010  1.005 - 1.035 Final    POC Blood, Urine 07/01/2024 NEGATIVE  NEGATIVE Final    POC PH, Urine 07/01/2024 7.0  No Reference Range Established PH Final    POC Protein, Urine 07/01/2024 NEGATIVE  NEGATIVE, 30 (1+) mg/dl Final    POC Urobilinogen, Urine 07/01/2024 0.2  0.2, 1.0 EU/DL Final    Poc Nitrite, Urine 07/01/2024 NEGATIVE  NEGATIVE Final    POC Leukocytes, Urine 07/01/2024 NEGATIVE  NEGATIVE Final     Patient ID: Alyssa Maldonado is a 74 y.o. female.    Bladder Catheterization    Date/Time: 7/1/2024 9:43 AM    Performed by: SUKHJINDER Chen  Authorized by: SUKHJINDER Chen    Procedure Details    Procedure: bladder instillation      Catheter type: red rubber      Catheter size: 14 Fr    Urine characteristics: clear and yellow         Bladder Instillation Details         Instillation agent: chemotherapy          Instillation agent comment: BCG        Treatment number: 5        Total planned treatments: 6    Post-Procedure Details     Outcome: patient tolerated procedure well with no complications      Post-procedure interventions: post-procedure education provided      Disposition: discharged home in satisfactory condition

## 2024-07-03 ENCOUNTER — HOSPITAL ENCOUNTER (OUTPATIENT)
Dept: CARDIOLOGY | Age: 74
Discharge: HOME | End: 2024-07-03
Payer: MEDICARE

## 2024-07-03 PROCEDURE — 93296 REM INTERROG EVL PM/IDS: CPT

## 2024-07-08 ENCOUNTER — APPOINTMENT (OUTPATIENT)
Dept: UROLOGY | Facility: CLINIC | Age: 74
End: 2024-07-08
Payer: MEDICARE

## 2024-07-08 VITALS
DIASTOLIC BLOOD PRESSURE: 105 MMHG | HEART RATE: 94 BPM | WEIGHT: 200 LBS | SYSTOLIC BLOOD PRESSURE: 140 MMHG | HEIGHT: 62 IN | BODY MASS INDEX: 36.8 KG/M2 | TEMPERATURE: 95.5 F

## 2024-07-08 DIAGNOSIS — C67.9 MALIGNANT NEOPLASM OF URINARY BLADDER, UNSPECIFIED SITE (MULTI): Primary | ICD-10-CM

## 2024-07-08 DIAGNOSIS — R35.0 URINE FREQUENCY: ICD-10-CM

## 2024-07-08 LAB
POC APPEARANCE, URINE: CLEAR
POC BILIRUBIN, URINE: NEGATIVE
POC BLOOD, URINE: NEGATIVE
POC COLOR, URINE: ABNORMAL
POC GLUCOSE, URINE: ABNORMAL MG/DL
POC KETONES, URINE: NEGATIVE MG/DL
POC LEUKOCYTES, URINE: NEGATIVE
POC NITRITE,URINE: NEGATIVE
POC PH, URINE: 7 PH
POC PROTEIN, URINE: NEGATIVE MG/DL
POC SPECIFIC GRAVITY, URINE: 1.01
POC UROBILINOGEN, URINE: 0.2 EU/DL

## 2024-07-08 PROCEDURE — 51720 TREATMENT OF BLADDER LESION: CPT | Performed by: NURSE PRACTITIONER

## 2024-07-08 PROCEDURE — 81002 URINALYSIS NONAUTO W/O SCOPE: CPT | Performed by: NURSE PRACTITIONER

## 2024-07-08 NOTE — PROGRESS NOTES
Procedure Visit on 07/08/2024   Component Date Value Ref Range Status    POC Color, Urine 07/08/2024 Light-Yellow  Straw, Yellow, Light-Yellow Final    POC Appearance, Urine 07/08/2024 Clear  Clear Final    POC Glucose, Urine 07/08/2024 250 (2+) (A)  NEGATIVE mg/dl Final    POC Bilirubin, Urine 07/08/2024 NEGATIVE  NEGATIVE Final    POC Ketones, Urine 07/08/2024 NEGATIVE  NEGATIVE mg/dl Final    POC Specific Gravity, Urine 07/08/2024 1.010  1.005 - 1.035 Final    POC Blood, Urine 07/08/2024 NEGATIVE  NEGATIVE Final    POC PH, Urine 07/08/2024 7.0  No Reference Range Established PH Final    POC Protein, Urine 07/08/2024 NEGATIVE  NEGATIVE, 30 (1+) mg/dl Final    POC Urobilinogen, Urine 07/08/2024 0.2  0.2, 1.0 EU/DL Final    Poc Nitrite, Urine 07/08/2024 NEGATIVE  NEGATIVE Final    POC Leukocytes, Urine 07/08/2024 NEGATIVE  NEGATIVE Final     Patient ID: Alyssa Maldonado is a 74 y.o. female.    Bladder Catheterization    Date/Time: 7/8/2024 9:11 AM    Performed by: SUKHJINDER Chen  Authorized by: SUKHJINDER Chen    Procedure Details    Procedure: bladder instillation      Position: dorsal lithotomy    Catheter type: red rubber      Catheter size: 14 Fr    Urine characteristics: clear and yellow         Bladder Instillation Details         Instillation agent: chemotherapy          Instillation agent comment: BCG        Treatment number: 6        Total planned treatments: 6    Post-Procedure Details     Outcome: patient tolerated procedure well with no complications      Post-procedure interventions: post-procedure education provided      Disposition: discharged home in satisfactory condition

## 2024-07-30 PROCEDURE — 93294 REM INTERROG EVL PM/LDLS PM: CPT | Performed by: INTERNAL MEDICINE

## 2024-08-08 ENCOUNTER — APPOINTMENT (OUTPATIENT)
Dept: UROLOGY | Facility: CLINIC | Age: 74
End: 2024-08-08
Payer: MEDICARE

## 2024-08-29 DIAGNOSIS — I42.9 CARDIOMYOPATHY, UNSPECIFIED TYPE (HCC): ICD-10-CM

## 2024-08-29 DIAGNOSIS — E11.9 TYPE 2 DIABETES MELLITUS WITHOUT COMPLICATIONS (MULTI): ICD-10-CM

## 2024-08-29 RX ORDER — SOTALOL HYDROCHLORIDE 120 MG/1
120 TABLET ORAL 2 TIMES DAILY
Qty: 180 TABLET | Refills: 3 | Status: SHIPPED | OUTPATIENT
Start: 2024-08-29

## 2024-08-29 RX ORDER — EMPAGLIFLOZIN 10 MG/1
10 TABLET, FILM COATED ORAL DAILY
Qty: 90 TABLET | Refills: 3 | Status: SHIPPED | OUTPATIENT
Start: 2024-08-29

## 2024-08-29 RX ORDER — METFORMIN HYDROCHLORIDE 500 MG/1
500 TABLET ORAL
Qty: 180 TABLET | Refills: 3 | Status: SHIPPED | OUTPATIENT
Start: 2024-08-29

## 2024-08-29 NOTE — TELEPHONE ENCOUNTER
Requesting medication refill. Please approve or deny this request.    Rx requested:  Requested Prescriptions     Pending Prescriptions Disp Refills    JARDIANCE 10 MG tablet [Pharmacy Med Name: JARDIANCE TAB 10MG] 90 tablet 3     Sig: TAKE 1 TABLET DAILY    sotalol (BETAPACE) 120 MG tablet [Pharmacy Med Name: SOTALOL HCL  TAB 120MG] 180 tablet 3     Sig: TAKE 1 TABLET TWICE A DAY         Last Office Visit:   12/8/2023      Next Visit Date:  Future Appointments   Date Time Provider Department Center   9/4/2024  8:30 AM Lucia Zimmerman PA [facility]   10/24/2024  8:30 AM Abdiel Hill MD [facility]               Last refill 09/25/2023. Please approve or deny.

## 2024-09-04 ENCOUNTER — OFFICE VISIT (OUTPATIENT)
Dept: CARDIOLOGY CLINIC | Age: 74
End: 2024-09-04
Payer: MEDICARE

## 2024-09-04 VITALS
RESPIRATION RATE: 16 BRPM | HEART RATE: 82 BPM | DIASTOLIC BLOOD PRESSURE: 82 MMHG | SYSTOLIC BLOOD PRESSURE: 128 MMHG | BODY MASS INDEX: 35.93 KG/M2 | WEIGHT: 209.4 LBS | OXYGEN SATURATION: 99 %

## 2024-09-04 DIAGNOSIS — I10 HYPERTENSION, UNSPECIFIED TYPE: ICD-10-CM

## 2024-09-04 DIAGNOSIS — N18.2 CKD (CHRONIC KIDNEY DISEASE) STAGE 2, GFR 60-89 ML/MIN: ICD-10-CM

## 2024-09-04 DIAGNOSIS — E78.5 DYSLIPIDEMIA: ICD-10-CM

## 2024-09-04 DIAGNOSIS — I27.20 PULMONARY HTN (HCC): ICD-10-CM

## 2024-09-04 DIAGNOSIS — I42.8 NONISCHEMIC CARDIOMYOPATHY (HCC): ICD-10-CM

## 2024-09-04 DIAGNOSIS — I50.22 CHRONIC SYSTOLIC CHF (CONGESTIVE HEART FAILURE), NYHA CLASS 1 (HCC): ICD-10-CM

## 2024-09-04 DIAGNOSIS — G47.33 OSA ON CPAP: ICD-10-CM

## 2024-09-04 DIAGNOSIS — Z95.0 S/P PLACEMENT OF CARDIAC PACEMAKER: ICD-10-CM

## 2024-09-04 DIAGNOSIS — I48.19 PERSISTENT ATRIAL FIBRILLATION (HCC): ICD-10-CM

## 2024-09-04 DIAGNOSIS — I25.10 MILD CAD: ICD-10-CM

## 2024-09-04 PROCEDURE — 3074F SYST BP LT 130 MM HG: CPT | Performed by: PHYSICIAN ASSISTANT

## 2024-09-04 PROCEDURE — G8400 PT W/DXA NO RESULTS DOC: HCPCS | Performed by: PHYSICIAN ASSISTANT

## 2024-09-04 PROCEDURE — 99214 OFFICE O/P EST MOD 30 MIN: CPT | Performed by: PHYSICIAN ASSISTANT

## 2024-09-04 PROCEDURE — 1090F PRES/ABSN URINE INCON ASSESS: CPT | Performed by: PHYSICIAN ASSISTANT

## 2024-09-04 PROCEDURE — 1036F TOBACCO NON-USER: CPT | Performed by: PHYSICIAN ASSISTANT

## 2024-09-04 PROCEDURE — 3079F DIAST BP 80-89 MM HG: CPT | Performed by: PHYSICIAN ASSISTANT

## 2024-09-04 PROCEDURE — G8417 CALC BMI ABV UP PARAM F/U: HCPCS | Performed by: PHYSICIAN ASSISTANT

## 2024-09-04 PROCEDURE — 1123F ACP DISCUSS/DSCN MKR DOCD: CPT | Performed by: PHYSICIAN ASSISTANT

## 2024-09-04 PROCEDURE — G8427 DOCREV CUR MEDS BY ELIG CLIN: HCPCS | Performed by: PHYSICIAN ASSISTANT

## 2024-09-04 PROCEDURE — 3017F COLORECTAL CA SCREEN DOC REV: CPT | Performed by: PHYSICIAN ASSISTANT

## 2024-09-04 ASSESSMENT — ENCOUNTER SYMPTOMS
COUGH: 0
VOMITING: 0
ABDOMINAL PAIN: 0
NAUSEA: 0
CHEST TIGHTNESS: 0
SHORTNESS OF BREATH: 1
ABDOMINAL DISTENTION: 0
BLOOD IN STOOL: 0
COLOR CHANGE: 0

## 2024-09-04 NOTE — PATIENT INSTRUCTIONS
-Check daily weight every morning and notify CHF clinic if gaining more than 3 pounds in 2 days    -Check blood pressure twice daily in a.m. and p.m. prior to taking medications and keep log of blood pressure trends to review at next office visit  Notify office if BP running low with  mmHg or below prior to taking medications  Notify office if BP running high with  mmHg or above or if DBP 85 mmHg or above    -Recommend 2000 mg daily sodium restriction    -Recommend 2 liter (64 ounces) daily fluid restriction

## 2024-09-04 NOTE — PROGRESS NOTES
which revealed mild luminal irregularities  Echocardiogram 6/4/2021 with normal LV systolic function with EF of 55%, concentric LVH, mild MR, RVSP elevated 53 mmHg  -Maintain follow-up with [facility] for routine pacemaker checks  -Maintain follow-up with pulmonology regarding GALINA/pulm HTN.  Continue CPAP at bedtime  -Maintain routine outpatient follow-up with PCP  -Follow-up with  urology, Dr. Miller as scheduled-next appointment 9/24/2024 for follow-up of bladder carcinoma  -F/U with CHF clinic in 9 months or sooner if needed        Counseling:  The importance of daily weights, dietary sodium restriction, and contact with cardiology if weight is increased more than 3 lbs in any 48 hour period was stressed. The patient has been advised to contact us if theyexperience progressive SOB, orthopnea, PND or progressive edema.              
Cardiac

## 2024-09-16 RX ORDER — LIDOCAINE HYDROCHLORIDE 20 MG/ML
1 JELLY TOPICAL ONCE
Status: COMPLETED | OUTPATIENT
Start: 2024-09-24 | End: 2024-09-24

## 2024-09-24 ENCOUNTER — APPOINTMENT (OUTPATIENT)
Dept: UROLOGY | Facility: CLINIC | Age: 74
End: 2024-09-24
Payer: MEDICARE

## 2024-09-24 VITALS
WEIGHT: 211 LBS | BODY MASS INDEX: 38.59 KG/M2 | SYSTOLIC BLOOD PRESSURE: 129 MMHG | HEART RATE: 79 BPM | DIASTOLIC BLOOD PRESSURE: 100 MMHG | TEMPERATURE: 98.1 F

## 2024-09-24 DIAGNOSIS — C67.9 MALIGNANT NEOPLASM OF URINARY BLADDER, UNSPECIFIED SITE (MULTI): ICD-10-CM

## 2024-09-24 LAB
POC APPEARANCE, URINE: CLEAR
POC BILIRUBIN, URINE: NEGATIVE
POC BLOOD, URINE: NEGATIVE
POC COLOR, URINE: YELLOW
POC GLUCOSE, URINE: ABNORMAL MG/DL
POC KETONES, URINE: NEGATIVE MG/DL
POC LEUKOCYTES, URINE: NEGATIVE
POC NITRITE,URINE: NEGATIVE
POC PH, URINE: 6 PH
POC PROTEIN, URINE: NEGATIVE MG/DL
POC SPECIFIC GRAVITY, URINE: 1.01
POC UROBILINOGEN, URINE: 0.2 EU/DL

## 2024-09-24 PROCEDURE — 52000 CYSTOURETHROSCOPY: CPT | Performed by: UROLOGY

## 2024-09-24 PROCEDURE — 81003 URINALYSIS AUTO W/O SCOPE: CPT | Performed by: UROLOGY
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
## 2024-10-01 ENCOUNTER — APPOINTMENT (OUTPATIENT)
Dept: PRIMARY CARE | Facility: CLINIC | Age: 74
End: 2024-10-01
Payer: MEDICARE

## 2024-10-01 ENCOUNTER — LAB (OUTPATIENT)
Dept: LAB | Facility: LAB | Age: 74
End: 2024-10-01
Payer: MEDICARE

## 2024-10-01 VITALS
SYSTOLIC BLOOD PRESSURE: 124 MMHG | RESPIRATION RATE: 18 BRPM | BODY MASS INDEX: 39.38 KG/M2 | OXYGEN SATURATION: 98 % | HEIGHT: 62 IN | WEIGHT: 214 LBS | HEART RATE: 88 BPM | DIASTOLIC BLOOD PRESSURE: 76 MMHG | TEMPERATURE: 97.1 F

## 2024-10-01 DIAGNOSIS — Z00.00 ENCOUNTER FOR MEDICARE ANNUAL WELLNESS EXAM: ICD-10-CM

## 2024-10-01 DIAGNOSIS — E78.2 MIXED HYPERLIPIDEMIA: ICD-10-CM

## 2024-10-01 DIAGNOSIS — E11.69 TYPE 2 DIABETES MELLITUS WITH OTHER SPECIFIED COMPLICATION, UNSPECIFIED WHETHER LONG TERM INSULIN USE (MULTI): ICD-10-CM

## 2024-10-01 DIAGNOSIS — I10 ESSENTIAL HYPERTENSION: ICD-10-CM

## 2024-10-01 DIAGNOSIS — G47.33 OSA (OBSTRUCTIVE SLEEP APNEA): ICD-10-CM

## 2024-10-01 DIAGNOSIS — N18.31 CKD STAGE G3A/A2, GFR 45-59 AND ALBUMIN CREATININE RATIO 30-299 MG/G (MULTI): ICD-10-CM

## 2024-10-01 DIAGNOSIS — Z95.0 PACEMAKER: ICD-10-CM

## 2024-10-01 DIAGNOSIS — C67.9 MALIGNANT NEOPLASM OF URINARY BLADDER, UNSPECIFIED SITE (MULTI): ICD-10-CM

## 2024-10-01 DIAGNOSIS — I48.91 ATRIAL FIBRILLATION, UNSPECIFIED TYPE (MULTI): ICD-10-CM

## 2024-10-01 DIAGNOSIS — D64.9 ANEMIA, MILD: ICD-10-CM

## 2024-10-01 LAB
ALBUMIN SERPL BCP-MCNC: 4 G/DL (ref 3.4–5)
ALP SERPL-CCNC: 69 U/L (ref 33–136)
ALT SERPL W P-5'-P-CCNC: 14 U/L (ref 7–45)
ANION GAP SERPL CALC-SCNC: 13 MMOL/L (ref 10–20)
AST SERPL W P-5'-P-CCNC: 20 U/L (ref 9–39)
BASOPHILS # BLD AUTO: 0.04 X10*3/UL (ref 0–0.1)
BASOPHILS NFR BLD AUTO: 0.6 %
BILIRUB SERPL-MCNC: 1 MG/DL (ref 0–1.2)
BUN SERPL-MCNC: 15 MG/DL (ref 6–23)
CALCIUM SERPL-MCNC: 9.4 MG/DL (ref 8.6–10.3)
CHLORIDE SERPL-SCNC: 107 MMOL/L (ref 98–107)
CHOLEST SERPL-MCNC: 143 MG/DL (ref 0–199)
CHOLESTEROL/HDL RATIO: 2.9
CO2 SERPL-SCNC: 27 MMOL/L (ref 21–32)
CREAT SERPL-MCNC: 0.89 MG/DL (ref 0.5–1.05)
EGFRCR SERPLBLD CKD-EPI 2021: 68 ML/MIN/1.73M*2
EOSINOPHIL # BLD AUTO: 0.09 X10*3/UL (ref 0–0.4)
EOSINOPHIL NFR BLD AUTO: 1.5 %
ERYTHROCYTE [DISTWIDTH] IN BLOOD BY AUTOMATED COUNT: 14 % (ref 11.5–14.5)
GLUCOSE SERPL-MCNC: 98 MG/DL (ref 74–99)
HCT VFR BLD AUTO: 46.2 % (ref 36–46)
HDLC SERPL-MCNC: 49 MG/DL
HGB BLD-MCNC: 14.6 G/DL (ref 12–16)
IMM GRANULOCYTES # BLD AUTO: 0.01 X10*3/UL (ref 0–0.5)
IMM GRANULOCYTES NFR BLD AUTO: 0.2 % (ref 0–0.9)
LDLC SERPL CALC-MCNC: 65 MG/DL
LYMPHOCYTES # BLD AUTO: 2.09 X10*3/UL (ref 0.8–3)
LYMPHOCYTES NFR BLD AUTO: 33.7 %
MCH RBC QN AUTO: 28.1 PG (ref 26–34)
MCHC RBC AUTO-ENTMCNC: 31.6 G/DL (ref 32–36)
MCV RBC AUTO: 89 FL (ref 80–100)
MONOCYTES # BLD AUTO: 0.4 X10*3/UL (ref 0.05–0.8)
MONOCYTES NFR BLD AUTO: 6.5 %
NEUTROPHILS # BLD AUTO: 3.57 X10*3/UL (ref 1.6–5.5)
NEUTROPHILS NFR BLD AUTO: 57.5 %
NON HDL CHOLESTEROL: 94 MG/DL (ref 0–149)
NRBC BLD-RTO: 0 /100 WBCS (ref 0–0)
PLATELET # BLD AUTO: 176 X10*3/UL (ref 150–450)
POTASSIUM SERPL-SCNC: 3.9 MMOL/L (ref 3.5–5.3)
PROT SERPL-MCNC: 6.6 G/DL (ref 6.4–8.2)
RBC # BLD AUTO: 5.2 X10*6/UL (ref 4–5.2)
SODIUM SERPL-SCNC: 143 MMOL/L (ref 136–145)
T4 FREE SERPL-MCNC: 1.12 NG/DL (ref 0.61–1.12)
TRIGL SERPL-MCNC: 143 MG/DL (ref 0–149)
TSH SERPL-ACNC: 1.05 MIU/L (ref 0.44–3.98)
VLDL: 29 MG/DL (ref 0–40)
WBC # BLD AUTO: 6.2 X10*3/UL (ref 4.4–11.3)

## 2024-10-01 PROCEDURE — 99214 OFFICE O/P EST MOD 30 MIN: CPT | Performed by: FAMILY MEDICINE

## 2024-10-01 PROCEDURE — 1160F RVW MEDS BY RX/DR IN RCRD: CPT | Performed by: FAMILY MEDICINE

## 2024-10-01 PROCEDURE — 1170F FXNL STATUS ASSESSED: CPT | Performed by: FAMILY MEDICINE

## 2024-10-01 PROCEDURE — 3044F HG A1C LEVEL LT 7.0%: CPT | Performed by: FAMILY MEDICINE

## 2024-10-01 PROCEDURE — 3074F SYST BP LT 130 MM HG: CPT | Performed by: FAMILY MEDICINE

## 2024-10-01 PROCEDURE — 84439 ASSAY OF FREE THYROXINE: CPT

## 2024-10-01 PROCEDURE — G0439 PPPS, SUBSEQ VISIT: HCPCS | Performed by: FAMILY MEDICINE

## 2024-10-01 PROCEDURE — 84443 ASSAY THYROID STIM HORMONE: CPT

## 2024-10-01 PROCEDURE — G0444 DEPRESSION SCREEN ANNUAL: HCPCS | Performed by: FAMILY MEDICINE

## 2024-10-01 PROCEDURE — 36415 COLL VENOUS BLD VENIPUNCTURE: CPT

## 2024-10-01 PROCEDURE — 80061 LIPID PANEL: CPT

## 2024-10-01 PROCEDURE — 3078F DIAST BP <80 MM HG: CPT | Performed by: FAMILY MEDICINE

## 2024-10-01 PROCEDURE — 85025 COMPLETE CBC W/AUTO DIFF WBC: CPT

## 2024-10-01 PROCEDURE — 1159F MED LIST DOCD IN RCRD: CPT | Performed by: FAMILY MEDICINE

## 2024-10-01 PROCEDURE — 3008F BODY MASS INDEX DOCD: CPT | Performed by: FAMILY MEDICINE

## 2024-10-01 PROCEDURE — 80053 COMPREHEN METABOLIC PANEL: CPT

## 2024-10-01 PROCEDURE — G0446 INTENS BEHAVE THER CARDIO DX: HCPCS | Performed by: FAMILY MEDICINE

## 2024-10-01 PROCEDURE — 83036 HEMOGLOBIN GLYCOSYLATED A1C: CPT

## 2024-10-01 PROCEDURE — 1036F TOBACCO NON-USER: CPT | Performed by: FAMILY MEDICINE

## 2024-10-01 ASSESSMENT — ENCOUNTER SYMPTOMS
DEPRESSION: 0
OCCASIONAL FEELINGS OF UNSTEADINESS: 0
LOSS OF SENSATION IN FEET: 0

## 2024-10-01 ASSESSMENT — PATIENT HEALTH QUESTIONNAIRE - PHQ9
2. FEELING DOWN, DEPRESSED OR HOPELESS: NOT AT ALL
1. LITTLE INTEREST OR PLEASURE IN DOING THINGS: NOT AT ALL
SUM OF ALL RESPONSES TO PHQ9 QUESTIONS 1 AND 2: 0

## 2024-10-01 ASSESSMENT — ACTIVITIES OF DAILY LIVING (ADL)
GROCERY_SHOPPING: INDEPENDENT
DOING_HOUSEWORK: INDEPENDENT
TAKING_MEDICATION: INDEPENDENT
BATHING: INDEPENDENT
DRESSING: INDEPENDENT
MANAGING_FINANCES: INDEPENDENT

## 2024-10-01 NOTE — PROGRESS NOTES
Covid vax: UTD--declined today  Flu: declined today  Pneumo: UTD  RSV: UTD  Shingles: UTD    CRC: due 2026  Mammogram: 1/2024  Pap: n/a  Lmp: n/a

## 2024-10-01 NOTE — PROGRESS NOTES
Subjective   Reason for Visit: Alyssa Maldonado is a 74 y.o. female here for a Medicare Wellness visit.   Covid vax: UTD--declined today  Flu: declined today  Pneumo: UTD  RSV: UTD  Shingles: UTD     CRC: due 2026  Mammogram: 1/2024  Pap: n/a  Lmp: n/a  Bladder CA-noninvasive  6mo appts x2y then yrly      CHECKLIST REVIEWED AND COMPLETE FOR [facility]W  Medicare Annual Wellness Visit Subsequent, Diabetes, Hypertension, and Chronic Kidney Disease  ---------------------------------------------------------------------------------------------  Past Medical, Surgical, and Family History reviewed and updated in chart.    Reviewed all medications by prescribing practitioner or clinical pharmacist (such as prescriptions, OTCs, herbal therapies and supplements) and documented in the medical record.    HPI    Patient Self Assessment of Health Status  Patient Self Assessment: Good    Nutrition and Exercise:    Current Diet: HEALTHY Diet always best, minimizing excess carbs,   weight reduction advised if BMI not WNL, please maintain a NORMAL BMI 18.5-24.9    Adequate Fluid Intake: Yes  Caffeine: -aware to minimize intake, <300mg best to even take in LESS  Exercise Frequency: Regularly advised, weight bearing, strengthening, aerobic    Functional Ability/Level of Safety:  Home safety addressed: no active new concerns,   fall risk addressed  NO new hearing issues or concerns  [place] in ADLs addressed and areas of assistance if present -noted    ANY Cognitive Impairment Observed: No cognitive impairment observed    Home Safety Risk Factors: None  --------------------------------------------------------------------------------------------  Patient Care Team:  Lyndsey Fernando MD as PCP - General    [facility]  Patient Active Problem List   Diagnosis    Type 2 diabetes mellitus    Atrial fibrillation (Multi)    Mixed hyperlipidemia    Essential hypertension    Diverticulitis of colon    CKD stage G3a/A2, GFR 45-59 and albumin creatinine  "ratio  mg/g (Multi)    Anemia, mild    Cardiomegaly    Thyroid disease    Morbid (severe) obesity due to excess calories (Multi)    Atherosclerosis of native coronary artery of native heart with angina pectoris    HIRAL (obstructive sleep apnea)    Chronic systolic (congestive) heart failure    SSS (sick sinus syndrome) (Multi)    Malignant neoplasm of urinary bladder (Multi)    Pacemaker      Past Surgical History:   Procedure Laterality Date    CARDIAC CATHETERIZATION      no stents    CARDIOVERSION  2023     SECTION, CLASSIC      ,     COLONOSCOPY W/ POLYPECTOMY  2021    due     ESOPHAGOGASTRODUODENOSCOPY  2021    HH    PACEMAKER PLACEMENT  2023    TONSILLECTOMY           PHQ2(-) No active depressed mood or not in crisis, 5min. spent in discussion.    Review of Systems:    NO Seizures  NO CAD  NO CVA    This patient has   NO history of recent Covid nor flu symptoms,  NO Fever nor chills,  NO Chest pain, shortness of breath nor paroxysmal nocturnal dyspnea,  NO Nausea, vomiting, nor diarrhea,  NO Hematochezia nor melena,  NO Dysuria, hematuria, nor new incontinence issues  NO new severe headaches nor neurological complaints,  NO new issues with anxiety nor depression nor new psychiatric complaints,  NO suicidal nor homicidal ideations.  ---------------------------------------------------------------------------------------------   OBJECTIVE:  /76   Pulse 88   Temp 36.2 °C (97.1 °F) (Temporal)   Resp 18   Ht 1.575 m (5' 2\")   Wt 97.1 kg (214 lb)   LMP  (LMP Unknown)   SpO2 98%   BMI 39.14 kg/m²      General:  alert, oriented, no acute distress.  No obvious skin rashes noted.   No gait disturbance noted.    Mood is pleasant, not tearful, no signs of emotional distress.  Not appearing intoxicated or altered.   No voiced delusions,   Normal, appropriate behavior.    HEENT: Normocephalic, atraumatic,   Pupils round, reactive to light  Extraocular motions intact " and wnl  Tympanic membranes normal    Neck: no nuchal rigidity  No masses palpable.  No carotid bruits.  No thyromegaly.    Respiratory: Equal breath sounds  No wheezes,    rales,    nor rhonchi  No respiratory distress.    Heart: Regular rate and rhythm, no    murmurs  no rubs/gallops    Abdomen: no masses palpable, no rebound nor guarding, no rebound nor guarding.overwt    Extremities: NO cyanosis noted, no clubbing.   No edema noted.  2+dorsalis pedis pulses.    Normal-not antalgic, steady gait.  No foot lesions    Procedure Visit on 09/24/2024   Component Date Value Ref Range Status    POC Color, Urine 09/24/2024 Yellow  Straw, Yellow, Light-Yellow Final    POC Appearance, Urine 09/24/2024 Clear  Clear Final    POC Glucose, Urine 09/24/2024 500 (3+) (A)  NEGATIVE mg/dl Final    POC Bilirubin, Urine 09/24/2024 NEGATIVE  NEGATIVE Final    POC Ketones, Urine 09/24/2024 NEGATIVE  NEGATIVE mg/dl Final    POC Specific Gravity, Urine 09/24/2024 1.010  1.005 - 1.035 Final    POC Blood, Urine 09/24/2024 NEGATIVE  NEGATIVE Final    POC PH, Urine 09/24/2024 6.0  No Reference Range Established PH Final    POC Protein, Urine 09/24/2024 NEGATIVE  NEGATIVE, 30 (1+) mg/dl Final    POC Urobilinogen, Urine 09/24/2024 0.2  0.2, 1.0 EU/DL Final    Poc Nitrite, Urine 09/24/2024 NEGATIVE  NEGATIVE Final    POC Leukocytes, Urine 09/24/2024 NEGATIVE  NEGATIVE Final   Procedure Visit on 07/08/2024   Component Date Value Ref Range Status    POC Color, Urine 07/08/2024 Light-Yellow  Straw, Yellow, Light-Yellow Final    POC Appearance, Urine 07/08/2024 Clear  Clear Final    POC Glucose, Urine 07/08/2024 250 (2+) (A)  NEGATIVE mg/dl Final    POC Bilirubin, Urine 07/08/2024 NEGATIVE  NEGATIVE Final    POC Ketones, Urine 07/08/2024 NEGATIVE  NEGATIVE mg/dl Final    POC Specific Gravity, Urine 07/08/2024 1.010  1.005 - 1.035 Final    POC Blood, Urine 07/08/2024 NEGATIVE  NEGATIVE Final    POC PH, Urine 07/08/2024 7.0  No Reference Range  Established PH Final    POC Protein, Urine 07/08/2024 NEGATIVE  NEGATIVE, 30 (1+) mg/dl Final    POC Urobilinogen, Urine 07/08/2024 0.2  0.2, 1.0 EU/DL Final    Poc Nitrite, Urine 07/08/2024 NEGATIVE  NEGATIVE Final    POC Leukocytes, Urine 07/08/2024 NEGATIVE  NEGATIVE Final        Assessment/Plan     Problem List Items Addressed This Visit       Type 2 diabetes mellitus    Atrial fibrillation (Multi)    Mixed hyperlipidemia    Essential hypertension    CKD stage G3a/A2, GFR 45-59 and albumin creatinine ratio  mg/g (Multi)    Anemia, mild    HIRAL (obstructive sleep apnea)    Malignant neoplasm of urinary bladder (Multi)    Pacemaker     Other Visit Diagnoses       Encounter for Medicare annual wellness exam              Advance Care Planning Note   Discussion Date: 10/01/24   Discussion Participants: patient  16 min spent discussing ACP w pt    The patient wishes to discuss Advance Care Planning today and the following is a brief summary of our discussion.     Patient has capacity to make their own medical decisions: Yes  Health Care Agent/Surrogate Decision Maker documented in chart: Yes    Documents on file and valid:  Advance Directive/Living Will: advised today  Health Care Power of :  advised today  Communication of Medical Status/Prognosis:   yes   Communication of Treatment Goals/Options:   yes  Treatment Decisions/involved with patient today  yes  Time Statement: Total face to face time spent on advance care planning was <30 minutes with <30 minutes spent in counseling, including the explanation.    SEE ME AT NEXT REGULARLY SCHEDULED VISIT-sooner if condition deteriorates or new problems arise.    PATIENT WOULD LIKE TO BE A FULL CODE    NO uncontrolled DEPRESSION noted  Assessed and reviewed for opioid use.  NO EVIDENCE OF SIGNIFICANT DEMENTIA    Signs and symptoms of concern with depression-if in crisis -(no current HI/SI) will let us know and proceed to ER   Signs/symptoms of concern with  dementia-and need to contact us if they occur discussed.    ASCVD   30.5%   addressed and risk reduction conversation took place    All above counseling 15 minutes in conversation/documentation etc    Mood counseling 5min- no uncontrolled depression, good support system, has crisis plan if occurs.  Included BMI counseling and options if BMI>30    And again had a lengthy discussion w pt  about risks of poorly controlled diabetes including micro and macrovascular complications of DM2 including blindness,MI,CVA and death among other possibilities. Pt aware and agrees to better -or if good control-continued compliance and adherance to instructions such as regular eye exams q 1-2 y, foot exams,and f/u regularly for hba1c with a goal of 6.5.    Follow up as planned for hba1c and BP checks REGULARLY.    QUESTIONS answered    Next visit addressed -regular visit as scheduled and follow up sooner if condition deterioration or new problems arise.    This completes Alyssa Maldonado ANNUAL MEDICARE WELLNESS VISIT today.  If no other follow ups discussed: Please follow up in 1 year for NEXT ANNUAL MEDICARE WELLNESS VISIT.    Lyndsey Fernando MD    This documentation is subject to inadvertent typing and other similar clerical/grammatic errors etc.

## 2024-10-02 LAB
EST. AVERAGE GLUCOSE BLD GHB EST-MCNC: 123 MG/DL
HBA1C MFR BLD: 5.9 %

## 2024-10-04 ENCOUNTER — OFFICE VISIT (OUTPATIENT)
Dept: CARDIOLOGY CLINIC | Age: 74
End: 2024-10-04

## 2024-10-04 VITALS
OXYGEN SATURATION: 99 % | WEIGHT: 215 LBS | SYSTOLIC BLOOD PRESSURE: 120 MMHG | RESPIRATION RATE: 16 BRPM | DIASTOLIC BLOOD PRESSURE: 90 MMHG | BODY MASS INDEX: 36.89 KG/M2 | HEART RATE: 96 BPM

## 2024-10-04 DIAGNOSIS — I42.9 CARDIOMYOPATHY, UNSPECIFIED TYPE (HCC): ICD-10-CM

## 2024-10-04 DIAGNOSIS — I48.0 PAROXYSMAL ATRIAL FIBRILLATION (HCC): Primary | ICD-10-CM

## 2024-10-04 DIAGNOSIS — I48.21 PERMANENT ATRIAL FIBRILLATION (HCC): ICD-10-CM

## 2024-10-04 RX ORDER — METOPROLOL SUCCINATE 100 MG/1
100 TABLET, EXTENDED RELEASE ORAL DAILY
Qty: 90 TABLET | Refills: 3 | Status: SHIPPED | OUTPATIENT
Start: 2024-10-04

## 2024-10-04 NOTE — PROGRESS NOTES
stress test s/p LHC on 6-4-21 with very mild CAD, EF of 55%. S/p ECHO with EF of55%, mod LVH, no valve abn, IAS consistently bowed to the right indicative of elevated severely dilated LA. , mod PHTN, RVSP of 54mmHg,  LA pressure She continues to have SOB worse with exertion. [place]ggled at the beach on vacation and going up steps. Pt denies   nausea, vomiting, diarrhea, constipation, motor weakness, insomnia, weight loss, syncope, dizziness, lightheadedness, palpitations, PND, orthopnea, or claudication. Followed with Heme/PCP for low MCV, Hgb is 12.4.  + LE edema. She is on cardizem and DOAC. Losartan was stopped due to mild renal insuff.   S/p CT of chest in 5/2021 with moderate sized hiatal hernia, ? Basilar atelectasis or fibrosis  + hx of DM, HTN, HLD.   EKG today in office afib at 114bpm.        9-9-21: Status post external cardioversion with successful conversion to sinus bradycardia.  Patient was started on sotalol 80 mg twice daily.  She is follow-up with the CHF clinic. S/p sleep study with mod GALINA and severe O2 desaturation as low as 74%.   Patient is on Xarelto 20 mg daily.+ hx of DM, HTN, HLD.  LHC on 6-4-21 with very mild CAD, EF of 55%. S/p ECHO with EF of55%, mod LVH, no valve abn, IAS consistently bowed to the right indicative of elevated severely dilated LA. , mod PHTN, RVSP of 54mmHg,  EKG today with sinus bradycardia normal QTc interval.    3-10-22: on CPAP now for GALINA. Following with CHF clinic. States pulse is low at times. Hx of Pafib s/p CVN and on sotalol and DOAC.   + hx of DM, HTN, HLD  hx of LHC on 6-4-21 with very mild CAD, EF of 55%. S/p ECHO with EF of55%, mod LVH, no valve abn, IAS consistently bowed to the right indicative of elevated severely dilated LA. , mod PHTN, RVSP of 54mmHg,  EKG with SB at 45bpm, normal QTC.       9-9-22: Patient is doing well overall.  No angina or heart failure type symptoms.  She did have an episode of tachycardia with heart rate into the 180s briefly and

## 2024-10-08 ENCOUNTER — HOSPITAL ENCOUNTER (OUTPATIENT)
Dept: CARDIOLOGY | Age: 74
Discharge: HOME | End: 2024-10-08
Payer: MEDICARE

## 2024-10-08 PROCEDURE — 93296 REM INTERROG EVL PM/IDS: CPT

## 2024-11-04 ENCOUNTER — OFFICE VISIT (OUTPATIENT)
Dept: PULMONOLOGY | Age: 74
End: 2024-11-04
Payer: MEDICARE

## 2024-11-04 VITALS
OXYGEN SATURATION: 98 % | BODY MASS INDEX: 36.71 KG/M2 | SYSTOLIC BLOOD PRESSURE: 106 MMHG | DIASTOLIC BLOOD PRESSURE: 80 MMHG | WEIGHT: 214 LBS | HEART RATE: 82 BPM

## 2024-11-04 DIAGNOSIS — G47.33 OSA (OBSTRUCTIVE SLEEP APNEA): Primary | ICD-10-CM

## 2024-11-04 DIAGNOSIS — E66.9 OBESITY (BMI 30-39.9): ICD-10-CM

## 2024-11-04 PROCEDURE — 99214 OFFICE O/P EST MOD 30 MIN: CPT | Performed by: INTERNAL MEDICINE

## 2024-11-04 PROCEDURE — 3017F COLORECTAL CA SCREEN DOC REV: CPT | Performed by: INTERNAL MEDICINE

## 2024-11-04 PROCEDURE — G8427 DOCREV CUR MEDS BY ELIG CLIN: HCPCS | Performed by: INTERNAL MEDICINE

## 2024-11-04 PROCEDURE — G8400 PT W/DXA NO RESULTS DOC: HCPCS | Performed by: INTERNAL MEDICINE

## 2024-11-04 PROCEDURE — 1123F ACP DISCUSS/DSCN MKR DOCD: CPT | Performed by: INTERNAL MEDICINE

## 2024-11-04 PROCEDURE — 1159F MED LIST DOCD IN RCRD: CPT | Performed by: INTERNAL MEDICINE

## 2024-11-04 PROCEDURE — 3074F SYST BP LT 130 MM HG: CPT | Performed by: INTERNAL MEDICINE

## 2024-11-04 PROCEDURE — 1036F TOBACCO NON-USER: CPT | Performed by: INTERNAL MEDICINE

## 2024-11-04 PROCEDURE — 1090F PRES/ABSN URINE INCON ASSESS: CPT | Performed by: INTERNAL MEDICINE

## 2024-11-04 PROCEDURE — G8484 FLU IMMUNIZE NO ADMIN: HCPCS | Performed by: INTERNAL MEDICINE

## 2024-11-04 PROCEDURE — 3079F DIAST BP 80-89 MM HG: CPT | Performed by: INTERNAL MEDICINE

## 2024-11-04 PROCEDURE — G8417 CALC BMI ABV UP PARAM F/U: HCPCS | Performed by: INTERNAL MEDICINE

## 2024-11-04 ASSESSMENT — ENCOUNTER SYMPTOMS
ABDOMINAL PAIN: 0
CHEST TIGHTNESS: 0
NAUSEA: 0
TROUBLE SWALLOWING: 0
VOMITING: 0
EYE DISCHARGE: 0
WHEEZING: 0
COUGH: 0
VOICE CHANGE: 0
SINUS PRESSURE: 0
EYE ITCHING: 0
DIARRHEA: 0
RHINORRHEA: 0
SHORTNESS OF BREATH: 0
SORE THROAT: 0

## 2024-11-04 NOTE — PROGRESS NOTES
dietary changes , exercise, behavioral modification.      Return in about 6 months (around 5/4/2025) for eulalio.      Abdiel Hill MD

## 2024-11-06 ENCOUNTER — HOSPITAL ENCOUNTER (OUTPATIENT)
Age: 74
Discharge: HOME | End: 2024-11-08
Attending: INTERNAL MEDICINE
Payer: MEDICARE

## 2024-11-06 VITALS
DIASTOLIC BLOOD PRESSURE: 90 MMHG | SYSTOLIC BLOOD PRESSURE: 120 MMHG | WEIGHT: 214 LBS | HEIGHT: 64 IN | BODY MASS INDEX: 36.54 KG/M2

## 2024-11-06 DIAGNOSIS — I42.9 CARDIOMYOPATHY, UNSPECIFIED TYPE (HCC): ICD-10-CM

## 2024-11-06 DIAGNOSIS — I48.21 PERMANENT ATRIAL FIBRILLATION (HCC): ICD-10-CM

## 2024-11-06 PROCEDURE — 93306 TTE W/DOPPLER COMPLETE: CPT

## 2024-11-07 LAB
ECHO AV AREA PEAK VELOCITY: 2.4 CM2
ECHO AV AREA VTI: 2.6 CM2
ECHO AV AREA/BSA VTI: 1.3 CM2/M2
ECHO AV CUSP MM: 1.4 CM
ECHO AV MEAN GRADIENT: 4 MMHG
ECHO AV MEAN VELOCITY: 0.9 M/S
ECHO AV PEAK GRADIENT: 7 MMHG
ECHO AV PEAK GRADIENT: 7 MMHG
ECHO AV PEAK VELOCITY: 1.3 M/S
ECHO AV PEAK VELOCITY: 1.3 M/S
ECHO AV VTI: 26.2 CM
ECHO BSA: 2.09 M2
ECHO EST RA PRESSURE: 10 MMHG
ECHO LA DIAMETER INDEX: 2.19 CM/M2
ECHO LA DIAMETER: 4.4 CM
ECHO LA VOL A-L A2C: 93 ML (ref 22–52)
ECHO LA VOL A-L A4C: 100 ML (ref 22–52)
ECHO LA VOL MOD A2C: 91 ML (ref 22–52)
ECHO LA VOL MOD A4C: 99 ML (ref 22–52)
ECHO LA VOLUME AREA LENGTH: 99 ML
ECHO LA VOLUME INDEX A-L A2C: 46 ML/M2 (ref 16–34)
ECHO LA VOLUME INDEX A-L A4C: 50 ML/M2 (ref 16–34)
ECHO LA VOLUME INDEX AREA LENGTH: 49 ML/M2 (ref 16–34)
ECHO LA VOLUME INDEX MOD A2C: 45 ML/M2 (ref 16–34)
ECHO LA VOLUME INDEX MOD A4C: 49 ML/M2 (ref 16–34)
ECHO LV E' LATERAL VELOCITY: 6.3 CM/S
ECHO LV E' SEPTAL VELOCITY: 2.9 CM/S
ECHO LV EDV A2C: 83 ML
ECHO LV EDV A4C: 75 ML
ECHO LV EDV BP: 83 ML (ref 56–104)
ECHO LV EDV INDEX A4C: 37 ML/M2
ECHO LV EDV INDEX BP: 41 ML/M2
ECHO LV EDV NDEX A2C: 41 ML/M2
ECHO LV EJECTION FRACTION A2C: 46 %
ECHO LV EJECTION FRACTION A4C: 50 %
ECHO LV EJECTION FRACTION BIPLANE: 48 % (ref 55–100)
ECHO LV ESV A2C: 45 ML
ECHO LV ESV A4C: 37 ML
ECHO LV ESV BP: 43 ML (ref 19–49)
ECHO LV ESV INDEX A2C: 22 ML/M2
ECHO LV ESV INDEX A4C: 18 ML/M2
ECHO LV ESV INDEX BP: 21 ML/M2
ECHO LV FRACTIONAL SHORTENING: 21 % (ref 28–44)
ECHO LV INTERNAL DIMENSION DIASTOLE INDEX: 2.14 CM/M2
ECHO LV INTERNAL DIMENSION DIASTOLIC: 4.3 CM (ref 3.9–5.3)
ECHO LV INTERNAL DIMENSION SYSTOLIC INDEX: 1.69 CM/M2
ECHO LV INTERNAL DIMENSION SYSTOLIC: 3.4 CM
ECHO LV IVSD: 1.2 CM (ref 0.6–0.9)
ECHO LV IVSS: 1.5 CM
ECHO LV MASS 2D: 142.5 G (ref 67–162)
ECHO LV MASS INDEX 2D: 70.9 G/M2 (ref 43–95)
ECHO LV POSTERIOR WALL DIASTOLIC: 0.8 CM (ref 0.6–0.9)
ECHO LV POSTERIOR WALL SYSTOLIC: 1.2 CM
ECHO LV RELATIVE WALL THICKNESS RATIO: 0.37
ECHO LVOT AREA: 3.1 CM2
ECHO LVOT AV VTI INDEX: 0.84
ECHO LVOT DIAM: 2 CM
ECHO LVOT MEAN GRADIENT: 2 MMHG
ECHO LVOT PEAK GRADIENT: 4 MMHG
ECHO LVOT PEAK GRADIENT: 4 MMHG
ECHO LVOT PEAK VELOCITY: 1 M/S
ECHO LVOT PEAK VELOCITY: 1 M/S
ECHO LVOT STROKE VOLUME INDEX: 34.2 ML/M2
ECHO LVOT SV: 68.8 ML
ECHO LVOT VTI: 21.9 CM
ECHO MV A VELOCITY: 0.28 M/S
ECHO MV AREA VTI: 2.6 CM2
ECHO MV E DECELERATION TIME (DT): 229 MS
ECHO MV E VELOCITY: 1.26 M/S
ECHO MV E/A RATIO: 4.5
ECHO MV E/E' LATERAL: 20
ECHO MV E/E' RATIO (AVERAGED): 31.72
ECHO MV E/E' SEPTAL: 43.45
ECHO MV LVOT VTI INDEX: 1.2
ECHO MV MAX VELOCITY: 1.6 M/S
ECHO MV MEAN GRADIENT: 3 MMHG
ECHO MV MEAN VELOCITY: 0.7 M/S
ECHO MV PEAK GRADIENT: 10 MMHG
ECHO MV VTI: 26.2 CM
ECHO PV MAX VELOCITY: 0.7 M/S
ECHO PV PEAK GRADIENT: 2 MMHG
ECHO RIGHT VENTRICULAR SYSTOLIC PRESSURE (RVSP): 46 MMHG
ECHO RV INTERNAL DIMENSION: 2.8 CM
ECHO RV TAPSE: 2.2 CM (ref 1.7–?)
ECHO TV REGURGITANT MAX VELOCITY: 2.98 M/S
ECHO TV REGURGITANT PEAK GRADIENT: 35 MMHG

## 2024-11-14 DIAGNOSIS — E07.9 THYROID DISEASE: ICD-10-CM

## 2024-11-14 RX ORDER — LEVOTHYROXINE SODIUM 125 UG/1
TABLET ORAL
Qty: 90 TABLET | Refills: 3 | Status: SHIPPED | OUTPATIENT
Start: 2024-11-14

## 2025-01-06 RX ORDER — SACUBITRIL AND VALSARTAN 24; 26 MG/1; MG/1
1 TABLET, FILM COATED ORAL 2 TIMES DAILY
Qty: 180 TABLET | Refills: 2 | Status: SHIPPED | OUTPATIENT
Start: 2025-01-06

## 2025-01-06 NOTE — TELEPHONE ENCOUNTER
Received a fax from [facility].     They are requesting for an Alternate Medication for Xarelto 20 MG tab- take 1 tablet daily with breakfast.

## 2025-01-06 NOTE — TELEPHONE ENCOUNTER
Requesting medication refill. Please approve or deny this request.    Rx requested:  Requested Prescriptions     Pending Prescriptions Disp Refills    ENTRESTO 24-26 MG per tablet [Pharmacy Med Name: ENTRESTO TAB 24-26MG] 180 tablet 2     Sig: TAKE 1 TABLET TWICE A DAY         Last Office Visit:   9/4/2024      Next Visit Date:  Future Appointments   Date Time Provider Department Center   5/5/2025  8:30 AM Abdiel Hill MD [facility]   6/4/2025  8:30 AM Lucia Zimmerman PA [facility]

## 2025-01-07 ENCOUNTER — TELEPHONE (OUTPATIENT)
Dept: CARDIOLOGY CLINIC | Age: 75
End: 2025-01-07

## 2025-01-07 NOTE — TELEPHONE ENCOUNTER
----- Message from JASWINDER Rogers sent at 1/7/2025  7:35 AM EST -----  Regarding: Follow-up  Please make sure patient is called and gets 6 month follow-up from her last appointment with Dr. Knott in Oct 2024 as it doesn't appear that one was made following her last appointment.    Please also ask if she has been checking her BP at home and if it is stable as it appears Dr. Knott had increased her Toprol to 100mg daily from 25mg daily at her last visit.    Thanks

## 2025-01-07 NOTE — TELEPHONE ENCOUNTER
Patient scheduled to follow up with Dr Knott in April and doing well with increased dose of Toprol

## 2025-01-14 ENCOUNTER — HOSPITAL ENCOUNTER (OUTPATIENT)
Dept: CARDIOLOGY | Age: 75
Discharge: HOME | End: 2025-01-14
Payer: MEDICARE

## 2025-01-14 PROCEDURE — 93296 REM INTERROG EVL PM/IDS: CPT

## 2025-01-22 DIAGNOSIS — E78.2 MIXED HYPERLIPIDEMIA: ICD-10-CM

## 2025-01-22 RX ORDER — ROSUVASTATIN CALCIUM 10 MG/1
TABLET, COATED ORAL
Qty: 90 TABLET | Refills: 3 | Status: SHIPPED | OUTPATIENT
Start: 2025-01-22

## 2025-01-30 ENCOUNTER — HOSPITAL ENCOUNTER (OUTPATIENT)
Dept: WOMENS IMAGING | Age: 75
Discharge: HOME | End: 2025-02-01
Payer: MEDICARE

## 2025-01-30 DIAGNOSIS — Z12.31 SCREENING MAMMOGRAM FOR BREAST CANCER: ICD-10-CM

## 2025-01-30 PROCEDURE — 77063 BREAST TOMOSYNTHESIS BI: CPT

## 2025-03-24 RX ORDER — LIDOCAINE HYDROCHLORIDE 20 MG/ML
1 JELLY TOPICAL ONCE
Status: COMPLETED | OUTPATIENT
Start: 2025-03-25 | End: 2025-03-25

## 2025-03-25 ENCOUNTER — APPOINTMENT (OUTPATIENT)
Dept: UROLOGY | Facility: CLINIC | Age: 75
End: 2025-03-25
Payer: MEDICARE

## 2025-03-25 VITALS — HEART RATE: 83 BPM | DIASTOLIC BLOOD PRESSURE: 87 MMHG | TEMPERATURE: 97.2 F | SYSTOLIC BLOOD PRESSURE: 133 MMHG

## 2025-03-25 DIAGNOSIS — C67.9 MALIGNANT NEOPLASM OF URINARY BLADDER, UNSPECIFIED SITE (MULTI): ICD-10-CM

## 2025-03-25 PROCEDURE — 81003 URINALYSIS AUTO W/O SCOPE: CPT | Performed by: UROLOGY

## 2025-03-25 PROCEDURE — 52000 CYSTOURETHROSCOPY: CPT | Performed by: UROLOGY

## 2025-03-25 RX ADMIN — LIDOCAINE HYDROCHLORIDE 1 APPLICATION: 20 JELLY TOPICAL at 08:54

## 2025-03-25 NOTE — PROGRESS NOTES
Subjective   Patient ID: Alyssa Maldonado is a 74 y.o. female who presents for surveillance cystoscopy for TCC bladder. Last seen 9/24/24 when The patient tolerated the cystoscopy procedure well.  Cystoscopy is negative for tumors. We will see the patient back for surveillance cystoscopy in 6 months.      HPI  The patient states she has not had any new urinary symptoms.       Review of Systems  A 12 system review was completed and is negative with the exception of those signs and symptoms noted in the history of present illness.    Objective   Physical Exam  General: in NAD, appears stated age  Head: normocephalic, atraumatic  Respiratory: normal effort, no use of accessory muscles  Cardiovascular: no edema noted  Skin: normal turgor, no rashes  Neurologic: grossly intact, oriented to person/place/time  Psychiatric: mode and affect appropriate     Procedure  Cystoscopy for TCC bladder  ?Patient's genitalia were prepped and draped in usual sterile fashion. A 17 [place] flexible cystoscope was passed atraumatically per the urethra and the bladder was inspected. No clots, stones, or foreign bodies were identified. 3 small 0.5 cm tumors inside the bladder neck circumferentially. The right and left ureteral orifice were in their normal anatomic position and effluxing clear urine. The scope was retroflexed and the bladder neck was unremarkable. The patient tolerated the procedure well.       Assessment/Plan   Problem List Items Addressed This Visit             ICD-10-CM    Malignant neoplasm of urinary bladder (Multi) C67.9    Relevant Medications    lidocaine 2 % mucosal jelly (Uro-Jet) 1 Application (Completed)    Other Relevant Orders    Cystourethroscopy    POCT UA Automated manually resulted (Completed)     The patient tolerated the cystoscopy procedure well. Cystoscopy finds 3 small 0.5 cm tumors inside the bladder neck circumferentially. I will arrange for the patient to follow-up with Dr. Brandon Lao or one of my  other colleagues for TURBT.     Follow-up with TURBT for continued management of TCC bladder.     Scribe Attestation  By signing my name below, I, Rosa M Ornelas   attest that this documentation has been prepared under the direction and in the presence of German Lorenzo MD.

## 2025-03-31 ENCOUNTER — TELEPHONE (OUTPATIENT)
Dept: CARDIOLOGY CLINIC | Age: 75
End: 2025-03-31

## 2025-03-31 NOTE — TELEPHONE ENCOUNTER
PATIENT CALLED OFFICE STATES SHE WILL BE HAVING EYE SURGERY AT THE [facility] ON 4/7/25. SHE NEEDS TO BE CLEARED TO STOP XARELTO PRIOR TO.

## 2025-04-01 DIAGNOSIS — R82.90 ABNORMAL URINALYSIS: ICD-10-CM

## 2025-04-01 DIAGNOSIS — D49.4 BLADDER TUMOR: Primary | ICD-10-CM

## 2025-04-01 RX ORDER — ACETAMINOPHEN 325 MG/1
975 TABLET ORAL ONCE
OUTPATIENT
Start: 2025-04-01 | End: 2025-04-01

## 2025-04-02 ENCOUNTER — APPOINTMENT (OUTPATIENT)
Dept: PRIMARY CARE | Facility: CLINIC | Age: 75
End: 2025-04-02
Payer: MEDICARE

## 2025-04-02 ASSESSMENT — ENCOUNTER SYMPTOMS
NECK NEGATIVE: 1
ENDOCRINE COMMENTS: DM
MUSCULOSKELETAL NEGATIVE: 1
CONSTITUTIONAL NEGATIVE: 1
GASTROINTESTINAL NEGATIVE: 1
NEUROLOGICAL NEGATIVE: 1

## 2025-04-02 NOTE — CPM/PAT H&P
CPM/PAT Evaluation       Name: Alyssa Maldonado (Alyssa Maldonado)  /Age: 1950/74 y.o.     In-Person       Chief Complaint: blood in the urine    HPI    This is a pleasant 73 yo with PmHx of afib, CHF, HIRAL, CKD, DM, HTN, HLD, and bladder tumor.  Pt states she had first noticed hematuria and was found to have a bladder tumor.  She has had previous TURBT and BCG treatments.  She reports routine surveillance showed another tumor.  She denies dysuria, or recent fever/chills.  Plan is for TURBT with Dr. Lao on .    Past Medical History:   Diagnosis Date    Anemia     Arrhythmia     Atrial Fibrillation    Arthritis     CHF (congestive heart failure)     Chronic kidney disease     Coronary artery disease     Diabetes mellitus (Multi)     type 2    Diverticulosis     Heart murmur     History of mammogram 2025    cat 1    Hyperlipidemia     Hypertension     Hypothyroidism     Pap test, as part of routine gynecological examination 10/2014    wnl, hpv neg    Sick sinus syndrome (Multi)     Sleep apnea     with cpap       Past Surgical History:   Procedure Laterality Date    CARDIAC CATHETERIZATION      no stents    CARDIOVERSION  2023     SECTION, CLASSIC      ,     COLONOSCOPY W/ POLYPECTOMY  2021    due     ESOPHAGOGASTRODUODENOSCOPY  2021    HH    OTHER SURGICAL HISTORY      bladder tumor removal    PACEMAKER PLACEMENT  2023    TONSILLECTOMY         Patient  has no history on file for sexual activity.    Family History   Problem Relation Name Age of Onset    Hyperlipidemia Father      Hypertension Father      Colon cancer Paternal Grandmother         No Known Allergies    Prior to Admission medications    Medication Sig Start Date End Date Taking? Authorizing Provider   cyanocobalamin (Vitamin B-12) 1,000 mcg tablet Take 1 tablet (1,000 mcg) by mouth once daily. 19   Historical Provider, MD   Entresto 24-26 mg tablet Take 1 tablet by mouth 2 times a day.  8/21/23   Historical Provider, MD   ferrous sulfate 325 (65 Fe) MG tablet Take 1 tablet (325 mg) by mouth once daily.    Historical Provider, MD   furosemide (Lasix) 20 mg tablet Take 1 tablet (20 mg) by mouth once daily. 9/6/23   Historical Provider, MD   Jardiance 10 mg Take 1 tablet (10 mg) by mouth once daily. 9/25/23   Historical Provider, MD   levothyroxine (Synthroid, Levoxyl) 125 mcg tablet 1 by mouth every day except Sunday 11/14/24   Lyndsey Fernando MD   metFORMIN (Glucophage) 500 mg tablet TAKE 1 TABLET TWICE DAILY  WITH MEALS 8/29/24   Lyndsey Fernando MD   metoprolol succinate XL (Toprol-XL) 25 mg 24 hr tablet Take 1 tablet (25 mg) by mouth once daily. 9/6/23   Historical Provider, MD   omega 3-dha-epa-fish oil (Fish OiL) 1,200 (144-216) mg capsule Take by mouth. 5/29/19   Historical Provider, MD   pantoprazole (ProtoNix) 40 mg EC tablet Take 1 tablet (40 mg) by mouth once daily in the morning. Take before meals. 3/4/21   Historical Provider, MD   phenazopyridine (Pyridium) 200 mg tablet Take 1 tablet (200 mg) by mouth 3 times a day as needed for bladder spasms. 4/19/24   Sulma Vasques MD   rosuvastatin (Crestor) 10 mg tablet TAKE 1 TABLET BY MOUTH EVERY DAY 1/22/25   Lyndsey Fernando MD   sotalol (Betapace) 120 mg tablet Take 1 tablet (120 mg) by mouth every 12 hours. 9/25/23   Historical Provider, MD   vitamin E acid succinate (vitamin E succinate) 268 mg (400 unit) tablet Take by mouth. 5/29/19   Historical Provider, MD   Xarelto 20 mg tablet Take 1 tablet (20 mg) by mouth once daily.    Historical Provider, MD ALMENDAREZ ROS:   Constitutional:   neg    Neuro/Psych:   neg    Eyes:    Strabismus eye surgery coming up on Monday- due to lazy eye   Pt has bilateral cataracts and they will be removed in June  Ears:   neg    Nose:   neg    Mouth:   neg    Throat:   neg    Neck:    No carotid bruits auscultated  neg    Cardio:    Follows with Dr Farrell  PM 6/13 2023  Hx of afib on xarelto,  Hx of cardiac murmur, HTN, HLD  7-2-21: s/p abnormal nuclear stress test s/p LHC on 6-4-21 with very mild CAD, EF of 55%. S/p ECHO with EF of55%, mod LVH, no valve abn  Hx of cardioversion  Respiratory:    HIRAL uses CPAP- sees sleep Doctor every 6 months  Endocrine:    DM  GI:   neg    :    See HPI  Hx of CKD  Musculoskeletal:   neg    Hematologic:   neg    Skin:  neg        Physical Exam  Constitutional:       Appearance: Normal appearance.   HENT:      Head: Normocephalic and atraumatic.      Nose: Nose normal.      Mouth/Throat:      Mouth: Mucous membranes are moist.   Eyes:      Pupils: Pupils are equal, round, and reactive to light.   Neck:      Comments: No carotid bruits auscultated  Cardiovascular:      Rate and Rhythm: Normal rate. Rhythm irregular.   Pulmonary:      Effort: Pulmonary effort is normal.      Breath sounds: Normal breath sounds.   Abdominal:      General: Bowel sounds are normal.      Palpations: Abdomen is soft.   Musculoskeletal:      Cervical back: Normal range of motion.      Comments: 1+ ankle edema right greater than left   Skin:     General: Skin is warm and dry.   Neurological:      General: No focal deficit present.      Mental Status: She is alert and oriented to person, place, and time.   Psychiatric:         Mood and Affect: Mood normal.         Behavior: Behavior normal.      Airway: nl neck ROM  Anesthesia:  Patient denies any anesthesia complications.   Teeth: intact    Testing/Diagnostic:   ECG: afib rate of 83 inc RBBB no acute changes when compared to ECG of 4/2024    Recent Results (from the past week)   Urinalysis with Reflex Culture and Microscopic    Collection Time: 04/03/25  8:31 AM   Result Value Ref Range    Color, Urine Colorless (N) Light-Yellow, Yellow, Dark-Yellow    Appearance, Urine Clear Clear    Specific Gravity, Urine 1.004 (N) 1.005 - 1.035    pH, Urine 6.0 5.0, 5.5, 6.0, 6.5, 7.0, 7.5, 8.0    Protein, Urine NEGATIVE NEGATIVE, 10 (TRACE), 20 (TRACE) mg/dL  "   Glucose, Urine OVER (4+) (A) Normal mg/dL    Blood, Urine NEGATIVE NEGATIVE mg/dL    Ketones, Urine NEGATIVE NEGATIVE mg/dL    Bilirubin, Urine NEGATIVE NEGATIVE mg/dL    Urobilinogen, Urine Normal Normal mg/dL    Nitrite, Urine NEGATIVE NEGATIVE    Leukocyte Esterase, Urine NEGATIVE NEGATIVE   Basic Metabolic Panel    Collection Time: 04/03/25  8:38 AM   Result Value Ref Range    Glucose 101 (H) 74 - 99 mg/dL    Sodium 143 136 - 145 mmol/L    Potassium 4.5 3.5 - 5.3 mmol/L    Chloride 107 98 - 107 mmol/L    Bicarbonate 27 21 - 32 mmol/L    Anion Gap 14 10 - 20 mmol/L    Urea Nitrogen 18 6 - 23 mg/dL    Creatinine 0.99 0.50 - 1.05 mg/dL    eGFR 60 (L) >60 mL/min/1.73m*2    Calcium 9.6 8.6 - 10.3 mg/dL   CBC    Collection Time: 04/03/25  8:38 AM   Result Value Ref Range    WBC 6.6 4.4 - 11.3 x10*3/uL    nRBC 0.0 0.0 - 0.0 /100 WBCs    RBC 5.16 4.00 - 5.20 x10*6/uL    Hemoglobin 14.6 12.0 - 16.0 g/dL    Hematocrit 46.4 (H) 36.0 - 46.0 %    MCV 90 80 - 100 fL    MCH 28.3 26.0 - 34.0 pg    MCHC 31.5 (L) 32.0 - 36.0 g/dL    RDW 13.4 11.5 - 14.5 %    Platelets 167 150 - 450 x10*3/uL         Patient Specialist/PCP: cardiology Dr. Farrell    Visit Vitals  /84   Pulse 98   Temp 36.4 °C (97.5 °F) (Temporal)   Resp 17   Ht 1.626 m (5' 4\")   Wt 100 kg (220 lb 14.4 oz)   LMP  (LMP Unknown)   SpO2 98%   BMI 37.92 kg/m²   OB Status Postmenopausal   Smoking Status Never   BSA 2.13 m²       DASI Risk Score      Flowsheet Row Pre-Admission Testing from 4/16/2024 in [facility] [place]   Can you take care of yourself (eat, dress, bathe, or use toilet)?  2.75 filed at 04/15/2024 1434   Can you walk indoors, such as around your house? 1.75 filed at 04/15/2024 1434   Can you walk a block or two on level ground?  2.75 filed at 04/15/2024 1434   Can you climb a flight of stairs or walk up a hill? 5.5 filed at 04/15/2024 1434   Can you run a short distance? 8 filed at 04/15/2024 1434   Can you do light work around the house " like dusting or washing dishes? 2.7 filed at 04/15/2024 1434   Can you do moderate work around the house like vacuuming, sweeping floors or carrying groceries? 3.5 filed at 04/15/2024 1434   Can you do heavy work around the house like scrubbing floors or lifting and moving heavy furniture?  8 filed at 04/15/2024 1434   Can you do yard work like raking leaves, weeding or pushing a mower? 4.5 filed at 04/15/2024 1434   Can you have sexual relations? 5.25 filed at 04/15/2024 1434   Can you participate in moderate recreational activities like golf, bowling, dancing, doubles tennis or throwing a baseball or football? 6 filed at 04/15/2024 1434   Can you participate in strenous sports like swimming, singles tennis, football, basketball, or skiing? 0 filed at 04/15/2024 1434   DASI SCORE 50.7 filed at 04/15/2024 1434   METS Score (Will be calculated only when all the questions are answered) 9 filed at 04/15/2024 1434          Caprini DVT Assessment      Flowsheet Row Pre-Admission Testing from 4/16/2024 in [facility] [place]   DVT Score (IF A SCORE IS NOT CALCULATING, MUST SELECT A BMI TO COMPLETE) 8 filed at 04/15/2024 1432   BMI (BMI MUST BE CHOSEN) 31-40 (Obesity) filed at 04/15/2024 1432   RETIRED: Current Status Major surgery planned, including arthroscopic and laproscopic (1-2 hours) filed at 04/15/2024 1432   RETIRED: History Prior major surgery filed at 04/15/2024 1432   RETIRED: Age 60-75 years filed at 04/15/2024 1432          Modified Frailty Index      Flowsheet Row Pre-Admission Testing from 4/16/2024 in [facility] [place]   Non-independent functional status (problems with dressing, bathing, personal grooming, or cooking) 0 filed at 04/15/2024 1437   History of diabetes mellitus  0.0909 filed at 04/15/2024 1437   History of COPD 0 filed at 04/15/2024 1437   History of CHF No filed at 04/15/2024 1437   History of MI 0 filed at 04/15/2024 1437   History of Percutaneous Coronary Intervention,  Cardiac Surgery, or Angina 0.0909 filed at 04/15/2024 1437   Hypertension requiring the use of medication  0.0909 filed at 04/15/2024 1437   Peripheral vascular disease 0 filed at 04/15/2024 1437   Impaired sensorium (cognitive impairement or loss, clouding, or delirium) 0.0909 filed at 04/15/2024 1437   TIA or CVA withouy residual deficit 0 filed at 04/15/2024 1437   Cerebrovascular accident with deficit 0 filed at 04/15/2024 1437   Modified Frailty Index Calculator .3636 filed at 04/15/2024 1437          [id number] Stroke Risk Points  Current as of just now        5 0 to 9 Points:      Last Change:           The [id number] risk score (Lip GH, et al. 2009. © 2010 [employer]) quantifies the risk of stroke for a patient with atrial fibrillation. For patients without atrial fibrillation or under the age of 18 this score appears as N/A. Higher score values generally indicate higher risk of stroke.          Points Metrics   1 Has Congestive Heart Failure:  Yes     Patients with congestive heart failure get 1 point.    Current as of just now   1 Has Hypertension:  Yes     Patients with hypertension get 1 point.    Current as of just now   1 Age:  74     Patients 65 to 74 years old get 1 point, or patients 75 years and older get 2 points.    Current as of just now   1 Has Diabetes:  Yes     Patients with diabetes get 1 point.    Current as of just now   0 Had Stroke:  No  Had TIA:  No  Had Thromboembolism:  No     Patients who have had a stroke, TIA, or thromboembolism get 2 points.    Current as of just now   0 Has Vascular Disease:  No     Patients with vascular disease get 1 point.    Current as of just now   1 Clinically Relevant Sex:  Female     Patients with a clinically relevant sex of Female get 1 point.    Current as of just now             Revised Cardiac Risk Index      Flowsheet Row Pre-Admission Testing from 4/16/2024 in [facility] [place]   High-Risk Surgery  (Intraperitoneal, Intrathoracic,Suprainguinal vascular) 0 filed at 04/15/2024 1435   History of ischemic heart disease (History of MI, History of positive exercuse test, Current chest paint considered due to myocardial ischemia, Use of nitrate therapy, ECG with pathological Q Waves) 0 filed at 04/15/2024 1435   History of congestive heart failure (pulmonary edemia, bilateral rales or S3 gallop, Paroxysmal nocturnal dyspnea, CXR showing pulmonary vascular redistribution) 0 filed at 04/15/2024 1435   History of cerebrovascular disease (Prior TIA or stroke) 0 filed at 04/15/2024 1435   Pre-operative insulin treatment 0 filed at 04/15/2024 1435          Apfel Simplified Score      Flowsheet Row Pre-Admission Testing from 4/16/2024 [place] [facility] [place]   Smoking status 1 filed at 04/15/2024 1435   History of motion sickness or PONV  0 filed at 04/15/2024 1435          Risk Analysis Index Results This Encounter    No data found in the last 10 encounters.       Stop Bang Score      Flowsheet Row Pre-Admission Testing from 4/16/2024 in [facility] [place]   Do you snore loudly? 1 filed at 04/15/2024 1432   Do you often feel tired or fatigued after your sleep? 0 filed at 04/15/2024 1432   Has anyone ever observed you stop breathing in your sleep? 1 filed at 04/15/2024 1432   Do you have or are you being treated for high blood pressure? 1 filed at 04/15/2024 1432   Recent BMI (Calculated) 39.6 filed at 04/15/2024 1432   Is BMI greater than 35 kg/m2? 1=Yes filed at 04/15/2024 1432   Age older than 50 years old? 1=Yes filed at 04/15/2024 1432   Is your neck circumference greater than 17 inches (Male) or 16 inches (Female)? 0 filed at 04/15/2024 1432   Gender - Male 0=No filed at 04/15/2024 1432   STOP-BANG Total Score 5 filed at 04/15/2024 1432          Prodigy: High Risk  Total Score: 19              Prodigy Age Score      Prodigy CHF score          ARISCAT Score for Postoperative Pulmonary Complications    No  data to display       Betsy Perioperative Risk for Myocardial Infarction or Cardiac Arrest (NEVAEH)    No data to display         Assessment and Plan:     Plan for OR on  for   Transurethral resection of bladder tumor, medium [16235 (CPT®)] N/A General   Due to bladder tumor  CBC, BMP, U/A with reflex, ECG    HEENT/Airway:  no significant findings on chart review or clinical presentation    Cardiovascular:    Follows with Dr. Farrell   Hx of afib on Eliquis  DASI  Duke Activity Status Index (DASI)  DASI Score: 42.7   MET Score: 8  RCRI: 1 point, 6.0% risk for postoperative MACE   NEVAEH: 0.07% risk for postoperative MACE      Pulmonary:  Pt has HIRAL uses CPAP most nights      Preoperative deep breathing educational handout provided to patient.  ARISCAT:   3   points which is a low (1.6%) risk of in-hospital post-op pulmonary complications   STOP BAN   points which is a intermediate risk for moderate to severe HIRAL    Renal:   See surgical plan  Endocrine:    DM on Jardiance/ Metformin  Last A1C 5.9     Hematologic:   no significant findings on chart review or clinical presentation  Preoperative DVT educational handout provided to patient.  Caprini Score:  9  points which is a high risk of perioperative VTE    Gastrointestinal:   no significant findings on chart review or clinical presentation  Apfel: 3 points 61% risk for post operative N/V    Infectious disease:  no significant findings on chart review or clinical presentation     Musculoskeletal:    No acute active issues  Pt wokring out at gym 2-3 times a week    Neuro:    No neurologic diagnoses, however, the patient is at an increased risk for post operative delirium secondary to age >/= 65.  Preoperative brain exercise educational handout provided to patient.    The patient is at an increased risk for perioperative stroke secondary to cardiac disease, preoperative interrupton of antithrombotic, increased age, HTN, HLD, DM , female sex , and general  anesthesia.

## 2025-04-02 NOTE — H&P (VIEW-ONLY)
CPM/PAT Evaluation       Name: Alyssa Maldonado (Alyssa Maldonado)  /Age: 1950/74 y.o.     In-Person       Chief Complaint: blood in the urine    HPI    This is a pleasant 75 yo with PmHx of afib, CHF, HIRAL, CKD, DM, HTN, HLD, and bladder tumor.  Pt states she had first noticed hematuria and was found to have a bladder tumor.  She has had previous TURBT and BCG treatments.  She reports routine surveillance showed another tumor.  She denies dysuria, or recent fever/chills.  Plan is for TURBT with Dr. Lao on .    Past Medical History:   Diagnosis Date    Anemia     Arrhythmia     Atrial Fibrillation    Arthritis     CHF (congestive heart failure)     Chronic kidney disease     Coronary artery disease     Diabetes mellitus (Multi)     type 2    Diverticulosis     Heart murmur     History of mammogram 2025    cat 1    Hyperlipidemia     Hypertension     Hypothyroidism     Pap test, as part of routine gynecological examination 10/2014    wnl, hpv neg    Sick sinus syndrome (Multi)     Sleep apnea     with cpap       Past Surgical History:   Procedure Laterality Date    CARDIAC CATHETERIZATION      no stents    CARDIOVERSION  2023     SECTION, CLASSIC      ,     COLONOSCOPY W/ POLYPECTOMY  2021    due     ESOPHAGOGASTRODUODENOSCOPY  2021    [facility]    OTHER SURGICAL HISTORY      bladder tumor removal    PACEMAKER PLACEMENT  2023    TONSILLECTOMY         Patient  has no history on file for sexual activity.    Family History   Problem Relation Name Age of Onset    Hyperlipidemia Father      Hypertension Father      Colon cancer Paternal Grandmother         No Known Allergies    Prior to Admission medications    Medication Sig Start Date End Date Taking? Authorizing Provider   cyanocobalamin (Vitamin B-12) 1,000 mcg tablet Take 1 tablet (1,000 mcg) by mouth once daily. 19   Historical Provider, MD   Entresto 24-26 mg tablet Take 1 tablet by mouth 2 times a day.  8/21/23   Historical Provider, MD   ferrous sulfate 325 (65 Fe) MG tablet Take 1 tablet (325 mg) by mouth once daily.    Historical Provider, MD   furosemide (Lasix) 20 mg tablet Take 1 tablet (20 mg) by mouth once daily. 9/6/23   Historical Provider, MD   Jardiance 10 mg Take 1 tablet (10 mg) by mouth once daily. 9/25/23   Historical Provider, MD   levothyroxine (Synthroid, Levoxyl) 125 mcg tablet 1 by mouth every day except Sunday 11/14/24   Lyndsey Fernando MD   metFORMIN (Glucophage) 500 mg tablet TAKE 1 TABLET TWICE DAILY  WITH MEALS 8/29/24   Lyndsey Fernando MD   metoprolol succinate XL (Toprol-XL) 25 mg 24 hr tablet Take 1 tablet (25 mg) by mouth once daily. 9/6/23   Historical Provider, MD   omega 3-dha-epa-fish oil (Fish OiL) 1,200 (144-216) mg capsule Take by mouth. 5/29/19   Historical Provider, MD   pantoprazole (ProtoNix) 40 mg EC tablet Take 1 tablet (40 mg) by mouth once daily in the morning. Take before meals. 3/4/21   Historical Provider, MD   phenazopyridine (Pyridium) 200 mg tablet Take 1 tablet (200 mg) by mouth 3 times a day as needed for bladder spasms. 4/19/24   Sulma Vasques MD   rosuvastatin (Crestor) 10 mg tablet TAKE 1 TABLET BY MOUTH EVERY DAY 1/22/25   Lyndsey Fernando MD   sotalol (Betapace) 120 mg tablet Take 1 tablet (120 mg) by mouth every 12 hours. 9/25/23   Historical Provider, MD   vitamin E acid succinate (vitamin E succinate) 268 mg (400 unit) tablet Take by mouth. 5/29/19   Historical Provider, MD   Xarelto 20 mg tablet Take 1 tablet (20 mg) by mouth once daily.    Historical Provider, MD ALMENDAREZ ROS:   Constitutional:   neg    Neuro/Psych:   neg    Eyes:    Strabismus eye surgery coming up on Monday- due to lazy eye   Pt has bilateral cataracts and they will be removed in June  Ears:   neg    Nose:   neg    Mouth:   neg    Throat:   neg    Neck:    No carotid bruits auscultated  neg    Cardio:    Follows with Dr Farrell  PM 6/13 2023  Hx of afib on xarelto,  Hx of cardiac murmur, HTN, HLD  7-2-21: s/p abnormal nuclear stress test s/p LHC on 6-4-21 with very mild CAD, EF of 55%. S/p ECHO with EF of55%, mod LVH, no valve abn  Hx of cardioversion  Respiratory:    HIRAL uses CPAP- sees sleep Doctor every 6 months  Endocrine:    DM  GI:   neg    :    See HPI  Hx of CKD  Musculoskeletal:   neg    Hematologic:   neg    Skin:  neg        Physical Exam  Constitutional:       Appearance: Normal appearance.   HENT:      Head: Normocephalic and atraumatic.      Nose: Nose normal.      Mouth/Throat:      Mouth: Mucous membranes are moist.   Eyes:      Pupils: Pupils are equal, round, and reactive to light.   Neck:      Comments: No carotid bruits auscultated  Cardiovascular:      Rate and Rhythm: Normal rate. Rhythm irregular.   Pulmonary:      Effort: Pulmonary effort is normal.      Breath sounds: Normal breath sounds.   Abdominal:      General: Bowel sounds are normal.      Palpations: Abdomen is soft.   Musculoskeletal:      Cervical back: Normal range of motion.      Comments: 1+ ankle edema right greater than left   Skin:     General: Skin is warm and dry.   Neurological:      General: No focal deficit present.      Mental Status: She is alert and oriented to person, place, and time.   Psychiatric:         Mood and Affect: Mood normal.         Behavior: Behavior normal.      Airway: nl neck ROM  Anesthesia:  Patient denies any anesthesia complications.   Teeth: intact    Testing/Diagnostic:   ECG: afib rate of 83 inc RBBB no acute changes when compared to ECG of 4/2024    Recent Results (from the past week)   Urinalysis with Reflex Culture and Microscopic    Collection Time: 04/03/25  8:31 AM   Result Value Ref Range    Color, Urine Colorless (N) Light-Yellow, Yellow, Dark-Yellow    Appearance, Urine Clear Clear    Specific Gravity, Urine 1.004 (N) 1.005 - 1.035    pH, Urine 6.0 5.0, 5.5, 6.0, 6.5, 7.0, 7.5, 8.0    Protein, Urine NEGATIVE NEGATIVE, 10 (TRACE), 20 (TRACE) mg/dL  "   Glucose, Urine OVER (4+) (A) Normal mg/dL    Blood, Urine NEGATIVE NEGATIVE mg/dL    Ketones, Urine NEGATIVE NEGATIVE mg/dL    Bilirubin, Urine NEGATIVE NEGATIVE mg/dL    Urobilinogen, Urine Normal Normal mg/dL    Nitrite, Urine NEGATIVE NEGATIVE    Leukocyte Esterase, Urine NEGATIVE NEGATIVE   Basic Metabolic Panel    Collection Time: 04/03/25  8:38 AM   Result Value Ref Range    Glucose 101 (H) 74 - 99 mg/dL    Sodium 143 136 - 145 mmol/L    Potassium 4.5 3.5 - 5.3 mmol/L    Chloride 107 98 - 107 mmol/L    Bicarbonate 27 21 - 32 mmol/L    Anion Gap 14 10 - 20 mmol/L    Urea Nitrogen 18 6 - 23 mg/dL    Creatinine 0.99 0.50 - 1.05 mg/dL    eGFR 60 (L) >60 mL/min/1.73m*2    Calcium 9.6 8.6 - 10.3 mg/dL   CBC    Collection Time: 04/03/25  8:38 AM   Result Value Ref Range    WBC 6.6 4.4 - 11.3 x10*3/uL    nRBC 0.0 0.0 - 0.0 /100 WBCs    RBC 5.16 4.00 - 5.20 x10*6/uL    Hemoglobin 14.6 12.0 - 16.0 g/dL    Hematocrit 46.4 (H) 36.0 - 46.0 %    MCV 90 80 - 100 fL    MCH 28.3 26.0 - 34.0 pg    MCHC 31.5 (L) 32.0 - 36.0 g/dL    RDW 13.4 11.5 - 14.5 %    Platelets 167 150 - 450 x10*3/uL         Patient Specialist/PCP: cardiology Dr. Farrell    Visit Vitals  /84   Pulse 98   Temp 36.4 °C (97.5 °F) (Temporal)   Resp 17   Ht 1.626 m (5' 4\")   Wt 100 kg (220 lb 14.4 oz)   LMP  (LMP Unknown)   SpO2 98%   BMI 37.92 kg/m²   OB Status Postmenopausal   Smoking Status Never   BSA 2.13 m²       DASI Risk Score      Flowsheet Row [place] [facility] [place]   Can you take care of yourself (eat, dress, bathe, or use toilet)?  2.75 filed at 04/15/2024 1434   Can you walk indoors, such as around your house? 1.75 filed at 04/15/2024 1434   Can you walk a block or two on level ground?  2.75 filed at 04/15/2024 1434   Can you climb a flight of stairs or walk up a hill? 5.5 filed at 04/15/2024 1434   Can you run a short distance? 8 filed at 04/15/2024 1434   Can you do light work around the house " like dusting or washing dishes? 2.7 filed at 04/15/2024 1434   Can you do moderate work around the house like vacuuming, sweeping floors or carrying groceries? 3.5 filed at 04/15/2024 1434   Can you do heavy work around the house like scrubbing floors or lifting and moving heavy furniture?  8 filed at 04/15/2024 1434   Can you do yard work like raking leaves, weeding or pushing a mower? 4.5 filed at 04/15/2024 1434   Can you have sexual relations? 5.25 filed at 04/15/2024 1434   Can you participate in moderate recreational activities like golf, bowling, dancing, doubles tennis or throwing a baseball or football? 6 filed at 04/15/2024 1434   Can you participate in strenous sports like swimming, singles tennis, football, basketball, or skiing? 0 filed at 04/15/2024 1434   DASI SCORE 50.7 filed at 04/15/2024 1434   METS Score (Will be calculated only when all the questions are answered) 9 filed at 04/15/2024 1434          Caprini DVT Assessment      Flowsheet Row [facility]   DVT Score (IF A SCORE IS NOT CALCULATING, MUST SELECT A BMI TO COMPLETE) 8 filed at 04/15/2024 1432   BMI (BMI MUST BE CHOSEN) 31-40 (Obesity) filed at 04/15/2024 1432   RETIRED: Current Status Major surgery planned, including arthroscopic and laproscopic (1-2 hours) filed at 04/15/2024 1432   RETIRED: History Prior major surgery filed at 04/15/2024 1432   RETIRED: Age 60-75 years filed at 04/15/2024 1432          Modified Frailty Index      Flowsheet Row [facility]Admission Testing [facility]16/[facility]   Non-independent functional status (problems with dressing, bathing, personal grooming, or cooking) 0 filed at 04/15/2024 1437   History of diabetes mellitus  0.0909 filed at 04/15/2024 1437   History of COPD 0 filed at 04/15/2024 1437   History of CHF No filed at 04/15/2024 1437   History of MI 0 filed at 04/15/2024 1437   History of Percutaneous Coronary Intervention,  Cardiac Surgery, or Angina 0.0909 filed at 04/15/2024 1437   Hypertension requiring the use of medication  0.0909 filed at 04/15/2024 1437   Peripheral vascular disease 0 filed at 04/15/2024 1437   Impaired sensorium (cognitive impairement or loss, clouding, or delirium) 0.0909 filed at 04/15/2024 1437   TIA or CVA withouy residual deficit 0 filed at 04/15/2024 1437   Cerebrovascular accident with deficit 0 filed at 04/15/2024 1437   Modified Frailty Index Calculator .3636 filed at 04/15/2024 1437          [id number] Stroke Risk Points  Current as of just now        5 0 to 9 Points:      Last Change:           The QEU3NS3-ANEs risk score (Lip GH, et al. 2009. © 2010 [employer]) quantifies the risk of stroke for a patient with atrial fibrillation. For patients without atrial fibrillation or under the age of 18 this score appears as N/A. Higher score values generally indicate higher risk of stroke.          Points Metrics   1 Has Congestive Heart Failure:  Yes     Patients with congestive heart failure get 1 point.    Current as of just now   1 Has Hypertension:  Yes     Patients with hypertension get 1 point.    Current as of just now   1 Age:  74     Patients 65 to 74 years old get 1 point, or patients 75 years and older get 2 points.    Current as of just now   1 Has Diabetes:  Yes     Patients with diabetes get 1 point.    Current as of just now   0 Had Stroke:  No  Had TIA:  No  Had Thromboembolism:  No     Patients who have had a stroke, TIA, or thromboembolism get 2 points.    Current as of just now   0 Has Vascular Disease:  No     Patients with vascular disease get 1 point.    Current as of just now   1 Clinically Relevant Sex:  Female     Patients with a clinically relevant sex of Female get 1 point.    Current as of just now             Revised Cardiac Risk Index      Flowsheet Row [facility]   High-Risk Surgery  (Intraperitoneal, Intrathoracic,Suprainguinal vascular) 0 filed at 04/15/2024 1435   History of ischemic heart disease (History of MI, History of positive exercuse test, Current chest paint considered due to myocardial ischemia, Use of nitrate therapy, ECG with pathological Q Waves) 0 filed at 04/15/2024 1435   History of congestive heart failure (pulmonary edemia, bilateral rales or S3 gallop, Paroxysmal nocturnal dyspnea, CXR showing pulmonary vascular redistribution) 0 filed at 04/15/2024 1435   History of cerebrovascular disease (Prior TIA or stroke) 0 filed at 04/15/2024 1435   Pre-operative insulin treatment 0 filed at 04/15/2024 1435          Apfel Simplified Score      Flowsheet Row Pre-Admission Testing from 4/16/2024 in [facility]   Smoking status 1 filed at 04/15/2024 1435   History of motion sickness or PONV  0 filed at 04/15/2024 1435          Risk Analysis Index Results This Encounter    No data found in the last 10 encounters.       Stop Bang Score      Flowsheet Row Pre-Admission Testing from 4/16/2024 in [facility]   Do you snore loudly? 1 filed at 04/15/2024 1432   Do you often feel tired or fatigued after your sleep? 0 filed at 04/15/2024 1432   Has anyone ever observed you stop breathing in your sleep? 1 filed at 04/15/2024 1432   Do you have or are you being treated for high blood pressure? 1 filed at 04/15/2024 1432   Recent BMI (Calculated) 39.6 filed at 04/15/2024 1432   Is BMI greater than 35 kg/m2? 1=Yes filed at 04/15/2024 1432   Age older than 50 years old? 1=Yes filed at 04/15/2024 1432   Is your neck circumference greater than 17 inches (Male) or 16 inches (Female)? 0 filed at 04/15/2024 1432   Gender - Male 0=No filed at 04/15/2024 1432   STOP-BANG Total Score 5 filed at 04/15/2024 1432          Prodigy: High Risk  Total Score: 19              Prodigy Age Score      Prodigy CHF score          ARISCAT Score for Postoperative Pulmonary Complications    No  data to display       Betsy Perioperative Risk for Myocardial Infarction or Cardiac Arrest (NEVAEH)    No data to display         Assessment and Plan:     Plan for OR on  for   Transurethral resection of bladder tumor, medium [02584 (CPT®)] N/A General   Due to bladder tumor  CBC, BMP, U/A with reflex, ECG    HEENT/Airway:  no significant findings on chart review or clinical presentation    Cardiovascular:    Follows with Dr. Farrell   Hx of afib on Eliquis  DASI  Duke Activity Status Index (DASI)  DASI Score: 42.7   MET Score: 8  RCRI: 1 point, 6.0% risk for postoperative MACE   NEVAEH: 0.07% risk for postoperative MACE      Pulmonary:  Pt has HIRAL uses CPAP most nights      Preoperative deep breathing educational handout provided to patient.  ARISCAT:   3   points which is a low (1.6%) risk of in-hospital post-op pulmonary complications   STOP BAN   points which is a intermediate risk for moderate to severe HIRAL    Renal:   See surgical plan  Endocrine:    DM on Jardiance/ Metformin  Last A1C 5.9     Hematologic:   no significant findings on chart review or clinical presentation  Preoperative DVT educational handout provided to patient.  Caprini Score:  9  points which is a high risk of perioperative VTE    Gastrointestinal:   no significant findings on chart review or clinical presentation  Apfel: 3 points 61% risk for post operative N/V    Infectious disease:  no significant findings on chart review or clinical presentation     Musculoskeletal:    No acute active issues  Pt wokring out at gym 2-3 times a week    Neuro:    No neurologic diagnoses, however, the patient is at an increased risk for post operative delirium secondary to age >/= 65.  Preoperative brain exercise educational handout provided to patient.    The patient is at an increased risk for perioperative stroke secondary to cardiac disease, preoperative interrupton of antithrombotic, increased age, HTN, HLD, DM , female sex , and general  anesthesia.

## 2025-04-03 ENCOUNTER — PRE-ADMISSION TESTING (OUTPATIENT)
Dept: PREADMISSION TESTING | Facility: HOSPITAL | Age: 75
End: 2025-04-03
Payer: MEDICARE

## 2025-04-03 ENCOUNTER — LAB (OUTPATIENT)
Dept: LAB | Facility: HOSPITAL | Age: 75
End: 2025-04-03
Payer: MEDICARE

## 2025-04-03 ENCOUNTER — TELEPHONE (OUTPATIENT)
Age: 75
End: 2025-04-03

## 2025-04-03 VITALS
DIASTOLIC BLOOD PRESSURE: 84 MMHG | TEMPERATURE: 97.5 F | WEIGHT: 220.9 LBS | RESPIRATION RATE: 17 BRPM | HEIGHT: 64 IN | SYSTOLIC BLOOD PRESSURE: 145 MMHG | OXYGEN SATURATION: 98 % | HEART RATE: 98 BPM | BODY MASS INDEX: 37.71 KG/M2

## 2025-04-03 DIAGNOSIS — D49.4 NEOPLASM OF UNSPECIFIED BEHAVIOR OF BLADDER: Primary | ICD-10-CM

## 2025-04-03 DIAGNOSIS — Z01.818 PRE-OP TESTING: Primary | ICD-10-CM

## 2025-04-03 DIAGNOSIS — D49.4 BLADDER TUMOR: ICD-10-CM

## 2025-04-03 DIAGNOSIS — R82.90 ABNORMAL URINALYSIS: ICD-10-CM

## 2025-04-03 DIAGNOSIS — R82.90 UNSPECIFIED ABNORMAL FINDINGS IN URINE: ICD-10-CM

## 2025-04-03 LAB
ANION GAP SERPL CALC-SCNC: 14 MMOL/L (ref 10–20)
APPEARANCE UR: CLEAR
ATRIAL RATE: 441 BPM
BILIRUB UR STRIP.AUTO-MCNC: NEGATIVE MG/DL
BUN SERPL-MCNC: 18 MG/DL (ref 6–23)
CALCIUM SERPL-MCNC: 9.6 MG/DL (ref 8.6–10.3)
CHLORIDE SERPL-SCNC: 107 MMOL/L (ref 98–107)
CO2 SERPL-SCNC: 27 MMOL/L (ref 21–32)
COLOR UR: COLORLESS
CREAT SERPL-MCNC: 0.99 MG/DL (ref 0.5–1.05)
EGFRCR SERPLBLD CKD-EPI 2021: 60 ML/MIN/1.73M*2
ERYTHROCYTE [DISTWIDTH] IN BLOOD BY AUTOMATED COUNT: 13.4 % (ref 11.5–14.5)
GLUCOSE SERPL-MCNC: 101 MG/DL (ref 74–99)
GLUCOSE UR STRIP.AUTO-MCNC: ABNORMAL MG/DL
HCT VFR BLD AUTO: 46.4 % (ref 36–46)
HGB BLD-MCNC: 14.6 G/DL (ref 12–16)
HOLD SPECIMEN: NORMAL
KETONES UR STRIP.AUTO-MCNC: NEGATIVE MG/DL
LEUKOCYTE ESTERASE UR QL STRIP.AUTO: NEGATIVE
MCH RBC QN AUTO: 28.3 PG (ref 26–34)
MCHC RBC AUTO-ENTMCNC: 31.5 G/DL (ref 32–36)
MCV RBC AUTO: 90 FL (ref 80–100)
NITRITE UR QL STRIP.AUTO: NEGATIVE
NRBC BLD-RTO: 0 /100 WBCS (ref 0–0)
PH UR STRIP.AUTO: 6 [PH]
PLATELET # BLD AUTO: 167 X10*3/UL (ref 150–450)
POTASSIUM SERPL-SCNC: 4.5 MMOL/L (ref 3.5–5.3)
PROT UR STRIP.AUTO-MCNC: NEGATIVE MG/DL
Q ONSET: 224 MS
QRS COUNT: 14 BEATS
QRS DURATION: 94 MS
QT INTERVAL: 362 MS
QTC CALCULATION(BAZETT): 425 MS
QTC FREDERICIA: 403 MS
R AXIS: -24 DEGREES
RBC # BLD AUTO: 5.16 X10*6/UL (ref 4–5.2)
RBC # UR STRIP.AUTO: NEGATIVE MG/DL
SODIUM SERPL-SCNC: 143 MMOL/L (ref 136–145)
SP GR UR STRIP.AUTO: 1
T AXIS: 25 DEGREES
T OFFSET: 405 MS
UROBILINOGEN UR STRIP.AUTO-MCNC: NORMAL MG/DL
VENTRICULAR RATE: 83 BPM
WBC # BLD AUTO: 6.6 X10*3/UL (ref 4.4–11.3)

## 2025-04-03 PROCEDURE — 85027 COMPLETE CBC AUTOMATED: CPT

## 2025-04-03 PROCEDURE — 93005 ELECTROCARDIOGRAM TRACING: CPT

## 2025-04-03 PROCEDURE — 99202 OFFICE O/P NEW SF 15 MIN: CPT | Performed by: NURSE PRACTITIONER

## 2025-04-03 PROCEDURE — 80048 BASIC METABOLIC PNL TOTAL CA: CPT

## 2025-04-03 PROCEDURE — 81003 URINALYSIS AUTO W/O SCOPE: CPT

## 2025-04-03 RX ORDER — METOPROLOL SUCCINATE 100 MG/1
100 TABLET, EXTENDED RELEASE ORAL DAILY
COMMUNITY

## 2025-04-03 ASSESSMENT — DUKE ACTIVITY SCORE INDEX (DASI)
CAN YOU PARTICIPATE IN STRENOUS SPORTS LIKE SWIMMING, SINGLES TENNIS, FOOTBALL, BASKETBALL, OR SKIING: NO
TOTAL_SCORE: 42.7
CAN YOU WALK INDOORS, SUCH AS AROUND YOUR HOUSE: YES
CAN YOU DO HEAVY WORK AROUND THE HOUSE LIKE SCRUBBING FLOORS OR LIFTING AND MOVING HEAVY FURNITURE: YES
CAN YOU RUN A SHORT DISTANCE: NO
CAN YOU DO LIGHT WORK AROUND THE HOUSE LIKE DUSTING OR WASHING DISHES: YES
CAN YOU PARTICIPATE IN MODERATE RECREATIONAL ACTIVITIES LIKE GOLF, BOWLING, DANCING, DOUBLES TENNIS OR THROWING A BASEBALL OR FOOTBALL: YES
DASI METS SCORE: 8
CAN YOU DO YARD WORK LIKE RAKING LEAVES, WEEDING OR PUSHING A MOWER: YES
CAN YOU DO MODERATE WORK AROUND THE HOUSE LIKE VACUUMING, SWEEPING FLOORS OR CARRYING GROCERIES: YES
CAN YOU WALK A BLOCK OR TWO ON LEVEL GROUND: YES
CAN YOU CLIMB A FLIGHT OF STAIRS OR WALK UP A HILL: YES
CAN YOU HAVE SEXUAL RELATIONS: YES
CAN YOU TAKE CARE OF YOURSELF (EAT, DRESS, BATHE, OR USE TOILET): YES

## 2025-04-03 ASSESSMENT — LIFESTYLE VARIABLES: SMOKING_STATUS: NONSMOKER

## 2025-04-03 ASSESSMENT — ACTIVITIES OF DAILY LIVING (ADL): ADL_SCORE: 0

## 2025-04-03 NOTE — PREPROCEDURE INSTRUCTIONS
Medication List            Accurate as of April 3, 2025  8:16 AM. Always use your most recent med list.                cyanocobalamin 1,000 mcg tablet  Commonly known as: Vitamin B-12  Additional Medication Adjustments for Surgery: Take last dose 7 days before surgery     Entresto 24-26 mg tablet  Generic drug: sacubitriL-valsartan  Medication Adjustments for Surgery: Take last dose 1 day (24 hours) before surgery     FERROUS SULFATE ORAL  Medication Adjustments for Surgery: Do Not take on the morning of surgery     Fish OiL 1,200 (144-216) mg capsule  Generic drug: omega 3-dha-epa-fish oil  Additional Medication Adjustments for Surgery: Take last dose 7 days before surgery     furosemide 20 mg tablet  Commonly known as: Lasix  Medication Adjustments for Surgery: Take/Use as prescribed     Jardiance 10 mg tablet  Generic drug: empagliflozin  Medication Adjustments for Surgery: Take last dose 3 days before surgery     levothyroxine 125 mcg tablet  Commonly known as: Synthroid, Levoxyl  1 by mouth every day except Sunday  Medication Adjustments for Surgery: Take/Use as prescribed     metFORMIN 500 mg tablet  Commonly known as: Glucophage  TAKE 1 TABLET TWICE DAILY  WITH MEALS  Medication Adjustments for Surgery: Take last dose 1 day (24 hours) before surgery     metoprolol succinate  mg 24 hr tablet  Commonly known as: Toprol-XL  Medication Adjustments for Surgery: Take/Use as prescribed     pantoprazole 40 mg EC tablet  Commonly known as: ProtoNix  Medication Adjustments for Surgery: Take/Use as prescribed     phenazopyridine 200 mg tablet  Commonly known as: Pyridium  Take 1 tablet (200 mg) by mouth 3 times a day as needed for bladder spasms.  Medication Adjustments for Surgery: Take last dose 1 day (24 hours) before surgery     rosuvastatin 10 mg tablet  Commonly known as: Crestor  TAKE 1 TABLET BY MOUTH EVERY DAY  Medication Adjustments for Surgery: Take/Use as prescribed     VITAMIN E ORAL     Xarelto 20  mg tablet  Generic drug: rivaroxaban  Notes to patient: Discuss with ordering provider when to stop before surgery                                PRE-OPERATIVE INSTRUCTIONS    You will receive notification one business day prior to your procedure to confirm your arrival time. It is important that you answer your phone and/or check your messages during this time. If you do not hear from the surgery center by 5 pm. the day before your procedure, please call [phone].     Please enter the building through the Outpatient entrance and take the elevator off the lobby to the 2nd floor then check in at the Outpatient Surgery desk on the 2nd floor.    INSTRUCTIONS:  Talk to your surgeon for instructions if you should stop your aspirin, blood thinner, or diabetes medicines.  DO NOT take any multivitamins or over the counter supplements for 7-10 days before surgery.  If not being admitted, you must have an adult immediately available to drive you home after surgery. We also highly recommend you have someone stay with you for the entire day and night of your surgery.  For children having surgery, a parent or legal guardian must accompany them to the surgery center. If this is not possible, please call [phone] to make additional arrangements.  For adults who are unable to consent or make medical decisions for themselves, a legal guardian or Power of  must accompany them to the surgery center. If this is not possible, please call [phone] to make additional arrangements.  Wear comfortable, loose fitting clothing.  All jewelry and piercings must be removed. If you are unable to remove an item or have a dermal piercing, please be sure to tell the nurse when you arrive for surgery.  Nail polish and make-up must be removed.  Avoid smoking or consuming alcohol for 24 hours before surgery.  To help prevent infection, please take a shower/bath and wash your hair the night before and/or morning of surgery (or follow  other specific bathing instructions provided).    Preoperative Fasting Guidelines    Why must I stop eating and drinking near surgery time?  With sedation, food or liquid in your stomach can enter your lungs causing serious complications  Increases nausea and vomiting    When do I need to stop eating and drinking before my surgery?  Do not eat any solid food after midnight the night before your surgery/procedure unless otherwise instructed by your surgeon.   You may have up to 13.5 ounces of clear liquid until TWO hours before your instructed arrival time to the hospital.  This includes water, black tea/coffee, (no milk or cream) apple juice, and electrolyte drinks (Gatorade).   You may chew gum until TWO hours before your surgery/procedure      If applicable, notify your surgeons office immediately of any new skin changes that occur to the surgical limb.      If you have any questions or concerns, please call Pre-Admission Testing at [phone].                   Home Preoperative Antibacterial Shower with Chlorhexidine gluconate (CHG)     What is a home preoperative antibacterial shower?  This shower is a way of cleaning the skin with a germ killing solution before surgery. The solution contains chlorhexidine gluconate, commonly known as CHG. CHG is a skin cleanser with germ killing ability. Let your doctor know if you are allergic to chlorhexidine.    Why do I need to take a preoperative antibacterial shower?  Skin is not sterile. It is best to try to make your skin as free of germs as possible before surgery. Proper cleansing with a germ killing soap before surgery can lower the number of germs on your skin. This helps to reduce the risk of infection at the surgical site. Following the instructions listed below will help you prepare your skin for surgery.    How do I use the solution?  Begin using your CHG soap the night before and again the morning of your procedure.   Do not shave the day before or day of  surgery.  Remove all jewelry until after surgery. Take off rings and take out all body-piercing jewelry.  Wash your face and hair with normal soap and shampoo before you use the CHG soap.  Apply the CHG solution to a clean wet washcloth. Move away from the water to avoid premature rinsing of the CHG soap as you are applying. Firmly lather your entire body from the neck down. Do not use CHG on your face, eyes, ears, or genitals.   Pay special attention to the area where your incisions will be located.  Do not scrub your skin too hard.  It is important to allow the CHG soap to sit on your skin for 3-5 minutes.  Rinse the solution off your body completely. Do not wash with your normal soap after the CHG soap solution.  Pat yourself dry with a clean, soft towel.  Do not apply powders, lotions or deodorants as these might block how the CHG soap works.   Dress in clean clothing.  Be sure to sleep with clean, freshly laundered sheets.  Be aware that CHG can cause stains on fabric. Rinse your washcloth and other linens that have contact with CHG completely. Use only non-chlorine detergents to launder the items used.

## 2025-04-03 NOTE — TELEPHONE ENCOUNTER
Pt called in and is set to have bladder with Dr. Friend at . Pt is having this surgery in 2 weeks. Pt wants to know if she is cleared and how long she should hold her medications for. Please advise     Please fax a letter to  Urology Dr. Friend's office [phone]

## 2025-04-03 NOTE — PREPROCEDURE INSTRUCTIONS
Thank you for visiting Pread[facility] at [facility]. If you have any changes to your health condition, please call the SURGEON's office to alert them and give them details of your symptoms.        Preoperative Brain Exercises    What are brain exercises?  A brain exercise is any activity that engages your thinking (cognitive) skills.    What types of activities are considered brain exercises?  Jigsaw puzzles, crossword puzzles, word jumble, memory games, word search, and many more.  Many can be found free online or on your phone via a mobile jimmy.    Why should I do brain exercises before my surgery?  More recent research has shown brain exercise before surgery can lower the risk of postoperative delirium (confusion) which can be especially important for older adults.  Patients who did brain exercises for 5 to 10 hours the days before surgery, cut their risk of postoperative delirium in half up to 1 week after surgery.      Preoperative Deep Breathing Exercises    Why it is important to do deep breathing exercises before my surgery?  Deep breathing exercises strengthen your breathing muscles.  This helps you to recover after your surgery and decreases the chance of breathing complications.    How are the deep breathing exercises done?  Sit straight with your back supported.  Breathe in deeply and slowly through your nose. Your lower rib cage should expand and your abdomen may move forward.  Hold that breath for 3 to 5 seconds.  Breathe out through pursed lips, slowly and completely.  Rest and repeat 10 times every hour while awake.  Rest longer if you become dizzy or lightheaded.      Patient and Family Education   Ways You Can Help Prevent Blood Clots     This handout explains some simple things you can do to help prevent blood clots.      Blood clots are blockages that can form in the body's veins. When a blood clot forms in your deep veins, it may be called a deep vein thrombosis, or DVT for short. Blood clots can  happen in any part of the body where blood flows, but they are most common in the arms and legs. If a piece of a blood clot breaks free and travels to the lungs, it is called a pulmonary embolus (PE). A PE can be a very serious problem.      Being in the hospital or having surgery can raise your chances of getting a blood clot because you may not be well enough to move around as much as you normally do.      Ways you can help prevent blood clots in the hospital         Wearing SCDs. SCDs stands for Sequential Compression Devices.   SCDs are special sleeves that wrap around your legs  They attach to a pump that fills them with air to gently squeeze your legs every few minutes.   This helps return the blood in your legs to your heart.   SCDs should only be taken off when walking or bathing.   SCDs may not be comfortable, but they can help save your life.               Wearing compression stockings - if your doctor orders them. These special snug fitting stockings gently squeeze your legs to help blood flow.       Walking. Walking helps move the blood in your legs.   If your doctor says it is ok, try walking the halls at least   5 times a day. Ask us to help you get up, so you don't fall.      Taking any blood thinning medicines your doctor orders.          ©[facility]; 3/23        Ways you can help prevent blood clots at home       Wearing compression stockings - if your doctor orders them. ? Walking - to help move the blood in your legs.       Taking any blood thinning medicines your doctor orders.      Signs of a blood clot or PE      Tell your doctor or nurse know right away if you have of the problems listed below.    If you are at home, seek medical care right away. Call 911 for chest pain or problems breathing.          Signs of a blood clot (DVT) - such as pain,  swelling, redness or warmth in your arm or leg      Signs of a pulmonary embolism (PE) - such as chest     pain or feeling short of breath

## 2025-04-07 ENCOUNTER — TELEPHONE (OUTPATIENT)
Age: 75
End: 2025-04-07

## 2025-04-07 ENCOUNTER — HOSPITAL ENCOUNTER (OUTPATIENT)
Dept: CARDIOLOGY | Age: 75
Discharge: HOME | End: 2025-04-07

## 2025-04-07 NOTE — TELEPHONE ENCOUNTER
Dr Cooper from [facility] called and wants dr del cid to call her at [phone] she thinks the pts pacemaker is malfunctioning. Pacer wont  readings pt is currently in the low 30s for her hr. Please advise

## 2025-04-07 NOTE — TELEPHONE ENCOUNTER
Per Milagros:  Per dr. Knott, please set the patient up for a pacemaker interrogation.  This way we can know more and if the pacemaker is truly malfunctioning.  Thanks     Called the [facility]emaker clinic to notify them that Dr. Knott would like the patient to be set up for a pacemaker interrogation. Spoke to Macy.    Patient had stopped in to the pacemaker clinic this morning already and had her pacemaker interrogated this morning. Report is in [facility] now.

## 2025-04-08 ENCOUNTER — APPOINTMENT (OUTPATIENT)
Dept: PRIMARY CARE | Facility: CLINIC | Age: 75
End: 2025-04-08
Payer: MEDICARE

## 2025-04-08 VITALS
SYSTOLIC BLOOD PRESSURE: 134 MMHG | HEART RATE: 83 BPM | TEMPERATURE: 96.5 F | HEIGHT: 64 IN | OXYGEN SATURATION: 96 % | RESPIRATION RATE: 16 BRPM | BODY MASS INDEX: 38.51 KG/M2 | DIASTOLIC BLOOD PRESSURE: 83 MMHG | WEIGHT: 225.6 LBS

## 2025-04-08 DIAGNOSIS — I25.119 ATHEROSCLEROSIS OF NATIVE CORONARY ARTERY OF NATIVE HEART WITH ANGINA PECTORIS: ICD-10-CM

## 2025-04-08 DIAGNOSIS — C67.9 MALIGNANT NEOPLASM OF URINARY BLADDER, UNSPECIFIED SITE (MULTI): ICD-10-CM

## 2025-04-08 DIAGNOSIS — E07.9 THYROID DISEASE: ICD-10-CM

## 2025-04-08 DIAGNOSIS — D64.9 ANEMIA, MILD: ICD-10-CM

## 2025-04-08 DIAGNOSIS — I10 ESSENTIAL HYPERTENSION: ICD-10-CM

## 2025-04-08 DIAGNOSIS — I48.91 ATRIAL FIBRILLATION, UNSPECIFIED TYPE (MULTI): ICD-10-CM

## 2025-04-08 DIAGNOSIS — I50.22 CHRONIC SYSTOLIC (CONGESTIVE) HEART FAILURE: ICD-10-CM

## 2025-04-08 DIAGNOSIS — R73.03 PREDIABETES: Primary | ICD-10-CM

## 2025-04-08 DIAGNOSIS — E78.2 MIXED HYPERLIPIDEMIA: ICD-10-CM

## 2025-04-08 DIAGNOSIS — N18.31 CKD STAGE G3A/A2, GFR 45-59 AND ALBUMIN CREATININE RATIO 30-299 MG/G (MULTI): ICD-10-CM

## 2025-04-08 PROBLEM — E66.01 MORBID (SEVERE) OBESITY DUE TO EXCESS CALORIES (MULTI): Status: RESOLVED | Noted: 2023-10-03 | Resolved: 2025-04-08

## 2025-04-08 LAB
ATRIAL RATE: 441 BPM
Q ONSET: 224 MS
QRS COUNT: 14 BEATS
QRS DURATION: 94 MS
QT INTERVAL: 362 MS
QTC CALCULATION(BAZETT): 425 MS
QTC FREDERICIA: 403 MS
R AXIS: -24 DEGREES
T AXIS: 25 DEGREES
T OFFSET: 405 MS
VENTRICULAR RATE: 83 BPM

## 2025-04-08 PROCEDURE — 1036F TOBACCO NON-USER: CPT | Performed by: FAMILY MEDICINE

## 2025-04-08 PROCEDURE — 99214 OFFICE O/P EST MOD 30 MIN: CPT | Performed by: FAMILY MEDICINE

## 2025-04-08 PROCEDURE — 1159F MED LIST DOCD IN RCRD: CPT | Performed by: FAMILY MEDICINE

## 2025-04-08 PROCEDURE — 1170F FXNL STATUS ASSESSED: CPT | Performed by: FAMILY MEDICINE

## 2025-04-08 PROCEDURE — 3079F DIAST BP 80-89 MM HG: CPT | Performed by: FAMILY MEDICINE

## 2025-04-08 PROCEDURE — 3075F SYST BP GE 130 - 139MM HG: CPT | Performed by: FAMILY MEDICINE

## 2025-04-08 PROCEDURE — 3008F BODY MASS INDEX DOCD: CPT | Performed by: FAMILY MEDICINE

## 2025-04-08 PROCEDURE — 1160F RVW MEDS BY RX/DR IN RCRD: CPT | Performed by: FAMILY MEDICINE

## 2025-04-08 RX ORDER — NEOMYCIN SULFATE, POLYMYXIN B SULFATE, AND DEXAMETHASONE 3.5; 10000; 1 MG/G; [USP'U]/G; MG/G
OINTMENT OPHTHALMIC
COMMUNITY
Start: 2025-04-02

## 2025-04-08 ASSESSMENT — ENCOUNTER SYMPTOMS
OCCASIONAL FEELINGS OF UNSTEADINESS: 0
LOSS OF SENSATION IN FEET: 0
DEPRESSION: 0

## 2025-04-08 ASSESSMENT — PATIENT HEALTH QUESTIONNAIRE - PHQ9
1. LITTLE INTEREST OR PLEASURE IN DOING THINGS: NOT AT ALL
SUM OF ALL RESPONSES TO PHQ9 QUESTIONS 1 AND 2: 0
2. FEELING DOWN, DEPRESSED OR HOPELESS: NOT AT ALL

## 2025-04-08 ASSESSMENT — ACTIVITIES OF DAILY LIVING (ADL)
DRESSING: INDEPENDENT
MANAGING_FINANCES: INDEPENDENT
GROCERY_SHOPPING: INDEPENDENT
TAKING_MEDICATION: INDEPENDENT
BATHING: INDEPENDENT
DOING_HOUSEWORK: INDEPENDENT

## 2025-04-08 NOTE — PROGRESS NOTES
Would like to discuss Metformin       C19: declines  Flu: declines  Pneumo: UTD  PAP/LMP: n/a   Yojana: 01/2025  [place]/Rec: 2021

## 2025-04-08 NOTE — PROGRESS NOTES
"Subjective   Patient ID: Alyssa Maldonado is a 74 y.o. female who presents for   Chief Complaint   Patient presents with    6 MONTH FOLLOW UP     C19: declines  Flu: declines  Pneumo: UTD  PAP/LMP: n/a   Yojana: 2025  [place]/Rec:   HAD EYE STRABISMUS SURGERY YESTERDAY    HPI  Patient Active Problem List   Diagnosis    Type 2 diabetes mellitus    Atrial fibrillation (Multi)    Mixed hyperlipidemia    Essential hypertension    Diverticulitis of colon    CKD stage G3a/A2, GFR 45-59 and albumin creatinine ratio  mg/g (Multi)    Anemia, mild    Cardiomegaly    Thyroid disease    Atherosclerosis of native coronary artery of native heart with angina pectoris    HIRAL (obstructive sleep apnea)    Chronic systolic (congestive) heart failure    SSS (sick sinus syndrome) (Multi)    Malignant neoplasm of urinary bladder (Multi)    Pacemaker    Bladder tumor       Past Surgical History:   Procedure Laterality Date    CARDIAC CATHETERIZATION      no stents    CARDIOVERSION  2023     SECTION, CLASSIC      ,     COLONOSCOPY W/ POLYPECTOMY  2021    due     ESOPHAGOGASTRODUODENOSCOPY  2021    [facility]    OTHER SURGICAL HISTORY      bladder tumor removal    PACEMAKER PLACEMENT  2023    TONSILLECTOMY         Review of Systems  This patient has  NO history of seizures/ CAD or CVA    NO history of recent Covid nor flu symptoms,    NO Fever nor chills today,    NO Chest pain, shortness of breath nor paroxysmal nocturnal dyspnea,  NO Nausea, vomiting, nor diarrhea,  NO Hematochezia nor melena,  NO Dysuria, hematuria, nor new incontinence issues  NO new severe headaches nor neurological complaints,  NO new issues with anxiety nor depression nor new psychiatric complaints,  NO suicidal nor homicidal ideations.     OBJECTIVE:  /83 (BP Location: Left arm, Patient Position: Sitting, BP Cuff Size: Large adult)   Pulse 83   Temp 35.8 °C (96.5 °F) (Temporal)   Resp 16   Ht 1.626 m (5' 4\")   Wt " 102 kg (225 lb 9.6 oz)   LMP  (LMP Unknown)   SpO2 96%   BMI 38.72 kg/m²      General:  alert, oriented, no acute distress.  No obvious skin rashes noted.   No gait disturbance noted/baseline gait.    Mood is pleasant,  no signs of emotional distress.   Not appearing intoxicated or altered.   No voiced delusions,   Normal, appropriate behavior.    HEENT: Normocephalic, atraumatic,   Pupils round, reactive to light  Extraocular motions intact and wnl  Tympanic membranes normal    Neck: no nuchal rigidity  No masses palpable.  No carotid bruits.  No thyromegaly.    Respiratory: Equal breath sounds  No wheezes,    rales,    nor rhonchi  No respiratory distress.    Heart: Regular rate right now and rhythm, no    murmurs  no rubs/gallops    Abdomen: no masses palpable, nontender, no rebound nor guarding.  ovwt  Extremities: NO cyanosis noted, no clubbing.   No edema noted.  2+dorsalis pedis pulses.    Normal-not antalgic, steady gait.    Pre-Admission Testing on 04/03/2025   Component Date Value Ref Range Status    Ventricular Rate 04/03/2025 83  BPM Preliminary    Atrial Rate 04/03/2025 441  BPM Preliminary    QRS Duration 04/03/2025 94  ms Preliminary    QT Interval 04/03/2025 362  ms Preliminary    QTC Calculation(Bazett) 04/03/2025 425  ms Preliminary    R Axis 04/03/2025 -24  degrees Preliminary    T Axis 04/03/2025 25  degrees Preliminary    QRS Count 04/03/2025 14  beats Preliminary    Q Onset 04/03/2025 224  ms Preliminary    T Offset 04/03/2025 405  ms Preliminary    QTC Fredericia 04/03/2025 403  ms Preliminary    Color, Urine 04/03/2025 Colorless (N)  Light-Yellow, Yellow, Dark-Yellow Final    Appearance, Urine 04/03/2025 Clear  Clear Final    Specific Gravity, Urine 04/03/2025 1.004 (N)  1.005 - 1.035 Final    pH, Urine 04/03/2025 6.0  5.0, 5.5, 6.0, 6.5, 7.0, 7.5, 8.0 Final    Protein, Urine 04/03/2025 NEGATIVE  NEGATIVE, 10 (TRACE), 20 (TRACE) mg/dL Final    Glucose, Urine 04/03/2025 OVER (4+) (A)   Normal mg/dL Final    Blood, Urine 04/03/2025 NEGATIVE  NEGATIVE mg/dL Final    Ketones, Urine 04/03/2025 NEGATIVE  NEGATIVE mg/dL Final    Bilirubin, Urine 04/03/2025 NEGATIVE  NEGATIVE mg/dL Final    Urobilinogen, Urine 04/03/2025 Normal  Normal mg/dL Final    Nitrite, Urine 04/03/2025 NEGATIVE  NEGATIVE Final    Leukocyte Esterase, Urine 04/03/2025 NEGATIVE  NEGATIVE Final    Extra Tube 04/03/2025 Hold for add-ons.   Final    Auto resulted.   Orders Only on 04/01/2025   Component Date Value Ref Range Status    Glucose 04/03/2025 101 (H)  74 - 99 mg/dL Final    Sodium 04/03/2025 143  136 - 145 mmol/L Final    Potassium 04/03/2025 4.5  3.5 - 5.3 mmol/L Final    Chloride 04/03/2025 107  98 - 107 mmol/L Final    Bicarbonate 04/03/2025 27  21 - 32 mmol/L Final    Anion Gap 04/03/2025 14  10 - 20 mmol/L Final    Urea Nitrogen 04/03/2025 18  6 - 23 mg/dL Final    Creatinine 04/03/2025 0.99  0.50 - 1.05 mg/dL Final    eGFR 04/03/2025 60 (L)  >60 mL/min/1.73m*2 Final    Calculations of estimated GFR are performed using the 2021 CKD-EPI Study Refit equation without the race variable for the IDMS-Traceable creatinine methods.  https://jasn.asnjournals.org/content/early/2021/09/22/ASN.8201712052    Calcium 04/03/2025 9.6  8.6 - 10.3 mg/dL Final    WBC 04/03/2025 6.6  4.4 - 11.3 x10*3/uL Final    nRBC 04/03/2025 0.0  0.0 - 0.0 /100 WBCs Final    RBC 04/03/2025 5.16  4.00 - 5.20 x10*6/uL Final    Hemoglobin 04/03/2025 14.6  12.0 - 16.0 g/dL Final    Hematocrit 04/03/2025 46.4 (H)  36.0 - 46.0 % Final    MCV 04/03/2025 90  80 - 100 fL Final    MCH 04/03/2025 28.3  26.0 - 34.0 pg Final    MCHC 04/03/2025 31.5 (L)  32.0 - 36.0 g/dL Final    RDW 04/03/2025 13.4  11.5 - 14.5 % Final    Platelets 04/03/2025 167  150 - 450 x10*3/uL Final   Procedure Visit on 03/25/2025   Component Date Value Ref Range Status    POC Color, Urine 03/25/2025 Yellow  Straw, Yellow, Light-Yellow Final    POC Appearance, Urine 03/25/2025 Clear  Clear Final     POC Glucose, Urine 03/25/2025 500 (3+) (A)  NEGATIVE mg/dl Final    POC Bilirubin, Urine 03/25/2025 NEGATIVE  NEGATIVE Final    POC Ketones, Urine 03/25/2025 NEGATIVE  NEGATIVE mg/dl Final    POC Specific Gravity, Urine 03/25/2025 <=1.005  1.005 - 1.035 Final    POC Blood, Urine 03/25/2025 TRACE-Intact (A)  NEGATIVE Final    POC PH, Urine 03/25/2025 5.5  No Reference Range Established PH Final    POC Protein, Urine 03/25/2025 NEGATIVE  NEGATIVE mg/dl Final    POC Urobilinogen, Urine 03/25/2025 0.2  0.2, 1.0 EU/DL Final    Poc Nitrite, Urine 03/25/2025 NEGATIVE  NEGATIVE Final    POC Leukocytes, Urine 03/25/2025 NEGATIVE  NEGATIVE Final        Assessment/Plan     Problem List Items Addressed This Visit       Atrial fibrillation (Multi)    Relevant Orders    Comprehensive Metabolic Panel    Hemoglobin A1C    Lipid Panel    Thyroid Stimulating Hormone    Thyroxine, Free    Lipid Panel    Thyroxine, Free    Thyroid Stimulating Hormone    Hemoglobin A1C    Comprehensive Metabolic Panel    Mixed hyperlipidemia    Relevant Orders    Comprehensive Metabolic Panel    Hemoglobin A1C    Lipid Panel    Thyroid Stimulating Hormone    Thyroxine, Free    Lipid Panel    Thyroxine, Free    Thyroid Stimulating Hormone    Hemoglobin A1C    Comprehensive Metabolic Panel    Essential hypertension    Relevant Orders    Comprehensive Metabolic Panel    Hemoglobin A1C    Lipid Panel    Thyroid Stimulating Hormone    Thyroxine, Free    Lipid Panel    Thyroxine, Free    Thyroid Stimulating Hormone    Hemoglobin A1C    Comprehensive Metabolic Panel    CKD stage G3a/A2, GFR 45-59 and albumin creatinine ratio  mg/g (Multi)    Relevant Orders    Comprehensive Metabolic Panel    Hemoglobin A1C    Lipid Panel    Thyroid Stimulating Hormone    Thyroxine, Free    Lipid Panel    Thyroxine, Free    Thyroid Stimulating Hormone    Hemoglobin A1C    Comprehensive Metabolic Panel    Anemia, mild    Relevant Orders    Comprehensive Metabolic Panel     Hemoglobin A1C    Lipid Panel    Thyroid Stimulating Hormone    Thyroxine, Free    Lipid Panel    Thyroxine, Free    Thyroid Stimulating Hormone    Hemoglobin A1C    Comprehensive Metabolic Panel    Thyroid disease    Relevant Orders    Comprehensive Metabolic Panel    Hemoglobin A1C    Lipid Panel    Thyroid Stimulating Hormone    Thyroxine, Free    Lipid Panel    Thyroxine, Free    Thyroid Stimulating Hormone    Hemoglobin A1C    Comprehensive Metabolic Panel    Atherosclerosis of native coronary artery of native heart with angina pectoris    Relevant Orders    Comprehensive Metabolic Panel    Hemoglobin A1C    Lipid Panel    Thyroid Stimulating Hormone    Thyroxine, Free    Lipid Panel    Thyroxine, Free    Thyroid Stimulating Hormone    Hemoglobin A1C    Comprehensive Metabolic Panel    Chronic systolic (congestive) heart failure    Relevant Orders    Comprehensive Metabolic Panel    Hemoglobin A1C    Lipid Panel    Thyroid Stimulating Hormone    Thyroxine, Free    Lipid Panel    Thyroxine, Free    Thyroid Stimulating Hormone    Hemoglobin A1C    Comprehensive Metabolic Panel    Malignant neoplasm of urinary bladder (Multi)    Relevant Orders    Comprehensive Metabolic Panel    Hemoglobin A1C    Lipid Panel    Thyroid Stimulating Hormone    Thyroxine, Free    Lipid Panel    Thyroxine, Free    Thyroid Stimulating Hormone    Hemoglobin A1C    Comprehensive Metabolic Panel     Other Visit Diagnoses       Prediabetes    -  Primary    Relevant Orders    Hemoglobin A1C        Interested in GLP1-will consider  Meds d/w pt    Labs due  The patient is aware that results will be forthcoming of ALL planned labs and or tests. If no results are received on my chart or by letter within 1 - 3 weeks, the patient is aware they need to call to obtain results, as this is not usual. Also, if any new conditions arise, or current condition worsens, it is understood that sooner appointment should be made or urgent care/convenient  care or emergency room treatment should be sought depending on severity. Otherwise follow up for recheck at regular intervals as we have discussed, at least yearly.        Alyssa Maldonado -be aware that any referrals discussed should be placed today or tests/labs ordered should result in prompt scheduling today.   If not done today-then a phone call for scheduling is expected in a timely manner(within 2 weeks).   If testing is to be done-a result should be available to patient within 2 weeks time unless otherwise specified.   You, the patient or caregiver, are responsible for making sure what was discussed is actually scheduled and completed.  If suboptimal understanding of results of tests or referral reason-a follow up appointment with me should be made.  If above does NOT occur-you are to connect with us for an explanation.    Follow up at next scheduled visit -as planned or directed today.  Sooner if new or unresolved issues of concern.    Alyssa Maldonado We know you have a choice for your health care, THANK YOU for choosing  and [facility].  We APPRECIATE YOU.  Sincerely,   Lyndsey Fernando MD   (dr. Lozada)

## 2025-04-09 LAB
ALBUMIN SERPL-MCNC: 4.2 G/DL (ref 3.6–5.1)
ALP SERPL-CCNC: 61 U/L (ref 37–153)
ALT SERPL-CCNC: 12 U/L (ref 6–29)
ANION GAP SERPL CALCULATED.4IONS-SCNC: 9 MMOL/L (CALC) (ref 7–17)
AST SERPL-CCNC: 17 U/L (ref 10–35)
BILIRUB SERPL-MCNC: 0.7 MG/DL (ref 0.2–1.2)
BUN SERPL-MCNC: 14 MG/DL (ref 7–25)
CALCIUM SERPL-MCNC: 9.8 MG/DL (ref 8.6–10.4)
CHLORIDE SERPL-SCNC: 108 MMOL/L (ref 98–110)
CHOLEST SERPL-MCNC: 156 MG/DL
CHOLEST/HDLC SERPL: 2.5 (CALC)
CO2 SERPL-SCNC: 27 MMOL/L (ref 20–32)
CREAT SERPL-MCNC: 1 MG/DL (ref 0.6–1)
EGFRCR SERPLBLD CKD-EPI 2021: 59 ML/MIN/1.73M2
EST. AVERAGE GLUCOSE BLD GHB EST-MCNC: 131 MG/DL
EST. AVERAGE GLUCOSE BLD GHB EST-SCNC: 7.3 MMOL/L
GLUCOSE SERPL-MCNC: 100 MG/DL (ref 65–99)
HBA1C MFR BLD: 6.2 % OF TOTAL HGB
HDLC SERPL-MCNC: 62 MG/DL
LDLC SERPL CALC-MCNC: 70 MG/DL (CALC)
NONHDLC SERPL-MCNC: 94 MG/DL (CALC)
POTASSIUM SERPL-SCNC: 4.4 MMOL/L (ref 3.5–5.3)
PROT SERPL-MCNC: 6.7 G/DL (ref 6.1–8.1)
SODIUM SERPL-SCNC: 144 MMOL/L (ref 135–146)
T4 FREE SERPL-MCNC: 1.3 NG/DL (ref 0.8–1.8)
TRIGL SERPL-MCNC: 159 MG/DL
TSH SERPL-ACNC: 0.41 MIU/L (ref 0.4–4.5)

## 2025-04-16 ENCOUNTER — HOSPITAL ENCOUNTER (OUTPATIENT)
Facility: HOSPITAL | Age: 75
Setting detail: OUTPATIENT SURGERY
Discharge: HOME | End: 2025-04-16
Attending: UROLOGY | Admitting: UROLOGY
Payer: MEDICARE

## 2025-04-16 ENCOUNTER — ANESTHESIA EVENT (OUTPATIENT)
Dept: OPERATING ROOM | Facility: HOSPITAL | Age: 75
End: 2025-04-16
Payer: MEDICARE

## 2025-04-16 ENCOUNTER — ANESTHESIA (OUTPATIENT)
Dept: OPERATING ROOM | Facility: HOSPITAL | Age: 75
End: 2025-04-16
Payer: MEDICARE

## 2025-04-16 VITALS
SYSTOLIC BLOOD PRESSURE: 139 MMHG | OXYGEN SATURATION: 96 % | HEART RATE: 79 BPM | WEIGHT: 200 LBS | BODY MASS INDEX: 35.44 KG/M2 | RESPIRATION RATE: 18 BRPM | HEIGHT: 63 IN | DIASTOLIC BLOOD PRESSURE: 93 MMHG | TEMPERATURE: 96.8 F

## 2025-04-16 DIAGNOSIS — D49.4 BLADDER TUMOR: ICD-10-CM

## 2025-04-16 LAB — GLUCOSE BLD MANUAL STRIP-MCNC: 98 MG/DL (ref 74–99)

## 2025-04-16 PROCEDURE — 7100000010 HC PHASE TWO TIME - EACH INCREMENTAL 1 MINUTE: Performed by: UROLOGY

## 2025-04-16 PROCEDURE — 82947 ASSAY GLUCOSE BLOOD QUANT: CPT

## 2025-04-16 PROCEDURE — 3600000003 HC OR TIME - INITIAL BASE CHARGE - PROCEDURE LEVEL THREE: Performed by: UROLOGY

## 2025-04-16 PROCEDURE — 3700000001 HC GENERAL ANESTHESIA TIME - INITIAL BASE CHARGE: Performed by: UROLOGY

## 2025-04-16 PROCEDURE — 2500000004 HC RX 250 GENERAL PHARMACY W/ HCPCS (ALT 636 FOR OP/ED): Performed by: UROLOGY

## 2025-04-16 PROCEDURE — A52235 PR CYSTOURETHROSCOPY,FULGUR 2-5 CM LESN: Performed by: ANESTHESIOLOGY

## 2025-04-16 PROCEDURE — 52235 CYSTOSCOPY AND TREATMENT: CPT | Performed by: UROLOGY

## 2025-04-16 PROCEDURE — 99100 ANES PT EXTEME AGE<1 YR&>70: CPT | Performed by: ANESTHESIOLOGY

## 2025-04-16 PROCEDURE — 3600000008 HC OR TIME - EACH INCREMENTAL 1 MINUTE - PROCEDURE LEVEL THREE: Performed by: UROLOGY

## 2025-04-16 PROCEDURE — 2500000004 HC RX 250 GENERAL PHARMACY W/ HCPCS (ALT 636 FOR OP/ED): Mod: JZ | Performed by: ANESTHESIOLOGIST ASSISTANT

## 2025-04-16 PROCEDURE — 7100000009 HC PHASE TWO TIME - INITIAL BASE CHARGE: Performed by: UROLOGY

## 2025-04-16 PROCEDURE — 7100000001 HC RECOVERY ROOM TIME - INITIAL BASE CHARGE: Performed by: UROLOGY

## 2025-04-16 PROCEDURE — 3700000002 HC GENERAL ANESTHESIA TIME - EACH INCREMENTAL 1 MINUTE: Performed by: UROLOGY

## 2025-04-16 PROCEDURE — A52235 PR CYSTOURETHROSCOPY,FULGUR 2-5 CM LESN: Performed by: ANESTHESIOLOGIST ASSISTANT

## 2025-04-16 PROCEDURE — 7100000002 HC RECOVERY ROOM TIME - EACH INCREMENTAL 1 MINUTE: Performed by: UROLOGY

## 2025-04-16 PROCEDURE — 2720000007 HC OR 272 NO HCPCS: Performed by: UROLOGY

## 2025-04-16 RX ORDER — LIDOCAINE HYDROCHLORIDE 20 MG/ML
INJECTION, SOLUTION INFILTRATION; PERINEURAL AS NEEDED
Status: DISCONTINUED | OUTPATIENT
Start: 2025-04-16 | End: 2025-04-16

## 2025-04-16 RX ORDER — ONDANSETRON HYDROCHLORIDE 2 MG/ML
4 INJECTION, SOLUTION INTRAVENOUS ONCE AS NEEDED
Status: DISCONTINUED | OUTPATIENT
Start: 2025-04-16 | End: 2025-04-16 | Stop reason: HOSPADM

## 2025-04-16 RX ORDER — HYDROMORPHONE HYDROCHLORIDE 0.2 MG/ML
0.2 INJECTION INTRAMUSCULAR; INTRAVENOUS; SUBCUTANEOUS EVERY 5 MIN PRN
Status: DISCONTINUED | OUTPATIENT
Start: 2025-04-16 | End: 2025-04-16 | Stop reason: HOSPADM

## 2025-04-16 RX ORDER — SODIUM CHLORIDE, SODIUM LACTATE, POTASSIUM CHLORIDE, CALCIUM CHLORIDE 600; 310; 30; 20 MG/100ML; MG/100ML; MG/100ML; MG/100ML
INJECTION, SOLUTION INTRAVENOUS CONTINUOUS PRN
Status: DISCONTINUED | OUTPATIENT
Start: 2025-04-16 | End: 2025-04-16

## 2025-04-16 RX ORDER — LABETALOL HYDROCHLORIDE 5 MG/ML
5 INJECTION, SOLUTION INTRAVENOUS ONCE AS NEEDED
Status: DISCONTINUED | OUTPATIENT
Start: 2025-04-16 | End: 2025-04-16 | Stop reason: HOSPADM

## 2025-04-16 RX ORDER — CEFAZOLIN SODIUM 2 G/100ML
2 INJECTION, SOLUTION INTRAVENOUS ONCE
Status: COMPLETED | OUTPATIENT
Start: 2025-04-16 | End: 2025-04-16

## 2025-04-16 RX ORDER — ALBUTEROL SULFATE 0.83 MG/ML
2.5 SOLUTION RESPIRATORY (INHALATION) ONCE AS NEEDED
Status: DISCONTINUED | OUTPATIENT
Start: 2025-04-16 | End: 2025-04-16 | Stop reason: HOSPADM

## 2025-04-16 RX ORDER — HYDRALAZINE HYDROCHLORIDE 20 MG/ML
5 INJECTION INTRAMUSCULAR; INTRAVENOUS EVERY 30 MIN PRN
Status: DISCONTINUED | OUTPATIENT
Start: 2025-04-16 | End: 2025-04-16 | Stop reason: HOSPADM

## 2025-04-16 RX ORDER — ONDANSETRON HYDROCHLORIDE 2 MG/ML
INJECTION, SOLUTION INTRAVENOUS AS NEEDED
Status: DISCONTINUED | OUTPATIENT
Start: 2025-04-16 | End: 2025-04-16

## 2025-04-16 RX ORDER — FENTANYL CITRATE 50 UG/ML
INJECTION, SOLUTION INTRAMUSCULAR; INTRAVENOUS AS NEEDED
Status: DISCONTINUED | OUTPATIENT
Start: 2025-04-16 | End: 2025-04-16

## 2025-04-16 RX ORDER — ACETAMINOPHEN 325 MG/1
975 TABLET ORAL ONCE
Status: DISCONTINUED | OUTPATIENT
Start: 2025-04-16 | End: 2025-04-16 | Stop reason: HOSPADM

## 2025-04-16 RX ORDER — LIDOCAINE HYDROCHLORIDE 10 MG/ML
0.1 INJECTION, SOLUTION INFILTRATION; PERINEURAL ONCE
Status: DISCONTINUED | OUTPATIENT
Start: 2025-04-16 | End: 2025-04-16 | Stop reason: HOSPADM

## 2025-04-16 RX ORDER — PROPOFOL 10 MG/ML
INJECTION, EMULSION INTRAVENOUS AS NEEDED
Status: DISCONTINUED | OUTPATIENT
Start: 2025-04-16 | End: 2025-04-16

## 2025-04-16 RX ORDER — SODIUM CHLORIDE, SODIUM LACTATE, POTASSIUM CHLORIDE, CALCIUM CHLORIDE 600; 310; 30; 20 MG/100ML; MG/100ML; MG/100ML; MG/100ML
100 INJECTION, SOLUTION INTRAVENOUS CONTINUOUS
Status: DISCONTINUED | OUTPATIENT
Start: 2025-04-16 | End: 2025-04-16 | Stop reason: HOSPADM

## 2025-04-16 RX ORDER — FAMOTIDINE 10 MG/ML
20 INJECTION, SOLUTION INTRAVENOUS ONCE
Status: DISCONTINUED | OUTPATIENT
Start: 2025-04-16 | End: 2025-04-16 | Stop reason: HOSPADM

## 2025-04-16 RX ORDER — METOCLOPRAMIDE HYDROCHLORIDE 5 MG/ML
10 INJECTION INTRAMUSCULAR; INTRAVENOUS ONCE AS NEEDED
Status: DISCONTINUED | OUTPATIENT
Start: 2025-04-16 | End: 2025-04-16 | Stop reason: HOSPADM

## 2025-04-16 RX ORDER — DIPHENHYDRAMINE HYDROCHLORIDE 50 MG/ML
12.5 INJECTION, SOLUTION INTRAMUSCULAR; INTRAVENOUS ONCE AS NEEDED
Status: DISCONTINUED | OUTPATIENT
Start: 2025-04-16 | End: 2025-04-16 | Stop reason: HOSPADM

## 2025-04-16 RX ORDER — OXYCODONE AND ACETAMINOPHEN 5; 325 MG/1; MG/1
1 TABLET ORAL EVERY 4 HOURS PRN
Status: DISCONTINUED | OUTPATIENT
Start: 2025-04-16 | End: 2025-04-16 | Stop reason: HOSPADM

## 2025-04-16 RX ORDER — OXYCODONE HYDROCHLORIDE 5 MG/1
5 TABLET ORAL EVERY 4 HOURS PRN
Status: DISCONTINUED | OUTPATIENT
Start: 2025-04-16 | End: 2025-04-16 | Stop reason: HOSPADM

## 2025-04-16 RX ADMIN — FENTANYL CITRATE 50 MCG: 50 INJECTION, SOLUTION INTRAMUSCULAR; INTRAVENOUS at 13:41

## 2025-04-16 RX ADMIN — PROPOFOL 50 MG: 10 INJECTION, EMULSION INTRAVENOUS at 13:55

## 2025-04-16 RX ADMIN — LIDOCAINE HYDROCHLORIDE 60 MG: 20 INJECTION, SOLUTION INFILTRATION; PERINEURAL at 13:41

## 2025-04-16 RX ADMIN — ONDANSETRON 4 MG: 2 INJECTION, SOLUTION INTRAMUSCULAR; INTRAVENOUS at 14:11

## 2025-04-16 RX ADMIN — SODIUM CHLORIDE, POTASSIUM CHLORIDE, SODIUM LACTATE AND CALCIUM CHLORIDE: 600; 310; 30; 20 INJECTION, SOLUTION INTRAVENOUS at 13:34

## 2025-04-16 RX ADMIN — FENTANYL CITRATE 50 MCG: 50 INJECTION, SOLUTION INTRAMUSCULAR; INTRAVENOUS at 13:55

## 2025-04-16 RX ADMIN — CEFAZOLIN SODIUM 2 G: 2 INJECTION, SOLUTION INTRAVENOUS at 13:44

## 2025-04-16 RX ADMIN — PROPOFOL 150 MG: 10 INJECTION, EMULSION INTRAVENOUS at 13:41

## 2025-04-16 RX ADMIN — DEXAMETHASONE SODIUM PHOSPHATE 4 MG: 4 INJECTION INTRA-ARTICULAR; INTRALESIONAL; INTRAMUSCULAR; INTRAVENOUS; SOFT TISSUE at 13:41

## 2025-04-16 SDOH — HEALTH STABILITY: MENTAL HEALTH: CURRENT SMOKER: 0

## 2025-04-16 ASSESSMENT — PAIN - FUNCTIONAL ASSESSMENT: PAIN_FUNCTIONAL_ASSESSMENT: 0-10

## 2025-04-16 ASSESSMENT — PAIN SCALES - GENERAL
PAINLEVEL_OUTOF10: 0 - NO PAIN

## 2025-04-16 NOTE — ANESTHESIA PREPROCEDURE EVALUATION
Patient: Alyssa Maldonado    Procedure Information       Date/Time: 04/16/25 1410    Procedure: Transurethral resection of bladder tumor, medium    Location: [facility] OR 08 / [facility] OR    Surgeons: Brandon Lao MD            Relevant Problems   Cardiac   (+) Atherosclerosis of native coronary artery of native heart with angina pectoris   (+) Atrial fibrillation (Multi)   (+) Essential hypertension   (+) Mixed hyperlipidemia   (+) Pacemaker   (+) SSS (sick sinus syndrome) (Multi)      Pulmonary   (+) HIRAL (obstructive sleep apnea)      Endocrine   (+) Type 2 diabetes mellitus      Hematology   (+) Anemia, mild       Clinical information reviewed:   Tobacco  Allergies  Meds   Med Hx  Surg Hx   Fam Hx  Soc Hx        NPO Detail:  NPO/Void Status  Date of Last Liquid: 04/16/25  Time of Last Liquid: 0800  Date of Last Solid: 04/15/25  Time of Last Solid: 1900  Time of Last Void: 1045         Physical Exam    Airway  Mallampati: II  TM distance: >3 FB  Mouth opening: 3 or more finger widths     Cardiovascular   Rhythm: regular  Rate: normal     Dental - normal exam     Pulmonary - normal examBreath sounds clear to auscultation     Abdominal            Anesthesia Plan    History of general anesthesia?: yes  History of complications of general anesthesia?: no    ASA 3     general     The patient is not a current smoker.  Education provided regarding risk of obstructive sleep apnea.  intravenous induction   Postoperative pain plan includes opioids.  Anesthetic plan and risks discussed with patient.    Plan discussed with CAA.

## 2025-04-16 NOTE — ANESTHESIA POSTPROCEDURE EVALUATION
Patient: Alyssa Maldonado    Procedure Summary       Date: 04/16/25 Room / Location: [facility] OR 08 / [facility]    Anesthesia Start: 1334 Anesthesia Stop: 1420    Procedure: Transurethral resection of bladder tumor, medium Diagnosis:       Bladder tumor      (Bladder tumor [D49.4])    Surgeons: Brandon Lao MD Responsible Provider: Ralph Theodore MD    Anesthesia Type: general ASA Status: 3            Anesthesia Type: general    Vitals Value Taken Time   /95 04/16/25 14:20   Temp 36 04/16/25 14:20   Pulse 62 04/16/25 14:20   Resp 14 04/16/25 14:20   SpO2 94 04/16/25 14:20       Anesthesia Post Evaluation    Patient location during evaluation: PACU  Patient participation: complete - patient participated  Level of consciousness: awake and alert  Pain management: satisfactory to patient  Airway patency: patent  Cardiovascular status: acceptable  Respiratory status: acceptable and face mask  Hydration status: acceptable  Postoperative Nausea and Vomiting: none        No notable events documented.

## 2025-04-16 NOTE — ANESTHESIA PROCEDURE NOTES
Airway  Date/Time: 4/16/2025 1:43 PM  Reason: elective    Airway not difficult    Staffing  Performed: LYNDON   Authorized by: Ralph Theodore MD    Performed by: LYNDON Aguilera  Patient location during procedure: OR    Patient Condition  Indications for airway management: anesthesia  Patient position: sniffing  MILS maintained throughout  Sedation level: deep     Final Airway Details   Preoxygenated: yes  Final airway type: supraglottic airway  Successful airway: classic  Size: 4  Airway Seal Pressure (cm H2O): 10  Number of attempts at approach: 1  Number of other approaches attempted: 0    Additional Comments  Lips and teeth in pre anesthetic conditions following LMA placement

## 2025-04-16 NOTE — DISCHARGE INSTRUCTIONS
DEPARTMENT OF UROLOGY  DISCHARGE INSTRUCTIONS TURBT/BIOPSY  Outpatient Surgery    C O N F I D E N T I A L   I N F O R M A T I O N    Alyssa Maldonado      Call [phone] during regular daytime business hours (8:00 am - 5:00 pm) and after 5:00 pm and ask for the Urology resident with any questions or concerns.      If it is a life-threatening situation, proceed to the nearest emergency department.        Follow-up appointment:      Thank you for the opportunity to care for you today.  Your health and healing are very important to us.  We hope we made you feel as comfortable as possible and are committed to your recovery and continued well-being.      The following is a brief overview of your transurethral bladder tumor resection/biopsy today. Some of the information contained on this summary may be confidential.  This information should be kept in your records and should be shared with your regular doctor.    Physicians:   Dr. Silva    Procedure performed: bladder tumor resection/biopsy  Pending results:   pathology of tissue taken from your bladder (we will go over these results at your post-operative appointment.)    What to Expect During your Recovery and Home Care  Anesthesia Side Effects   You received general anesthesia today.  You may feel sleepy, tired, or have a sore throat.   You may also feel drowsiness, dizziness, or inability to think clearly.  For your safety, do not drive, drink alcoholic beverages, take any unprescribed medication or make any important decisions for 24 hours.  A responsible adult should be with you for 24 hours.        Activity and Recovery    No heavy lifting over ten pounds x 2 weeks. Limit activity if you have a urinary catheter in place. Avoid activities that would cause pulling or tugging on your catheter.    Do not drive or operate heavy machinery while taking narcotic pain medications as these medications can alter perception, impair judgement, and slow reaction  times.      Pain Control  Unfortunately, you may experience pain after your procedure.  Adequate management can include alternative measures to help ease your pain and can include over the counter Tylenol or Ibuprofen can be taken as prescribed as needed for breakthrough pain. Do not take more than 4,000mg of Tylenol in a 24-hour period.      You may also experience bladder spasms or burning with urination. You can purchase AZO from most pharmacies to ease this discomfort.     Nausea/Vomiting   Clear liquids are best tolerated at first. Start slow, advance your diet as tolerated to normal foods. Avoid spicy, greasy, heavy foods at first. Also, you may feel nauseous or like you need to vomit if you take any type of medication on an empty stomach.  Call your physician if you are unable to eat or drink and have persistent vomiting.    Signs of Bleeding   You are going to have some blood in your urine. Your urine will be light pink to yellow. If you have a catheter you always want to look at the urine in the tubing of your catheter and not in the large urine collection bag to check for bleeding. If urine becomes thick dark praveen red, has large clots or stops draining, please notify your physician.    Treatment/wound care:   Keep area(s) clean and dry. Clean around insertion site of catheter daily with mild soap and water.  It is okay to shower 24 hours after time of surgery.    Do not submerge your catheter in standing water until seen for follow up appointment (no tub bathing, swimming, or hot tubs).      Signs of Infection  Signs of infection can include fever, drainage(green/yellow), chills, burning sensation with passing of urine, catheter leakage, or severe abdominal pain.  If you see any of these occur, please contact your doctor's office at [phone]. Any fever higher than 100.4, especially if associated with an ill feeling, abdominal pain, chills, or nausea should be reported to your surgeon.          Assist  in bowel movements/urination  Increase fiber in diet  Increase water (6 to 8 glasses)  Increase walking   Urination should occur within 6 hours of anesthesia  If you have tried these methods and your bladder still feels full and you cannot use the bathroom, please go to your nearest Emergency room.    Additional Instructions: CATHETER CARE  Always keep the catheters tubing and drainage bag below the level of your bladder.  Avoid loops and kinks in the catheter tubing.  NOTIFY your physician if catheter falls out or catheter seems clogged and urine is not draining.   Do not wear the small leg bag to bed you should be provided with a larger overnight bag that you should wear to bed and can hang over the side of the bed.  We recommend wearing the large bag in the shower as well as this is easy to dry, and you do not get your leg straps wet from your leg bag.   Your catheter should be secured to your upper thigh, do not allow it to hang or dangle.  Your catheter will be removed at your post-operative appointment.

## 2025-04-21 ENCOUNTER — HOSPITAL ENCOUNTER (OUTPATIENT)
Dept: CARDIOLOGY | Age: 75
Discharge: HOME | End: 2025-04-21
Payer: MEDICARE

## 2025-04-21 ENCOUNTER — PATIENT MESSAGE (OUTPATIENT)
Dept: PRIMARY CARE | Facility: CLINIC | Age: 75
End: 2025-04-21
Payer: MEDICARE

## 2025-04-21 DIAGNOSIS — Z95.0 PACEMAKER: Primary | ICD-10-CM

## 2025-04-21 DIAGNOSIS — D64.9 ANEMIA, MILD: ICD-10-CM

## 2025-04-21 PROCEDURE — 93296 REM INTERROG EVL PM/IDS: CPT

## 2025-04-25 RX ORDER — PANTOPRAZOLE SODIUM 40 MG/1
40 TABLET, DELAYED RELEASE ORAL
Qty: 90 TABLET | Refills: 3 | Status: SHIPPED | OUTPATIENT
Start: 2025-04-25

## 2025-04-29 DIAGNOSIS — E11.69 TYPE 2 DIABETES MELLITUS WITH OTHER SPECIFIED COMPLICATION, UNSPECIFIED WHETHER LONG TERM INSULIN USE (MULTI): Primary | ICD-10-CM

## 2025-05-02 ENCOUNTER — OFFICE VISIT (OUTPATIENT)
Age: 75
End: 2025-05-02
Payer: MEDICARE

## 2025-05-02 VITALS
WEIGHT: 222 LBS | HEART RATE: 91 BPM | OXYGEN SATURATION: 98 % | BODY MASS INDEX: 38.09 KG/M2 | SYSTOLIC BLOOD PRESSURE: 120 MMHG | DIASTOLIC BLOOD PRESSURE: 80 MMHG

## 2025-05-02 DIAGNOSIS — I42.9 CARDIOMYOPATHY, UNSPECIFIED TYPE (HCC): ICD-10-CM

## 2025-05-02 DIAGNOSIS — I10 HYPERTENSION, UNSPECIFIED TYPE: ICD-10-CM

## 2025-05-02 DIAGNOSIS — I48.21 PERMANENT ATRIAL FIBRILLATION (HCC): Primary | ICD-10-CM

## 2025-05-02 DIAGNOSIS — I50.22 CHRONIC SYSTOLIC CHF (CONGESTIVE HEART FAILURE), NYHA CLASS 1 (HCC): ICD-10-CM

## 2025-05-02 DIAGNOSIS — N18.2 CKD (CHRONIC KIDNEY DISEASE) STAGE 2, GFR 60-89 ML/MIN: ICD-10-CM

## 2025-05-02 DIAGNOSIS — I25.10 MILD CAD: ICD-10-CM

## 2025-05-02 DIAGNOSIS — I42.8 NONISCHEMIC CARDIOMYOPATHY (HCC): ICD-10-CM

## 2025-05-02 DIAGNOSIS — I48.19 PERSISTENT ATRIAL FIBRILLATION (HCC): ICD-10-CM

## 2025-05-02 DIAGNOSIS — E78.2 MIXED HYPERLIPIDEMIA: ICD-10-CM

## 2025-05-02 PROCEDURE — 93005 ELECTROCARDIOGRAM TRACING: CPT | Performed by: INTERNAL MEDICINE

## 2025-05-02 PROCEDURE — 99213 OFFICE O/P EST LOW 20 MIN: CPT | Performed by: INTERNAL MEDICINE

## 2025-05-02 NOTE — PROGRESS NOTES
Chief Complaint   Patient presents with    Atrial Fibrillation    Congestive Heart Failure    Hypertension    Hyperlipidemia    6 Month Follow-Up       5-21-21: Patient presents for initial medical evaluation. Patient is followed on a regular basis by Nichelle Daniel MD. S/p hospitalization for CP/heaviness. Had negative wokup in ER. Symptoms occurred at rest and was worse with carrying groceries. Had radiation to right shoulder. + associated SOB. No hx of MI, CHF or arrhythmia.    was worried about her during the episode. Never seen her like this.   No hx of MI, CHF or arrhythmia. No hx of cath.   Pt denies   nausea, vomiting, diarrhea, constipation, motor weakness, insomnia, weight loss, syncope, dizziness, lightheadedness, palpitations, PND, orthopnea, or claudication.  Does have history of iron deficiency anemia ranging from 8-10. Does have hx of endoscopy.       7-2-21:  Patient is a 70 y.o. female who presented for elective stress test and echo. Patient is followed on a regular basis by Dr. Nichelle Redding MD.  Patient with past medical 3 of hypertension, hyperlipidemia and diabetes.  Patient status post recent hospitalization for chest pain/heaviness and had negative work-up in the emergency department.  Patient developed chest pain that occurred at rest and was worsened with exertion with radiation to the right shoulder associated with shortness of breath.   Patient was noted to be in new onset atrial fibrillation with rapid ventricular response on presentation to the stress test.  Patient does not sense that she is in atrial fibrillation.  Preliminary stress test images reviewed showing mild anterior/anterior apical ischemia versus breast attenuation artifact.  Patient currently is chest pain-free.  Hematology oncology..  No previous history of myocardial infarction, congestive heart failure.  No history of cardiac catheterization.  Patient with history of iron deficiency anemia ranging

## 2025-05-05 ENCOUNTER — OFFICE VISIT (OUTPATIENT)
Age: 75
End: 2025-05-05
Payer: MEDICARE

## 2025-05-05 VITALS
OXYGEN SATURATION: 99 % | DIASTOLIC BLOOD PRESSURE: 82 MMHG | HEART RATE: 83 BPM | SYSTOLIC BLOOD PRESSURE: 124 MMHG | BODY MASS INDEX: 38.09 KG/M2 | WEIGHT: 222 LBS

## 2025-05-05 DIAGNOSIS — G47.33 OSA (OBSTRUCTIVE SLEEP APNEA): Primary | ICD-10-CM

## 2025-05-05 DIAGNOSIS — E66.9 OBESITY (BMI 30-39.9): ICD-10-CM

## 2025-05-05 PROCEDURE — 1159F MED LIST DOCD IN RCRD: CPT | Performed by: INTERNAL MEDICINE

## 2025-05-05 PROCEDURE — G8400 PT W/DXA NO RESULTS DOC: HCPCS | Performed by: INTERNAL MEDICINE

## 2025-05-05 PROCEDURE — 99214 OFFICE O/P EST MOD 30 MIN: CPT | Performed by: INTERNAL MEDICINE

## 2025-05-05 PROCEDURE — 3074F SYST BP LT 130 MM HG: CPT | Performed by: INTERNAL MEDICINE

## 2025-05-05 PROCEDURE — 1090F PRES/ABSN URINE INCON ASSESS: CPT | Performed by: INTERNAL MEDICINE

## 2025-05-05 PROCEDURE — 3079F DIAST BP 80-89 MM HG: CPT | Performed by: INTERNAL MEDICINE

## 2025-05-05 PROCEDURE — 1123F ACP DISCUSS/DSCN MKR DOCD: CPT | Performed by: INTERNAL MEDICINE

## 2025-05-05 PROCEDURE — G8417 CALC BMI ABV UP PARAM F/U: HCPCS | Performed by: INTERNAL MEDICINE

## 2025-05-05 PROCEDURE — G8427 DOCREV CUR MEDS BY ELIG CLIN: HCPCS | Performed by: INTERNAL MEDICINE

## 2025-05-05 PROCEDURE — 3017F COLORECTAL CA SCREEN DOC REV: CPT | Performed by: INTERNAL MEDICINE

## 2025-05-05 PROCEDURE — 1036F TOBACCO NON-USER: CPT | Performed by: INTERNAL MEDICINE

## 2025-05-05 PROCEDURE — 99213 OFFICE O/P EST LOW 20 MIN: CPT | Performed by: INTERNAL MEDICINE

## 2025-05-05 ASSESSMENT — ENCOUNTER SYMPTOMS
SINUS PRESSURE: 0
ABDOMINAL PAIN: 0
NAUSEA: 0
SHORTNESS OF BREATH: 0
VOICE CHANGE: 0
TROUBLE SWALLOWING: 0
DIARRHEA: 0
WHEEZING: 0
CHEST TIGHTNESS: 0
SORE THROAT: 0
VOMITING: 0
EYE ITCHING: 0
RHINORRHEA: 0
COUGH: 0
EYE DISCHARGE: 0

## 2025-05-05 NOTE — PROGRESS NOTES
Subjective:             Evette Londono is a 74 y.o. female who complains today of:     Chief Complaint   Patient presents with    Follow-up     6m f/u on GALINA        HPI  She is using CPAP with 11   centimeters of H2O with heated humidity.  She is using CPAP for about   6-7  hours every night.  She is using CPAP with   nasal Mask.  She said  sleep is restful with the CPAP use.  She is compliant with CPAP therapy and benefiting with CPAP use.  No snoring with CPAP use.  She has no c/o vivid dreams or night mejia.    No complaint of daytime sleepiness or tiredness with CPAP use.  She denies taking naps.  No sleepiness with driving.   She denies difficulty falling asleep or staying asleep.    I reviewed compliance report with patient regarding CPAP therapy. She is using  CPAP for 16 days out of 30 days.  Average usage of days used is 6 hours and 26  min , average AHI 0.6 with CPAP use.       Allergies:  Patient has no known allergies.  Past Medical History:   Diagnosis Date    Angina at rest 2021    Bilateral leg edema 2021    Diastolic congestive heart failure (HCC) 2021    DM2 (diabetes mellitus, type 2) (HCC)     Hyperlipidemia     Hypertension     Hypothyroidism     GALINA (obstructive sleep apnea)     Shortness of breath 2021     Past Surgical History:   Procedure Laterality Date    BLADDER TUMOR EXCISION  2024     SECTION      COLONOSCOPY  2008    needs 2018    COLONOSCOPY N/A 2021    COLONOSCOPY DIAGNOSTIC performed by Mari Tran MD at [facility]    PACEMAKER PLACEMENT  2023    NE COLON CA SCRN NOT HI RSK IND N/A 2018    COLONOSCOPY adenoma    UPPER GASTROINTESTINAL ENDOSCOPY N/A 2021    EGD DIAGNOSTIC ONLY performed by Mari Tran MD at [facility]     Family History   Problem Relation Age of Onset    Colon Cancer Maternal Grandmother      Social History     Socioeconomic History    Marital status:      Spouse name: Not

## 2025-05-09 ENCOUNTER — APPOINTMENT (OUTPATIENT)
Dept: UROLOGY | Facility: CLINIC | Age: 75
End: 2025-05-09
Payer: MEDICARE

## 2025-05-09 VITALS — DIASTOLIC BLOOD PRESSURE: 82 MMHG | HEART RATE: 92 BPM | TEMPERATURE: 98.1 F | SYSTOLIC BLOOD PRESSURE: 144 MMHG

## 2025-05-09 DIAGNOSIS — C67.5 MALIGNANT NEOPLASM OF URINARY BLADDER NECK (MULTI): Primary | ICD-10-CM

## 2025-05-09 PROCEDURE — 99214 OFFICE O/P EST MOD 30 MIN: CPT | Performed by: UROLOGY

## 2025-05-09 PROCEDURE — 3079F DIAST BP 80-89 MM HG: CPT | Performed by: UROLOGY

## 2025-05-09 PROCEDURE — 3077F SYST BP >= 140 MM HG: CPT | Performed by: UROLOGY

## 2025-05-09 PROCEDURE — 1036F TOBACCO NON-USER: CPT | Performed by: UROLOGY

## 2025-05-09 PROCEDURE — G2211 COMPLEX E/M VISIT ADD ON: HCPCS | Performed by: UROLOGY

## 2025-05-09 PROCEDURE — 1160F RVW MEDS BY RX/DR IN RCRD: CPT | Performed by: UROLOGY

## 2025-05-09 PROCEDURE — 1159F MED LIST DOCD IN RCRD: CPT | Performed by: UROLOGY

## 2025-05-09 RX ORDER — EPINEPHRINE 0.3 MG/.3ML
0.3 INJECTION SUBCUTANEOUS EVERY 5 MIN PRN
OUTPATIENT
Start: 2025-06-25

## 2025-05-09 RX ORDER — PROCHLORPERAZINE MALEATE 5 MG
10 TABLET ORAL EVERY 6 HOURS PRN
OUTPATIENT
Start: 2025-06-18

## 2025-05-09 RX ORDER — PROCHLORPERAZINE MALEATE 5 MG
10 TABLET ORAL EVERY 6 HOURS PRN
OUTPATIENT
Start: 2025-06-25

## 2025-05-09 RX ORDER — EPINEPHRINE 0.3 MG/.3ML
0.3 INJECTION SUBCUTANEOUS EVERY 5 MIN PRN
OUTPATIENT
Start: 2025-05-28

## 2025-05-09 RX ORDER — ALBUTEROL SULFATE 0.83 MG/ML
3 SOLUTION RESPIRATORY (INHALATION) AS NEEDED
OUTPATIENT
Start: 2025-06-18

## 2025-05-09 RX ORDER — EPINEPHRINE 0.3 MG/.3ML
0.3 INJECTION SUBCUTANEOUS EVERY 5 MIN PRN
OUTPATIENT
Start: 2025-06-11

## 2025-05-09 RX ORDER — DIPHENHYDRAMINE HYDROCHLORIDE 50 MG/ML
50 INJECTION, SOLUTION INTRAMUSCULAR; INTRAVENOUS AS NEEDED
OUTPATIENT
Start: 2025-06-04

## 2025-05-09 RX ORDER — PROCHLORPERAZINE MALEATE 5 MG
10 TABLET ORAL EVERY 6 HOURS PRN
OUTPATIENT
Start: 2025-05-28

## 2025-05-09 RX ORDER — DIPHENHYDRAMINE HYDROCHLORIDE 50 MG/ML
50 INJECTION, SOLUTION INTRAMUSCULAR; INTRAVENOUS AS NEEDED
OUTPATIENT
Start: 2025-06-18

## 2025-05-09 RX ORDER — FAMOTIDINE 10 MG/ML
20 INJECTION, SOLUTION INTRAVENOUS ONCE AS NEEDED
OUTPATIENT
Start: 2025-07-02

## 2025-05-09 RX ORDER — PROCHLORPERAZINE EDISYLATE 5 MG/ML
10 INJECTION INTRAMUSCULAR; INTRAVENOUS EVERY 6 HOURS PRN
OUTPATIENT
Start: 2025-06-18

## 2025-05-09 RX ORDER — ALBUTEROL SULFATE 0.83 MG/ML
3 SOLUTION RESPIRATORY (INHALATION) AS NEEDED
OUTPATIENT
Start: 2025-05-28

## 2025-05-09 RX ORDER — DIPHENHYDRAMINE HYDROCHLORIDE 50 MG/ML
50 INJECTION, SOLUTION INTRAMUSCULAR; INTRAVENOUS AS NEEDED
OUTPATIENT
Start: 2025-05-28

## 2025-05-09 RX ORDER — DIPHENHYDRAMINE HYDROCHLORIDE 50 MG/ML
50 INJECTION, SOLUTION INTRAMUSCULAR; INTRAVENOUS AS NEEDED
OUTPATIENT
Start: 2025-07-02

## 2025-05-09 RX ORDER — DIPHENHYDRAMINE HYDROCHLORIDE 50 MG/ML
50 INJECTION, SOLUTION INTRAMUSCULAR; INTRAVENOUS AS NEEDED
OUTPATIENT
Start: 2025-06-11

## 2025-05-09 RX ORDER — PROCHLORPERAZINE EDISYLATE 5 MG/ML
10 INJECTION INTRAMUSCULAR; INTRAVENOUS EVERY 6 HOURS PRN
OUTPATIENT
Start: 2025-05-28

## 2025-05-09 RX ORDER — FAMOTIDINE 10 MG/ML
20 INJECTION, SOLUTION INTRAVENOUS ONCE AS NEEDED
OUTPATIENT
Start: 2025-06-18

## 2025-05-09 RX ORDER — PROCHLORPERAZINE MALEATE 5 MG
10 TABLET ORAL EVERY 6 HOURS PRN
OUTPATIENT
Start: 2025-07-02

## 2025-05-09 RX ORDER — ALBUTEROL SULFATE 0.83 MG/ML
3 SOLUTION RESPIRATORY (INHALATION) AS NEEDED
OUTPATIENT
Start: 2025-06-25

## 2025-05-09 RX ORDER — DIPHENHYDRAMINE HYDROCHLORIDE 50 MG/ML
50 INJECTION, SOLUTION INTRAMUSCULAR; INTRAVENOUS AS NEEDED
OUTPATIENT
Start: 2025-06-25

## 2025-05-09 RX ORDER — EPINEPHRINE 0.3 MG/.3ML
0.3 INJECTION SUBCUTANEOUS EVERY 5 MIN PRN
OUTPATIENT
Start: 2025-06-04

## 2025-05-09 RX ORDER — FAMOTIDINE 10 MG/ML
20 INJECTION, SOLUTION INTRAVENOUS ONCE AS NEEDED
OUTPATIENT
Start: 2025-06-04

## 2025-05-09 RX ORDER — PROCHLORPERAZINE EDISYLATE 5 MG/ML
10 INJECTION INTRAMUSCULAR; INTRAVENOUS EVERY 6 HOURS PRN
OUTPATIENT
Start: 2025-06-25

## 2025-05-09 RX ORDER — FAMOTIDINE 10 MG/ML
20 INJECTION, SOLUTION INTRAVENOUS ONCE AS NEEDED
OUTPATIENT
Start: 2025-06-25

## 2025-05-09 RX ORDER — ALBUTEROL SULFATE 0.83 MG/ML
3 SOLUTION RESPIRATORY (INHALATION) AS NEEDED
OUTPATIENT
Start: 2025-07-02

## 2025-05-09 RX ORDER — PROCHLORPERAZINE EDISYLATE 5 MG/ML
10 INJECTION INTRAMUSCULAR; INTRAVENOUS EVERY 6 HOURS PRN
OUTPATIENT
Start: 2025-06-11

## 2025-05-09 RX ORDER — FAMOTIDINE 10 MG/ML
20 INJECTION, SOLUTION INTRAVENOUS ONCE AS NEEDED
OUTPATIENT
Start: 2025-06-11

## 2025-05-09 RX ORDER — ALBUTEROL SULFATE 0.83 MG/ML
3 SOLUTION RESPIRATORY (INHALATION) AS NEEDED
OUTPATIENT
Start: 2025-06-11

## 2025-05-09 RX ORDER — PROCHLORPERAZINE EDISYLATE 5 MG/ML
10 INJECTION INTRAMUSCULAR; INTRAVENOUS EVERY 6 HOURS PRN
OUTPATIENT
Start: 2025-07-02

## 2025-05-09 RX ORDER — EPINEPHRINE 0.3 MG/.3ML
0.3 INJECTION SUBCUTANEOUS EVERY 5 MIN PRN
OUTPATIENT
Start: 2025-06-18

## 2025-05-09 RX ORDER — ALBUTEROL SULFATE 0.83 MG/ML
3 SOLUTION RESPIRATORY (INHALATION) AS NEEDED
OUTPATIENT
Start: 2025-06-04

## 2025-05-09 RX ORDER — PROCHLORPERAZINE MALEATE 5 MG
10 TABLET ORAL EVERY 6 HOURS PRN
OUTPATIENT
Start: 2025-06-04

## 2025-05-09 RX ORDER — PROCHLORPERAZINE EDISYLATE 5 MG/ML
10 INJECTION INTRAMUSCULAR; INTRAVENOUS EVERY 6 HOURS PRN
OUTPATIENT
Start: 2025-06-04

## 2025-05-09 RX ORDER — PROCHLORPERAZINE MALEATE 5 MG
10 TABLET ORAL EVERY 6 HOURS PRN
OUTPATIENT
Start: 2025-06-11

## 2025-05-09 RX ORDER — EPINEPHRINE 0.3 MG/.3ML
0.3 INJECTION SUBCUTANEOUS EVERY 5 MIN PRN
OUTPATIENT
Start: 2025-07-02

## 2025-05-09 RX ORDER — FAMOTIDINE 10 MG/ML
20 INJECTION, SOLUTION INTRAVENOUS ONCE AS NEEDED
OUTPATIENT
Start: 2025-05-28

## 2025-05-09 ASSESSMENT — PATIENT HEALTH QUESTIONNAIRE - PHQ9
SUM OF ALL RESPONSES TO PHQ9 QUESTIONS 1 AND 2: 0
1. LITTLE INTEREST OR PLEASURE IN DOING THINGS: NOT AT ALL
2. FEELING DOWN, DEPRESSED OR HOPELESS: NOT AT ALL

## 2025-05-09 ASSESSMENT — ENCOUNTER SYMPTOMS
OCCASIONAL FEELINGS OF UNSTEADINESS: 0
DEPRESSION: 0
LOSS OF SENSATION IN FEET: 0

## 2025-05-09 NOTE — PROGRESS NOTES
"PAST UROLOGICAL HISTORY:  74-year-old woman initially diagnosed with TA high-grade bladder cancer in April 2024, with less than 5% of the specimen affected. She completed six BCG treatments in July 2024. She was previously managed by Dr. Lorenzo until April 16, 2025, when I took over her care. On April 16, 2025, I performed a cystoscopy with TURBT, finding three small bladder tumors (5mm) at the 4, 6, and 9 o'clock positions on the bladder neck, plus an additional 5mm tumor on the bladder dome. Pathology returned as TA low-grade bladder cancer, representing intermediate risk.    I independently reviewed and interpreted the patient's prior surgical pathology reports and cystoscopy findings as noted herein.    HPI TODAY 05/09/2025:    Bladder Cancer:  - Ms. Maldonado presents for follow-up after TURBT on 04/16/2025 which revealed TA low-grade bladder cancer.  - She reports good recovery from the procedure with no need for pain medication.  - She experienced mild hematuria post-procedure that was \"not too bad\" and burning with urination for 2-3 days that subsequently resolved.  - She has no current urinary symptoms.    PAST MEDICAL HISTORY:  - Cardiac history with pacemaker  - On Lasix and Xarelto  - Ophthalmologic issues: Recent strabismus surgery, scheduled for cataract surgery end of May/early June 2025    SOCIAL:  - Lives in [place]  - Takes care of grandchildren, drives them to school  - [facility]    REVIEW OF SYSTEMS: A tailored review of systems was performed and all pertinent positives and negatives are listed in the HPI.     PHYSICAL EXAMINATION:  Gen: NAD  Pulm: No increased WOB on RA  Cards: WWP    ASSESSMENT/PLAN:    Bladder Cancer, TA Low-Grade (C67.9)  - Assessment: 74-year-old woman with intermediate risk bladder cancer, recently diagnosed with TA low-grade bladder cancer on 04/16/2025, previously had TA high-grade bladder cancer in 04/2024.  - Plan:    - Intravesical gemcitabine " chemotherapy once weekly for 6 weeks starting early June 2025    - She will coordinate with ophthalmologist regarding timing with upcoming cataract surgeries    - Hold Lasix on treatment days to help with retention of medication    - Continue Xarelto without interruption    - Cystoscopy surveillance 6 weeks after completion of induction therapy (approximately late August/early September 2025)    - If no recurrence, will transition to monthly maintenance treatments for one year    - Counseling: Risks, benefits, and alternatives were discussed including but not limited to urinary frequency, urgency, dysuria, and hematuria. Discussed that this is localized chemotherapy with minimal systemic absorption, unlike systemic chemotherapy that causes hair loss, nausea, and weight loss. Explained that these tumors have less than 5% chance of progression to more aggressive disease, but recurrence is common. Discussed surveillance option as an alternative but noted higher recurrence risk.

## 2025-05-22 DIAGNOSIS — I48.19 PERSISTENT ATRIAL FIBRILLATION (HCC): ICD-10-CM

## 2025-05-22 NOTE — TELEPHONE ENCOUNTER
Requesting medication refill VIA FAX.  Please approve or deny this request.    Rx requested:  Requested Prescriptions     Pending Prescriptions Disp Refills    rivaroxaban (XARELTO) 20 MG TABS tablet 90 tablet 3     Sig: Take 1 tablet by mouth daily (with breakfast)         Last Office Visit:   5/2/2025      Next Visit Date:  Future Appointments   Date Time Provider Department Center   8/5/2025  8:45 AM Abdiel Hill MD [facility]   2/9/2026  9:30 AM [facility]   5/8/2026  9:15 AM HolSeamus sanchez DO [facility]

## 2025-05-27 ENCOUNTER — LAB (OUTPATIENT)
Dept: LAB | Facility: HOSPITAL | Age: 75
End: 2025-05-27
Payer: MEDICARE

## 2025-05-27 ENCOUNTER — INFUSION (OUTPATIENT)
Dept: HEMATOLOGY/ONCOLOGY | Facility: HOSPITAL | Age: 75
End: 2025-05-27
Payer: MEDICARE

## 2025-05-27 VITALS
SYSTOLIC BLOOD PRESSURE: 147 MMHG | WEIGHT: 222.3 LBS | OXYGEN SATURATION: 99 % | HEART RATE: 97 BPM | DIASTOLIC BLOOD PRESSURE: 65 MMHG | TEMPERATURE: 97.9 F | BODY MASS INDEX: 39.38 KG/M2

## 2025-05-27 DIAGNOSIS — R69 ILLNESS, UNSPECIFIED: Primary | ICD-10-CM

## 2025-05-27 DIAGNOSIS — C67.5 MALIGNANT NEOPLASM OF URINARY BLADDER NECK (MULTI): ICD-10-CM

## 2025-05-27 DIAGNOSIS — C67.9 MALIGNANT NEOPLASM OF URINARY BLADDER, UNSPECIFIED SITE (MULTI): Primary | ICD-10-CM

## 2025-05-27 LAB
ALBUMIN SERPL BCP-MCNC: 4 G/DL (ref 3.4–5)
ALP SERPL-CCNC: 61 U/L (ref 33–136)
ALT SERPL W P-5'-P-CCNC: 11 U/L (ref 7–45)
ANION GAP SERPL CALC-SCNC: 13 MMOL/L (ref 10–20)
APPEARANCE UR: CLEAR
AST SERPL W P-5'-P-CCNC: 17 U/L (ref 9–39)
BILIRUB SERPL-MCNC: 0.8 MG/DL (ref 0–1.2)
BILIRUB UR STRIP.AUTO-MCNC: NEGATIVE MG/DL
BUN SERPL-MCNC: 18 MG/DL (ref 6–23)
CALCIUM SERPL-MCNC: 9.5 MG/DL (ref 8.6–10.3)
CHLORIDE SERPL-SCNC: 110 MMOL/L (ref 98–107)
CHOLEST SERPL-MCNC: 141 MG/DL (ref 0–199)
CHOLESTEROL/HDL RATIO: 2.5
CO2 SERPL-SCNC: 28 MMOL/L (ref 21–32)
COLOR UR: ABNORMAL
CREAT SERPL-MCNC: 0.75 MG/DL (ref 0.5–1.05)
CREAT UR-MCNC: 35.9 MG/DL (ref 20–320)
EGFRCR SERPLBLD CKD-EPI 2021: 84 ML/MIN/1.73M*2
EST. AVERAGE GLUCOSE BLD GHB EST-MCNC: 111 MG/DL
GLUCOSE SERPL-MCNC: 107 MG/DL (ref 74–99)
GLUCOSE UR STRIP.AUTO-MCNC: ABNORMAL MG/DL
HBA1C MFR BLD: 5.5 % (ref ?–5.7)
HBV CORE AB SER QL: NONREACTIVE
HBV SURFACE AB SER-ACNC: 16.9 MIU/ML
HBV SURFACE AG SERPL QL IA: NONREACTIVE
HDLC SERPL-MCNC: 56.3 MG/DL
HOLD SPECIMEN: NORMAL
KETONES UR STRIP.AUTO-MCNC: NEGATIVE MG/DL
LDLC SERPL CALC-MCNC: 58 MG/DL
LEUKOCYTE ESTERASE UR QL STRIP.AUTO: NEGATIVE
MICROALBUMIN UR-MCNC: 13.8 MG/L
MICROALBUMIN/CREAT UR: 38.4 UG/MG CREAT
NITRITE UR QL STRIP.AUTO: NEGATIVE
NON HDL CHOLESTEROL: 85 MG/DL (ref 0–149)
PH UR STRIP.AUTO: 6.5 [PH]
POTASSIUM SERPL-SCNC: 4.7 MMOL/L (ref 3.5–5.3)
PROT SERPL-MCNC: 6.8 G/DL (ref 6.4–8.2)
PROT UR STRIP.AUTO-MCNC: NEGATIVE MG/DL
RBC # UR STRIP.AUTO: ABNORMAL MG/DL
RBC #/AREA URNS AUTO: NORMAL /HPF
SODIUM SERPL-SCNC: 146 MMOL/L (ref 136–145)
SP GR UR STRIP.AUTO: 1.01
T4 FREE SERPL-MCNC: 1.4 NG/DL (ref 0.78–1.48)
TRIGL SERPL-MCNC: 136 MG/DL (ref 0–149)
TSH SERPL-ACNC: 1.19 MIU/L (ref 0.44–3.98)
UROBILINOGEN UR STRIP.AUTO-MCNC: NORMAL MG/DL
VLDL: 27 MG/DL (ref 0–40)
WBC #/AREA URNS AUTO: NORMAL /HPF

## 2025-05-27 PROCEDURE — 87340 HEPATITIS B SURFACE AG IA: CPT | Performed by: FAMILY MEDICINE

## 2025-05-27 PROCEDURE — 2500000004 HC RX 250 GENERAL PHARMACY W/ HCPCS (ALT 636 FOR OP/ED): Mod: JZ | Performed by: UROLOGY

## 2025-05-27 PROCEDURE — 81001 URINALYSIS AUTO W/SCOPE: CPT

## 2025-05-27 PROCEDURE — 80061 LIPID PANEL: CPT | Performed by: FAMILY MEDICINE

## 2025-05-27 PROCEDURE — 80053 COMPREHEN METABOLIC PANEL: CPT | Performed by: FAMILY MEDICINE

## 2025-05-27 PROCEDURE — 36415 COLL VENOUS BLD VENIPUNCTURE: CPT | Performed by: UROLOGY

## 2025-05-27 PROCEDURE — 51720 TREATMENT OF BLADDER LESION: CPT

## 2025-05-27 PROCEDURE — 51702 INSERT TEMP BLADDER CATH: CPT

## 2025-05-27 PROCEDURE — 84439 ASSAY OF FREE THYROXINE: CPT | Performed by: FAMILY MEDICINE

## 2025-05-27 PROCEDURE — 86706 HEP B SURFACE ANTIBODY: CPT | Performed by: FAMILY MEDICINE

## 2025-05-27 PROCEDURE — 86704 HEP B CORE ANTIBODY TOTAL: CPT | Performed by: FAMILY MEDICINE

## 2025-05-27 PROCEDURE — 87086 URINE CULTURE/COLONY COUNT: CPT | Performed by: UROLOGY

## 2025-05-27 PROCEDURE — 84443 ASSAY THYROID STIM HORMONE: CPT | Performed by: FAMILY MEDICINE

## 2025-05-27 PROCEDURE — 82570 ASSAY OF URINE CREATININE: CPT | Performed by: FAMILY MEDICINE

## 2025-05-27 PROCEDURE — 82043 UR ALBUMIN QUANTITATIVE: CPT | Performed by: FAMILY MEDICINE

## 2025-05-27 PROCEDURE — 83036 HEMOGLOBIN GLYCOSYLATED A1C: CPT | Performed by: FAMILY MEDICINE

## 2025-05-27 RX ORDER — FAMOTIDINE 10 MG/ML
20 INJECTION, SOLUTION INTRAVENOUS ONCE AS NEEDED
Status: DISCONTINUED | OUTPATIENT
Start: 2025-05-27 | End: 2025-05-27 | Stop reason: HOSPADM

## 2025-05-27 RX ORDER — PROCHLORPERAZINE MALEATE 10 MG
10 TABLET ORAL EVERY 6 HOURS PRN
Status: DISCONTINUED | OUTPATIENT
Start: 2025-05-27 | End: 2025-05-27 | Stop reason: HOSPADM

## 2025-05-27 RX ORDER — DIPHENHYDRAMINE HYDROCHLORIDE 50 MG/ML
50 INJECTION, SOLUTION INTRAMUSCULAR; INTRAVENOUS AS NEEDED
Status: DISCONTINUED | OUTPATIENT
Start: 2025-05-27 | End: 2025-05-27 | Stop reason: HOSPADM

## 2025-05-27 RX ORDER — EPINEPHRINE 0.3 MG/.3ML
0.3 INJECTION SUBCUTANEOUS EVERY 5 MIN PRN
Status: DISCONTINUED | OUTPATIENT
Start: 2025-05-27 | End: 2025-05-27 | Stop reason: HOSPADM

## 2025-05-27 RX ORDER — ALBUTEROL SULFATE 0.83 MG/ML
3 SOLUTION RESPIRATORY (INHALATION) AS NEEDED
Status: DISCONTINUED | OUTPATIENT
Start: 2025-05-27 | End: 2025-05-27 | Stop reason: HOSPADM

## 2025-05-27 RX ORDER — PROCHLORPERAZINE EDISYLATE 5 MG/ML
10 INJECTION INTRAMUSCULAR; INTRAVENOUS EVERY 6 HOURS PRN
Status: DISCONTINUED | OUTPATIENT
Start: 2025-05-27 | End: 2025-05-27 | Stop reason: HOSPADM

## 2025-05-27 RX ADMIN — GEMCITABINE HYDROCHLORIDE 2000 MG: 2 INJECTION, SOLUTION INTRAVENOUS at 09:49

## 2025-05-27 ASSESSMENT — PAIN SCALES - GENERAL: PAINLEVEL_OUTOF10: 0-NO PAIN

## 2025-05-27 NOTE — PROGRESS NOTES
Patient came in for intravesical chemo. Aseptically inserted Roy cath fr. 14 without any complications, emptied bladder with 80 cc of yellow colored urine. Patient tolerated the treatment very well. Patient educated about post chemo administration safety at home and patient able to teach back. Handout and printed schedule given to patient. Encouraged to do urinalysis one day before the treatment. All queries and concerns addressed. Discharged to home in stable condition accompanied by significant other.

## 2025-05-28 DIAGNOSIS — C67.5 MALIGNANT NEOPLASM OF URINARY BLADDER NECK (MULTI): ICD-10-CM

## 2025-05-28 LAB — BACTERIA UR CULT: NORMAL

## 2025-06-01 LAB
APPEARANCE UR: CLEAR
BACTERIA #/AREA URNS HPF: ABNORMAL /HPF
BACTERIA UR CULT: NORMAL
BILIRUB UR QL STRIP: NEGATIVE
COLOR UR: YELLOW
GLUCOSE UR QL STRIP: ABNORMAL
HGB UR QL STRIP: NEGATIVE
HYALINE CASTS #/AREA URNS LPF: ABNORMAL /LPF
KETONES UR QL STRIP: NEGATIVE
LEUKOCYTE ESTERASE UR QL STRIP: ABNORMAL
NITRITE UR QL STRIP: NEGATIVE
PH UR STRIP: 5.5 [PH] (ref 5–8)
PROT UR QL STRIP: NEGATIVE
RBC #/AREA URNS HPF: ABNORMAL /HPF
SERVICE CMNT-IMP: ABNORMAL
SP GR UR STRIP: 1.01 (ref 1–1.03)
SQUAMOUS #/AREA URNS HPF: ABNORMAL /HPF
WBC #/AREA URNS HPF: ABNORMAL /HPF

## 2025-06-03 ENCOUNTER — INFUSION (OUTPATIENT)
Dept: HEMATOLOGY/ONCOLOGY | Facility: HOSPITAL | Age: 75
End: 2025-06-03
Payer: MEDICARE

## 2025-06-03 VITALS
SYSTOLIC BLOOD PRESSURE: 123 MMHG | TEMPERATURE: 98.1 F | WEIGHT: 222.7 LBS | RESPIRATION RATE: 18 BRPM | HEART RATE: 100 BPM | OXYGEN SATURATION: 100 % | DIASTOLIC BLOOD PRESSURE: 85 MMHG | BODY MASS INDEX: 39.45 KG/M2

## 2025-06-03 DIAGNOSIS — C67.5 MALIGNANT NEOPLASM OF URINARY BLADDER NECK (MULTI): ICD-10-CM

## 2025-06-03 DIAGNOSIS — I48.19 PERSISTENT ATRIAL FIBRILLATION (HCC): ICD-10-CM

## 2025-06-03 PROCEDURE — 2500000004 HC RX 250 GENERAL PHARMACY W/ HCPCS (ALT 636 FOR OP/ED): Performed by: UROLOGY

## 2025-06-03 PROCEDURE — 51702 INSERT TEMP BLADDER CATH: CPT

## 2025-06-03 PROCEDURE — 51720 TREATMENT OF BLADDER LESION: CPT

## 2025-06-03 RX ADMIN — GEMCITABINE HYDROCHLORIDE 2000 MG: 2 INJECTION, SOLUTION INTRAVENOUS at 09:02

## 2025-06-03 NOTE — TELEPHONE ENCOUNTER
Requesting medication refill. Please approve or deny this request.    Rx requested:  Requested Prescriptions     Pending Prescriptions Disp Refills    rivaroxaban (XARELTO) 20 MG TABS tablet 90 tablet 3     Sig: Take 1 tablet by mouth daily (with breakfast)         Last Office Visit:   Visit date not found      Next Visit Date:  Future Appointments   Date Time Provider Department Center   8/5/2025  8:45 AM Abdiel Hill MD [facility]   2/9/2026  9:30 AM [facility]   5/8/2026  9:15 AM Seamus Knott DO [facility]                Please approve or deny.

## 2025-06-03 NOTE — PROGRESS NOTES
Patient tolerated installation well, no adverse reactions at this visit.  Patient educated on post void protocol and to hold for one full hour and hydrate post void.  Patient stated effective understanding and ambulated out fo the clinic in stable conditon

## 2025-06-06 ENCOUNTER — LAB (OUTPATIENT)
Dept: LAB | Facility: HOSPITAL | Age: 75
End: 2025-06-06
Payer: MEDICARE

## 2025-06-06 LAB
APPEARANCE UR: CLEAR
BILIRUB UR STRIP.AUTO-MCNC: NEGATIVE MG/DL
COLOR UR: COLORLESS
GLUCOSE UR STRIP.AUTO-MCNC: ABNORMAL MG/DL
KETONES UR STRIP.AUTO-MCNC: NEGATIVE MG/DL
LEUKOCYTE ESTERASE UR QL STRIP.AUTO: NEGATIVE
NITRITE UR QL STRIP.AUTO: NEGATIVE
PH UR STRIP.AUTO: 6.5 [PH]
PROT UR STRIP.AUTO-MCNC: NEGATIVE MG/DL
RBC # UR STRIP.AUTO: NEGATIVE MG/DL
SP GR UR STRIP.AUTO: 1
UROBILINOGEN UR STRIP.AUTO-MCNC: NORMAL MG/DL

## 2025-06-06 PROCEDURE — 87086 URINE CULTURE/COLONY COUNT: CPT

## 2025-06-06 PROCEDURE — 81003 URINALYSIS AUTO W/O SCOPE: CPT

## 2025-06-08 LAB — BACTERIA UR CULT: NO GROWTH

## 2025-06-10 ENCOUNTER — INFUSION (OUTPATIENT)
Dept: HEMATOLOGY/ONCOLOGY | Facility: HOSPITAL | Age: 75
End: 2025-06-10
Payer: MEDICARE

## 2025-06-10 VITALS
WEIGHT: 221.56 LBS | HEART RATE: 89 BPM | DIASTOLIC BLOOD PRESSURE: 81 MMHG | RESPIRATION RATE: 16 BRPM | TEMPERATURE: 97.3 F | OXYGEN SATURATION: 100 % | BODY MASS INDEX: 39.25 KG/M2 | SYSTOLIC BLOOD PRESSURE: 136 MMHG

## 2025-06-10 DIAGNOSIS — C67.5 MALIGNANT NEOPLASM OF URINARY BLADDER NECK (MULTI): ICD-10-CM

## 2025-06-10 PROCEDURE — 51720 TREATMENT OF BLADDER LESION: CPT

## 2025-06-10 PROCEDURE — 2500000004 HC RX 250 GENERAL PHARMACY W/ HCPCS (ALT 636 FOR OP/ED): Performed by: UROLOGY

## 2025-06-10 PROCEDURE — 51702 INSERT TEMP BLADDER CATH: CPT

## 2025-06-10 RX ORDER — FAMOTIDINE 10 MG/ML
20 INJECTION, SOLUTION INTRAVENOUS ONCE AS NEEDED
Status: DISCONTINUED | OUTPATIENT
Start: 2025-06-10 | End: 2025-06-10 | Stop reason: HOSPADM

## 2025-06-10 RX ORDER — DIPHENHYDRAMINE HYDROCHLORIDE 50 MG/ML
50 INJECTION, SOLUTION INTRAMUSCULAR; INTRAVENOUS AS NEEDED
Status: DISCONTINUED | OUTPATIENT
Start: 2025-06-10 | End: 2025-06-10 | Stop reason: HOSPADM

## 2025-06-10 RX ORDER — PROCHLORPERAZINE MALEATE 10 MG
10 TABLET ORAL EVERY 6 HOURS PRN
Status: DISCONTINUED | OUTPATIENT
Start: 2025-06-10 | End: 2025-06-10 | Stop reason: HOSPADM

## 2025-06-10 RX ORDER — PROCHLORPERAZINE EDISYLATE 5 MG/ML
10 INJECTION INTRAMUSCULAR; INTRAVENOUS EVERY 6 HOURS PRN
Status: DISCONTINUED | OUTPATIENT
Start: 2025-06-10 | End: 2025-06-10 | Stop reason: HOSPADM

## 2025-06-10 RX ORDER — EPINEPHRINE 0.3 MG/.3ML
0.3 INJECTION SUBCUTANEOUS EVERY 5 MIN PRN
Status: DISCONTINUED | OUTPATIENT
Start: 2025-06-10 | End: 2025-06-10 | Stop reason: HOSPADM

## 2025-06-10 RX ORDER — ALBUTEROL SULFATE 0.83 MG/ML
3 SOLUTION RESPIRATORY (INHALATION) AS NEEDED
Status: DISCONTINUED | OUTPATIENT
Start: 2025-06-10 | End: 2025-06-10 | Stop reason: HOSPADM

## 2025-06-10 RX ADMIN — GEMCITABINE 2000 MG: 38 INJECTION, SOLUTION INTRAVENOUS at 09:02

## 2025-06-10 ASSESSMENT — PAIN SCALES - GENERAL: PAINLEVEL_OUTOF10: 0-NO PAIN

## 2025-06-11 DIAGNOSIS — C67.5 MALIGNANT NEOPLASM OF URINARY BLADDER NECK (MULTI): ICD-10-CM

## 2025-06-13 ENCOUNTER — APPOINTMENT (OUTPATIENT)
Dept: LAB | Facility: HOSPITAL | Age: 75
End: 2025-06-13
Payer: MEDICARE

## 2025-06-13 LAB
APPEARANCE UR: CLEAR
BILIRUB UR STRIP.AUTO-MCNC: NEGATIVE MG/DL
COLOR UR: ABNORMAL
GLUCOSE UR STRIP.AUTO-MCNC: ABNORMAL MG/DL
KETONES UR STRIP.AUTO-MCNC: NEGATIVE MG/DL
LEUKOCYTE ESTERASE UR QL STRIP.AUTO: ABNORMAL
NITRITE UR QL STRIP.AUTO: NEGATIVE
PH UR STRIP.AUTO: 5.5 [PH]
PROT UR STRIP.AUTO-MCNC: NEGATIVE MG/DL
RBC # UR STRIP.AUTO: NEGATIVE MG/DL
RBC #/AREA URNS AUTO: NORMAL /HPF
SP GR UR STRIP.AUTO: <1.005
SQUAMOUS #/AREA URNS AUTO: NORMAL /HPF
UROBILINOGEN UR STRIP.AUTO-MCNC: NORMAL MG/DL
WBC #/AREA URNS AUTO: NORMAL /HPF

## 2025-06-13 PROCEDURE — 81001 URINALYSIS AUTO W/SCOPE: CPT

## 2025-06-14 LAB — BACTERIA UR CULT: NORMAL

## 2025-06-17 ENCOUNTER — INFUSION (OUTPATIENT)
Dept: HEMATOLOGY/ONCOLOGY | Facility: HOSPITAL | Age: 75
End: 2025-06-17
Payer: MEDICARE

## 2025-06-17 VITALS
BODY MASS INDEX: 39.4 KG/M2 | WEIGHT: 222.4 LBS | TEMPERATURE: 97.5 F | HEART RATE: 81 BPM | DIASTOLIC BLOOD PRESSURE: 91 MMHG | OXYGEN SATURATION: 100 % | RESPIRATION RATE: 18 BRPM | SYSTOLIC BLOOD PRESSURE: 136 MMHG

## 2025-06-17 DIAGNOSIS — C67.5 MALIGNANT NEOPLASM OF URINARY BLADDER NECK (MULTI): Primary | ICD-10-CM

## 2025-06-17 DIAGNOSIS — C67.5 MALIGNANT NEOPLASM OF URINARY BLADDER NECK (MULTI): ICD-10-CM

## 2025-06-17 PROCEDURE — 51720 TREATMENT OF BLADDER LESION: CPT

## 2025-06-17 PROCEDURE — 51702 INSERT TEMP BLADDER CATH: CPT | Mod: CCI

## 2025-06-17 PROCEDURE — 2500000004 HC RX 250 GENERAL PHARMACY W/ HCPCS (ALT 636 FOR OP/ED): Performed by: UROLOGY

## 2025-06-17 RX ORDER — PROCHLORPERAZINE MALEATE 10 MG
10 TABLET ORAL EVERY 6 HOURS PRN
Status: DISCONTINUED | OUTPATIENT
Start: 2025-06-17 | End: 2025-06-17 | Stop reason: HOSPADM

## 2025-06-17 RX ORDER — ALBUTEROL SULFATE 0.83 MG/ML
3 SOLUTION RESPIRATORY (INHALATION) AS NEEDED
Status: DISCONTINUED | OUTPATIENT
Start: 2025-06-17 | End: 2025-06-17 | Stop reason: HOSPADM

## 2025-06-17 RX ORDER — DIPHENHYDRAMINE HYDROCHLORIDE 50 MG/ML
50 INJECTION, SOLUTION INTRAMUSCULAR; INTRAVENOUS AS NEEDED
Status: DISCONTINUED | OUTPATIENT
Start: 2025-06-17 | End: 2025-06-17 | Stop reason: HOSPADM

## 2025-06-17 RX ORDER — FAMOTIDINE 10 MG/ML
20 INJECTION, SOLUTION INTRAVENOUS ONCE AS NEEDED
Status: DISCONTINUED | OUTPATIENT
Start: 2025-06-17 | End: 2025-06-17 | Stop reason: HOSPADM

## 2025-06-17 RX ORDER — PROCHLORPERAZINE EDISYLATE 5 MG/ML
10 INJECTION INTRAMUSCULAR; INTRAVENOUS EVERY 6 HOURS PRN
Status: DISCONTINUED | OUTPATIENT
Start: 2025-06-17 | End: 2025-06-17 | Stop reason: HOSPADM

## 2025-06-17 RX ORDER — EPINEPHRINE 0.3 MG/.3ML
0.3 INJECTION SUBCUTANEOUS EVERY 5 MIN PRN
Status: DISCONTINUED | OUTPATIENT
Start: 2025-06-17 | End: 2025-06-17 | Stop reason: HOSPADM

## 2025-06-17 RX ADMIN — GEMCITABINE HYDROCHLORIDE 2000 MG: 2 INJECTION, SOLUTION INTRAVENOUS at 09:00

## 2025-06-17 ASSESSMENT — PAIN SCALES - GENERAL: PAINLEVEL_OUTOF10: 0-NO PAIN

## 2025-06-17 NOTE — PROGRESS NOTES
Patient arrives to Infusion for her intravesical infusion. Patient has 25 leukocytes on her urine, culture was contaminated but patient said she has no blood in the urine, no pain in urination, a little urgency but not new. Dr Nicholas said its ok to treat, She tolerated her treatment without any issue. She knows what to do when she gets home. All questions were answered. She was discharged stable.

## 2025-06-18 DIAGNOSIS — C67.5 MALIGNANT NEOPLASM OF URINARY BLADDER NECK (MULTI): ICD-10-CM

## 2025-06-20 ENCOUNTER — APPOINTMENT (OUTPATIENT)
Dept: LAB | Facility: HOSPITAL | Age: 75
End: 2025-06-20
Payer: MEDICARE

## 2025-06-20 LAB
APPEARANCE UR: CLEAR
BILIRUB UR STRIP.AUTO-MCNC: NEGATIVE MG/DL
COLOR UR: COLORLESS
GLUCOSE UR STRIP.AUTO-MCNC: ABNORMAL MG/DL
KETONES UR STRIP.AUTO-MCNC: NEGATIVE MG/DL
LEUKOCYTE ESTERASE UR QL STRIP.AUTO: NEGATIVE
NITRITE UR QL STRIP.AUTO: NEGATIVE
PH UR STRIP.AUTO: 6 [PH]
PROT UR STRIP.AUTO-MCNC: NEGATIVE MG/DL
RBC # UR STRIP.AUTO: NEGATIVE MG/DL
SP GR UR STRIP.AUTO: <1.005
UROBILINOGEN UR STRIP.AUTO-MCNC: NORMAL MG/DL

## 2025-06-20 PROCEDURE — 81003 URINALYSIS AUTO W/O SCOPE: CPT

## 2025-06-21 LAB — BACTERIA UR CULT: NORMAL

## 2025-06-24 ENCOUNTER — INFUSION (OUTPATIENT)
Dept: HEMATOLOGY/ONCOLOGY | Facility: HOSPITAL | Age: 75
End: 2025-06-24
Payer: MEDICARE

## 2025-06-24 VITALS
SYSTOLIC BLOOD PRESSURE: 132 MMHG | RESPIRATION RATE: 18 BRPM | TEMPERATURE: 98.2 F | OXYGEN SATURATION: 100 % | HEART RATE: 98 BPM | DIASTOLIC BLOOD PRESSURE: 69 MMHG | BODY MASS INDEX: 39.4 KG/M2 | WEIGHT: 222.4 LBS

## 2025-06-24 DIAGNOSIS — C67.5 MALIGNANT NEOPLASM OF URINARY BLADDER NECK (MULTI): ICD-10-CM

## 2025-06-24 PROCEDURE — 51720 TREATMENT OF BLADDER LESION: CPT

## 2025-06-24 PROCEDURE — 2500000004 HC RX 250 GENERAL PHARMACY W/ HCPCS (ALT 636 FOR OP/ED): Performed by: UROLOGY

## 2025-06-24 PROCEDURE — 51702 INSERT TEMP BLADDER CATH: CPT

## 2025-06-24 RX ADMIN — GEMCITABINE HYDROCHLORIDE 2000 MG: 2 INJECTION, SOLUTION INTRAVENOUS at 08:03

## 2025-06-24 ASSESSMENT — PAIN SCALES - GENERAL: PAINLEVEL_OUTOF10: 0-NO PAIN

## 2025-06-24 NOTE — PROGRESS NOTES
Patient tolerated therapy well, no adverse reactions at this visit.  Patient educated regarding holding therapy for one full hour, and encourage to hydrate post void.  Patient aware of follow up visits, and ambulated out of clinic in stable condition.

## 2025-06-25 DIAGNOSIS — C67.5 MALIGNANT NEOPLASM OF URINARY BLADDER NECK (MULTI): ICD-10-CM

## 2025-06-28 ENCOUNTER — LAB (OUTPATIENT)
Dept: LAB | Facility: HOSPITAL | Age: 75
End: 2025-06-28
Payer: MEDICARE

## 2025-06-28 LAB
APPEARANCE UR: CLEAR
BILIRUB UR STRIP.AUTO-MCNC: NEGATIVE MG/DL
COLOR UR: COLORLESS
GLUCOSE UR STRIP.AUTO-MCNC: ABNORMAL MG/DL
KETONES UR STRIP.AUTO-MCNC: NEGATIVE MG/DL
LEUKOCYTE ESTERASE UR QL STRIP.AUTO: NEGATIVE
NITRITE UR QL STRIP.AUTO: NEGATIVE
PH UR STRIP.AUTO: 5.5 [PH]
PROT UR STRIP.AUTO-MCNC: NEGATIVE MG/DL
RBC # UR STRIP.AUTO: NEGATIVE MG/DL
SP GR UR STRIP.AUTO: 1
UROBILINOGEN UR STRIP.AUTO-MCNC: NORMAL MG/DL

## 2025-06-28 PROCEDURE — 81003 URINALYSIS AUTO W/O SCOPE: CPT

## 2025-06-28 PROCEDURE — 87086 URINE CULTURE/COLONY COUNT: CPT

## 2025-06-30 LAB — BACTERIA UR CULT: NORMAL

## 2025-07-01 ENCOUNTER — INFUSION (OUTPATIENT)
Dept: HEMATOLOGY/ONCOLOGY | Facility: HOSPITAL | Age: 75
End: 2025-07-01
Payer: MEDICARE

## 2025-07-01 VITALS
SYSTOLIC BLOOD PRESSURE: 137 MMHG | HEART RATE: 88 BPM | BODY MASS INDEX: 39.01 KG/M2 | TEMPERATURE: 97.9 F | DIASTOLIC BLOOD PRESSURE: 94 MMHG | RESPIRATION RATE: 18 BRPM | OXYGEN SATURATION: 100 % | WEIGHT: 220.24 LBS

## 2025-07-01 DIAGNOSIS — C67.5 MALIGNANT NEOPLASM OF URINARY BLADDER NECK (MULTI): ICD-10-CM

## 2025-07-01 PROCEDURE — 2500000004 HC RX 250 GENERAL PHARMACY W/ HCPCS (ALT 636 FOR OP/ED): Performed by: UROLOGY

## 2025-07-01 PROCEDURE — 51702 INSERT TEMP BLADDER CATH: CPT

## 2025-07-01 PROCEDURE — 51720 TREATMENT OF BLADDER LESION: CPT

## 2025-07-01 RX ADMIN — GEMCITABINE HYDROCHLORIDE 2000 MG: 2 INJECTION, SOLUTION INTRAVENOUS at 08:30

## 2025-07-01 NOTE — PROGRESS NOTES
Patient tolerated installation well, no adverse reactions at this visit.  Patient aware of follow up appts and ambulated out of clinic in stable condition.  
85.7

## 2025-07-02 DIAGNOSIS — C67.5 MALIGNANT NEOPLASM OF URINARY BLADDER NECK (MULTI): ICD-10-CM

## 2025-07-28 ENCOUNTER — HOSPITAL ENCOUNTER (OUTPATIENT)
Dept: CARDIOLOGY | Age: 75
Discharge: HOME | End: 2025-07-28
Payer: MEDICARE

## 2025-07-28 PROCEDURE — 93294 REM INTERROG EVL PM/LDLS PM: CPT | Performed by: INTERNAL MEDICINE

## 2025-07-28 PROCEDURE — 93296 REM INTERROG EVL PM/IDS: CPT

## 2025-08-05 ENCOUNTER — OFFICE VISIT (OUTPATIENT)
Age: 75
End: 2025-08-05
Payer: MEDICARE

## 2025-08-05 VITALS
OXYGEN SATURATION: 99 % | HEART RATE: 88 BPM | BODY MASS INDEX: 37.57 KG/M2 | SYSTOLIC BLOOD PRESSURE: 116 MMHG | DIASTOLIC BLOOD PRESSURE: 80 MMHG | WEIGHT: 219 LBS

## 2025-08-05 DIAGNOSIS — E66.9 OBESITY (BMI 30-39.9): ICD-10-CM

## 2025-08-05 DIAGNOSIS — G47.33 OSA (OBSTRUCTIVE SLEEP APNEA): Primary | ICD-10-CM

## 2025-08-05 PROCEDURE — 3079F DIAST BP 80-89 MM HG: CPT | Performed by: INTERNAL MEDICINE

## 2025-08-05 PROCEDURE — 99214 OFFICE O/P EST MOD 30 MIN: CPT | Performed by: INTERNAL MEDICINE

## 2025-08-05 PROCEDURE — 1090F PRES/ABSN URINE INCON ASSESS: CPT | Performed by: INTERNAL MEDICINE

## 2025-08-05 PROCEDURE — G8400 PT W/DXA NO RESULTS DOC: HCPCS | Performed by: INTERNAL MEDICINE

## 2025-08-05 PROCEDURE — 1036F TOBACCO NON-USER: CPT | Performed by: INTERNAL MEDICINE

## 2025-08-05 PROCEDURE — 1123F ACP DISCUSS/DSCN MKR DOCD: CPT | Performed by: INTERNAL MEDICINE

## 2025-08-05 PROCEDURE — 3074F SYST BP LT 130 MM HG: CPT | Performed by: INTERNAL MEDICINE

## 2025-08-05 PROCEDURE — 1159F MED LIST DOCD IN RCRD: CPT | Performed by: INTERNAL MEDICINE

## 2025-08-05 PROCEDURE — 99213 OFFICE O/P EST LOW 20 MIN: CPT | Performed by: INTERNAL MEDICINE

## 2025-08-05 PROCEDURE — 3017F COLORECTAL CA SCREEN DOC REV: CPT | Performed by: INTERNAL MEDICINE

## 2025-08-05 PROCEDURE — G8427 DOCREV CUR MEDS BY ELIG CLIN: HCPCS | Performed by: INTERNAL MEDICINE

## 2025-08-05 PROCEDURE — G8417 CALC BMI ABV UP PARAM F/U: HCPCS | Performed by: INTERNAL MEDICINE

## 2025-08-05 ASSESSMENT — ENCOUNTER SYMPTOMS
RHINORRHEA: 0
COUGH: 0
NAUSEA: 0
TROUBLE SWALLOWING: 0
DIARRHEA: 0
CHEST TIGHTNESS: 0
VOMITING: 0
EYE DISCHARGE: 0
SINUS PRESSURE: 0
ABDOMINAL PAIN: 0
SORE THROAT: 0
WHEEZING: 0
EYE ITCHING: 0
VOICE CHANGE: 0
SHORTNESS OF BREATH: 0

## 2025-08-08 DIAGNOSIS — E07.9 THYROID DISEASE: ICD-10-CM

## 2025-08-08 RX ORDER — LEVOTHYROXINE SODIUM 125 UG/1
TABLET ORAL
Qty: 78 TABLET | Refills: 3 | Status: SHIPPED | OUTPATIENT
Start: 2025-08-08

## 2025-08-18 ENCOUNTER — APPOINTMENT (OUTPATIENT)
Dept: UROLOGY | Facility: CLINIC | Age: 75
End: 2025-08-18
Payer: MEDICARE

## 2025-09-15 ENCOUNTER — APPOINTMENT (OUTPATIENT)
Dept: UROLOGY | Facility: CLINIC | Age: 75
End: 2025-09-15
Payer: MEDICARE

## 2025-10-08 ENCOUNTER — APPOINTMENT (OUTPATIENT)
Dept: PRIMARY CARE | Facility: CLINIC | Age: 75
End: 2025-10-08
Payer: MEDICARE

## (undated) DEVICE — TRAY, MINOR, SINGLE BASIN, STERILE

## (undated) DEVICE — TUBING, SUCTION, 1/4" X 10', STRAIGHT: Brand: MEDLINE

## (undated) DEVICE — SOLUTION, IRRIGATION, SODIUM CHLORIDE 0.9%, 1000 ML, POUR BOTTLE

## (undated) DEVICE — SOLUTION, IRRIGATION, STERILE WATER, 1000 ML, POUR BOTTLE

## (undated) DEVICE — TUBE SET 96 MM 64 MM H2O PERISTALTIC STD AUX CHANNEL

## (undated) DEVICE — GLOVE, SURGICAL, PROTEXIS PI W/NEU-THERA, 7.5, PF, LF

## (undated) DEVICE — DRAPE PACK, LAVH, W/ATTACHED LEGGINGS, W/POUCH, 100 X 114 IN, LF, STERILE

## (undated) DEVICE — ADAPTER FLSH PMP FLD MGMT GI IRRIG OFP 2 DISPOSABLE

## (undated) DEVICE — NEEDLE, HYPODERMIC, SPECIALTY, REGULAR WALL, SHORT BEVEL, 18 G X 1.5 IN

## (undated) DEVICE — TUBING, SUCTION, 6MM X 10, CLEAN N-COND

## (undated) DEVICE — HOLSTER, ELECTROSURGERY ACCESSORY, STERILE

## (undated) DEVICE — GLOVE, SURGICAL, PROTEXIS PI BLUE W/NEUTHERA, 8.0, PF, LF

## (undated) DEVICE — PROTECTOR, NERVE, ULNAR, PINK

## (undated) DEVICE — GLOVE, SURGICAL, PROTEXIS PI MICRO, 7.5, PF, LF

## (undated) DEVICE — ELECTRODE, HF, ESG PLASMA LOOP-MEDIUM 30 DEG

## (undated) DEVICE — ENDO CARRY-ON PROCEDURE KIT: Brand: ENDO CARRY-ON PROCEDURE KIT

## (undated) DEVICE — CONMED SCOPE SAVER BITE BLOCK, 20X27 MM: Brand: SCOPE SAVER

## (undated) DEVICE — TRAY, SKIN SCRUB, WET PREP, WITH 4 COMPARMENT

## (undated) DEVICE — Device

## (undated) DEVICE — DRAINBAG, 15.5 X 31, 2600/2800 UROVIEW, STERILE

## (undated) DEVICE — GLOVE, SURGICAL, PROTEXIS PI ORTHO, 7.0, PF, LF

## (undated) DEVICE — SYRINGE, LUER LOCK, 12ML

## (undated) DEVICE — SINGLE PORT MANIFOLD: Brand: NEPTUNE 2

## (undated) DEVICE — TOWEL PACK, STERILE, 4/PACK, BLUE

## (undated) DEVICE — SYRINGE, 20 CC, LUER LOCK

## (undated) DEVICE — Device: Brand: ENDO SMARTCAP

## (undated) DEVICE — BRUSH ENDO CLN L90.5IN SHTH DIA1.7MM BRIST DIA5-7MM 2-6MM

## (undated) DEVICE — CATHETER, URETERAL, OLIVE TIP, 5 FR, 115 CM, POLYURETHANE